# Patient Record
Sex: FEMALE | Race: WHITE | NOT HISPANIC OR LATINO | Employment: FULL TIME | URBAN - METROPOLITAN AREA
[De-identification: names, ages, dates, MRNs, and addresses within clinical notes are randomized per-mention and may not be internally consistent; named-entity substitution may affect disease eponyms.]

---

## 2017-03-14 ENCOUNTER — TRANSFERRED RECORDS (OUTPATIENT)
Dept: HEALTH INFORMATION MANAGEMENT | Facility: CLINIC | Age: 32
End: 2017-03-14

## 2017-08-11 ENCOUNTER — TRANSFERRED RECORDS (OUTPATIENT)
Dept: HEALTH INFORMATION MANAGEMENT | Facility: CLINIC | Age: 32
End: 2017-08-11

## 2018-05-09 ENCOUNTER — TRANSFERRED RECORDS (OUTPATIENT)
Dept: HEALTH INFORMATION MANAGEMENT | Facility: CLINIC | Age: 33
End: 2018-05-09

## 2018-05-09 LAB
ALT SERPL-CCNC: 13 IU/L (ref 0–32)
AST SERPL-CCNC: 15 IU/L (ref 0–40)
CREAT SERPL-MCNC: 0.57 MG/DL (ref 0.57–1)
GFR SERPL CREATININE-BSD FRML MDRD: 122 ML/MIN/1.73
GLUCOSE SERPL-MCNC: 82 MG/DL (ref 65–99)
POTASSIUM SERPL-SCNC: 3.9 MMOL/L (ref 3.5–5.2)
TSH SERPL-ACNC: 1.48 ULU/ML (ref 0.45–4.5)

## 2019-07-01 ENCOUNTER — TRANSFERRED RECORDS (OUTPATIENT)
Dept: HEALTH INFORMATION MANAGEMENT | Facility: CLINIC | Age: 34
End: 2019-07-01

## 2019-07-01 LAB — PAP SMEAR - HIM PATIENT REPORTED: NEGATIVE

## 2019-07-05 ENCOUNTER — THERAPY VISIT (OUTPATIENT)
Dept: PHYSICAL THERAPY | Facility: CLINIC | Age: 34
End: 2019-07-05
Payer: COMMERCIAL

## 2019-07-05 DIAGNOSIS — N81.89 PELVIC FLOOR WEAKNESS IN FEMALE: ICD-10-CM

## 2019-07-05 DIAGNOSIS — M62.89 PELVIC FLOOR DYSFUNCTION IN FEMALE: ICD-10-CM

## 2019-07-05 PROCEDURE — 97140 MANUAL THERAPY 1/> REGIONS: CPT | Mod: GP | Performed by: PHYSICAL THERAPIST

## 2019-07-05 PROCEDURE — 97161 PT EVAL LOW COMPLEX 20 MIN: CPT | Mod: GP | Performed by: PHYSICAL THERAPIST

## 2019-07-05 PROCEDURE — 97112 NEUROMUSCULAR REEDUCATION: CPT | Mod: GP | Performed by: PHYSICAL THERAPIST

## 2019-07-05 PROCEDURE — 97110 THERAPEUTIC EXERCISES: CPT | Mod: GP | Performed by: PHYSICAL THERAPIST

## 2019-07-05 PROCEDURE — 97535 SELF CARE MNGMENT TRAINING: CPT | Mod: GP | Performed by: PHYSICAL THERAPIST

## 2019-07-05 NOTE — LETTER
[facility]  [address] [address] [place] [id number]  [phone]    2019    Re: Griselda Bell   :   1985  MRN:  [id number]   REFERRING PHYSICIAN:   Homa Gonzales    [facility]    Date of Initial Evaluation:  2019  Visits:  Rxs Used: 1  Reason for Referral:     Pelvic floor dysfunction in female  Pelvic floor weakness in female  Postpartum care following vaginal delivery  Fourth degree perineal tear during delivery with  problem    EVALUATION SUMMARY    [facility] Initial Evaluation  Subjective:    Griselda Bell being seen for pelvic floor weakness.   Date of Onset: 19. Problem occurred: vaginal delivery with 4 th degree tear  and reported as 2/10 (groin) on pain scale. General health as reported by patient is excellent. Pertinent medical history includes:  None.   Other medical allergies details: shellfish.  Surgeries include:  Orthopedic surgery. Other surgery history details: left shoulder.  Current medications:  Anti-inflammatory.   Primary job tasks include:  Prolonged sitting and computer work.  Pain is described as aching  Pain is worse during the day. Since onset symptoms are unchanged.  Patient is . Restrictions include:  Working in normal job without restrictions.  Barriers include:  None as reported by patient.Red flags:  None as reported by patient.    Type of problem:  Pelvic dysfunction. Condition occurred with:  After pregnancy.    Problem details: MD order 19. - vaginal delivery with 4 th degree tear. Marisabel is getting back to normal routine-walking and she is nervous about running. She is terrified to have sex again, she feels some groin pain while sitting on toilet seat. She also spins on bike and not sure if it safe to bike again. She does not have UI or bowel issues currently. Just got her period back. She concern is to strengthen her pelvic floor and do her  recreational activities and make sure no pain with intercourse. She met with her OBGYN and was sent to PT for further management. .   Patient reports pain:  Groin. Symptoms are exacerbated by walking and relieved by NSAID's.    Objective:    Pelvic Dysfunction Evaluation:    Abdominal Wall:  normal  Pelvic Clock Exam:    Transverse Perineal:  +/-  Levator ANI:  +  Perineal Body:  ++  External Assessment:    Skin Condition:  Normal  Scars:  Tender and tear  Bearing Down/Coughing:  Normal  Tissue Symmetry:  Normal  Introitus:  Normal  Muscle Contraction/Perineal Mobility:  Elevation and urogential triangle descent and substitution  Internal Assessment:  Internal assessment pelvic: Laycock 2+/5/10/7. Tender to palaption around the scar tissue mostly to the left side.   Sensory Exam:  Normal  Contraction/Grade:  Weak squeeze, 2 second hold (2)  Accessory Muscle use-Abdominals:  After 5 sec hold with PFM contraction  Accessory Muscle use-Gluteals:  After 5 sec hold with PFM contraction  Accessory Muscle use-Adductors:  Minimal    Additional History:  Delivery History:  Vaginal delivery and tearing  Number of Pregnancies: 1  Number of Live Births: 1  Caffeine Consumption:  1 cup/day         Assessment/Plan:    Patient is a 34 year old female with pelvic complaints.    Patient has the following significant findings with corresponding treatment plan.                Diagnosis 1:  Pelvic floor dysfunction- PFM weakness  Pain -  hot/cold therapy, manual therapy, STS, self management, education and home program  Decreased strength - therapeutic exercise, therapeutic activities and home program  Decreased function - therapeutic activities and home program    Therapy Evaluation Codes:   1) History comprised of:   Personal factors that impact the plan of care:      Time since onset of symptoms.    Comorbidity factors that impact the plan of care are:      Cancer and Menopausal.     Medications impacting care: None.  2) Examination  of Body Systems comprised of:   Body structures and functions that impact the plan of care:      Pelvis.   Activity limitations that impact the plan of care are:      Lifting, Running and [place].  3) Clinical presentation characteristics are:   Stable/Uncomplicated.  4) Decision-Making    Low complexity using standardized patient assessment instrument and/or measureable assessment of functional outcome.  Cumulative Therapy Evaluation is: Low complexity.    Previous and current functional limitations:  (See Goal Flow Sheet for this information)    Short term and Long term goals: (See Goal Flow Sheet for this information)     Communication ability:  Patient appears to be able to clearly communicate and understand verbal and written communication and follow directions correctly.  Treatment Explanation - The following has been discussed with the patient:   RX ordered/plan of care  Anticipated outcomes  Possible risks and side effects  This patient would benefit from PT intervention to resume normal activities.   Rehab potential is good.    Frequency:  1 X week, once daily  Duration:  for 1 weeks for 3 weeks tapering to 2 X a month over 2 months  Discharge Plan:  Achieve all LTG.  Independent in home treatment program.  Reach maximal therapeutic benefit.    Thank you for your referral.    INQUIRIES  Therapist: Blaire Starr, PT  [address] [address]  Phone: [phone]  Fax: [phone]

## 2019-07-05 NOTE — PROGRESS NOTES
[facility] Initial Evaluation  Subjective:    Griselda Bell being seen for pelvic floor weakness.   Date of Onset: 19. Problem occurred: vaginal delivery with 4 th degree tear  and reported as 2/10 (groin) on pain scale. General health as reported by patient is excellent. Pertinent medical history includes:  None.   Other medical allergies details: shellfish.  Surgeries include:  Orthopedic surgery. Other surgery history details: left shoulder.  Current medications:  Anti-inflammatory.   Primary job tasks include:  Prolonged sitting and computer work.  Pain is described as aching  Pain is worse during the day. Since onset symptoms are unchanged.      Patient is . Restrictions include:  Working in normal job without restrictions.    Barriers include:  None as reported by patient.  Red flags:  None as reported by patient.  Type of problem:  Pelvic dysfunction   Condition occurred with:  After pregnancy.    Problem details: MD order 19. - vaginal delivery with 4 th degree tear. Marisabel is getting back to normal routine-walking and she is nervous about running. She is terrified to have sex again, she feels some groin pain while sitting on toilet seat. She also spins on bike and not sure if it safe to bike again. She does not have UI or bowel issues currently. Just got her period back. She concern is to strengthen her pelvic floor and do her recreational activities and make sure no pain with intercourse. She met with her OBGYN and was sent to PT for further management. .   Patient reports pain:  Groin.   Symptoms are exacerbated by walking and relieved by NSAID's.                      Objective:  System                                 Pelvic Dysfunction Evaluation:          Abdominal Wall:  normal        Pelvic Clock Exam:        Transverse Perineal:  +/-  Levator ANI:  +  Perineal Body:  ++      External Assessment:    Skin Condition:  Normal  Scars:  Tender and tear  Bearing  Down/Coughing:  Normal  Tissue Symmetry:  Normal  Introitus:  Normal  Muscle Contraction/Perineal Mobility:  Elevation and urogential triangle descent and substitution  Internal Assessment:  Internal assessment pelvic: Laycock 2+/5/10/7. Tender to palaption around the scar tissue mostly to the left side.   Sensory Exam:  Normal  Contraction/Grade:  Weak squeeze, 2 second hold (2)  Accessory Muscle use-Abdominals:  After 5 sec hold with PFM contraction  Accessory Muscle use-Gluteals:  After 5 sec hold with PFM contraction  Accessory Muscle use-Adductors:  Minimal    Additional History:  Delivery History:  Vaginal delivery and tearing  Number of Pregnancies: 1  Number of Live Births: 1  Caffeine Consumption:  1 cup/day                     General     ROS    Assessment/Plan:    Patient is a 34 year old female with pelvic complaints.    Patient has the following significant findings with corresponding treatment plan.                Diagnosis 1:  Pelvic floor dysfunction- PFM weakness  Pain -  hot/cold therapy, manual therapy, STS, self management, education and home program  Decreased strength - therapeutic exercise, therapeutic activities and home program  Decreased function - therapeutic activities and home program    Therapy Evaluation Codes:   1) History comprised of:   Personal factors that impact the plan of care:      Time since onset of symptoms.    Comorbidity factors that impact the plan of care are:      Cancer and Menopausal.     Medications impacting care: None.  2) Examination of Body Systems comprised of:   Body structures and functions that impact the plan of care:      Pelvis.   Activity limitations that impact the plan of care are:      Lifting, Running and [place].  3) Clinical presentation characteristics are:   Stable/Uncomplicated.  4) Decision-Making    Low complexity using standardized patient assessment instrument and/or measureable assessment of functional outcome.  Cumulative Therapy  Evaluation is: Low complexity.    Previous and current functional limitations:  (See Goal Flow Sheet for this information)    Short term and Long term goals: (See Goal Flow Sheet for this information)     Communication ability:  Patient appears to be able to clearly communicate and understand verbal and written communication and follow directions correctly.  Treatment Explanation - The following has been discussed with the patient:   RX ordered/plan of care  Anticipated outcomes  Possible risks and side effects  This patient would benefit from PT intervention to resume normal activities.   Rehab potential is good.    Frequency:  1 X week, once daily  Duration:  for 1 weeks for 3 weeks tapering to 2 X a month over 2 months  Discharge Plan:  Achieve all LTG.  Independent in home treatment program.  Reach maximal therapeutic benefit.    Please refer to the daily flowsheet for treatment today, total treatment time and time spent performing 1:1 timed codes.

## 2019-07-09 ENCOUNTER — THERAPY VISIT (OUTPATIENT)
Dept: PHYSICAL THERAPY | Facility: CLINIC | Age: 34
End: 2019-07-09
Payer: COMMERCIAL

## 2019-07-09 DIAGNOSIS — N81.89 PELVIC FLOOR WEAKNESS IN FEMALE: ICD-10-CM

## 2019-07-09 DIAGNOSIS — M62.89 PELVIC FLOOR DYSFUNCTION IN FEMALE: ICD-10-CM

## 2019-07-09 PROCEDURE — 97110 THERAPEUTIC EXERCISES: CPT | Mod: GP | Performed by: PHYSICAL THERAPIST

## 2019-07-09 PROCEDURE — 97140 MANUAL THERAPY 1/> REGIONS: CPT | Mod: GP | Performed by: PHYSICAL THERAPIST

## 2019-07-09 PROCEDURE — 97112 NEUROMUSCULAR REEDUCATION: CPT | Mod: GP | Performed by: PHYSICAL THERAPIST

## 2019-07-17 ENCOUNTER — THERAPY VISIT (OUTPATIENT)
Dept: PHYSICAL THERAPY | Facility: CLINIC | Age: 34
End: 2019-07-17
Payer: COMMERCIAL

## 2019-07-17 DIAGNOSIS — M62.89 PELVIC FLOOR DYSFUNCTION IN FEMALE: ICD-10-CM

## 2019-07-17 DIAGNOSIS — N81.89 PELVIC FLOOR WEAKNESS IN FEMALE: ICD-10-CM

## 2019-07-17 PROCEDURE — 97140 MANUAL THERAPY 1/> REGIONS: CPT | Mod: GP | Performed by: PHYSICAL THERAPIST

## 2019-07-17 PROCEDURE — 97112 NEUROMUSCULAR REEDUCATION: CPT | Mod: GP | Performed by: PHYSICAL THERAPIST

## 2019-08-02 ENCOUNTER — THERAPY VISIT (OUTPATIENT)
Dept: PHYSICAL THERAPY | Facility: CLINIC | Age: 34
End: 2019-08-02
Payer: COMMERCIAL

## 2019-08-02 DIAGNOSIS — M62.89 PELVIC FLOOR DYSFUNCTION IN FEMALE: ICD-10-CM

## 2019-08-02 DIAGNOSIS — N81.89 PELVIC FLOOR WEAKNESS IN FEMALE: ICD-10-CM

## 2019-08-02 PROCEDURE — 97140 MANUAL THERAPY 1/> REGIONS: CPT | Mod: GP | Performed by: PHYSICAL THERAPIST

## 2019-08-02 PROCEDURE — 97112 NEUROMUSCULAR REEDUCATION: CPT | Mod: GP | Performed by: PHYSICAL THERAPIST

## 2019-08-02 PROCEDURE — 97110 THERAPEUTIC EXERCISES: CPT | Mod: GP | Performed by: PHYSICAL THERAPIST

## 2019-09-13 ENCOUNTER — THERAPY VISIT (OUTPATIENT)
Dept: PHYSICAL THERAPY | Facility: CLINIC | Age: 34
End: 2019-09-13
Payer: COMMERCIAL

## 2019-09-13 DIAGNOSIS — N81.89 PELVIC FLOOR WEAKNESS IN FEMALE: ICD-10-CM

## 2019-09-13 DIAGNOSIS — M62.89 PELVIC FLOOR DYSFUNCTION IN FEMALE: ICD-10-CM

## 2019-09-13 PROCEDURE — 97110 THERAPEUTIC EXERCISES: CPT | Mod: GP | Performed by: PHYSICAL THERAPIST

## 2019-09-13 NOTE — PROGRESS NOTES
Subjective:  HPI                    Objective:  System    Physical Exam    General     ROS    Assessment/Plan:    PROGRESS  REPORT    Progress reporting period is from 7/5/19 to 9/13/19.       SUBJECTIVE  Subjective changes noted by patient:   Subjective: Marisabel was concerned about seeing     Current pain level is   .     Previous pain level was    .   Changes in function:  Yes (See Goal flowsheet attached for changes in current functional level)  Adverse reaction to treatment or activity: None    OBJECTIVE  Changes noted in objective findings:  Yes,   Objective: Laycock 3+/10/10/10. Improved isolation and awareness of PFM demonstrated.      ASSESSMENT/PLAN  Updated problem list and treatment plan: Diagnosis 1:  PFD    STG/LTGs have been met or progress has been made towards goals:  Yes (See Goal flow sheet completed today.)  Assessment of Progress: The patient's condition is improving.  The patient's condition has potential to improve.  The patient has met all of their long term goals.  Self Management Plans:  Patient is independent in a home treatment program.  Patient is independent in self management of symptoms.  I have re-evaluated this patient and find that the nature, scope, duration and intensity of the therapy is appropriate for the medical condition of the patient.  Griselda continues to require the following intervention to meet STG and LTG's:  PT intervention is no longer required to meet STG/LTG.    Recommendations:  This patient is ready to be discharged from therapy and continue their home treatment program.    Please refer to the daily flowsheet for treatment today, total treatment time and time spent performing 1:1 timed codes.

## 2019-11-06 ENCOUNTER — TELEPHONE (OUTPATIENT)
Dept: FAMILY MEDICINE | Facility: CLINIC | Age: 34
End: 2019-11-06

## 2019-11-06 NOTE — TELEPHONE ENCOUNTER
Reason for Call:  Other returning call    Detailed comments: Pt did return call and stated she wanted to have the appt as a Pre op. Appt notes were switched over to pre op. Thank you.    Phone Number Patient can be reached at: Home number on file [phone] (home)    Best Time: Any    Can we leave a detailed message on this number? NO    Call taken on 11/6/2019 at 1:30 PM by Nan Schroeder

## 2019-11-06 NOTE — TELEPHONE ENCOUNTER
This writer attempted to contact pt on 11/06/19      Reason for call relay message and left message.      If patient calls back:   On 11/7/19 pt is scheduled for a preop and a physical at 1120 with Dr. Garcia,  We can not do both visits at one time has to be either for a AFE or a preop.        Winter Muller MA

## 2019-11-07 ENCOUNTER — OFFICE VISIT (OUTPATIENT)
Dept: FAMILY MEDICINE | Facility: CLINIC | Age: 34
End: 2019-11-07
Payer: COMMERCIAL

## 2019-11-07 VITALS
BODY MASS INDEX: 29.98 KG/M2 | WEIGHT: 191 LBS | RESPIRATION RATE: 16 BRPM | HEIGHT: 67 IN | OXYGEN SATURATION: 98 % | TEMPERATURE: 97.9 F | HEART RATE: 75 BPM | DIASTOLIC BLOOD PRESSURE: 81 MMHG | SYSTOLIC BLOOD PRESSURE: 117 MMHG

## 2019-11-07 DIAGNOSIS — Z91.013 SHELLFISH ALLERGY: ICD-10-CM

## 2019-11-07 DIAGNOSIS — Z01.818 PRE-OPERATIVE EXAMINATION: Primary | ICD-10-CM

## 2019-11-07 LAB — HCG UR QL: NEGATIVE

## 2019-11-07 PROCEDURE — 99204 OFFICE O/P NEW MOD 45 MIN: CPT | Performed by: FAMILY MEDICINE

## 2019-11-07 PROCEDURE — 81025 URINE PREGNANCY TEST: CPT | Performed by: FAMILY MEDICINE

## 2019-11-07 RX ORDER — ESCITALOPRAM OXALATE 10 MG/1
TABLET ORAL
COMMUNITY
Start: 2019-10-31 | End: 2021-04-19

## 2019-11-07 SDOH — HEALTH STABILITY: MENTAL HEALTH: HOW OFTEN DO YOU HAVE A DRINK CONTAINING ALCOHOL?: 2-4 TIMES A MONTH

## 2019-11-07 ASSESSMENT — MIFFLIN-ST. JEOR: SCORE: 1591.06

## 2019-11-07 NOTE — PROGRESS NOTES
[address] [address]  [phone]  Dept: [phone]    PRE-OP EVALUATION:  Today's date: 2019  New Patient/Transfer of Care from [facility]     Griselda Bell (: 1985) presents for pre-operative evaluation assessment as requested by Diana Coy.  She requires evaluation and anesthesia risk assessment prior to undergoing surgery/procedure for treatment of  Vaginal gas/?stool, ? fistula .    Proposed Surgery/ Procedure: EXAM UNDER ANESTHESIA, RECTAL VAGINAL FISTULA  Date of Surgery/ Procedure: 19  Time of Surgery/ Procedure: 10:10AM  Hospital/Surgical [facility]: [facility]   Fax number for surgical facility: not needed   Primary Physician: Za Buckley  Type of Anesthesia Anticipated: Monitor Anesthesia Care    Patient has a Health Care Directive or Living Will:  NO    1. NO - Do you have a history of heart attack, stroke, stent, bypass or surgery on an artery in the head, neck, heart or legs?  2. NO - Do you ever have any pain or discomfort in your chest?  3. NO - Do you have a history of  Heart Failure?  4. NO - Are you troubled by shortness of breath when: walking on the level, up a slight hill or at night?  5. NO - Do you currently have a cold, bronchitis or other respiratory infection?  6. NO - Do you have a cough, shortness of breath or wheezing?  7. NO - Do you sometimes get pains in the calves of your legs when you walk?  8. NO - Do you or anyone in your family have previous history of blood clots?  9. NO - Do you or does anyone in your family have a serious bleeding problem such as prolonged bleeding following surgeries or cuts?  10. YES - HAVE YOU EVER HAD PROBLEMS WITH ANEMIA OR BEEN TOLD TO TAKE IRON PILLS? 10 years ago had to take iron pills, no anemia since. Pt doesn't remember the reason why she was anemic.   11. NO - Have you had any abnormal blood loss such as black, tarry or bloody  stools, or abnormal vaginal bleeding?  12. NO - Have you ever had a blood transfusion?  13. NO - Have you or any of your relatives ever had problems with anesthesia?  14. NO - Do you have sleep apnea, excessive snoring or daytime drowsiness?  15. NO - Do you have any prosthetic heart valves?  16. NO - Do you have prosthetic joints?  17. NO - Is there any chance that you may be pregnant?      HPI:     HPI related to upcoming procedure: In April 2019 pt gave birth and experienced a fourth degree tear. She is not having pain or bowel incontinence, but questions if there has been times stool/gas has come our of her vagina. Has exploratory surgery scheduled to investigate the possibility of a rectal vaginal fistula.     See problem list for active medical problems.  Problems all longstanding and stable, except as noted/documented.  See ROS for pertinent symptoms related to these conditions.      MEDICAL HISTORY:     Patient Active Problem List    Diagnosis Date Noted     Post partum depression 11/07/2019     Priority: Medium      History reviewed. No pertinent past medical history.  Past Surgical History:   Procedure Laterality Date     ARTHROSCOPY SHOULDER RT/LT  2006    labral repair     LEEP TX, CERVICAL  2015?     Current Outpatient Medications   Medication Sig Dispense Refill     escitalopram (LEXAPRO) 10 MG tablet        Prenatal Multivit-Min-Fe-FA (PRENATAL VITAMINS PO)        OTC products: None, except as noted above    No Known Allergies   Latex Allergy: NO    Social History     Tobacco Use     Smoking status: Never Smoker     Smokeless tobacco: Never Used   Substance Use Topics     Alcohol use: Yes     Frequency: 2-4 times a month     Comment: 1 drink when does have etoh     History   Drug Use Unknown       REVIEW OF SYSTEMS:   CONSTITUTIONAL: NEGATIVE for fever, chills, change in weight. About 1 week ago pt had a URI. She is feeling better now, all symptoms resolved.   INTEGUMENTARY/SKIN: NEGATIVE for  "worrisome rashes, moles or lesions. Has had food allergy testing in the past that showed an allergen to shellfish. She has noticed a swelling in her upper lip when she has eaten scallops in the past. No recent exposure or sx's.   EYES: NEGATIVE for vision changes or irritation  ENT/MOUTH: deviated septum. NEGATIVE for ear, mouth and throat problems  RESP: NEGATIVE for significant cough or SOB  BREAST: NEGATIVE for masses, tenderness or discharge. Pt is currently breast feeding.   CV: NEGATIVE for chest pain, palpitations or peripheral edema  GI: See HPI. NEGATIVE for nausea, abdominal pain, heartburn, or change in bowel habits  : NEGATIVE for frequency, dysuria, or hematuria. Pt consistently uses condoms, is not on an OCP. Has been getting irregular periods since birth.   MUSCULOSKELETAL: NEGATIVE for significant arthralgias or myalgia  NEURO: NEGATIVE for weakness, dizziness or paresthesias  ENDOCRINE: NEGATIVE for temperature intolerance, skin/hair changes  HEME: NEGATIVE for bleeding problems   PSYCHIATRIC: NEGATIVE for changes in mood or affect. Has been taking Lexapro for post partum depression, pt thinks this has a large part to do with the tear she experienced. It is working well for her. Plans to continue taking it until her baby is 1 year old and then will re-assess her mood with her obgyn.     This document serves as a record of the services and decisions personally performed by FRANKLIN GONZALEZ. It was created on his/her behalf by Yaquelin Rodriguez, a trained medical scribe. The creation of this document is based on the provider's statements to the medical scribe. Yaquelin Rodriguez, November 7, 2019 12:17 PM     EXAM:   /81 (BP Location: Right arm, Patient Position: Sitting, Cuff Size: Adult Large)   Pulse 75   Temp 97.9  F (36.6  C) (Oral)   Resp 16   Ht 1.689 m (5' 6.5\")   Wt 86.6 kg (191 lb)   SpO2 98%   BMI 30.37 kg/m      GENERAL APPEARANCE: healthy, alert and no distress     EYES: " EOMI, PERRL     HENT: air fluid level seen behind right TM, otherwise ear canals and TM's normal and nose and mouth without ulcers or lesions     NECK: no adenopathy, no asymmetry, masses, or scars and thyroid normal to palpation     RESP: lungs clear to auscultation - no rales, rhonchi or wheezes     CV: regular rates and rhythm, normal S1 S2, no S3 or S4 and no murmur, click or rub     ABDOMEN:  soft, nontender, no HSM or masses and bowel sounds normal     MS: extremities normal- no gross deformities noted, no evidence of inflammation in joints, FROM in all extremities.     SKIN: no suspicious lesions or rashes     NEURO: Normal strength and tone, sensory exam grossly normal, mentation intact and speech normal     PSYCH: mentation appears normal. and affect normal/bright     LYMPHATICS: No cervical adenopathy    DIAGNOSTICS:   No labs or EKG required for low risk surgery (cataract, skin procedure, breast biopsy, etc)    Results for orders placed or performed in visit on 11/07/19   HCG Qual, Urine ([id number])     Status: None   Result Value Ref Range    HCG Qual Urine Negative NEG^Negative     IMPRESSION:   Reason for surgery/procedure: ? Of rectovaginal fistula    The proposed surgical procedure is considered LOW risk.    REVISED CARDIAC RISK INDEX  The patient has the following serious cardiovascular risks for perioperative complications such as (MI, PE, VFib and 3  AV Block):  No serious cardiac risks  INTERPRETATION: 0 risks: Class I (very low risk - 0.4% complication rate)    The patient has the following additional risks for perioperative complications:  No identified additional risks      ICD-10-CM    1. Obstetric vaginal laceration with fourth degree perineal laceration O70.3 HCG Qual, Urine ([id number])   2. Post partum depression O99.345     F53.0    3. Shellfish allergy Z91.013 ALLERGY/ASTHMA ADULT REFERRAL   4. Breast feeding status of mother Z39.1    5. Pre-operative examination Z01.818 HCG Qual, Urine  ([id number])       RECOMMENDATIONS:     --Patient is to take all scheduled medications on the day of surgery EXCEPT for modifications listed below. Patient takes her lexapro at  and will do so night prior to surgery    APPROVAL GIVEN to proceed with proposed procedure, without further diagnostic evaluation     Patient Instructions     Check with your surgery team about recommendations for breast feeding/pumping after your surgery.    Do not take NSAIDs (ibuprofen, Aleve, naproxen, Advil, aspirin) the week before surgery. Tylenol is fine for pain.     On the morning of the procedure do not take any vitamins or medications.     At [facility], we strive to deliver an exceptional experience to you, every time we see you. If you receive a survey, please complete it as we do value your feedback.  If you have MyChart, you can expect to receive results automatically within 24 hours of their completion.  Your provider will send a note interpreting your results as well.   If you do not have MyChart, you should receive your results in about a week by mail.    Your care team:     Family Medicine   KEM Brewer MD Emily Bunt, APRN [facility]   S. MD Leonila Brantley MD Angela Werm[place]kirc[place], MD    Internal Medicine  Carlos Fuentes MD coming 2020     Clinic hours: Monday - Wednesday 7 am-7 pm   Thursdays and Fridays 7 am-5 pm.     [facility]: Monday - Friday 11 am-9 pm,   Saturday and Sunday 9 am-5 pm.    [facility]: Monday -Thursday 8 am-8 pm; Friday 8 am-6 pm; Saturday and Sunday 9 am-5 pm.     [facility]: Monday - Thursday 8 am - 7 pm; Friday 8 am - 6 pm    Clinic: [phone]   [facility]: [phone]   [facility]: [phone]                The information in this document, created by the medical scribe for me, accurately reflects the services I  personally performed and the decisions made by me. I have reviewed and approved this document for accuracy.   Signed Electronically by: Za Buckley MD    Copy of this evaluation report is provided to requesting physician.    [place] Preop Guidelines    Revised Cardiac Risk Index

## 2019-11-07 NOTE — PATIENT INSTRUCTIONS
Check with your surgery team about recommendations for breast feeding/pumping after your surgery.    Do not take NSAIDs (ibuprofen, Aleve, naproxen, Advil, aspirin) the week before surgery. Tylenol is fine for pain.     On the morning of the procedure do not take any vitamins or medications.     At [facility], we strive to deliver an exceptional experience to you, every time we see you. If you receive a survey, please complete it as we do value your feedback.  If you have MyChart, you can expect to receive results automatically within 24 hours of their completion.  Your provider will send a note interpreting your results as well.   If you do not have MyChart, you should receive your results in about a week by mail.    Your care team:     Family Medicine   KEM Brewer MD Emily Bunt, APRTANNER [facility]   S. MD Leonila Brantley MD Angela Werm[place]hen, MD    Internal Medicine  Carlos Fuentes MD [place] [id number]     Clinic hours: Monday - Wednesday 7 am-7 pm   Thursdays and Fridays 7 am-5 pm.     [facility]: Monday - Friday 11 am-9 pm,   Saturday and Sunday 9 am-5 pm.    [facility]: Monday -Thursday 8 am-8 pm; Friday 8 am-6 pm; Saturday and Sunday 9 am-5 pm.     [facility]: Monday - Thursday 8 am - 7 pm; Friday 8 am - 6 pm    Clinic: [phone]   [facility]: [phone] [facility]: [phone]

## 2019-11-13 ENCOUNTER — ANESTHESIA EVENT (OUTPATIENT)
Dept: SURGERY | Facility: CLINIC | Age: 34
End: 2019-11-13
Payer: COMMERCIAL

## 2019-11-14 ENCOUNTER — HOSPITAL ENCOUNTER (OUTPATIENT)
Facility: CLINIC | Age: 34
Discharge: HOME | End: 2019-11-14
Attending: COLON & RECTAL SURGERY | Admitting: COLON & RECTAL SURGERY
Payer: COMMERCIAL

## 2019-11-14 ENCOUNTER — ANESTHESIA (OUTPATIENT)
Dept: SURGERY | Facility: CLINIC | Age: 34
End: 2019-11-14
Payer: COMMERCIAL

## 2019-11-14 VITALS
HEART RATE: 71 BPM | DIASTOLIC BLOOD PRESSURE: 73 MMHG | HEIGHT: 66 IN | SYSTOLIC BLOOD PRESSURE: 116 MMHG | RESPIRATION RATE: 16 BRPM | TEMPERATURE: 97.2 F | BODY MASS INDEX: 30.7 KG/M2 | WEIGHT: 191 LBS | OXYGEN SATURATION: 100 %

## 2019-11-14 LAB — HCG UR QL: NEGATIVE

## 2019-11-14 PROCEDURE — 36000046 ZZH SURGERY LEVEL 1 EA 15 ADDTL MIN: Performed by: COLON & RECTAL SURGERY

## 2019-11-14 PROCEDURE — 25000125 ZZHC RX 250: Performed by: COLON & RECTAL SURGERY

## 2019-11-14 PROCEDURE — 25000128 H RX IP 250 OP 636: Performed by: NURSE ANESTHETIST, CERTIFIED REGISTERED

## 2019-11-14 PROCEDURE — 71000012 ZZH RECOVERY PHASE 1 LEVEL 1 FIRST HR: Performed by: COLON & RECTAL SURGERY

## 2019-11-14 PROCEDURE — 25000132 ZZH RX MED GY IP 250 OP 250 PS 637: Performed by: COLON & RECTAL SURGERY

## 2019-11-14 PROCEDURE — 81025 URINE PREGNANCY TEST: CPT | Performed by: COLON & RECTAL SURGERY

## 2019-11-14 PROCEDURE — 71000027 ZZH RECOVERY PHASE 2 EACH 15 MINS: Performed by: COLON & RECTAL SURGERY

## 2019-11-14 PROCEDURE — 25800030 ZZH RX IP 258 OP 636: Performed by: NURSE ANESTHETIST, CERTIFIED REGISTERED

## 2019-11-14 PROCEDURE — 36000044 ZZH SURGERY LEVEL 1 1ST 30 MIN: Performed by: COLON & RECTAL SURGERY

## 2019-11-14 PROCEDURE — 25000125 ZZHC RX 250: Performed by: NURSE ANESTHETIST, CERTIFIED REGISTERED

## 2019-11-14 PROCEDURE — 37000009 ZZH ANESTHESIA TECHNICAL FEE, EACH ADDTL 15 MIN: Performed by: COLON & RECTAL SURGERY

## 2019-11-14 PROCEDURE — 27210794 ZZH OR GENERAL SUPPLY STERILE: Performed by: COLON & RECTAL SURGERY

## 2019-11-14 PROCEDURE — 37000008 ZZH ANESTHESIA TECHNICAL FEE, 1ST 30 MIN: Performed by: COLON & RECTAL SURGERY

## 2019-11-14 PROCEDURE — 40000170 ZZH STATISTIC PRE-PROCEDURE ASSESSMENT II: Performed by: COLON & RECTAL SURGERY

## 2019-11-14 RX ORDER — FENTANYL CITRATE 50 UG/ML
25-50 INJECTION, SOLUTION INTRAMUSCULAR; INTRAVENOUS
Status: DISCONTINUED | OUTPATIENT
Start: 2019-11-14 | End: 2019-11-14 | Stop reason: HOSPADM

## 2019-11-14 RX ORDER — ONDANSETRON 2 MG/ML
INJECTION INTRAMUSCULAR; INTRAVENOUS PRN
Status: DISCONTINUED | OUTPATIENT
Start: 2019-11-14 | End: 2019-11-14

## 2019-11-14 RX ORDER — DEXAMETHASONE SODIUM PHOSPHATE 4 MG/ML
4 INJECTION, SOLUTION INTRA-ARTICULAR; INTRALESIONAL; INTRAMUSCULAR; INTRAVENOUS; SOFT TISSUE EVERY 10 MIN PRN
Status: DISCONTINUED | OUTPATIENT
Start: 2019-11-14 | End: 2019-11-14 | Stop reason: HOSPADM

## 2019-11-14 RX ORDER — LABETALOL HYDROCHLORIDE 5 MG/ML
10 INJECTION, SOLUTION INTRAVENOUS
Status: DISCONTINUED | OUTPATIENT
Start: 2019-11-14 | End: 2019-11-14 | Stop reason: HOSPADM

## 2019-11-14 RX ORDER — MEPERIDINE HYDROCHLORIDE 25 MG/ML
12.5 INJECTION INTRAMUSCULAR; INTRAVENOUS; SUBCUTANEOUS
Status: DISCONTINUED | OUTPATIENT
Start: 2019-11-14 | End: 2019-11-14 | Stop reason: HOSPADM

## 2019-11-14 RX ORDER — PROPOFOL 10 MG/ML
INJECTION, EMULSION INTRAVENOUS PRN
Status: DISCONTINUED | OUTPATIENT
Start: 2019-11-14 | End: 2019-11-14

## 2019-11-14 RX ORDER — SODIUM CHLORIDE, SODIUM LACTATE, POTASSIUM CHLORIDE, CALCIUM CHLORIDE 600; 310; 30; 20 MG/100ML; MG/100ML; MG/100ML; MG/100ML
INJECTION, SOLUTION INTRAVENOUS CONTINUOUS
Status: DISCONTINUED | OUTPATIENT
Start: 2019-11-14 | End: 2019-11-14 | Stop reason: HOSPADM

## 2019-11-14 RX ORDER — HYDRALAZINE HYDROCHLORIDE 20 MG/ML
2.5-5 INJECTION INTRAMUSCULAR; INTRAVENOUS EVERY 10 MIN PRN
Status: DISCONTINUED | OUTPATIENT
Start: 2019-11-14 | End: 2019-11-14 | Stop reason: HOSPADM

## 2019-11-14 RX ORDER — ACETAMINOPHEN 325 MG/1
975 TABLET ORAL ONCE
Status: COMPLETED | OUTPATIENT
Start: 2019-11-14 | End: 2019-11-14

## 2019-11-14 RX ORDER — KETOROLAC TROMETHAMINE 30 MG/ML
30 INJECTION, SOLUTION INTRAMUSCULAR; INTRAVENOUS EVERY 6 HOURS PRN
Status: DISCONTINUED | OUTPATIENT
Start: 2019-11-14 | End: 2019-11-14 | Stop reason: HOSPADM

## 2019-11-14 RX ORDER — MAGNESIUM HYDROXIDE 1200 MG/15ML
LIQUID ORAL PRN
Status: DISCONTINUED | OUTPATIENT
Start: 2019-11-14 | End: 2019-11-14 | Stop reason: HOSPADM

## 2019-11-14 RX ORDER — DEXAMETHASONE SODIUM PHOSPHATE 4 MG/ML
INJECTION, SOLUTION INTRA-ARTICULAR; INTRALESIONAL; INTRAMUSCULAR; INTRAVENOUS; SOFT TISSUE PRN
Status: DISCONTINUED | OUTPATIENT
Start: 2019-11-14 | End: 2019-11-14

## 2019-11-14 RX ORDER — LIDOCAINE HYDROCHLORIDE 20 MG/ML
INJECTION, SOLUTION INFILTRATION; PERINEURAL PRN
Status: DISCONTINUED | OUTPATIENT
Start: 2019-11-14 | End: 2019-11-14

## 2019-11-14 RX ORDER — ONDANSETRON 4 MG/1
4 TABLET, ORALLY DISINTEGRATING ORAL EVERY 30 MIN PRN
Status: DISCONTINUED | OUTPATIENT
Start: 2019-11-14 | End: 2019-11-14 | Stop reason: HOSPADM

## 2019-11-14 RX ORDER — PROPOFOL 10 MG/ML
INJECTION, EMULSION INTRAVENOUS CONTINUOUS PRN
Status: DISCONTINUED | OUTPATIENT
Start: 2019-11-14 | End: 2019-11-14

## 2019-11-14 RX ORDER — SODIUM CHLORIDE, SODIUM LACTATE, POTASSIUM CHLORIDE, CALCIUM CHLORIDE 600; 310; 30; 20 MG/100ML; MG/100ML; MG/100ML; MG/100ML
INJECTION, SOLUTION INTRAVENOUS CONTINUOUS PRN
Status: DISCONTINUED | OUTPATIENT
Start: 2019-11-14 | End: 2019-11-14

## 2019-11-14 RX ORDER — HYDROMORPHONE HYDROCHLORIDE 1 MG/ML
.3-.5 INJECTION, SOLUTION INTRAMUSCULAR; INTRAVENOUS; SUBCUTANEOUS EVERY 10 MIN PRN
Status: DISCONTINUED | OUTPATIENT
Start: 2019-11-14 | End: 2019-11-14 | Stop reason: HOSPADM

## 2019-11-14 RX ORDER — FENTANYL CITRATE 50 UG/ML
INJECTION, SOLUTION INTRAMUSCULAR; INTRAVENOUS PRN
Status: DISCONTINUED | OUTPATIENT
Start: 2019-11-14 | End: 2019-11-14

## 2019-11-14 RX ORDER — NALOXONE HYDROCHLORIDE 0.4 MG/ML
.1-.4 INJECTION, SOLUTION INTRAMUSCULAR; INTRAVENOUS; SUBCUTANEOUS
Status: DISCONTINUED | OUTPATIENT
Start: 2019-11-14 | End: 2019-11-14 | Stop reason: HOSPADM

## 2019-11-14 RX ORDER — ONDANSETRON 2 MG/ML
4 INJECTION INTRAMUSCULAR; INTRAVENOUS EVERY 30 MIN PRN
Status: DISCONTINUED | OUTPATIENT
Start: 2019-11-14 | End: 2019-11-14 | Stop reason: HOSPADM

## 2019-11-14 RX ADMIN — DEXAMETHASONE SODIUM PHOSPHATE 4 MG: 4 INJECTION, SOLUTION INTRA-ARTICULAR; INTRALESIONAL; INTRAMUSCULAR; INTRAVENOUS; SOFT TISSUE at 10:32

## 2019-11-14 RX ADMIN — FENTANYL CITRATE 50 MCG: 50 INJECTION, SOLUTION INTRAMUSCULAR; INTRAVENOUS at 10:30

## 2019-11-14 RX ADMIN — PROPOFOL 150 MCG/KG/MIN: 10 INJECTION, EMULSION INTRAVENOUS at 10:28

## 2019-11-14 RX ADMIN — LIDOCAINE HYDROCHLORIDE 40 MG: 20 INJECTION, SOLUTION INFILTRATION; PERINEURAL at 10:28

## 2019-11-14 RX ADMIN — SODIUM CHLORIDE, POTASSIUM CHLORIDE, SODIUM LACTATE AND CALCIUM CHLORIDE: 600; 310; 30; 20 INJECTION, SOLUTION INTRAVENOUS at 10:27

## 2019-11-14 RX ADMIN — ONDANSETRON 4 MG: 2 INJECTION INTRAMUSCULAR; INTRAVENOUS at 10:36

## 2019-11-14 RX ADMIN — ACETAMINOPHEN 975 MG: 325 TABLET, FILM COATED ORAL at 09:06

## 2019-11-14 RX ADMIN — PROPOFOL 30 MG: 10 INJECTION, EMULSION INTRAVENOUS at 10:44

## 2019-11-14 RX ADMIN — MIDAZOLAM 2 MG: 1 INJECTION INTRAMUSCULAR; INTRAVENOUS at 10:25

## 2019-11-14 ASSESSMENT — MIFFLIN-ST. JEOR: SCORE: 1583.12

## 2019-11-14 ASSESSMENT — LIFESTYLE VARIABLES: TOBACCO_USE: 0

## 2019-11-14 NOTE — BRIEF OP NOTE
[facility]    Brief Operative Note    Pre-operative diagnosis: Rectovaginal fistula [N82.3]  Post-operative diagnosis rectovaginal fistula    Procedure: Procedure(s):  EXAM UNDER ANESTHESIA  Surgeon: Surgeon(s) and Role:     * Diana Steward MD - Primary     * Gaby Cabrera MD - Fellow - Assisting  Anesthesia: Monitor Anesthesia Care   Estimated blood loss: Minimal  Drains: None  Specimens: * No specimens in log *  Findings:   Midline anterior rectovaginal fistula at the dentate line  Complications: None.  Implants: * No implants in log *

## 2019-11-14 NOTE — DISCHARGE INSTRUCTIONS
Discharge Instructions Following Anorectal Surgery  [facility]  [phone]  Medication:     You may be prescribed a narcotic pain relievers such as: Vicodin, Percocet, and Tylenol # 3.  You should reduce your use of these medications as your pain improves.  You may be prescribed an anal ointment (such as Americain, Tronothane, or Xylocaine). Apply the ointment to the anal area with your finger, then cover the area with cotton or gauze.  Stool softeners (Miralax) are to be taken in a glass of water once in the morning with increased water intake throughout the day.   Bowel function:   It is important to have soft bowel movements at least every other day and to keep your stool soft. Constipation and/or diarrhea can make the pain worse and must be avoided.   Constipation:  You should have a bowel movement at least every other day.  If two days pass without one, take one tablespoon of milk of magnesia; if there is no result, repeat this dose in six hours.   Diarrhea:  Diarrhea, usually caused by the overuse of laxatives, is also a concern. If you have more than three watery bowel movements during a 24 hour period, stop taking milk of magnesia or laxatives.  If diarrhea persists, call your doctor.   Bathing:  After bowel movements, use a wet washcloth, toilet paper or cotton to clean yourself.  If possible take a sitz bath or tub bath immediately. Baths should last between 10-15 minutes with the water as warm as you can comfortably tolerate. Try to take at least three sitz baths (or showers with hand-held sprayer) every day.   Discharge/Infection:  Bloody discharge after bowel movements is normal and may last 2 to 4 weeks after your surgery. However, if you have bleeding between bowel movements that doesn't stop, call the office.  Urination:  If you have trouble urinating, do so while sitting in a tub of water, or run the water faucet while sitting on the toilet. If you are unable to urinate  6-8 hours after surgery, call the office.  Diet:  A high fiber diet, including at least four servings of fruits or vegetables daily, will help to keep your bowel movements regular and soft. It is important to drink six to eight glasses of water or juice everyday when using fiber products.   Activity:    Activity as tolerated. After some surgeries, you may be told not to perform any lifting (more than 10 pounds) for several weeks after surgery.    Call the office if you have:  Fever greater than 101   Chills   Foul-smelling drainage   Nausea and vomiting   Diarrhea - greater than 3 water stools in 24 hours   Constipation - no bowel movement for 3 days   Severe bleeding that does not stop soon after a bowel movement   Problems with the incision, including increased pain, swelling, redness, or drainage.  Difficulty urinating        Same Day Surgery Discharge Instructions for  Sedation and General Anesthesia       It's not unusual to feel dizzy, light-headed or faint for up to 24 hours after surgery or while taking pain medication.  If you have these symptoms: sit for a few minutes before standing and have someone assist you when you get up to walk or use the bathroom.      You should rest and relax for the next 24 hours. We recommend you make arrangements to have an adult stay with you for at least 24 hours after your discharge.  Avoid hazardous and strenuous activity.      DO NOT DRIVE any vehicle or operate mechanical equipment for 24 hours following the end of your surgery.  Even though you may feel normal, your reactions may be affected by the medication you have received.      Do not drink alcoholic beverages for 24 hours following surgery.       Slowly progress to your regular diet as you feel able. It's not unusual to feel nauseated and/or vomit after receiving anesthesia.  If you develop these symptoms, drink clear liquids (apple juice, ginger ale, broth, 7-up, etc. ) until you feel better.  If your nausea and  vomiting persists for 24 hours, please notify your surgeon.        All narcotic pain medications, along with inactivity and anesthesia, can cause constipation. Drinking plenty of liquids and increasing fiber intake will help.      For any questions of a medical nature, call your surgeon.      Do not make important decisions for 24 hours.      If you feel your pain is not well managed with the pain medications prescribed by your surgeon, please contact your surgeon's office to let them know so they can address your concerns.       Today you were given 975 mg of Tylenol at 0910. The recommended daily maximum dose is 4000 mg.     **If you have questions or concerns about your procedure,  call Dr. Steward at [phone]**

## 2019-11-14 NOTE — ANESTHESIA PREPROCEDURE EVALUATION
"Anesthesia Pre-Procedure Evaluation    Patient: Griselda Bell   MRN: [id number] : 1985          Preoperative Diagnosis: Rectovaginal fistula [N82.3]    Procedure(s):  EXAM UNDER ANESTHESIA, RECTAL VAGINAL FISTULA    Past Medical History:   Diagnosis Date     Obese      Past Surgical History:   Procedure Laterality Date     ARTHROSCOPY SHOULDER RT/LT  2006    labral repair     LEEP [place], CERVICAL  2015?       Anesthesia Evaluation     . Pt has had prior anesthetic.     No history of anesthetic complications          ROS/MED HX    ENT/Pulmonary:      (-) tobacco use, asthma and sleep apnea   Neurologic:  - neg neurologic ROS     Cardiovascular:  - neg cardiovascular ROS      (-) hypertension   METS/Exercise Tolerance:     Hematologic:         Musculoskeletal:         GI/Hepatic:  - neg GI/hepatic ROS      (-) GERD   Renal/Genitourinary:  - ROS Renal section negative   (+) Other Renal/ Genitourinary, rectovaginal fistula      Endo:     (+) Obesity, .      Psychiatric:         Infectious Disease:         Malignancy:         Other:                          Physical Exam  Normal systems: cardiovascular, pulmonary and dental    Airway   Mallampati: II  TM distance: >3 FB  Neck ROM: full    Dental     Cardiovascular       Pulmonary             Lab Results   Component Value Date    HCG Negative 2019       Preop Vitals  BP Readings from Last 3 Encounters:   19 117/84   19 117/81    Pulse Readings from Last 3 Encounters:   19 75      Resp Readings from Last 3 Encounters:   19 20   19 16    SpO2 Readings from Last 3 Encounters:   19 97%   19 98%      Temp Readings from Last 1 Encounters:   19 36.6  C (97.8  F) (Oral)    Ht Readings from Last 1 Encounters:   19 1.676 m (5' 6\")      Wt Readings from Last 1 Encounters:   19 86.6 kg (191 lb)    Estimated body mass index is 30.83 kg/m  as calculated from the following:    Height as of this encounter: " "1.676 m (5' 6\").    Weight as of this encounter: 86.6 kg (191 lb).       Anesthesia Plan      History & Physical Review  History and physical reviewed and following examination; no interval change.    ASA Status:  2 .    NPO Status:  > 8 hours    Plan for MAC Reason for MAC:  Deep or markedly invasive procedure (G8)  PONV prophylaxis:  Ondansetron (or other 5HT-3) and Dexamethasone or Solumedrol       Postoperative Care  Postoperative pain management:  Multi-modal analgesia.      Consents  Anesthetic plan, risks, benefits and alternatives discussed with:  Patient..                 Gabriela Zapata MD  "

## 2019-11-14 NOTE — ANESTHESIA POSTPROCEDURE EVALUATION
Patient: Griselda Bell    Procedure(s):  EXAM UNDER ANESTHESIA    Diagnosis:Rectovaginal fistula [N82.3]  Diagnosis Additional Information: No value filed.    Anesthesia Type:  MAC    Note:  Anesthesia Post Evaluation    Patient location during evaluation: PACU  Patient participation: Able to fully participate in evaluation  Level of consciousness: awake and alert  Pain management: adequate  Airway patency: patent  Cardiovascular status: acceptable  Respiratory status: acceptable and unassisted  Hydration status: acceptable  PONV: none             Last vitals:  Vitals:    11/14/19 0818   BP: 117/84   Resp: 20   Temp: 36.6  C (97.8  F)   SpO2: 97%         Electronically Signed By: Gabriela Zapata MD  November 14, 2019  11:19 AM

## 2019-11-14 NOTE — BRIEF OP NOTE
[facility]    Brief Operative Note    Pre-operative diagnosis: Rectovaginal fistula [N82.3]  Post-operative diagnosis rectovaginal fistula    Procedure: Procedure(s):  EXAM UNDER ANESTHESIA  Surgeon: Surgeon(s) and Role:     * Diana Steward MD - Primary     * Gaby Cabrera MD - Fellow - Assisting  Anesthesia: Monitor Anesthesia Care   Estimated blood loss: None  Drains: None  Specimens: * No specimens in log *  Findings:  Narrow fistula at top of anal canal.   Complications: None.  Implants: * No implants in log *

## 2019-11-14 NOTE — ANESTHESIA CARE TRANSFER NOTE
Patient: Griselda Bell    Procedure(s):  EXAM UNDER ANESTHESIA    Diagnosis: Rectovaginal fistula [N82.3]  Diagnosis Additional Information: No value filed.    Anesthesia Type:   MAC     Note:  Airway :Face Mask  Patient transferred to:PACU  Comments: At end of procedure, spontaneous respirations, patient alert to voice, able to follow commands. Oxygen via facemask at 8 liters per minute to PACU. Oxygen tubing connected to wall O2 in PACU, SpO2, NiBP, and EKG monitors and alarms on and functioning, Yashira Hugger warmer connected to patient gown, report on patient's clinical status given to PACU RN, RN questions answered.Handoff Report: Identifed the Patient, Identified the Reponsible Provider, Reviewed the pertinent medical history, Discussed the surgical course, Reviewed Intra-OP anesthesia mangement and issues during anesthesia, Set expectations for post-procedure period and Allowed opportunity for questions and acknowledgement of understanding      Vitals: (Last set prior to Anesthesia Care Transfer)    CRNA VITALS  11/14/2019 1026 - 11/14/2019 1102      11/14/2019             Resp Rate (set):  10            100/69  HR 70  SPO2 99    Electronically Signed By: JASWINDER Ag CRNA  November 14, 2019  11:02 AM

## 2019-11-18 NOTE — OP NOTE
Procedure Date: 11/14/2019      PREOPERATIVE DIAGNOSIS:  Possible rectovaginal fistula.      POSTOPERATIVE DIAGNOSIS:  Rectovaginal fistula.      SURGEON:  Diana Steward MD      ASSISTANT:  Gaby Cabrera MD      ANESTHESIA:  MAC.      INDICATIONS:  The patient is a 34-year-old woman who in her postpartum period has developed some symptoms of passage of air and occasional abnormal discharge from her vagina.  On evaluation in the office, there was some indication of a rectovaginal fistula, but it could not be definitively demonstrated.  We discussed the possibility of monitoring her symptoms for another several months or doing an examination under anesthesia to definitively demonstrate the presence or absence of a fistula.  She preferred to proceed with the examination under anesthesia.  The indications, procedure and risks were reviewed with the patient who understands and wishes to proceed.      OPERATIVE PROCEDURE:  After an outpatient prep, the patient was brought to the operating room.  Her legs were placed in pneumatic compression stockings.  She was then placed in the prone jackknife position.  After receiving sedation from the anesthetist, her buttocks were taped apart for exposure, the area was prepped and draped in the usual sterile manner.  A timeout was performed and everyone present in the operating room agreed to the planned procedure.  Perianal nerve block using a mixture of Marcaine, lidocaine and sodium bicarb was then induced.      On examination, her perineal body appears normal.  Digital examination reveals probable intact distal external sphincter, but the possibility of a sphincter defect in the mid and upper portion of the anal sphincter anteriorly.  A Monae bivalve anoscope was introduced, and a possible internal opening was noted at the dentate line.  This opening is 1-2 mm in size.  However, with introduction of the probe, I could pass it easily into the vagina.  Hemostasis was checked  for and felt to be adequate.  A pad was placed over the anal canal.  All sponge, blade and needle counts were reported as correct x2.        If the fistula does not close spontaneously,then surgical repair would be considered.  INDIRA QUACH MD             D: 2019   T: 2019   MT: TAMEKA      Name:     DOMITILA GALVIN   MRN:      [id number]        Account:        [id number]   :      1985           Procedure Date: 2019      Document: [id number]       cc: Za Quach MD

## 2019-11-25 ENCOUNTER — OFFICE VISIT (OUTPATIENT)
Dept: FAMILY MEDICINE | Facility: CLINIC | Age: 34
End: 2019-11-25
Payer: COMMERCIAL

## 2019-11-25 VITALS
WEIGHT: 193.2 LBS | HEART RATE: 85 BPM | DIASTOLIC BLOOD PRESSURE: 79 MMHG | BODY MASS INDEX: 31.05 KG/M2 | TEMPERATURE: 98.6 F | SYSTOLIC BLOOD PRESSURE: 118 MMHG | OXYGEN SATURATION: 98 % | HEIGHT: 66 IN | RESPIRATION RATE: 18 BRPM

## 2019-11-25 DIAGNOSIS — R21 RASH AND NONSPECIFIC SKIN ERUPTION: Primary | ICD-10-CM

## 2019-11-25 PROCEDURE — 99213 OFFICE O/P EST LOW 20 MIN: CPT | Performed by: NURSE PRACTITIONER

## 2019-11-25 RX ORDER — MUPIROCIN 20 MG/G
OINTMENT TOPICAL 2 TIMES DAILY
Qty: 22 G | Refills: 0 | Status: SHIPPED | OUTPATIENT
Start: 2019-11-25 | End: 2019-11-30

## 2019-11-25 ASSESSMENT — MIFFLIN-ST. JEOR: SCORE: 1593.1

## 2019-11-25 ASSESSMENT — PAIN SCALES - GENERAL: PAINLEVEL: NO PAIN (0)

## 2019-11-25 NOTE — PROGRESS NOTES
"Subjective     Griselda Bell is a 34 year old female who presents to clinic today for the following health issues:    HPI   Rash  Onset:last  night    Description:   Location: Inner thigh close to the pelvic area  Character: raised, draining, red  Itching (Pruritis): no     Progression of Symptoms:  worsening    Accompanying Signs & Symptoms:  Fever: no   Body aches or joint pain: no   Sore throat symptoms: no   Recent cold symptoms: yes    History:   Previous similar rash: no     Precipitating factors:   Exposure to similar rash: no   New exposures: None   Recent travel: no     Alleviating factors:  nothing    Therapies Tried and outcome: nothing    Patient is breastfeeding. Redness got bigger this morning. No new lotions, detergents, foods. Minimal pain at site.        Patient Active Problem List   Diagnosis     Post partum depression     Patient is a currently breast-feeding mother     Past Surgical History:   Procedure Laterality Date     ARTHROSCOPY SHOULDER RT/LT  2006    labral repair     EXAM UNDER ANESTHESIA RECTUM N/A 11/14/2019    Procedure: EXAM UNDER ANESTHESIA;  Surgeon: Diana Steward MD;  Location:  OR     [facility] [place], CERVICAL  2015?       Social History     Tobacco Use     Smoking status: Never Smoker     Smokeless tobacco: Never Used   Substance Use Topics     Alcohol use: Yes     Frequency: 2-4 times a month     Comment: 1 drink when does have etoh     Family History   Problem Relation Age of Onset     Diabetes Father      Arrhythmia Maternal Grandfather         pacemaker             Reviewed and updated as needed this visit by Provider  Tobacco  Allergies  Meds  Problems  Med Hx  Surg Hx  Fam Hx         Review of Systems   ROS COMP: skin as above      Objective    /79 (BP Location: Right arm, Patient Position: Sitting, Cuff Size: Adult Regular)   Pulse 85   Temp 98.6  F (37  C) (Oral)   Resp 18   Ht 1.676 m (5' 6\")   Wt 87.6 kg (193 lb 3.2 oz)   LMP 11/21/2019   " "SpO2 98%   BMI 31.18 kg/m    Body mass index is 31.18 kg/m .  Physical Exam   GENERAL: healthy, alert and no distress  SKIN: right groin/upper thigh small red papule, slightly tender, slight erythema about 2 inches in circular diameter around papule, no pus, not itchy    Diagnostic Test Results:  Labs reviewed in Epic        Assessment & Plan     1. Rash and nonspecific skin eruption  Call Wednesday morning if not improving, can adjust treatment as needed before the holiday  - mupirocin (BACTROBAN) 2 % external ointment; Apply topically 2 times daily for 5 days To right groin rash  Dispense: 22 g; Refill: 0    2. Patient is a currently breast-feeding mother  Had to adjust ointment due to this. Clindamycin ointment was a reaction with lacation       BMI:   Estimated body mass index is 31.18 kg/m  as calculated from the following:    Height as of this encounter: 1.676 m (5' 6\").    Weight as of this encounter: 87.6 kg (193 lb 3.2 oz).       Return in about 1 week (around 12/2/2019), or if symptoms worsen or fail to improve.    JASWINDER Younger, NP-C  [facility]    "

## 2019-11-25 NOTE — Clinical Note
Please abstract the following data from this visit with this patient into the appropriate field in Epic:Tests that can be patient reported without a hard copy:Pap smear done on this date: 07/2019 (approximately), by this group: [facility], results were Normal. Other Tests found in the patient's chart through Chart Review/Care Everywhere:{Abstract Quality List (Optional):988804}Note to Abstraction: If this section is blank, no results were found via Chart Review/Care Everywhere.

## 2020-01-21 PROBLEM — Z98.890 S/P LEEP: Status: ACTIVE | Noted: 2020-01-21

## 2020-01-30 ENCOUNTER — OFFICE VISIT (OUTPATIENT)
Dept: FAMILY MEDICINE | Facility: CLINIC | Age: 35
End: 2020-01-30
Payer: COMMERCIAL

## 2020-01-30 VITALS
OXYGEN SATURATION: 98 % | HEART RATE: 72 BPM | RESPIRATION RATE: 18 BRPM | BODY MASS INDEX: 31.34 KG/M2 | HEIGHT: 66 IN | WEIGHT: 195 LBS | SYSTOLIC BLOOD PRESSURE: 118 MMHG | DIASTOLIC BLOOD PRESSURE: 84 MMHG | TEMPERATURE: 98.3 F

## 2020-01-30 DIAGNOSIS — J01.00 ACUTE MAXILLARY SINUSITIS, RECURRENCE NOT SPECIFIED: Primary | ICD-10-CM

## 2020-01-30 PROCEDURE — 99213 OFFICE O/P EST LOW 20 MIN: CPT | Performed by: PHYSICIAN ASSISTANT

## 2020-01-30 ASSESSMENT — MIFFLIN-ST. JEOR: SCORE: 1601.26

## 2020-01-30 NOTE — PATIENT INSTRUCTIONS
Fill prescription for augmentin twice a day for 10 days if no improvement in symptoms over the weekend   Return urgently if any change in symptoms like fever, increasing cough, shortness of breath or other change in symptoms.     Patient Education     At [facility], we strive to deliver an exceptional experience to you, every time we see you. If you receive a survey, please complete it as we do value your feedback.  If you have MyChart, you can expect to receive results automatically within 24 hours of their completion.  Your provider will send a note interpreting your results as well.   If you do not have MyChart, you should receive your results in about a week by mail.    Your care team:     Family Medicine   KEM Brewer MD Emily Bunt, APRN CNP S. Matthew Hockett, MD Pamela Kolacz, MD Angela Wermerskirchen, MD    Internal Medicine  Carlos Fuentes MD coming 2020     Clinic hours: Monday - Wednesday 7 am-7 pm   Thursdays and Fridays 7 am-5 pm.     [facility]: Monday - Friday 11 am-9 pm,   Saturday and Sunday 9 am-5 pm.    [facility]: Monday -Thursday 8 am-8 pm; Friday 8 am-6 pm; Saturday and Sunday 9 am-5 pm.     [facility]: Monday - Thursday 8 am - 7 pm; Friday 8 am - 6 pm    Clinic: [phone]   [facility]: [phone] m [facility]: [phone]

## 2020-01-30 NOTE — PROGRESS NOTES
"Subjective     Griselda Bell is a 34 year old female who presents to clinic today for the following health issues:    HPI   Acute Illness   Acute illness concerns: Cough   Onset: 4 days - had stomach flu 1/25-/1/26 and was not treated; resolved     Fever: no     Chills/Sweats: YES    Headache (location?): YES    Sinus Pressure:YES    Conjunctivitis:  no    Ear Pain: YES: bilateral    Rhinorrhea: YES    Congestion: YES    Sore Throat: YES, not strep like      Cough: YES-productive of yellow sputum    Wheeze: no     Decreased Appetite: YES    Nausea: no     Vomiting: YES, due to stomach flu     Diarrhea:  YES, \"did\"    Dysuria/Freq.: no     Fatigue/Achiness: YES    Sick/Strep Exposure: YES- work with       Therapies Tried and outcome: Sudafed, Mucinex, Nasal spray - not resolved    2 days prior to cough had stomach bug  In ears and head and nothing working  and cough is hurting and feels wiped.   No fever.   Cough for 4 days  No history of pneumonia   Just stopped breastfeeding- has some breast pain bilateral lateral breasts  Doesn't feel like mastitis pain          Patient Active Problem List   Diagnosis     Post partum depression     Patient is a currently breast-feeding mother     S/P LEEP     Past Surgical History:   Procedure Laterality Date     ARTHROSCOPY SHOULDER RT/LT  2006    labral repair     EXAM UNDER ANESTHESIA RECTUM N/A 11/14/2019    Procedure: EXAM UNDER ANESTHESIA;  Surgeon: Diana Steward MD;  Location: [facility] [place], CERVICAL  2015?       Social History     Tobacco Use     Smoking status: Never Smoker     Smokeless tobacco: Never Used   Substance Use Topics     Alcohol use: Yes     Frequency: 2-4 times a month     Comment: 1 drink when does have etoh     Family History   Problem Relation Age of Onset     Diabetes Father      Arrhythmia Maternal Grandfather         pacemaker     Colon Polyps Brother         age 35     Colon Cancer No family hx of      Breast Cancer No " "family hx of          Current Outpatient Medications   Medication Sig Dispense Refill     amoxicillin-clavulanate (AUGMENTIN) 875-125 MG tablet Take 1 tablet by mouth 2 times daily for 10 days 20 tablet 0     escitalopram (LEXAPRO) 10 MG tablet        Prenatal Multivit-Min-Fe-FA (PRENATAL VITAMINS PO)        BP Readings from Last 3 Encounters:   01/30/20 118/84   11/25/19 118/79   11/14/19 116/73    Wt Readings from Last 3 Encounters:   01/30/20 88.5 kg (195 lb)   11/25/19 87.6 kg (193 lb 3.2 oz)   11/14/19 86.6 kg (191 lb)                      Reviewed and updated as needed this visit by Provider  Tobacco  Allergies  Meds  Problems  Med Hx  Surg Hx  Fam Hx  Soc Hx          Review of Systems   ROS COMP: Constitutional, HEENT, cardiovascular, pulmonary, gi and gu systems are negative, except as otherwise noted.      Objective    /84 (BP Location: Right arm, Patient Position: Sitting, Cuff Size: Adult Large)   Pulse 72   Temp 98.3  F (36.8  C) (Oral)   Resp 18   Ht 1.676 m (5' 6\")   Wt 88.5 kg (195 lb)   SpO2 98%   BMI 31.47 kg/m    Body mass index is 31.47 kg/m .  Physical Exam   GENERAL: healthy, alert and no distress  EYES: Eyes grossly normal to inspection, PERRL and conjunctivae and sclerae normal  HENT: normal cephalic/atraumatic, ear canals and TM's normal, nose and mouth without ulcers or lesions, oropharynx clear, oral mucous membranes moist and sinuses: maxillary tenderness on bilaterally  NECK: no adenopathy, no asymmetry, masses, or scars and thyroid normal to palpation  RESP: lungs clear to auscultation - no rales, rhonchi or wheezes  CV: regular rate and rhythm, normal S1 S2, no S3 or S4, no murmur, click or rub, no peripheral edema and peripheral pulses strong  MS: no gross musculoskeletal defects noted, no edema    Diagnostic Test Results:  none         Assessment & Plan     1. Acute maxillary sinusitis, recurrence not specified  May be viral given only four days of symptoms - given " "prescription to fill if symptoms not improving over the next 5 days.  Discussed concerns for overuse of antibiotics and antibiotic resistance etc .    - amoxicillin-clavulanate (AUGMENTIN) 875-125 MG tablet; Take 1 tablet by mouth 2 times daily for 10 days  Dispense: 20 tablet; Refill: 0     BMI:   Estimated body mass index is 31.47 kg/m  as calculated from the following:    Height as of this encounter: 1.676 m (5' 6\").    Weight as of this encounter: 88.5 kg (195 lb).   Weight management plan: Discussed healthy diet and exercise guidelines        Patient Instructions     Fill prescription for augmentin twice a day for 10 days if no improvement in symptoms over the weekend   Return urgently if any change in symptoms like fever, increasing cough, shortness of breath or other change in symptoms.     Patient Education     At [facility], we strive to deliver an exceptional experience to you, every time we see you. If you receive a survey, please complete it as we do value your feedback.  If you have MyChart, you can expect to receive results automatically within 24 hours of their completion.  Your provider will send a note interpreting your results as well.   If you do not have MyChart, you should receive your results in about a week by mail.    Your care team:     Family Medicine   KEM Brewer MD Emily Bunt, APRN CNP S. MD Leonila Brantley MD Angela Wermerskirchen, MD    Internal Medicine  Carlos Fuentes MD coming 2020     Clinic hours: Monday - Wednesday 7 am-7 pm   Thursdays and Fridays 7 am-5 pm.     [facility]: Monday - Friday 11 am-9 pm,   Saturday and Sunday 9 am-5 pm.    [facility]: Monday -Thursday 8 am-8 pm; Friday 8 am-6 pm; Saturday and Sunday 9 am-5 pm.     [facility]: Monday - Thursday 8 am - 7 pm; Friday 8 am - 6 pm    Clinic: [phone]   [facility]: " [phone] m [facility]: [phone]                Return in about 1 week (around 2/6/2020), or if symptoms worsen or fail to improve.    Carolina Haji PA-C  [facility]

## 2020-03-11 ENCOUNTER — HEALTH MAINTENANCE LETTER (OUTPATIENT)
Age: 35
End: 2020-03-11

## 2020-06-30 ENCOUNTER — PRE VISIT (OUTPATIENT)
Dept: MATERNAL FETAL MEDICINE | Facility: CLINIC | Age: 35
End: 2020-06-30

## 2020-06-30 DIAGNOSIS — O35.9XX0 SUSPECTED FETAL ANOMALY, ANTEPARTUM, SINGLE OR UNSPECIFIED FETUS: Primary | ICD-10-CM

## 2020-07-03 ENCOUNTER — OFFICE VISIT (OUTPATIENT)
Dept: MATERNAL FETAL MEDICINE | Facility: CLINIC | Age: 35
End: 2020-07-03
Attending: OBSTETRICS & GYNECOLOGY
Payer: COMMERCIAL

## 2020-07-03 ENCOUNTER — HOSPITAL ENCOUNTER (OUTPATIENT)
Dept: ULTRASOUND IMAGING | Facility: CLINIC | Age: 35
End: 2020-07-03
Attending: OBSTETRICS & GYNECOLOGY
Payer: COMMERCIAL

## 2020-07-03 DIAGNOSIS — O09.521 SUPERVISION OF ELDERLY MULTIGRAVIDA IN FIRST TRIMESTER: ICD-10-CM

## 2020-07-03 DIAGNOSIS — O09.521 SUPERVISION OF ELDERLY MULTIGRAVIDA IN FIRST TRIMESTER: Primary | ICD-10-CM

## 2020-07-03 DIAGNOSIS — O35.9XX0 SUSPECTED FETAL ANOMALY, ANTEPARTUM, SINGLE OR UNSPECIFIED FETUS: Primary | ICD-10-CM

## 2020-07-03 DIAGNOSIS — O35.9XX0 SUSPECTED FETAL ANOMALY, ANTEPARTUM, SINGLE OR UNSPECIFIED FETUS: ICD-10-CM

## 2020-07-03 LAB — MISCELLANEOUS TEST: NORMAL

## 2020-07-03 PROCEDURE — 88267 CHROMOSOME ANALYS PLACENTA: CPT | Performed by: OBSTETRICS & GYNECOLOGY

## 2020-07-03 PROCEDURE — 36415 COLL VENOUS BLD VENIPUNCTURE: CPT | Mod: ZF

## 2020-07-03 PROCEDURE — 96040 ZZH GENETIC COUNSELING, EACH 30 MINUTES: CPT | Mod: ZF | Performed by: GENETIC COUNSELOR, MS

## 2020-07-03 PROCEDURE — 88275 CYTOGENETICS 100-300: CPT | Performed by: OBSTETRICS & GYNECOLOGY

## 2020-07-03 PROCEDURE — 88271 CYTOGENETICS DNA PROBE: CPT | Performed by: OBSTETRICS & GYNECOLOGY

## 2020-07-03 PROCEDURE — 76945 ECHO GUIDE VILLUS SAMPLING: CPT | Performed by: OBSTETRICS & GYNECOLOGY

## 2020-07-03 PROCEDURE — 25000128 H RX IP 250 OP 636: Mod: ZF | Performed by: OBSTETRICS & GYNECOLOGY

## 2020-07-03 PROCEDURE — 40001082 ZZHCL STATISTIC MATERNAL CELL CONTAM MOLEC: Performed by: OBSTETRICS & GYNECOLOGY

## 2020-07-03 PROCEDURE — 76801 OB US < 14 WKS SINGLE FETUS: CPT

## 2020-07-03 PROCEDURE — 88235 TISSUE CULTURE PLACENTA: CPT | Performed by: OBSTETRICS & GYNECOLOGY

## 2020-07-03 RX ORDER — HEPARIN SODIUM (PORCINE) LOCK FLUSH IV SOLN 100 UNIT/ML 100 UNIT/ML
1 SOLUTION INTRAVENOUS ONCE
Status: COMPLETED | OUTPATIENT
Start: 2020-07-03 | End: 2020-07-03

## 2020-07-03 RX ADMIN — HEPARIN 1 ML: 100 SYRINGE at 14:10

## 2020-07-03 NOTE — PROGRESS NOTES
Please refer to ultrasound report under 'Imaging' Studies of 'Chart Review' tabs.    Travis Hernandez M.D.

## 2020-07-03 NOTE — PROGRESS NOTES
Pt here for chorionic villus sampling d/t possible trisomy 13, abnormal NIPT. Saw [facility], see their dictation.  After consent signed and TimeOut completed, patient prepped with chlorahexadine 4% solution due to a shellfish allergy,  Dr. Hernandez withdrew adequate fluid x1 transabdominal pass.    Patient reports a little pain.  Pt is RH postitive.  MD reviewed blood typeand rhogam is not indicated.  Discharge teaching completed and questions answered.  Pt discharged ambulatory and stable.   Lab alerted by calling phone number on amnio work-aid for [facility].  Cytogenetics notified of specimen.  Specimen transported to main lab, warm hand-off [facility] lab charge instructed this RN to hand of to tech when in lab.

## 2020-07-03 NOTE — PROGRESS NOTES
[facility] [place]  Genetic Counseling Consult    Patient: Griselda Bell YOB: 1985   Date of Service: 20      Griselda Bell was seen at [facility] [place] for genetic consultation to discuss the options for screening and testing for fetal chromosome abnormalities.  The indication for genetic counseling is abnormal NIPT, increased risk for trisomy 13.        Impression/Plan:   1.  Griselda had an ultrasound and CVS procedure.  The following testing was ordered on the prenatal sample: Chromosome analysis and FISH preliminary analysis.  Results are expected within 2-4 days for FISH and 7-10 days for chromosome analysis, and will be available in EPIC.  We will contact her to discuss the results, and a copy will be forwarded to the office of the referring OB provider.  Griselda will be contacted at the phone number she provided, [phone], and requests that results NOT be left in her voicemail if she cannot be reached.    2.  Griselda requests that fetal sex indicated by testing not be disclosed unless requested.      Pregnancy History:   /Parity:    Age at Delivery: 35 year old  KARAN: 2021, by Last Menstrual Period  Gestational Age: 11w0d    No significant complications or exposures were reported in the current pregnancy.    Griselda vallejo pregnancy history is significant for 1 prior full term pregnancy with no reported complications.    Medical History:   Griselda vallejo reported medical history is not expected to impact pregnancy management or risks to fetal development.          Risk Assessment for Chromosome Conditions:   We explained that the risk for fetal chromosome abnormalities increases with maternal age. We discussed specific features of common chromosome abnormalities, including Down syndrome, trisomy 13, trisomy 18, and sex chromosome trisomies.      - At age 35 at midtrimester, the risk to  "have a baby with Down syndrome is 1 in 274.     - At age 35 at midtrimester, the risk to have a baby with any chromosome abnormality is 1 in 135.         The patient had a Non-Invasive Prenatal Test (NIPT) ([id number]) earlier in pregnancy.  The results are \"ANEUPLOIDY DETECTED\" for trisomy 13. The results are normal for chromosomes 21, 18, and sex chromosomes, as well as select microdeletions (no aneuploidy detected).  We reviewed that NIPT is a screening test, not a diagnostic test, meaning that it does not confirm or rule out any condition.  We discussed that the purpose of NIPT is to identify pregnancies that are at greater risk for having the chromosome conditions assessed.  For a positive NIPT result, there is a statistical calculation that can be used to provide an estimate for the risks for the pregnancy to be affected called a positive predictive value (PPV).  PPV is calculated using the accuracy metrics for the test for that condition (sensitivity and specificity) as well the baseline risks for an affected pregnancy.  For Griselda's NIPT result, the positive predictive value is 17%.  This means that there is a 83% chance that the pregnancy is not affected with trisomy 13, and that her result is a false positive.  We discussed that diagnostic testing via amniocentesis or chorionic villus sampling can provide a definitive answer regarding the status of the pregnancy.      We reviewed the benefits and limitations of CVS, including the possibility of mosaic results and the possibility of recommendation for amniocentesis to clarify those results.  After thorough discussion Griselda consented for CVS for diagnostic testing.  Griselda opted for FISH analysis and chromosome analysis.  Chromosome microarray was discussed as an option in the event of a normal FISH, but was declined due to the possibility of VUS deletions/duplications.      At the couple's request we briefly reviewed the clinical picture for " trisomy 13.  We reviewed that pregnancies with this condition are at significantly increased risk for loss, and that children with the condition typically experience significant health concerns.  A majority of children born with trisomy 13 do not survive to their first birthday, with many passing away in the immediate  period.  Griselda reports that they would likely consider termination of pregnancy if a diagnosis of trisomy 13 is made, and declined and in depth discussion of this care until after CVS results are back.      All of the couple's questions were answered to their satisfaction and they were provided direct contact information for genetic counseling if any questions or concerns arise moving forward.      It was a pleasure to be involved with Griselda s care. Face-to-face time of the meeting was 25 minutes.      German Barry MS, [facility]  Licensed Genetic Counselor  Phone: [phone]  Pager: [phone]

## 2020-07-06 ENCOUNTER — TELEPHONE (OUTPATIENT)
Dept: MATERNAL FETAL MEDICINE | Facility: CLINIC | Age: 35
End: 2020-07-06

## 2020-07-06 LAB
CHROM ANALY INTERPHASE CVS FISH-IMP: NORMAL
PATHOLOGY STUDY: NORMAL

## 2020-07-06 NOTE — TELEPHONE ENCOUNTER
7/6/2020       Called Griselda to discuss FISH results from her recent CVS  Results came back with a normal pattern, indicating two copies each of chromosomes 21, 18, & 13, as well as the sex chromosomes.  These test results do not definitively rule out the possibility of one of these conditions, but they do greatly reduce the likelihood. We reviewed that FISH results are not considered completely diagnostic, but that this result is highly reassuring and that final karyotype and maternal cell contamination studies are expected in 7-10 days. Griselda had no questions at this time and was encouraged to reach out if she has any questions or concerns in the future.       German Barry MS, [facility]  Licensed Genetic Counselor  Phone: [phone]  Pager: [phone]

## 2020-07-16 ENCOUNTER — TELEPHONE (OUTPATIENT)
Dept: MATERNAL FETAL MEDICINE | Facility: CLINIC | Age: 35
End: 2020-07-16

## 2020-07-16 NOTE — TELEPHONE ENCOUNTER
Marisabel called the [facility] clinic today asking if her CVS karyotype results have returned yet. I called [facility] and spoke to Emy, MS [facility], to inquire about the estimated return of results. Emy stated that the results should be back by tomorrow (Friday 7/17), but may be back on Saturday.    Understandably, Marisabel expressed significant frustration for the delayed results and lack of concrete updates. I normalized her feelings and apologized for the wait time for the results to return.     We made a plan that I would call [facility] tomorrow if results did not come back by 2pm. I will call Marisabel by the end of the day with an update.    Eloina Flynn MS, [facility]  Genetic Counselor  Maternal Fetal Medicine  [facility]   Phone: [phone]  Pager: [phone]  Email: [email or web address]

## 2020-07-17 LAB
CHROMOSOME ENHANCED REPORT CVS: NORMAL
KARYOTYP CVS: NORMAL

## 2020-07-17 NOTE — TELEPHONE ENCOUNTER
Marisabel completed a CVS procedure on 7/3/20 due to the high risk for Trisomy 13 on NIPT. She was informed of the normal FISH result on 7/6/20 by German Barry MS [facility].  Chromosome analysis is complete and no clinically significant abnormalities were identified. This result is consistent with a 46,XX or chromosomally normal genetic female.     Marisabel understands that these results are reassuring, however, this analysis is not capable of ruling out all potential underlying genetic conditions. All of Marisabel's questions were answered to her apparent satisfaction. I encouraged her to contact me with any questions or concerns.      Eloina Flynn MS, [facility]  Genetic Counselor  Maternal Fetal Medicine  [facility]   Phone: [phone]  Pager: [phone]  Email: [email or web address]

## 2020-07-17 NOTE — TELEPHONE ENCOUNTER
I called Marisabel to inform her that [facility] still does not have the CVS chromosome results back at this point. When I spoke with [facility] today, I requested that they call me when results are back even if after hours. I offered to call Marisabel when results come back either tonight or tomorrow if she doesn't mind receiving them on the weekend. Marisabel stated she would like me to call with results when they return either today or tomorrw, and if she does not answer, she requested the results be left in her voicemail.    Marisabel expressed appreciation for the call and update.    Eloina Flynn MS, [facility]  Genetic Counselor  Maternal Fetal Medicine  [facility]   Phone: [phone]  Pager: [phone]  Email: [email or web address]

## 2020-07-23 ENCOUNTER — TELEPHONE (OUTPATIENT)
Dept: MATERNAL FETAL MEDICINE | Facility: CLINIC | Age: 35
End: 2020-07-23

## 2020-07-23 LAB — LAB SCANNED RESULT: NORMAL

## 2020-07-23 NOTE — TELEPHONE ENCOUNTER
7/23/2020      Called Griselda to review maternal cell contamination (longterm) results from her CVS.  longterm results indicated 100% contamination.  This means that for the sample tested, no cell line other than Griselda's was detected.  This result indicates that the negative FISH and chromosome results reported previously may not be representative of the pregnancy, and there is still a risk for the pregnancy to be affected with trisomy 13.  We discussed that this could be clarified via amniocentesis.      Griselda expressed significant anger at this information and with the testing and results disclosure process thus far.  Our call was ended with Griselda requesting to be contacted later this afternoon to discuss further.     German Barry MS, [facility]  Licensed Genetic Counselor  Phone: [phone]  Pager: [phone]

## 2020-07-23 NOTE — TELEPHONE ENCOUNTER
7/23/2020    Griselda's  Zurdo called and with verbal permission from Griselda we discussed the maternal cell contamination result in more detail.  We reviewed that the FISH and chromosome results indicated negative, normal female results.  However, the lab performs a secondary test that compares the test sample to a genetic sample from the mother, Marisabel, in order to ensure that the CVS sample is testing the pregnancy.  This testing, called maternal cell contamination, indicated that the blood sample from Marisabel and the CVS sample for the chromosome analysis are genetically identical, indicating the test sample for chromosome analysis is contaminated with maternal DNA.  This means that the normal result discussed previously is not expected to be reflective of the pregnancy, but rather is Griselda's DNA.      The couple expressed significant anger at this result, in particular that this result is the final information they received, so far after their chromosome analysis results.  We discussed amniocentesis, as well as the timeline for this testing as an option, however, Griselda reports that she is unsure if she would be willing to have an amniocentesis with our clinic, and we discussed that there are other providers in the area that can facilitate this care as well.        Griselda reports that she is going to discuss her situation with her primary OB provider directly, and will contact [facility] as needed moving forward.      German Barry MS, [facility]  Licensed Genetic Counselor  Phone: [phone]  Pager: [phone]

## 2020-07-24 ENCOUNTER — TELEPHONE (OUTPATIENT)
Dept: MATERNAL FETAL MEDICINE | Facility: CLINIC | Age: 35
End: 2020-07-24

## 2020-07-24 DIAGNOSIS — O35.9XX0 SUSPECTED FETAL ANOMALY, ANTEPARTUM, SINGLE OR UNSPECIFIED FETUS: Primary | ICD-10-CM

## 2020-07-24 DIAGNOSIS — O09.521 SUPERVISION OF ELDERLY MULTIGRAVIDA IN FIRST TRIMESTER: ICD-10-CM

## 2020-07-24 NOTE — TELEPHONE ENCOUNTER
Called patient to follow-up on her CVS results. Patient is upset that we received residential results today indicating that the CVS sample had maternal cell contamination and the previous results may not be representative of fetal results. Will review with Risk management and offered the Patient Relations number which she said she already had.    Val Dodd MS, [facility]  Maternal Fetal Medicine  [facility]  Phone:[phone]  Email: [email or web address]

## 2020-07-24 NOTE — TELEPHONE ENCOUNTER
7/24/2020    Griselda called and left a voicemail asking to be contacted by genetic counseling to discuss coordination of an amniocentesis. Griselda has been in contact with another genetic counselor with our clinic, and is has been scheduled for an amniocentesis next week, 7/31, with maternal fetal medicine.  A return call was made and a generic voicemail was left asking Griselda to contact genetic counseling at her convenience if there is anything further she would like to discuss.     German Barry MS, [facility]  Licensed Genetic Counselor  Phone: [phone]  Pager: [phone]

## 2020-07-24 NOTE — TELEPHONE ENCOUNTER
Returned patient's call regarding scheduling amniocentesis. Patient would like to schedule 2/3 complete, early anatomy US with amniocentesis at 15w0d. Discussed chorion amnion separation may mean the amnio at 15w0d is not possible and patient would need to return for another attempt. Patient verbalized understanding. Patient will arrive at 10am for GC consent with Radha Lombardi and 2/3 complete is scheduled at 10:15am.    Val Dodd MS, [facility]  Maternal Fetal Medicine

## 2020-07-25 NOTE — TELEPHONE ENCOUNTER
Called patient to inform her that Dr. Hernandez would be performing her amnio instead of Dr. Maki. Patient agreeable

## 2020-07-31 ENCOUNTER — HOSPITAL ENCOUNTER (OUTPATIENT)
Dept: ULTRASOUND IMAGING | Facility: CLINIC | Age: 35
End: 2020-07-31
Attending: OBSTETRICS & GYNECOLOGY
Payer: COMMERCIAL

## 2020-07-31 ENCOUNTER — OFFICE VISIT (OUTPATIENT)
Dept: MATERNAL FETAL MEDICINE | Facility: CLINIC | Age: 35
End: 2020-07-31
Attending: OBSTETRICS & GYNECOLOGY
Payer: COMMERCIAL

## 2020-07-31 DIAGNOSIS — O35.9XX0 SUSPECTED FETAL ANOMALY, ANTEPARTUM, SINGLE OR UNSPECIFIED FETUS: ICD-10-CM

## 2020-07-31 DIAGNOSIS — O09.521 SUPERVISION OF ELDERLY MULTIGRAVIDA IN FIRST TRIMESTER: ICD-10-CM

## 2020-07-31 DIAGNOSIS — O28.0 ABNORMAL MATERNAL SERUM SCREENING TEST: Primary | ICD-10-CM

## 2020-07-31 DIAGNOSIS — O09.522 SUPERVISION OF ELDERLY MULTIGRAVIDA IN SECOND TRIMESTER: ICD-10-CM

## 2020-07-31 DIAGNOSIS — O28.0 ABNORMAL MATERNAL SERUM SCREENING TEST: ICD-10-CM

## 2020-07-31 LAB
LOCATION PERFORMED: NORMAL
LOCATION PERFORMED: NORMAL
MISCELLANEOUS TEST: NORMAL
MISCELLANEOUS TEST: NORMAL
RESULT: NORMAL
RESULT: NORMAL
SEND OUTS MISC TEST CODE: NORMAL
SEND OUTS MISC TEST CODE: NORMAL
SEND OUTS MISC TEST SPECIMEN: NORMAL
SEND OUTS MISC TEST SPECIMEN: NORMAL
TEST NAME: NORMAL
TEST NAME: NORMAL

## 2020-07-31 PROCEDURE — 76805 OB US >/= 14 WKS SNGL FETUS: CPT

## 2020-07-31 PROCEDURE — 36415 COLL VENOUS BLD VENIPUNCTURE: CPT | Mod: ZF

## 2020-07-31 PROCEDURE — 88235 TISSUE CULTURE PLACENTA: CPT | Performed by: OBSTETRICS & GYNECOLOGY

## 2020-07-31 PROCEDURE — 88275 CYTOGENETICS 100-300: CPT | Performed by: OBSTETRICS & GYNECOLOGY

## 2020-07-31 PROCEDURE — 76946 ECHO GUIDE FOR AMNIOCENTESIS: CPT | Performed by: OBSTETRICS & GYNECOLOGY

## 2020-07-31 PROCEDURE — 40000803 ZZHCL STATISTIC DNA ISOL HIGH PURITY: Performed by: OBSTETRICS & GYNECOLOGY

## 2020-07-31 PROCEDURE — 81265 STR MARKERS SPECIMEN ANAL: CPT | Performed by: GENERAL ACUTE CARE HOSPITAL

## 2020-07-31 PROCEDURE — 88269 CHROMOSOME ANALYS AMNIOTIC: CPT | Performed by: OBSTETRICS & GYNECOLOGY

## 2020-07-31 PROCEDURE — 88280 CHROMOSOME KARYOTYPE STUDY: CPT | Performed by: OBSTETRICS & GYNECOLOGY

## 2020-07-31 PROCEDURE — 82106 ALPHA-FETOPROTEIN AMNIOTIC: CPT | Performed by: OBSTETRICS & GYNECOLOGY

## 2020-07-31 PROCEDURE — 88271 CYTOGENETICS DNA PROBE: CPT | Performed by: OBSTETRICS & GYNECOLOGY

## 2020-07-31 PROCEDURE — 88285 CHROMOSOME COUNT ADDITIONAL: CPT | Performed by: OBSTETRICS & GYNECOLOGY

## 2020-07-31 PROCEDURE — 40000072 ZZH STATISTIC GENETIC COUNSELING, < 16 MIN: Mod: ZF | Performed by: GENETIC COUNSELOR, MS

## 2020-07-31 NOTE — PROGRESS NOTES
[facility]  Genetic Counseling Consult    Patient:  Griselda Bell YOB: 1985   Date of Service:  7/31/20      Griselda Bell was seen at the [facility] for genetic consultation as part of her appointment for comprehensive ultrasound.  The indication for genetic counseling is advanced maternal age.       Impression/Plan:   1. Marisabel completed an early anatomy ultrasound today. Please see the ultrasound report for further details.    2. Marisabel has had detailed discussions with Val Dodd, genetic counselor via telephone regarding the option of genetic amniocentesis. She did not wish to review the procedure in detail with me today, however, we did review the importance of chorion/amnion fusion.     Marisabel has previously completed the genetic testing consent form as well as the procedure consent for CVS testing. These consents were again reviewed today. Marisabel did not wish for these consents to be reviewed in complete detail today, however, the risks, benefits, and limitations of this procedure and testing were again reviewed with Marisabel and Zurdo.      Genetic Amniocentesis  - This is an invasive procedure typically performed at 15 weeks or later, through which amniotic fluid is obtained for the purpose of chromosome analysis and/or other prenatal genetic analysis.  - Amniocentesis is considered a diagnostic test for chromosome problems during pregnancy.  - The risk of pregnancy loss associated with amniocentesis is generally estimated to be 1/500 or less.  - Amniotic fluid AFP measurement is also done to screen for the possibility of open neural tube or ventral defects.    The following testing was ordered on the prenatal sample: FISH preliminary analysis, Chromosome analysis, AFAFP, and maternal cell contamination. The availability of microarray analysis was again reviewed with Marisabel today and they continue  to decline this testing option. Results of the FISH analysis are expected tomorrow and Val Dodd will inform her of these results when available. AF-AFP results are anticipated in 3-4 days. Chromosome analysis results are expected in 7-10 days. We will keep Marisabel informed of any potential delays with this result. Maternal Cell Contamination studies will likely take approximately 2 weeks depending on size of sample obtained today. half-way studies will be completed at [facility]. These results will be available in EPIC.  Marisabel requests a detailed voice message with results when available ([phone]). They are already aware of expected female sex.     Each test ordered was reviewed in detail with Marisabel and Zurdo today as well as the expected timeline for each result. We will keep them informed of any unexpected changes to these expectations. All of Marisabel and Zurdo's questions were answered to their apparent satisfaction.     Pregnancy/Medical History:   Discussed at previous genetic counseling visits.       Risk Assessment for Chromosome Conditions:   We explained that the risk for fetal chromosome abnormalities increases with maternal age. We discussed specific features of common chromosome abnormalities, including Down syndrome, trisomy 13, trisomy 18, and sex chromosome trisomies.      - At age 35 at midtrimester, the risk to have a baby with Down syndrome is 1 in 274.     - At age 35 at midtrimester, the risk to have a baby with any chromosome abnormality is 1 in 135.       Griselda had maternal serum screening earlier in pregnancy.     Non-invasive Prenatal Testing (NIPT)    Maternal plasma cell-free DNA testing    Screens for fetal trisomy 21, trisomy 13, trisomy 18, and sex chromosome aneuploidy    This screen was ABNORMAL, consistent with an increased risk for fetal trisomy 13. This result has been discussed in detail during prior genetic counseling appointments. See consult note  dated 7/3/2020 by German Barry for complete discussion.      Chorionic Villus Sampling (CVS)    Griselda completed a CVS procedure on 7/3/2020. See documentation for consenting process as well as results disclosure.     Maternal Cell Contamination studies confirmed contamination of sample, confirming that a single maternal genotype was present with no fetal cells present.     Testing Options:   We discussed the following options:   Genetic Amniocentesis    Invasive procedure typically performed in the second trimester by which amniotic fluid is obtained for the purpose of chromosome analysis and/or other prenatal genetic analysis    Diagnostic results; >99% sensitivity for fetal chromosome abnormalities    AFAFP measurement tests for open neural tube defects      We reviewed the benefits and limitations of this testing.  Screening tests provide a risk assessment specific to the pregnancy for certain fetal chromosome abnormalities, but cannot definitively diagnose or exclude a fetal chromosome abnormality.  Follow-up genetic counseling and consideration of diagnostic testing is recommended with any abnormal screening result.     Diagnostic tests carry inherent risks- including risk of miscarriage- that require careful consideration.  These tests can detect fetal chromosome abnormalities with greater than 99% certainty.  Results can be compromised by maternal cell contamination or mosaicism, and are limited by the resolution of cytogenetic G-banding technology.  There is no screening nor diagnostic test that can detect all forms of birth defects or mental disability.    It was a pleasure to be involved with Griselda s care. Face-to-face time of the meeting was 15 minutes.      Radha Lombardi MS, [facility]  Maternal Fetal Medicine  [facility]  Ph: [phone]  [email or web address]

## 2020-07-31 NOTE — NURSING NOTE
Pt here for amniocentesis d/t +T13 on NIPT. Saw [facility], see their dictation.  After consent signed and TimeOut completed, Dr. Hernandez withdrew adequate fluid x1 transabdominal pass.    Patient reports no pain.  Pt is RH positive.  MD reviewed blood type and screen and Rhogam NOT indicated. Discharge teaching completed and questions answered.  Pt discharged ambulatory and stable.   Lab alerted by calling phone number on amnio work-aid for [facility] site.  Cytogenetics notified of specimen.  Specimen transported to main lab, warm hand-off completed.  Yanique Le RN

## 2020-08-01 ENCOUNTER — TELEPHONE (OUTPATIENT)
Dept: OBGYN | Facility: CLINIC | Age: 35
End: 2020-08-01

## 2020-08-01 ENCOUNTER — TELEPHONE (OUTPATIENT)
Dept: MATERNAL FETAL MEDICINE | Facility: CLINIC | Age: 35
End: 2020-08-01

## 2020-08-01 LAB — COPATH REPORT: NORMAL

## 2020-08-01 NOTE — TELEPHONE ENCOUNTER
I have contacted Ms. Bell. Regarding her recent test results as well as her concern for cramping. She reports no loss of fluid or vaginal bleeding.     I have recommended using acetaminophen or ibuprofen on schedule for cramping, which should gradually resolve and should not be associated with any bleeding.     We discussed the backgound risk for miscarriage is also increased for pregnancies complicated by trisomy 13 and have recommended she contact me or her primary care provider should increased cramping with or without bleeding develop.    Travis Hernandez M.D.  Maternal Fetal Medicine

## 2020-08-01 NOTE — TELEPHONE ENCOUNTER
Called Marisabel and reported abnormal FISH results from her amniocentesis. Preliminary results are consistent with a diagnosis of trisomy 13. Patient verbalized understanding. They are considering pregnancy termination by D&E. Discussed recommendation for receiving final amniocentesis results (karyotype) before pursing termination but that some patients opt that some patients decide to schedule D&E based on abnormal FISH results, ultrasound abnormalities, and an abnormal NIPT.    I will communicate results to patient's primary OB, Dr. Gonzales, on Monday. Patient reports that she has occasional mild cramping. Reviewed symptoms and FISH results with Dr. Hernandez - communicated to patient that mild cramping is not uncommon following procedure. I reviewed with patient and encouraged her to call the on call provider if symptoms get worse. Patient verbalized understanding.    Val Dodd MS, [facility]  Maternal Fetal Medicine  Phone:[phone]

## 2020-08-02 LAB
A-FETO PROTEIN  AMNGEST AGE ULTRA: NORMAL
AFP AMN-MCNC: NORMAL NG/ML
AFP INTERP AMN-IMP: NEGATIVE
AFP MOM AMN: 1.51
GA: NORMAL WK
LMP START DATE: NORMAL

## 2020-08-03 ENCOUNTER — TELEPHONE (OUTPATIENT)
Dept: MATERNAL FETAL MEDICINE | Facility: CLINIC | Age: 35
End: 2020-08-03

## 2020-08-03 PROBLEM — O35.11X0: Status: ACTIVE | Noted: 2020-08-03

## 2020-08-03 PROBLEM — O35.9XX0 SUSPECTED FETAL ANOMALY, ANTEPARTUM, SINGLE OR UNSPECIFIED FETUS: Status: ACTIVE | Noted: 2020-08-03

## 2020-08-03 NOTE — TELEPHONE ENCOUNTER
Writer called and spoke with Marisabel.  Marisabel has apt for cervical dilators tomorrow at [facility] with Dr. Hernandez at 1:30 pm. Pt to have Covid testing today at 4:00 pm. Pt is scheduled for D&E 8/5 at 10:30 am. Pt verbalized understanding was given directions for apt tomorrow and has no further questions and/or concerns at this time. Stefanie Headley RN

## 2020-08-03 NOTE — TELEPHONE ENCOUNTER
Called patient's primary OB clinic, spoke with triage RN Phylicia, and reported abnormal FISH results from amnio. Informed RN that patient is considering D&E and asked their provider's preference in helping to facilitate procedure - we can start our process of scheduling a D&E if OGI would prefer. Provided my contact information. Per RN, Dr. Gonzales is out of the office today but she will provide information to their on-call physician.    Val Dodd MS, [facility]  Maternal Fetal Medicine

## 2020-08-03 NOTE — TELEPHONE ENCOUNTER
"Contacted [employer] financial counselors regarding benefit information for D&E. :\"spoke with Donna DODGE with [employer] at [phone] and it appears [place] [address] is a covered benefit based on medical necessity; however, there is no definition of medical necessity. The patient does not have benefit for an elective termination. There is no policy exclusion that the mother s life is at risk, rape or incest. The call ref #[id number], Ded-$1000 ($ 0 met), Cpoins-80%,OOP-$4000 ($100.17 met). If the patient wants to be more confident she can check with her benefit manual, benefit rep at her employer or she can call customer service.\"      Dr. Hernandez completed the 24 hr WRTK consent and discussed the above information with the patient. Per , the patient was calling her insurance to get additional information but wanted to scheduled the D&E on Wednesday morning 10:30am. At Dr. Hernandez's direction, the patient will be scheduled on Wednesday 8/5/20. Called patient regarding the above information. Patient stated that her insurance will cover the procedure, that she has not yet met her deductible and that after she meets her deductible her insurance company covers at 80%. I communicated plan for D&E on 8/5/20 and that PCC will contact patient regarding COVID19 testing and laminaria placement. Patient verbalized understanding.    Val Dodd MS, [facility]  Maternal Fetal Medicine        "

## 2020-08-04 ENCOUNTER — ANCILLARY PROCEDURE (OUTPATIENT)
Dept: MATERNAL FETAL MEDICINE | Facility: CLINIC | Age: 35
End: 2020-08-04
Attending: OBSTETRICS & GYNECOLOGY
Payer: COMMERCIAL

## 2020-08-04 DIAGNOSIS — O35.11X0 FETUS WITH TRISOMY 13, SINGLE GESTATION: ICD-10-CM

## 2020-08-04 RX ORDER — MISOPROSTOL 200 UG/1
800 TABLET ORAL ONCE
Qty: 4 TABLET | Refills: 0 | Status: ON HOLD | OUTPATIENT
Start: 2020-08-05 | End: 2020-08-05

## 2020-08-04 NOTE — PROGRESS NOTES
Marisabel here for cervical dilator placement today due to fetus with Trisomy 13 and D&E tomorrow for termination of pregnancy. Dr. Hernandez signed 24 hr Women's Right to know consent via telephone yesterday at noon and pt signed the form in person today prior to starting procedure. Pt signed consent today for dilator placement. Pt is allergic to shellfish so cholorprep was used. Dr. Hernandez in to explain procedure. Time out was done. Dr. Hernandez used 1% Lidocaine to cervical area. Pt tolerated sterile procedure well. Pt had 4 Dilapan -S placed and 1 4x4 sponge placed. Pt had scant amount of bleeding. Pt was given a kenna pad. Pt is aware of where to go tomorrow for her procedure 8/5 at 10:30 am for D&E. Pt was given grief and loss support information via forms.  Pt was given instructions about taking Ibuprofen and TYlenol for pain. Pt left amb and stable. Pt given phone number for L&D to discuss any s/s of contractions and /or bleeding. Pt left amb and stable. Stefanie Headley RN

## 2020-08-04 NOTE — H&P
Patient is 35 year old  at 15w4d bu LMP and first trimester US with a pregnancy diagnosis of trisomy 13 based on NIPT, abnormal ultrasound and amniocentesis based FISH. Final karyotype pending.     Ms. Bell has elected to proceed with pregnancy termination by D and E tomorrow. She is here today for laminaria placement. She has no complaints at this time: bleeding or cramping. COVID 19 testing done yesterday was negative.    After discussion of procedure, risks and benefits we proceeded to placement of laminaria.    Review Of Systems  Skin: negative  Eyes: negative  Ears/Nose/Throat: negative  Respiratory: No shortness of breath, dyspnea on exertion, cough, or hemoptysis  Cardiovascular: negative  Gastrointestinal: negative  Genitourinary: negative  Musculoskeletal: negative  Neurologic: negative  Psychiatric: negative  Hematologic/Lymphatic/Immunologic: negative  Endocrine: negative    Patient Active Problem List   Diagnosis Code     Post partum depression O99.345, F53.0     Patient is a currently breast-feeding mother Z39.1     S/P [facility] Z98.890     Suspected fetal anomaly, antepartum, single or unspecified fetus O35.9XX0     Fetus with trisomy 13, single gestation O35.1XX0       Past Medical History:   Diagnosis Date     Migraine      Obese      Past Surgical History:   Procedure Laterality Date     ARTHROSCOPY SHOULDER RT/LT  2006    labral repair     EXAM UNDER ANESTHESIA RECTUM N/A 2019    Procedure: EXAM UNDER ANESTHESIA;  Surgeon: Diana Steward MD;  Location:  OR     [facility] [place], CERVICAL  ?       I have reviewed this patient's family history and updated it with pertinent information if needed.  Family History   Problem Relation Age of Onset     Diabetes Father      Arrhythmia Maternal Grandfather         pacemaker     Colon Polyps Brother         age 35     Colon Cancer No family hx of      Breast Cancer No family hx of         Allergies   Allergen Reactions     Shellfish-Derived  Products Swelling   Iodine    PHYSICAL EXAM:  The cervix was cleansed with chlorhexidine solution, grasped at 12:00 position and paracervical block was placed. We then inserted the Dilapan dilators without difficulty. When attempting to insert the fifth dilator, patient complained of discomfort and procedure was suspended.    Assessment and plan:  IUP with trisomy 13 status post hydrophilic dilator placement.    P:  1. D and E at 10:30 am  2. Misoprostol 800 mcg at 6 am  3. NPO after midnight.  4. Instructions given to report increasing cramping or bleeding prior to admission.  5. Pain management with ibuprofen and acetaminophen.    Travis Hernandez M.D.  Maternal Fetal Medicine

## 2020-08-04 NOTE — PROGRESS NOTES
Patient is 35 year old  at 15w4d bu LMP and first trimester US with a pregnancy diagnosis of trisomy 13 based on NIPT, abnormal ultrasound and amniocentesis based FISH. Final karyotype pending.     Ms. Bell has elected to proceed with pregnancy termination by WALI and E tomorrow. She is here today for laminaria placement. She has no complaints at this time: bleeding or cramping. COVID 19 testing done yesterday was negative.    After discussion of procedure, risks and benefits we proceeded to placement of laminaria: Dilapan x 4.     The cervix was cleansed with chlorhexidine solution, grasped at 12:00 position and paracervical block was placed. We then inserted the Dilapan dilators without difficulty. When attempting to insert the fifth dilator, patient complained of discomfort and procedure was suspended.    A: IUP with trisomy 13 status post hydrophilic dilator placement.    P:  1. D and E tomorrow at 10:30  2. Misoprostol 800 mcg tomorrow at 6 am  3. NPO after midnight.    Travis Hernandez M.D.  Maternal Fetal Medicine

## 2020-08-05 ENCOUNTER — ANESTHESIA EVENT (OUTPATIENT)
Dept: SURGERY | Facility: CLINIC | Age: 35
End: 2020-08-05
Payer: COMMERCIAL

## 2020-08-05 ENCOUNTER — ANESTHESIA (OUTPATIENT)
Dept: SURGERY | Facility: CLINIC | Age: 35
End: 2020-08-05
Payer: COMMERCIAL

## 2020-08-05 ENCOUNTER — HOSPITAL ENCOUNTER (OUTPATIENT)
Facility: CLINIC | Age: 35
Discharge: HOME | End: 2020-08-05
Attending: OBSTETRICS & GYNECOLOGY | Admitting: OBSTETRICS & GYNECOLOGY
Payer: COMMERCIAL

## 2020-08-05 VITALS
SYSTOLIC BLOOD PRESSURE: 114 MMHG | DIASTOLIC BLOOD PRESSURE: 74 MMHG | WEIGHT: 192.46 LBS | BODY MASS INDEX: 30.21 KG/M2 | HEART RATE: 82 BPM | RESPIRATION RATE: 14 BRPM | TEMPERATURE: 98.1 F | OXYGEN SATURATION: 98 % | HEIGHT: 67 IN

## 2020-08-05 DIAGNOSIS — O35.9XX0 SUSPECTED FETAL ANOMALY, ANTEPARTUM, SINGLE OR UNSPECIFIED FETUS: ICD-10-CM

## 2020-08-05 DIAGNOSIS — O35.11X0 FETUS WITH TRISOMY 13, SINGLE GESTATION: ICD-10-CM

## 2020-08-05 LAB
ABO + RH BLD: NORMAL
ABO + RH BLD: NORMAL
BLD GP AB SCN SERPL QL: NORMAL
BLOOD BANK CMNT PATIENT-IMP: NORMAL
GLUCOSE BLDC GLUCOMTR-MCNC: 80 MG/DL (ref 70–99)
GLUCOSE SERPL-MCNC: 78 MG/DL (ref 70–99)
HCG SERPL QL: POSITIVE
HGB BLD-MCNC: 13.4 G/DL (ref 11.7–15.7)
SPECIMEN EXP DATE BLD: NORMAL

## 2020-08-05 PROCEDURE — 25000125 ZZHC RX 250: Performed by: OBSTETRICS & GYNECOLOGY

## 2020-08-05 PROCEDURE — 36415 COLL VENOUS BLD VENIPUNCTURE: CPT | Performed by: ANESTHESIOLOGY

## 2020-08-05 PROCEDURE — 25000125 ZZHC RX 250: Performed by: NURSE ANESTHETIST, CERTIFIED REGISTERED

## 2020-08-05 PROCEDURE — 86850 RBC ANTIBODY SCREEN: CPT | Performed by: ANESTHESIOLOGY

## 2020-08-05 PROCEDURE — 36000053 ZZH SURGERY LEVEL 2 EA 15 ADDTL MIN - UMMC: Performed by: OBSTETRICS & GYNECOLOGY

## 2020-08-05 PROCEDURE — 82947 ASSAY GLUCOSE BLOOD QUANT: CPT | Performed by: ANESTHESIOLOGY

## 2020-08-05 PROCEDURE — 82962 GLUCOSE BLOOD TEST: CPT

## 2020-08-05 PROCEDURE — 25800030 ZZH RX IP 258 OP 636: Performed by: ANESTHESIOLOGY

## 2020-08-05 PROCEDURE — 25000128 H RX IP 250 OP 636: Performed by: NURSE ANESTHETIST, CERTIFIED REGISTERED

## 2020-08-05 PROCEDURE — 00000159 ZZHCL STATISTIC H-SEND OUTS PREP: Performed by: OBSTETRICS & GYNECOLOGY

## 2020-08-05 PROCEDURE — 86901 BLOOD TYPING SEROLOGIC RH(D): CPT | Performed by: ANESTHESIOLOGY

## 2020-08-05 PROCEDURE — 86900 BLOOD TYPING SEROLOGIC ABO: CPT | Performed by: ANESTHESIOLOGY

## 2020-08-05 PROCEDURE — 37000008 ZZH ANESTHESIA TECHNICAL FEE, 1ST 30 MIN: Performed by: OBSTETRICS & GYNECOLOGY

## 2020-08-05 PROCEDURE — 71000027 ZZH RECOVERY PHASE 2 EACH 15 MINS: Performed by: OBSTETRICS & GYNECOLOGY

## 2020-08-05 PROCEDURE — 85018 HEMOGLOBIN: CPT | Performed by: ANESTHESIOLOGY

## 2020-08-05 PROCEDURE — 37000009 ZZH ANESTHESIA TECHNICAL FEE, EACH ADDTL 15 MIN: Performed by: OBSTETRICS & GYNECOLOGY

## 2020-08-05 PROCEDURE — 40000170 ZZH STATISTIC PRE-PROCEDURE ASSESSMENT II: Performed by: OBSTETRICS & GYNECOLOGY

## 2020-08-05 PROCEDURE — 84703 CHORIONIC GONADOTROPIN ASSAY: CPT | Performed by: ANESTHESIOLOGY

## 2020-08-05 PROCEDURE — 25800030 ZZH RX IP 258 OP 636: Performed by: NURSE ANESTHETIST, CERTIFIED REGISTERED

## 2020-08-05 PROCEDURE — 88309 TISSUE EXAM BY PATHOLOGIST: CPT | Mod: 26 | Performed by: OBSTETRICS & GYNECOLOGY

## 2020-08-05 PROCEDURE — 25000128 H RX IP 250 OP 636: Performed by: ANESTHESIOLOGY

## 2020-08-05 PROCEDURE — 27210794 ZZH OR GENERAL SUPPLY STERILE: Performed by: OBSTETRICS & GYNECOLOGY

## 2020-08-05 PROCEDURE — 36000051 ZZH SURGERY LEVEL 2 1ST 30 MIN - UMMC: Performed by: OBSTETRICS & GYNECOLOGY

## 2020-08-05 PROCEDURE — 88309 TISSUE EXAM BY PATHOLOGIST: CPT | Performed by: OBSTETRICS & GYNECOLOGY

## 2020-08-05 RX ORDER — LIDOCAINE 40 MG/G
CREAM TOPICAL
Status: DISCONTINUED | OUTPATIENT
Start: 2020-08-05 | End: 2020-08-05 | Stop reason: HOSPADM

## 2020-08-05 RX ORDER — FENTANYL CITRATE 50 UG/ML
INJECTION, SOLUTION INTRAMUSCULAR; INTRAVENOUS PRN
Status: DISCONTINUED | OUTPATIENT
Start: 2020-08-05 | End: 2020-08-05

## 2020-08-05 RX ORDER — PROPOFOL 10 MG/ML
INJECTION, EMULSION INTRAVENOUS PRN
Status: DISCONTINUED | OUTPATIENT
Start: 2020-08-05 | End: 2020-08-05

## 2020-08-05 RX ORDER — ACETAMINOPHEN 500 MG
500-1000 TABLET ORAL EVERY 6 HOURS PRN
COMMUNITY
End: 2021-04-19

## 2020-08-05 RX ORDER — PROPOFOL 10 MG/ML
INJECTION, EMULSION INTRAVENOUS CONTINUOUS PRN
Status: DISCONTINUED | OUTPATIENT
Start: 2020-08-05 | End: 2020-08-05

## 2020-08-05 RX ORDER — SODIUM CHLORIDE, SODIUM LACTATE, POTASSIUM CHLORIDE, CALCIUM CHLORIDE 600; 310; 30; 20 MG/100ML; MG/100ML; MG/100ML; MG/100ML
INJECTION, SOLUTION INTRAVENOUS CONTINUOUS
Status: DISCONTINUED | OUTPATIENT
Start: 2020-08-05 | End: 2020-08-05 | Stop reason: HOSPADM

## 2020-08-05 RX ORDER — SODIUM CHLORIDE, SODIUM LACTATE, POTASSIUM CHLORIDE, CALCIUM CHLORIDE 600; 310; 30; 20 MG/100ML; MG/100ML; MG/100ML; MG/100ML
INJECTION, SOLUTION INTRAVENOUS CONTINUOUS PRN
Status: DISCONTINUED | OUTPATIENT
Start: 2020-08-05 | End: 2020-08-05

## 2020-08-05 RX ORDER — DOXYCYCLINE 100 MG/10ML
100 INJECTION, POWDER, LYOPHILIZED, FOR SOLUTION INTRAVENOUS
Status: COMPLETED | OUTPATIENT
Start: 2020-08-05 | End: 2020-08-05

## 2020-08-05 RX ORDER — IBUPROFEN 600 MG/1
600 TABLET, FILM COATED ORAL EVERY 6 HOURS PRN
Qty: 30 TABLET | Refills: 0 | Status: SHIPPED | OUTPATIENT
Start: 2020-08-05 | End: 2021-04-19

## 2020-08-05 RX ORDER — DOXYCYCLINE HYCLATE 100 MG
100 TABLET ORAL 2 TIMES DAILY
Qty: 10 TABLET | Refills: 0 | Status: SHIPPED | OUTPATIENT
Start: 2020-08-05 | End: 2020-08-10

## 2020-08-05 RX ORDER — LIDOCAINE HYDROCHLORIDE 20 MG/ML
INJECTION, SOLUTION INFILTRATION; PERINEURAL PRN
Status: DISCONTINUED | OUTPATIENT
Start: 2020-08-05 | End: 2020-08-05

## 2020-08-05 RX ORDER — KETOROLAC TROMETHAMINE 30 MG/ML
INJECTION, SOLUTION INTRAMUSCULAR; INTRAVENOUS PRN
Status: DISCONTINUED | OUTPATIENT
Start: 2020-08-05 | End: 2020-08-05

## 2020-08-05 RX ORDER — IBUPROFEN 200 MG
600 TABLET ORAL EVERY 4 HOURS PRN
Status: ON HOLD | COMMUNITY
End: 2020-08-05

## 2020-08-05 RX ORDER — ONDANSETRON 2 MG/ML
4 INJECTION INTRAMUSCULAR; INTRAVENOUS ONCE
Status: COMPLETED | OUTPATIENT
Start: 2020-08-05 | End: 2020-08-05

## 2020-08-05 RX ORDER — LIDOCAINE HYDROCHLORIDE 10 MG/ML
INJECTION, SOLUTION EPIDURAL; INFILTRATION; INTRACAUDAL; PERINEURAL PRN
Status: DISCONTINUED | OUTPATIENT
Start: 2020-08-05 | End: 2020-08-05 | Stop reason: HOSPADM

## 2020-08-05 RX ADMIN — PROPOFOL 30 MG: 10 INJECTION, EMULSION INTRAVENOUS at 10:28

## 2020-08-05 RX ADMIN — LIDOCAINE HYDROCHLORIDE 80 MG: 20 INJECTION, SOLUTION INFILTRATION; PERINEURAL at 10:27

## 2020-08-05 RX ADMIN — KETOROLAC TROMETHAMINE 30 MG: 30 INJECTION, SOLUTION INTRAMUSCULAR at 10:55

## 2020-08-05 RX ADMIN — HYDROMORPHONE HYDROCHLORIDE 0.2 MG: 1 INJECTION, SOLUTION INTRAMUSCULAR; INTRAVENOUS; SUBCUTANEOUS at 10:35

## 2020-08-05 RX ADMIN — SODIUM CHLORIDE, POTASSIUM CHLORIDE, SODIUM LACTATE AND CALCIUM CHLORIDE 500 ML: 600; 310; 30; 20 INJECTION, SOLUTION INTRAVENOUS at 09:49

## 2020-08-05 RX ADMIN — PROPOFOL 100 MCG/KG/MIN: 10 INJECTION, EMULSION INTRAVENOUS at 10:27

## 2020-08-05 RX ADMIN — ONDANSETRON 4 MG: 2 INJECTION INTRAMUSCULAR; INTRAVENOUS at 10:02

## 2020-08-05 RX ADMIN — SODIUM CHLORIDE, POTASSIUM CHLORIDE, SODIUM LACTATE AND CALCIUM CHLORIDE: 600; 310; 30; 20 INJECTION, SOLUTION INTRAVENOUS at 10:24

## 2020-08-05 RX ADMIN — PROPOFOL 30 MG: 10 INJECTION, EMULSION INTRAVENOUS at 10:40

## 2020-08-05 RX ADMIN — MIDAZOLAM 2 MG: 1 INJECTION INTRAMUSCULAR; INTRAVENOUS at 10:24

## 2020-08-05 RX ADMIN — FENTANYL CITRATE 50 MCG: 50 INJECTION, SOLUTION INTRAMUSCULAR; INTRAVENOUS at 10:28

## 2020-08-05 RX ADMIN — SODIUM CHLORIDE, POTASSIUM CHLORIDE, SODIUM LACTATE AND CALCIUM CHLORIDE: 600; 310; 30; 20 INJECTION, SOLUTION INTRAVENOUS at 11:04

## 2020-08-05 RX ADMIN — PROPOFOL 20 MG: 10 INJECTION, EMULSION INTRAVENOUS at 10:36

## 2020-08-05 RX ADMIN — PROPOFOL 20 MG: 10 INJECTION, EMULSION INTRAVENOUS at 10:32

## 2020-08-05 RX ADMIN — HYDROMORPHONE HYDROCHLORIDE 0.3 MG: 1 INJECTION, SOLUTION INTRAMUSCULAR; INTRAVENOUS; SUBCUTANEOUS at 10:30

## 2020-08-05 RX ADMIN — DOXYCYCLINE 100 MG: 100 INJECTION, POWDER, LYOPHILIZED, FOR SOLUTION INTRAVENOUS at 10:26

## 2020-08-05 ASSESSMENT — MIFFLIN-ST. JEOR: SCORE: 1600.63

## 2020-08-05 NOTE — ANESTHESIA POSTPROCEDURE EVALUATION
Anesthesia POST Procedure Evaluation    Patient: Griselda Bell   MRN:     [id number] Gender:   female   Age:    35 year old :      1985        Preoperative Diagnosis: Suspected fetal anomaly, antepartum, single or unspecified fetus [O35.9XX0]  Fetus with trisomy 13, single gestation [O35.1XX0]   Procedure(s):  DILATION AND EVACUATION, UTERUS-   Postop Comments: No value filed.     Anesthesia Type: MAC          Postop Pain Control: Uneventful            Sign Out: Well controlled pain   PONV: No   Neuro/Psych: Uneventful            Sign Out: Acceptable/Baseline neuro status   Airway/Respiratory: Uneventful            Sign Out: AIRWAY IN SITU/Resp. Support   CV/Hemodynamics: Uneventful            Sign Out: Acceptable CV status   Other NRE: NONE   DID A NON-ROUTINE EVENT OCCUR? No         Last Anesthesia Record Vitals:  CRNA VITALS  2020 1037 - 2020 1137      2020             NIBP:  121/74    Ht Rate:  84    Temp:  36.8  C (98.2  F)    SpO2:  100 %    EKG:  Sinus rhythm     regular          Last PACU Vitals:  Vitals Value Taken Time   BP     Temp     Pulse     Resp     SpO2     Temp src     NIBP 102/68 2020 11:01 AM   Pulse 90 2020 11:06 AM   SpO2 98 % 2020 11:06 AM   Resp     Temp     Ht Rate 80 2020 11:03 AM   Temp 2           Electronically Signed By: Jon Murphy MD, 2020, 12:29 PM

## 2020-08-05 NOTE — ANESTHESIA CARE TRANSFER NOTE
Patient: Griselda Bell    Procedure(s):  DILATION AND EVACUATION, UTERUS-    Diagnosis: Suspected fetal anomaly, antepartum, single or unspecified fetus [O35.9XX0]  Fetus with trisomy 13, single gestation [O35.1XX0]  Diagnosis Additional Information: No value filed.    Anesthesia Type:   MAC     Note:  Airway :Face Mask  Patient transferred to:Phase II  Comments: Report to SYLVIE Baker Pt awake, emotional, otherwise comfortable with VSS (see intraop documentation)  Handoff Report: Identifed the Patient, Identified the Reponsible Provider, Reviewed the pertinent medical history, Discussed the surgical course, Reviewed Intra-OP anesthesia mangement and issues during anesthesia, Set expectations for post-procedure period and Allowed opportunity for questions and acknowledgement of understanding      Vitals: (Last set prior to Anesthesia Care Transfer)    CRNA VITALS  8/5/2020 1037 - 8/5/2020 1121      8/5/2020             NIBP:  121/74    Ht Rate:  84    Temp:  36.8  C (98.2  F)    SpO2:  100 %    EKG:  Sinus rhythm     regular                Electronically Signed By: AJSWINDER Mitchell CRNA  August 5, 2020  11:21 AM

## 2020-08-05 NOTE — ANESTHESIA PREPROCEDURE EVALUATION
"Anesthesia Pre-Procedure Evaluation    Patient: Griselda Bell   MRN:     [id number] Gender:   female   Age:    35 year old :      1985        Preoperative Diagnosis: Suspected fetal anomaly, antepartum, single or unspecified fetus [O35.9XX0]  Fetus with trisomy 13, single gestation [O35.1XX0]   Procedure(s):  DILATION AND EVACUATION, UTERUS-     LABS:  CBC:   Lab Results   Component Value Date    HGB 13.4 2020     BMP:   Lab Results   Component Value Date    POTASSIUM 3.9 2018    POTASSIUM 3.8 2016    CR 0.57 2018    CR 0.60 2016    GLC 78 2020    GLC 82 2018     COAGS: No results found for: PTT, INR, FIBR  POC:   Lab Results   Component Value Date    BGM 80 2020    HCG Negative 2019    HCGS Positive (A) 2020     OTHER:   Lab Results   Component Value Date    ALT 13 2018    AST 15 2018    TSH 1.480 2018        Preop Vitals    BP Readings from Last 3 Encounters:   20 (!) 131/90   20 118/84   19 118/79    Pulse Readings from Last 3 Encounters:   20 91   20 72   19 85      Resp Readings from Last 3 Encounters:   20 16   20 18   19 18    SpO2 Readings from Last 3 Encounters:   20 99%   20 98%   19 98%      Temp Readings from Last 1 Encounters:   20 36.9  C (98.4  F) (Oral)    Ht Readings from Last 1 Encounters:   20 1.702 m (5' 7\")      Wt Readings from Last 1 Encounters:   20 87.3 kg (192 lb 7.4 oz)    Estimated body mass index is 30.14 kg/m  as calculated from the following:    Height as of this encounter: 1.702 m (5' 7\").    Weight as of this encounter: 87.3 kg (192 lb 7.4 oz).     LDA:  Peripheral IV 20 Right Hand (Active)   Number of days: 0        Past Medical History:   Diagnosis Date     Migraine      Obese       Past Surgical History:   Procedure Laterality Date     ARTHROSCOPY SHOULDER RT/LT  2006    labral repair     EXAM " UNDER ANESTHESIA RECTUM N/A 11/14/2019    Procedure: EXAM UNDER ANESTHESIA;  Surgeon: Diana Steward MD;  Location:  OR     LEEP [place], CERVICAL  2015?      Allergies   Allergen Reactions     Shellfish-Derived Products Swelling        Anesthesia Evaluation     . Pt has had prior anesthetic.     No history of anesthetic complications          ROS/MED HX    ENT/Pulmonary:       Neurologic:       Cardiovascular:         METS/Exercise Tolerance:     Hematologic:         Musculoskeletal:         GI/Hepatic:         Renal/Genitourinary:         Endo:         Psychiatric: Comment: H/O post partum depresion        Infectious Disease:         Malignancy:         Other:                         PHYSICAL EXAM:   Mental Status/Neuro: A/A/O   Airway: Facies: Feasible  Mallampati: I  Mouth/Opening: Full  TM distance: > 6 cm  Neck ROM: Full   Respiratory: Auscultation: CTAB     Resp. Rate: Normal     Resp. Effort: Normal      CV: Rhythm: Regular  Rate: Age appropriate  Heart: Normal Sounds  Edema: None   Comments:      Dental: Normal Dentition Habitus: Overweight               Assessment:   ASA SCORE: 2            Plan:   Anes. Type:  MAC   Pre-Medication: None   Induction:  N/a   Airway: Native Airway   Access/Monitoring: PIV   Maintenance: N/a     Postop Plan:   Postop Pain: None  Postop Sedation/Airway: Not planned     PONV Management: Adult Risk Factors: Female   Prevention: Ondansetron     CONSENT: Direct conversation   Plan and risks discussed with: Patient   Blood Products: Consented (ALL Blood Products)                   Jon Murphy MD

## 2020-08-05 NOTE — OP NOTE
Obstetrics Dilation and Evacuation Operative Note:         Pre-Op Diagnosis:   1) single intrauterine pregnancy at 15w5d by LMP and first trimester US  2) fetal anomalies: trisomy 13         Post-Op Diagnosis:   1) same  2) s/p D and E         Procedure:   1) dilation and evacuation under direct continuous ultrasound guidance         Surgeons:   Attending: Travis Hernandez MD, Maternal-Fetal Medicine & Clinical Genetics  Fellow: Michelle Cunningham           Anesthesia:   Conscious sedation with intracervical block          Estimated Blood Loss:   100 cc         Findings:   1) fetal parts consistent with 15 weeks  2) fetal malformations: bilateral cleft lip and palate          Specimens:   1) products of conception for complete autopsy           Complications:   None apparent          History:   Brianne Bell is a 35y.o.  at 15w5d by LMP and US.  Pregnancy was found to be affected by trisomy 13  After counseling regarding these findings, the patient has decided to terminate the pregnancy.         Details of Procedure:   After administration of MAC anesthesia the patient was placed in the dorsal lithotomy position and prepped and draped in the usual fashion.  An open-sided speculum was placed into the vagina and the 4x4 sponge and 4 Dilapan dilators which had been placed in the clinic on the prior day were removed from the vagina/cervix.  The vagina was copiously cleansed with solution.  The anterior lip of the cervix was injected with 2cc of 1% lidocaine without vasopressin and was then grasped with a ringed forceps.  An intracervical block was then placed using a total of an additional 8cc of 1% lidocaine without vasopressin.  The cervix was confirmed to be dilated with to Rangel Monae dilator #59.  A 16-curved suction cannula was inserted, rupturing membranes.  When the amniotic fluid was completely evacuated, the suction cannula was removed.  Using Tam forceps, the placenta and fetal parts were extracted under  ultrasound guidance.  When all of the pregnancy tissue had been evacuated, a sharp curette was inserted and a gentle curetage was performed until a uterine cri was noted.  The 16-curved suction cannula was then re-inserted to clear the uterine cavity of remaining debris.   The tenaculum was removed from the cervix.  The cervix and vaginal vault were inspected and noted to be free of injury.  Only a small amount of blood was evident at the cervical os. The instruments were removed from the vagina, the patient transferred to the recovery room in satisfactory condition.    Sponge counts were correct at the close of the case x 2.    Fetal remains will be sent to for general cremation at the patient's request.         Follow-up and Discharge Instructions:   We discussed activity restrictions, pelvic rest, breast care & post-partum depression.    She will follow-up with her primary Obstetric Provider Dr. Gonzales in 2 weeks for a physical and emotional check.  She will discuss a contraceptive plan with her primary provider.    Discharge medications prescriptions including ibuprofen (#40), doxycycline were provided in the clinic at the time of dilator placement.    Travis Hernandez M.D.  Maternal Fetal Medicine

## 2020-08-05 NOTE — DISCHARGE INSTRUCTIONS
Same-Day Surgery   Adult Discharge Orders & Instructions     For 24 hours after surgery:  1. Get plenty of rest.  A responsible adult must stay with you for at least 24 hours after you leave the hospital.   2. Pain medication can slow your reflexes. Do not drive or use heavy equipment.  If you have weakness or tingling, don't drive or use heavy equipment until this feeling goes away.  3. Mixing alcohol and pain medication can cause dizziness and slow your breathing. It can even be fatal. Do not drink alcohol while taking pain medication.  4. Avoid strenuous or risky activities.  Ask for help when climbing stairs.   5. You may feel lightheaded.  If so, sit for a few minutes before standing.  Have someone help you get up.   6. If you have nausea (feel sick to your stomach), drink only clear liquids such as apple juice, ginger ale, broth or 7-Up.  Rest may also help.  Be sure to drink enough fluids.  Move to a regular diet as you feel able. Take pain medications with a small amount of solid food, such as toast or crackers, to avoid nausea.   7. A slight fever is normal. Call the doctor if your fever is over 100 F (37.7 C) (taken under the tongue) or lasts longer than 24 hours.  8. You may have a dry mouth, muscle aches, trouble sleeping or a sore throat.  These symptoms should go away after 24 hours.  9. Do not make important or legal decisions.   Pain Management:      1. Take pain medication (if prescribed) for pain as directed by your physician.        2. WARNING: If the pain medication you have been prescribed contains Tylenol  (acetaminophen), DO NOT take additional doses of Tylenol (acetaminophen).     Call your doctor for any of the followin.  Signs of infection (fever, growing tenderness at the surgery site, severe pain, a large amount of drainage or bleeding, foul-smelling drainage, redness, swelling).    2.  It has been over 8 to 10 hours since surgery and you are still not able to urinate (pee).    3.   Headache for over 24 hours.    4.  Numbness, tingling or weakness the day after surgery (if you had spinal anesthesia).  To contact a doctor, call _____________________________________ or:      [phone] and ask for the Resident On Call for:          __________________________________________ (answered 24 hours a day)      Emergency Department:  [place] [facility]: [phone]  [place] [facility]: [phone]               Rev. 10/2014   Discharge Instructions: Following a Dilation   and Curettage/Dilation and Evacuation    What to expect:    Expect small to moderate amount of vaginal bleeding which should taper off in 4-5 days. It should not be heavier than your regular menstrual flow.    Do not douche, and use a pad rather than tampons.     No intercourse until bleeding has ceased.    Activity:    Rest the day of surgery. You may resume normal activity the next day.    You may bathe or shower.    Avoid heavy lifting (10-15 lbs) for one week.    Comfort:    The amount of discomfort you can expect is very unpredictable. If you have pain that cannot be controlled with non-aspirin pain relievers or with the prescription you may have received, you should notify your doctor.    Abdominal cramping (like menstrual cramps) or low back ache are common and should not be a cause for concern. You will be drowsy and weak the day of surgery and possibly the following day.    Diet:    You have no restrictions on your diet. Following surgery, drink plenty of fluids and eat a light meal.    Nausea:    The anesthesia medications you received during your surgical procedure may produce some nausea.    If you feel nauseated, stay in bed, keep your head down and try drinking fluids such as Seven-Up, tea or soup.    Notify Physician at once if you experience:    A fever over 100.4 degrees (a low grade fever under 100 degrees is usual after surgery).    Heavy flow and/or passing large clots. Saturating more  than 1 pad per hour for 2 or more hours.     Severe pain or cramps.    Important numbers  [facility] ([address]) - [place]: [phone]   [facility] ([address]) : [phone]  Rev. 5/12

## 2020-08-07 LAB — COPATH REPORT: NORMAL

## 2020-08-10 ENCOUNTER — TELEPHONE (OUTPATIENT)
Dept: MATERNAL FETAL MEDICINE | Facility: CLINIC | Age: 35
End: 2020-08-10

## 2020-08-10 LAB — COPATH REPORT: NORMAL

## 2020-08-10 NOTE — TELEPHONE ENCOUNTER
I left a detailed voice message for Marisabel today regarding final chromosome analysis results. Per Dr. Nanette Cagle, director with Cyt[employer], final results are consistent with a fetal diagnosis of trisomy 13 which is consistent with the NIPT and amniocentesis FISH results.     Per Dr. Cagle, the analysis showed trisomy 13 due to nondisjunction with no other structural or numerical changes noted. Final results are not yet signed out by the laboratory.     Maternal cell contamination studies are currently pending with the [facility] Molecular laboratory. Marisabel will be informed of these results when they become available.    Marisabel was encouraged to contact me with any questions or concerns.    Radha Lombardi MS, [facility]  Maternal Fetal Medicine  [facility]  Ph: [phone]  [email or web address]

## 2020-08-10 NOTE — TELEPHONE ENCOUNTER
Per Rose in the Cytogenetics laboratory, cells are growing well for chromosome analysis and the flask is almost ready to be sent to the Molecular laboratory to complete maternal cell contamination studies. Marisabel was encouraged to call with any questions or concerns while we await further results.    Radha Lombardi MS, [facility]  Maternal Fetal Medicine  [facility]  Ph: [phone]  [email or web address]

## 2020-08-14 LAB — COPATH REPORT: NORMAL

## 2020-08-17 ENCOUNTER — TELEPHONE (OUTPATIENT)
Dept: MATERNAL FETAL MEDICINE | Facility: CLINIC | Age: 35
End: 2020-08-17

## 2020-08-17 NOTE — TELEPHONE ENCOUNTER
LM for patient that amniocentesis retirement results did not identify maternal cell contamination in the amniotic fluid specimen and therefore the karyotype results previously called out on amniotic fluid specimen were representative of the fetus having a diagnosis of trisomy 13.    Patient was provided my call back number.    Val Dodd MS, [facility]  Maternal Fetal Medicine  Phone:[phone]

## 2020-08-20 LAB — COPATH REPORT: NORMAL

## 2020-10-09 LAB — COPATH REPORT: NORMAL

## 2020-11-11 ENCOUNTER — AMBULATORY - HEALTHEAST (OUTPATIENT)
Dept: FAMILY MEDICINE | Facility: CLINIC | Age: 35
End: 2020-11-11

## 2020-11-11 ENCOUNTER — VIRTUAL VISIT (OUTPATIENT)
Dept: FAMILY MEDICINE | Facility: OTHER | Age: 35
End: 2020-11-11
Payer: COMMERCIAL

## 2020-11-11 DIAGNOSIS — Z20.822 SUSPECTED COVID-19 VIRUS INFECTION: ICD-10-CM

## 2020-11-11 PROCEDURE — 99421 OL DIG E/M SVC 5-10 MIN: CPT | Performed by: PHYSICIAN ASSISTANT

## 2020-11-11 NOTE — PROGRESS NOTES
"Date: 2020 15:10:13  Clinician: Kiki Barksdale  Clinician NPI: [id number]  Patient: Griselda Bell  Patient : 1985  Patient Address: [facility] [place] [address]  Patient Phone: [phone]  Visit Protocol: URI  Patient Summary:  Griselda is a 35 year old ( : 1985 ) female who initiated a [facility] Visit for COVID-19 (Coronavirus) evaluation and screening. When asked the question \"Please sign me up to receive news, health information and promotions from [facility].\", Griselda responded \"No\".    Griselda states her symptoms started 1-2 days ago.   Her symptoms consist of malaise, a sore throat, a headache, and nasal congestion.   Symptom details     Nasal secretions: The color of her mucus is clear.    Sore throat: Griselda reports having mild throat pain (1-3 on a 10 point pain scale), does not have exudate on her tonsils, and can swallow liquids. The lymph nodes in her neck are not enlarged. A rash has not appeared on the skin since the sore throat started.     Headache: She states the headache is moderate (4-6 on a 10 point pain scale).      Griselda denies having vomiting, rhinitis, facial pain or pressure, myalgias, chills, teeth pain, ageusia, diarrhea, ear pain, wheezing, fever, enlarged lymph nodes, cough, nausea, and anosmia. She also denies taking antibiotic medication in the past month and having recent facial or sinus surgery in the past 60 days. She is not experiencing dyspnea.   Precipitating events  Within the past week, Griselda has not been exposed to someone with strep throat.   Pertinent COVID-19 (Coronavirus) information  Griselda does not work or volunteer as healthcare worker or a . In the past 14 days, Griselda has not worked or volunteered at a healthcare facility or group living setting.   In the past 14 days, she also has not lived in a congregate living setting.   Griselda has had a close contact with a laboratory-confirmed " COVID-19 patient within 14 days of symptom onset. She was not exposed at her work. Date Griselda was exposed to the laboratory-confirmed COVID-19 patient: 11/06/2020   Additional information about contact with COVID-19 (Coronavirus) patient as reported by the patient (free text):  at home    Since December 2019, Griselda has been tested for COVID-19 and has had upper respiratory infection (URI) or influenza-like illness.      Result of COVID-19 test: Negative     Date of her COVID-19 test: 10/06/2020     Date(s) of previous URI or influenza-like illness (free-text): 10/6/2020     Symptoms Griselda experienced during previous URI or influenza-like illness as reported by the patient (free-text): Sore throat, cough, cold, stuffy nose, runny nose, sinus pressure        Pertinent medical history  Griselda does not get yeast infections when she takes antibiotics.   Griselda does not need a return to work/school note.   Weight: 195 lbs   Griselda does not smoke or use smokeless tobacco.   She denies pregnancy and denies breastfeeding. She has menstruated in the past month.   Weight: 195 lbs    MEDICATIONS: Prozac oral, ALLERGIES: NKDA  Clinician Response:  Dear Griselda,   Your symptoms show that you may have coronavirus (COVID-19). This illness can cause fever, cough and trouble breathing. Many people get a mild case and get better on their own. Some people can get very sick.  What should I do?  We would like to test you for this virus.   1. Please call [phone] to schedule your visit. Explain that you were referred by [facility] to have a COVID-19 test. Be ready to share your [facility] visit ID number.  * If you need to schedule in [facility] please call [phone] or for [facility] employees please call [phone].  * If you need to schedule in the [place] area please call [phone]. [employer] employees call [phone].  The following will serve as your written order for this COVID Test, ordered by me,  "for the indication of suspected COVID [Z20.828]: The test will be ordered in [employer], our electronic health record, after you are scheduled. It will show as ordered and authorized by Sathya Smith MD.  Order: COVID-19 (Coronavirus) PCR [facility]TOMATIC [facility].   2. When it's time for your COVID test:  Stay at least 6 feet away from others. (If someone will drive you to your test, stay in the backseat, as far away from the  as you can.)   Cover your mouth and nose with a mask, tissue or washcloth.  Go straight to the testing site. Don't make any stops on the way there or back.      3.Starting now: Stay home and away from others (self-isolate) until:   You've had no fever---and no medicine that reduces fever---for one full day (24 hours). And...   Your other symptoms have gotten better. For example, your cough or breathing has improved. And...   At least 10 days have passed since your symptoms started.       During this time, don't leave the house except for testing or medical care.   Stay in your own room, even for meals. Use your own bathroom if you can.   Stay away from others in your home. No hugging, kissing or shaking hands. No visitors.  Don't go to work, school or anywhere else.    Clean \"high touch\" surfaces often (doorknobs, counters, handles, etc.). Use a household cleaning spray or wipes. You'll find a full list of  on the EPA website: www.epa.gov/pesticide-registration/list-n-disinfectants-use-against-sars-cov-2.   Cover your mouth and nose with a mask, tissue or washcloth to avoid spreading germs.  Wash your hands and face often. Use soap and water.  Caregivers in these groups are at risk for severe illness due to COVID-19:  o People 65 years and older  o People who live in a nursing home or long-term care facility  o People with chronic disease (lung, heart, cancer, diabetes, kidney, liver, immunologic)  o People who have a weakened immune system, including those who:   Are in " cancer treatment  Take medicine that weakens the immune system, such as corticosteroids  Had a bone marrow or organ transplant  Have an immune deficiency  Have poorly controlled HIV or AIDS  Are obese (body mass index of 40 or higher)  Smoke regularly   o Caregivers should wear gloves while washing dishes, handling laundry and cleaning bedrooms and bathrooms.  o Use caution when washing and drying laundry: Don't shake dirty laundry, and use the warmest water setting that you can.  o For more tips, go to www.cdc.gov/coronavirus/2019-ncov/downloads/10Things.pdf.    4.Sign up for [employer]. We know it's scary to hear that you might have COVID-19. We want to track your symptoms to make sure you're okay over the next 2 weeks. Please look for an email from [employer]---this is a free, online program that we'll use to keep in touch. To sign up, follow the link in the email. Learn more at [email or web address]  How can I take care of myself?   Get lots of rest. Drink extra fluids (unless a doctor has told you not to).   Take Tylenol (acetaminophen) for fever or pain. If you have liver or kidney problems, ask your family doctor if it's okay to take Tylenol.   Adults can take either:    650 mg (two 325 mg pills) every 4 to 6 hours, or...   1,000 mg (two 500 mg pills) every 8 hours as needed.    Note: Don't take more than 3,000 mg in one day. Acetaminophen is found in many medicines (both prescribed and over-the-counter medicines). Read all labels to be sure you don't take too much.   For children, check the Tylenol bottle for the right dose. The dose is based on the child's age or weight.    If you have other health problems (like cancer, heart failure, an organ transplant or severe kidney disease): Call your specialty clinic if you don't feel better in the next 2 days.       Know when to call 911. Emergency warning signs include:    Trouble breathing or shortness of breath Pain or pressure in the chest that  doesn't go away Feeling confused like you haven't felt before, or not being able to wake up Bluish-colored lips or face.  Where can I get more information?   [facility] -- About COVID-19: [email or web address]   CDC -- What to Do If You're Sick: www.cdc.gov/coronavirus/2019-ncov/about/steps-when-sick.html   CDC -- Ending Home Isolation: www.cdc.gov/coronavirus/2019-ncov/hcp/disposition-in-home-patients.html   [facility] -- Caring for Someone: www.cdc.gov/coronavirus/2019-ncov/if-you-are-sick/care-for-someone.html   [facility] -- Interim Guidance for Hospital Discharge to Home: www.[facility].[place].us/diseases/coronavirus/hcp/hospdischarge.pdf   [facility] clinical trials (COVID-19 research studies): [email or web address].[facility]-clinical-trials    Below are the COVID-19 hotlines at the [facility] ([facility]). Interpreters are available.    For health questions: Call 182-501-9064 or 1-520.927.3470 (7 a.m. to 7 p.m.) For questions about schools and childcare: Call 124-295-2561 or 1-408.126.5111 (7 a.m. to 7 p.m.)    Diagnosis: Contact with and (suspected) exposure to other communicable diseases  Diagnosis ICD: Z20.89

## 2020-11-12 ENCOUNTER — AMBULATORY - HEALTHEAST (OUTPATIENT)
Dept: FAMILY MEDICINE | Facility: CLINIC | Age: 35
End: 2020-11-12

## 2020-11-12 DIAGNOSIS — Z20.822 SUSPECTED COVID-19 VIRUS INFECTION: ICD-10-CM

## 2020-11-14 ENCOUNTER — COMMUNICATION - HEALTHEAST (OUTPATIENT)
Dept: SCHEDULING | Facility: CLINIC | Age: 35
End: 2020-11-14

## 2021-01-03 ENCOUNTER — HEALTH MAINTENANCE LETTER (OUTPATIENT)
Age: 36
End: 2021-01-03

## 2021-03-18 ENCOUNTER — AMBULATORY - HEALTHEAST (OUTPATIENT)
Dept: NURSING | Facility: CLINIC | Age: 36
End: 2021-03-18

## 2021-04-08 ENCOUNTER — AMBULATORY - HEALTHEAST (OUTPATIENT)
Dept: NURSING | Facility: CLINIC | Age: 36
End: 2021-04-08

## 2021-04-19 ENCOUNTER — SURGERY (OUTPATIENT)
Age: 36
End: 2021-04-19
Payer: COMMERCIAL

## 2021-04-19 ENCOUNTER — ANESTHESIA (OUTPATIENT)
Dept: SURGERY | Facility: CLINIC | Age: 36
End: 2021-04-19
Payer: COMMERCIAL

## 2021-04-19 ENCOUNTER — APPOINTMENT (OUTPATIENT)
Dept: CT IMAGING | Facility: CLINIC | Age: 36
End: 2021-04-19
Attending: EMERGENCY MEDICINE
Payer: COMMERCIAL

## 2021-04-19 ENCOUNTER — ANESTHESIA EVENT (OUTPATIENT)
Dept: SURGERY | Facility: CLINIC | Age: 36
End: 2021-04-19
Payer: COMMERCIAL

## 2021-04-19 ENCOUNTER — HOSPITAL ENCOUNTER (OUTPATIENT)
Facility: CLINIC | Age: 36
Setting detail: OBSERVATION
Discharge: HOME | End: 2021-04-19
Attending: EMERGENCY MEDICINE | Admitting: SURGERY
Payer: COMMERCIAL

## 2021-04-19 ENCOUNTER — OFFICE VISIT (OUTPATIENT)
Dept: FAMILY MEDICINE | Facility: CLINIC | Age: 36
End: 2021-04-19
Payer: COMMERCIAL

## 2021-04-19 VITALS
BODY MASS INDEX: 29.44 KG/M2 | SYSTOLIC BLOOD PRESSURE: 121 MMHG | HEIGHT: 67 IN | TEMPERATURE: 98.8 F | DIASTOLIC BLOOD PRESSURE: 83 MMHG | RESPIRATION RATE: 18 BRPM | OXYGEN SATURATION: 100 % | WEIGHT: 187.6 LBS | HEART RATE: 89 BPM

## 2021-04-19 VITALS
DIASTOLIC BLOOD PRESSURE: 74 MMHG | RESPIRATION RATE: 10 BRPM | OXYGEN SATURATION: 95 % | HEART RATE: 79 BPM | BODY MASS INDEX: 29.38 KG/M2 | SYSTOLIC BLOOD PRESSURE: 121 MMHG | HEIGHT: 67 IN | TEMPERATURE: 98.4 F

## 2021-04-19 DIAGNOSIS — K35.30 ACUTE APPENDICITIS WITH LOCALIZED PERITONITIS, WITHOUT PERFORATION, ABSCESS, OR GANGRENE: ICD-10-CM

## 2021-04-19 DIAGNOSIS — G89.18 POSTOPERATIVE PAIN: Primary | ICD-10-CM

## 2021-04-19 DIAGNOSIS — R10.31 ABDOMINAL PAIN, RIGHT LOWER QUADRANT: Primary | ICD-10-CM

## 2021-04-19 LAB
ALBUMIN SERPL-MCNC: 3.8 G/DL (ref 3.4–5)
ALBUMIN UR-MCNC: NEGATIVE MG/DL
ALP SERPL-CCNC: 54 U/L (ref 40–150)
ALT SERPL W P-5'-P-CCNC: 21 U/L (ref 0–50)
ANION GAP SERPL CALCULATED.3IONS-SCNC: 5 MMOL/L (ref 3–14)
APPEARANCE UR: CLEAR
AST SERPL W P-5'-P-CCNC: 12 U/L (ref 0–45)
BASOPHILS # BLD AUTO: 0 10E9/L (ref 0–0.2)
BASOPHILS NFR BLD AUTO: 0.5 %
BILIRUB SERPL-MCNC: 1.2 MG/DL (ref 0.2–1.3)
BILIRUB UR QL STRIP: NEGATIVE
BUN SERPL-MCNC: 11 MG/DL (ref 7–30)
CALCIUM SERPL-MCNC: 9 MG/DL (ref 8.5–10.1)
CHLORIDE SERPL-SCNC: 106 MMOL/L (ref 94–109)
CO2 SERPL-SCNC: 25 MMOL/L (ref 20–32)
COLOR UR AUTO: NORMAL
CREAT SERPL-MCNC: 0.6 MG/DL (ref 0.52–1.04)
DIFFERENTIAL METHOD BLD: NORMAL
EOSINOPHIL # BLD AUTO: 0.1 10E9/L (ref 0–0.7)
EOSINOPHIL NFR BLD AUTO: 1.7 %
ERYTHROCYTE [DISTWIDTH] IN BLOOD BY AUTOMATED COUNT: 11.4 % (ref 10–15)
GFR SERPL CREATININE-BSD FRML MDRD: >90 ML/MIN/{1.73_M2}
GLUCOSE SERPL-MCNC: 91 MG/DL (ref 70–99)
GLUCOSE UR STRIP-MCNC: NEGATIVE MG/DL
HCG UR QL: NEGATIVE
HCT VFR BLD AUTO: 38.8 % (ref 35–47)
HGB BLD-MCNC: 13.4 G/DL (ref 11.7–15.7)
HGB UR QL STRIP: NEGATIVE
IMM GRANULOCYTES # BLD: 0 10E9/L (ref 0–0.4)
IMM GRANULOCYTES NFR BLD: 0.2 %
INR PPP: 1.01 (ref 0.86–1.14)
KETONES UR STRIP-MCNC: NEGATIVE MG/DL
LABORATORY COMMENT REPORT: NORMAL
LEUKOCYTE ESTERASE UR QL STRIP: NEGATIVE
LYMPHOCYTES # BLD AUTO: 1.4 10E9/L (ref 0.8–5.3)
LYMPHOCYTES NFR BLD AUTO: 16.9 %
MCH RBC QN AUTO: 31.6 PG (ref 26.5–33)
MCHC RBC AUTO-ENTMCNC: 34.5 G/DL (ref 31.5–36.5)
MCV RBC AUTO: 92 FL (ref 78–100)
MONOCYTES # BLD AUTO: 0.6 10E9/L (ref 0–1.3)
MONOCYTES NFR BLD AUTO: 7.7 %
NEUTROPHILS # BLD AUTO: 6.1 10E9/L (ref 1.6–8.3)
NEUTROPHILS NFR BLD AUTO: 73 %
NITRATE UR QL: NEGATIVE
NRBC # BLD AUTO: 0 10*3/UL
NRBC BLD AUTO-RTO: 0 /100
PH UR STRIP: 6 PH (ref 5–7)
PLATELET # BLD AUTO: 224 10E9/L (ref 150–450)
POTASSIUM SERPL-SCNC: 3.6 MMOL/L (ref 3.4–5.3)
PROT SERPL-MCNC: 7.4 G/DL (ref 6.8–8.8)
RBC # BLD AUTO: 4.24 10E12/L (ref 3.8–5.2)
SARS-COV-2 RNA RESP QL NAA+PROBE: NEGATIVE
SODIUM SERPL-SCNC: 136 MMOL/L (ref 133–144)
SOURCE: NORMAL
SP GR UR STRIP: 1.01 (ref 1–1.03)
SPECIMEN SOURCE: NORMAL
UROBILINOGEN UR STRIP-MCNC: 0 MG/DL (ref 0–2)
WBC # BLD AUTO: 8.3 10E9/L (ref 4–11)

## 2021-04-19 PROCEDURE — 99285 EMERGENCY DEPT VISIT HI MDM: CPT | Mod: 25

## 2021-04-19 PROCEDURE — 258N000003 HC RX IP 258 OP 636: Performed by: EMERGENCY MEDICINE

## 2021-04-19 PROCEDURE — 87635 SARS-COV-2 COVID-19 AMP PRB: CPT | Performed by: EMERGENCY MEDICINE

## 2021-04-19 PROCEDURE — 88304 TISSUE EXAM BY PATHOLOGIST: CPT | Mod: TC | Performed by: SURGERY

## 2021-04-19 PROCEDURE — 96361 HYDRATE IV INFUSION ADD-ON: CPT

## 2021-04-19 PROCEDURE — 250N000011 HC RX IP 250 OP 636: Performed by: ANESTHESIOLOGY

## 2021-04-19 PROCEDURE — 272N000001 HC OR GENERAL SUPPLY STERILE: Performed by: SURGERY

## 2021-04-19 PROCEDURE — 96365 THER/PROPH/DIAG IV INF INIT: CPT | Mod: 59

## 2021-04-19 PROCEDURE — 258N000003 HC RX IP 258 OP 636: Performed by: NURSE ANESTHETIST, CERTIFIED REGISTERED

## 2021-04-19 PROCEDURE — 80053 COMPREHEN METABOLIC PANEL: CPT | Performed by: EMERGENCY MEDICINE

## 2021-04-19 PROCEDURE — 88304 TISSUE EXAM BY PATHOLOGIST: CPT | Mod: 26 | Performed by: PATHOLOGY

## 2021-04-19 PROCEDURE — 85610 PROTHROMBIN TIME: CPT | Performed by: EMERGENCY MEDICINE

## 2021-04-19 PROCEDURE — 710N000009 HC RECOVERY PHASE 1, LEVEL 1, PER MIN: Performed by: SURGERY

## 2021-04-19 PROCEDURE — 250N000009 HC RX 250: Performed by: NURSE ANESTHETIST, CERTIFIED REGISTERED

## 2021-04-19 PROCEDURE — 99207 PR INPT ADMISSION FROM CLINIC: CPT | Performed by: INTERNAL MEDICINE

## 2021-04-19 PROCEDURE — 250N000011 HC RX IP 250 OP 636: Performed by: NURSE ANESTHETIST, CERTIFIED REGISTERED

## 2021-04-19 PROCEDURE — 370N000017 HC ANESTHESIA TECHNICAL FEE, PER MIN: Performed by: SURGERY

## 2021-04-19 PROCEDURE — G0378 HOSPITAL OBSERVATION PER HR: HCPCS

## 2021-04-19 PROCEDURE — 74177 CT ABD & PELVIS W/CONTRAST: CPT

## 2021-04-19 PROCEDURE — 44970 LAPAROSCOPY APPENDECTOMY: CPT | Mod: AS | Performed by: PHYSICIAN ASSISTANT

## 2021-04-19 PROCEDURE — 81003 URINALYSIS AUTO W/O SCOPE: CPT | Performed by: EMERGENCY MEDICINE

## 2021-04-19 PROCEDURE — 710N000012 HC RECOVERY PHASE 2, PER MINUTE: Performed by: SURGERY

## 2021-04-19 PROCEDURE — 250N000025 HC SEVOFLURANE, PER MIN: Performed by: SURGERY

## 2021-04-19 PROCEDURE — 360N000076 HC SURGERY LEVEL 3, PER MIN: Performed by: SURGERY

## 2021-04-19 PROCEDURE — 250N000009 HC RX 250: Performed by: SURGERY

## 2021-04-19 PROCEDURE — 250N000009 HC RX 250: Performed by: EMERGENCY MEDICINE

## 2021-04-19 PROCEDURE — 44970 LAPAROSCOPY APPENDECTOMY: CPT | Performed by: SURGERY

## 2021-04-19 PROCEDURE — 85025 COMPLETE CBC W/AUTO DIFF WBC: CPT | Performed by: EMERGENCY MEDICINE

## 2021-04-19 PROCEDURE — 250N000013 HC RX MED GY IP 250 OP 250 PS 637: Performed by: PHYSICIAN ASSISTANT

## 2021-04-19 PROCEDURE — 250N000011 HC RX IP 250 OP 636: Performed by: EMERGENCY MEDICINE

## 2021-04-19 PROCEDURE — 81025 URINE PREGNANCY TEST: CPT | Performed by: EMERGENCY MEDICINE

## 2021-04-19 PROCEDURE — 999N000141 HC STATISTIC PRE-PROCEDURE NURSING ASSESSMENT: Performed by: SURGERY

## 2021-04-19 PROCEDURE — 99204 OFFICE O/P NEW MOD 45 MIN: CPT | Mod: 57 | Performed by: SURGERY

## 2021-04-19 PROCEDURE — 96375 TX/PRO/DX INJ NEW DRUG ADDON: CPT | Mod: 59

## 2021-04-19 PROCEDURE — C9803 HOPD COVID-19 SPEC COLLECT: HCPCS

## 2021-04-19 RX ORDER — NALOXONE HYDROCHLORIDE 0.4 MG/ML
0.4 INJECTION, SOLUTION INTRAMUSCULAR; INTRAVENOUS; SUBCUTANEOUS
Status: DISCONTINUED | OUTPATIENT
Start: 2021-04-19 | End: 2021-04-19

## 2021-04-19 RX ORDER — NALOXONE HYDROCHLORIDE 0.4 MG/ML
0.2 INJECTION, SOLUTION INTRAMUSCULAR; INTRAVENOUS; SUBCUTANEOUS
Status: DISCONTINUED | OUTPATIENT
Start: 2021-04-19 | End: 2021-04-19

## 2021-04-19 RX ORDER — ONDANSETRON 2 MG/ML
4 INJECTION INTRAMUSCULAR; INTRAVENOUS EVERY 30 MIN PRN
Status: DISCONTINUED | OUTPATIENT
Start: 2021-04-19 | End: 2021-04-19 | Stop reason: HOSPADM

## 2021-04-19 RX ORDER — MORPHINE SULFATE 4 MG/ML
4 INJECTION, SOLUTION INTRAMUSCULAR; INTRAVENOUS
Status: COMPLETED | OUTPATIENT
Start: 2021-04-19 | End: 2021-04-19

## 2021-04-19 RX ORDER — NALOXONE HYDROCHLORIDE 0.4 MG/ML
0.4 INJECTION, SOLUTION INTRAMUSCULAR; INTRAVENOUS; SUBCUTANEOUS
Status: DISCONTINUED | OUTPATIENT
Start: 2021-04-19 | End: 2021-04-19 | Stop reason: HOSPADM

## 2021-04-19 RX ORDER — HYDROMORPHONE HYDROCHLORIDE 1 MG/ML
0.5 INJECTION, SOLUTION INTRAMUSCULAR; INTRAVENOUS; SUBCUTANEOUS
Status: COMPLETED | OUTPATIENT
Start: 2021-04-19 | End: 2021-04-19

## 2021-04-19 RX ORDER — LIDOCAINE 40 MG/G
CREAM TOPICAL
Status: DISCONTINUED | OUTPATIENT
Start: 2021-04-19 | End: 2021-04-19 | Stop reason: HOSPADM

## 2021-04-19 RX ORDER — MULTIVIT-MIN/IRON/FOLIC ACID/K 18-600-40
1000 CAPSULE ORAL DAILY
COMMUNITY
End: 2024-07-25

## 2021-04-19 RX ORDER — SODIUM CHLORIDE, SODIUM LACTATE, POTASSIUM CHLORIDE, CALCIUM CHLORIDE 600; 310; 30; 20 MG/100ML; MG/100ML; MG/100ML; MG/100ML
INJECTION, SOLUTION INTRAVENOUS CONTINUOUS PRN
Status: DISCONTINUED | OUTPATIENT
Start: 2021-04-19 | End: 2021-04-19

## 2021-04-19 RX ORDER — DEXAMETHASONE SODIUM PHOSPHATE 4 MG/ML
INJECTION, SOLUTION INTRA-ARTICULAR; INTRALESIONAL; INTRAMUSCULAR; INTRAVENOUS; SOFT TISSUE PRN
Status: DISCONTINUED | OUTPATIENT
Start: 2021-04-19 | End: 2021-04-19

## 2021-04-19 RX ORDER — HYDROCODONE BITARTRATE AND ACETAMINOPHEN 5; 325 MG/1; MG/1
1 TABLET ORAL
Status: COMPLETED | OUTPATIENT
Start: 2021-04-19 | End: 2021-04-19

## 2021-04-19 RX ORDER — ONDANSETRON 2 MG/ML
INJECTION INTRAMUSCULAR; INTRAVENOUS PRN
Status: DISCONTINUED | OUTPATIENT
Start: 2021-04-19 | End: 2021-04-19

## 2021-04-19 RX ORDER — SODIUM CHLORIDE, SODIUM LACTATE, POTASSIUM CHLORIDE, CALCIUM CHLORIDE 600; 310; 30; 20 MG/100ML; MG/100ML; MG/100ML; MG/100ML
INJECTION, SOLUTION INTRAVENOUS CONTINUOUS
Status: DISCONTINUED | OUTPATIENT
Start: 2021-04-19 | End: 2021-04-19 | Stop reason: HOSPADM

## 2021-04-19 RX ORDER — GLYCOPYRROLATE 0.2 MG/ML
INJECTION, SOLUTION INTRAMUSCULAR; INTRAVENOUS PRN
Status: DISCONTINUED | OUTPATIENT
Start: 2021-04-19 | End: 2021-04-19

## 2021-04-19 RX ORDER — PROPOFOL 10 MG/ML
INJECTION, EMULSION INTRAVENOUS PRN
Status: DISCONTINUED | OUTPATIENT
Start: 2021-04-19 | End: 2021-04-19

## 2021-04-19 RX ORDER — PROPOFOL 10 MG/ML
INJECTION, EMULSION INTRAVENOUS CONTINUOUS PRN
Status: DISCONTINUED | OUTPATIENT
Start: 2021-04-19 | End: 2021-04-19

## 2021-04-19 RX ORDER — LIDOCAINE HYDROCHLORIDE 20 MG/ML
INJECTION, SOLUTION INFILTRATION; PERINEURAL PRN
Status: DISCONTINUED | OUTPATIENT
Start: 2021-04-19 | End: 2021-04-19

## 2021-04-19 RX ORDER — HYDROMORPHONE HYDROCHLORIDE 1 MG/ML
.3-.5 INJECTION, SOLUTION INTRAMUSCULAR; INTRAVENOUS; SUBCUTANEOUS EVERY 5 MIN PRN
Status: DISCONTINUED | OUTPATIENT
Start: 2021-04-19 | End: 2021-04-19 | Stop reason: HOSPADM

## 2021-04-19 RX ORDER — NALOXONE HYDROCHLORIDE 0.4 MG/ML
0.2 INJECTION, SOLUTION INTRAMUSCULAR; INTRAVENOUS; SUBCUTANEOUS
Status: DISCONTINUED | OUTPATIENT
Start: 2021-04-19 | End: 2021-04-19 | Stop reason: HOSPADM

## 2021-04-19 RX ORDER — FENTANYL CITRATE 50 UG/ML
25-50 INJECTION, SOLUTION INTRAMUSCULAR; INTRAVENOUS
Status: DISCONTINUED | OUTPATIENT
Start: 2021-04-19 | End: 2021-04-19 | Stop reason: HOSPADM

## 2021-04-19 RX ORDER — HYDROMORPHONE HYDROCHLORIDE 1 MG/ML
.3-.5 INJECTION, SOLUTION INTRAMUSCULAR; INTRAVENOUS; SUBCUTANEOUS EVERY 10 MIN PRN
Status: DISCONTINUED | OUTPATIENT
Start: 2021-04-19 | End: 2021-04-19 | Stop reason: HOSPADM

## 2021-04-19 RX ORDER — AMOXICILLIN 250 MG
1-2 CAPSULE ORAL 2 TIMES DAILY
Qty: 30 TABLET | Refills: 0 | Status: SHIPPED | OUTPATIENT
Start: 2021-04-19 | End: 2022-06-23

## 2021-04-19 RX ORDER — MEPERIDINE HYDROCHLORIDE 25 MG/ML
12.5 INJECTION INTRAMUSCULAR; INTRAVENOUS; SUBCUTANEOUS
Status: DISCONTINUED | OUTPATIENT
Start: 2021-04-19 | End: 2021-04-19 | Stop reason: HOSPADM

## 2021-04-19 RX ORDER — FENTANYL CITRATE 50 UG/ML
50-100 INJECTION, SOLUTION INTRAMUSCULAR; INTRAVENOUS
Status: DISCONTINUED | OUTPATIENT
Start: 2021-04-19 | End: 2021-04-19 | Stop reason: HOSPADM

## 2021-04-19 RX ORDER — NEOSTIGMINE METHYLSULFATE 1 MG/ML
VIAL (ML) INJECTION PRN
Status: DISCONTINUED | OUTPATIENT
Start: 2021-04-19 | End: 2021-04-19

## 2021-04-19 RX ORDER — ONDANSETRON 4 MG/1
4 TABLET, ORALLY DISINTEGRATING ORAL EVERY 30 MIN PRN
Status: DISCONTINUED | OUTPATIENT
Start: 2021-04-19 | End: 2021-04-19 | Stop reason: HOSPADM

## 2021-04-19 RX ORDER — FENTANYL CITRATE 50 UG/ML
INJECTION, SOLUTION INTRAMUSCULAR; INTRAVENOUS PRN
Status: DISCONTINUED | OUTPATIENT
Start: 2021-04-19 | End: 2021-04-19

## 2021-04-19 RX ORDER — IOPAMIDOL 755 MG/ML
94 INJECTION, SOLUTION INTRAVASCULAR ONCE
Status: COMPLETED | OUTPATIENT
Start: 2021-04-19 | End: 2021-04-19

## 2021-04-19 RX ORDER — PIPERACILLIN SODIUM, TAZOBACTAM SODIUM 3; .375 G/15ML; G/15ML
3.38 INJECTION, POWDER, LYOPHILIZED, FOR SOLUTION INTRAVENOUS ONCE
Status: COMPLETED | OUTPATIENT
Start: 2021-04-19 | End: 2021-04-19

## 2021-04-19 RX ORDER — KETOROLAC TROMETHAMINE 30 MG/ML
INJECTION, SOLUTION INTRAMUSCULAR; INTRAVENOUS PRN
Status: DISCONTINUED | OUTPATIENT
Start: 2021-04-19 | End: 2021-04-19

## 2021-04-19 RX ORDER — HYDROCODONE BITARTRATE AND ACETAMINOPHEN 5; 325 MG/1; MG/1
1 TABLET ORAL EVERY 4 HOURS PRN
Qty: 10 TABLET | Refills: 0 | Status: SHIPPED | OUTPATIENT
Start: 2021-04-19 | End: 2021-07-27

## 2021-04-19 RX ADMIN — PHENYLEPHRINE HYDROCHLORIDE 50 MCG: 10 INJECTION INTRAVENOUS at 12:28

## 2021-04-19 RX ADMIN — PHENYLEPHRINE HYDROCHLORIDE 50 MCG: 10 INJECTION INTRAVENOUS at 12:34

## 2021-04-19 RX ADMIN — FENTANYL CITRATE 100 MCG: 50 INJECTION, SOLUTION INTRAMUSCULAR; INTRAVENOUS at 12:22

## 2021-04-19 RX ADMIN — KETOROLAC TROMETHAMINE 15 MG: 30 INJECTION, SOLUTION INTRAMUSCULAR at 13:05

## 2021-04-19 RX ADMIN — SUCCINYLCHOLINE CHLORIDE 100 MG: 20 INJECTION, SOLUTION INTRAMUSCULAR; INTRAVENOUS; PARENTERAL at 12:23

## 2021-04-19 RX ADMIN — LIDOCAINE HYDROCHLORIDE 100 MG: 20 INJECTION, SOLUTION INFILTRATION; PERINEURAL at 12:22

## 2021-04-19 RX ADMIN — GLYCOPYRROLATE 0.8 MG: 0.2 INJECTION, SOLUTION INTRAMUSCULAR; INTRAVENOUS at 13:10

## 2021-04-19 RX ADMIN — IOPAMIDOL 94 ML: 755 INJECTION, SOLUTION INTRAVENOUS at 09:51

## 2021-04-19 RX ADMIN — SODIUM CHLORIDE 67 ML: 9 INJECTION, SOLUTION INTRAVENOUS at 09:52

## 2021-04-19 RX ADMIN — ONDANSETRON 4 MG: 2 INJECTION INTRAMUSCULAR; INTRAVENOUS at 12:39

## 2021-04-19 RX ADMIN — HYDROCODONE BITARTRATE AND ACETAMINOPHEN 1 TABLET: 5; 325 TABLET ORAL at 14:29

## 2021-04-19 RX ADMIN — PROPOFOL 200 MG: 10 INJECTION, EMULSION INTRAVENOUS at 12:22

## 2021-04-19 RX ADMIN — MIDAZOLAM 2 MG: 1 INJECTION INTRAMUSCULAR; INTRAVENOUS at 12:19

## 2021-04-19 RX ADMIN — PROPOFOL 50 MCG/KG/MIN: 10 INJECTION, EMULSION INTRAVENOUS at 12:25

## 2021-04-19 RX ADMIN — DEXAMETHASONE SODIUM PHOSPHATE 4 MG: 4 INJECTION, SOLUTION INTRA-ARTICULAR; INTRALESIONAL; INTRAMUSCULAR; INTRAVENOUS; SOFT TISSUE at 12:29

## 2021-04-19 RX ADMIN — PIPERACILLIN SODIUM AND TAZOBACTAM SODIUM 3.38 G: 3; .375 INJECTION, POWDER, LYOPHILIZED, FOR SOLUTION INTRAVENOUS at 10:41

## 2021-04-19 RX ADMIN — ROCURONIUM BROMIDE 10 MG: 10 INJECTION INTRAVENOUS at 12:28

## 2021-04-19 RX ADMIN — ONDANSETRON 4 MG: 2 INJECTION INTRAMUSCULAR; INTRAVENOUS at 09:25

## 2021-04-19 RX ADMIN — HYDROMORPHONE HYDROCHLORIDE 0.5 MG: 1 INJECTION, SOLUTION INTRAMUSCULAR; INTRAVENOUS; SUBCUTANEOUS at 10:57

## 2021-04-19 RX ADMIN — SODIUM CHLORIDE, POTASSIUM CHLORIDE, SODIUM LACTATE AND CALCIUM CHLORIDE: 600; 310; 30; 20 INJECTION, SOLUTION INTRAVENOUS at 12:18

## 2021-04-19 RX ADMIN — HYDROMORPHONE HYDROCHLORIDE 0.5 MG: 1 INJECTION, SOLUTION INTRAMUSCULAR; INTRAVENOUS; SUBCUTANEOUS at 14:07

## 2021-04-19 RX ADMIN — HYDROMORPHONE HYDROCHLORIDE 0.5 MG: 1 INJECTION, SOLUTION INTRAMUSCULAR; INTRAVENOUS; SUBCUTANEOUS at 13:35

## 2021-04-19 RX ADMIN — NEOSTIGMINE METHYLSULFATE 4 MG: 1 INJECTION, SOLUTION INTRAVENOUS at 13:10

## 2021-04-19 RX ADMIN — SODIUM CHLORIDE 1000 ML: 9 INJECTION, SOLUTION INTRAVENOUS at 09:21

## 2021-04-19 RX ADMIN — BUPIVACAINE HYDROCHLORIDE AND EPINEPHRINE BITARTRATE 38 ML: 5; .005 INJECTION, SOLUTION EPIDURAL; INTRACAUDAL; PERINEURAL at 12:44

## 2021-04-19 RX ADMIN — MORPHINE SULFATE 4 MG: 4 INJECTION, SOLUTION INTRAMUSCULAR; INTRAVENOUS at 09:26

## 2021-04-19 ASSESSMENT — ENCOUNTER SYMPTOMS
DIARRHEA: 1
FEVER: 0
NAUSEA: 1
ABDOMINAL PAIN: 1

## 2021-04-19 ASSESSMENT — MIFFLIN-ST. JEOR: SCORE: 1573.58

## 2021-04-19 ASSESSMENT — LIFESTYLE VARIABLES: TOBACCO_USE: 0

## 2021-04-19 ASSESSMENT — COPD QUESTIONNAIRES: COPD: 0

## 2021-04-19 ASSESSMENT — PAIN SCALES - GENERAL: PAINLEVEL: EXTREME PAIN (8)

## 2021-04-19 NOTE — ANESTHESIA PREPROCEDURE EVALUATION
Anesthesia Pre-Procedure Evaluation    Patient: Griselda Bell   MRN: [id number] : 1985        Preoperative Diagnosis: Acute appendicitis with localized peritonitis, without perforation, abscess, or gangrene [K35.30]   Procedure : Procedure(s):  APPENDECTOMY, LAPAROSCOPIC     Past Medical History:   Diagnosis Date     Migraine      Obese       Past Surgical History:   Procedure Laterality Date     ARTHROSCOPY SHOULDER RT/LT      labral repair     DILATION AND EVACUATION N/A 2020    Procedure: DILATION AND EVACUATION, UTERUS-;  Surgeon: Travis Hernandez MD;  Location: [facility] OR     EXAM UNDER ANESTHESIA RECTUM N/A 2019    Procedure: EXAM UNDER ANESTHESIA;  Surgeon: Diana Steward MD;  Location:  OR     LEEP TX, CERVICAL  ?      Allergies   Allergen Reactions     Shellfish-Derived Products Swelling      Social History     Tobacco Use     Smoking status: Never Smoker     Smokeless tobacco: Never Used   Substance Use Topics     Alcohol use: Yes     Frequency: 2-4 times a month     Comment: 1 drink when does have etoh      Wt Readings from Last 1 Encounters:   21 85.1 kg (187 lb 9.6 oz)        Anesthesia Evaluation   Pt has had prior anesthetic. Type: General.    No history of anesthetic complications       ROS/MED HX  ENT/Pulmonary:    (-) tobacco use, asthma, COPD and sleep apnea   Neurologic:     (+) migraines,     Cardiovascular:    (-) hypertension and CAD   METS/Exercise Tolerance:     Hematologic:  - neg hematologic  ROS     Musculoskeletal:       GI/Hepatic:    (-) GERD and liver disease   Renal/Genitourinary:    (-) renal disease   Endo:     (+) Obesity,  (-) Type I DM and Type II DM   Psychiatric/Substance Use:       Infectious Disease:       Malignancy:       Other:            Physical Exam    Airway        Mallampati: II   TM distance: > 3 FB   Neck ROM: full   Mouth opening: > 3 cm    Respiratory Devices and Support         Dental  no notable dental history          Cardiovascular   cardiovascular exam normal          Pulmonary   pulmonary exam normal                OUTSIDE LABS:  CBC:   Lab Results   Component Value Date    WBC 8.3 04/19/2021    HGB 13.4 04/19/2021    HGB 13.4 08/05/2020    HCT 38.8 04/19/2021     04/19/2021     BMP:   Lab Results   Component Value Date     04/19/2021    POTASSIUM 3.6 04/19/2021    POTASSIUM 3.9 05/09/2018    CHLORIDE 106 04/19/2021    CO2 25 04/19/2021    BUN 11 04/19/2021    CR 0.60 04/19/2021    CR 0.57 05/09/2018    GLC 91 04/19/2021    GLC 78 08/05/2020     COAGS:   Lab Results   Component Value Date    INR 1.01 04/19/2021     POC:   Lab Results   Component Value Date    BGM 80 08/05/2020    HCG Negative 04/19/2021    HCGS Positive (A) 08/05/2020     HEPATIC:   Lab Results   Component Value Date    ALBUMIN 3.8 04/19/2021    PROTTOTAL 7.4 04/19/2021    ALT 21 04/19/2021    AST 12 04/19/2021    ALKPHOS 54 04/19/2021    BILITOTAL 1.2 04/19/2021     OTHER:   Lab Results   Component Value Date    DOUG 9.0 04/19/2021    TSH 1.480 05/09/2018       Anesthesia Plan    ASA Status:  2, emergent    NPO Status:  NPO Appropriate    Anesthesia Type: General.     - Airway: ETT   Induction: RSI.   Maintenance: Balanced.        Consents    Anesthesia Plan(s) and associated risks, benefits, and realistic alternatives discussed. Questions answered and patient/representative(s) expressed understanding.     - Discussed with:  Patient      - Extended Intubation/Ventilatory Support Discussed: No.      - Patient is DNR/DNI Status: No    Use of blood products discussed: No .     Postoperative Care    Pain management: IV analgesics.     - Plan for long acting post-op opioid use   PONV prophylaxis: Ondansetron (or other 5HT-3), Dexamethasone or Solumedrol     Comments:                Neftali Trotter MD

## 2021-04-19 NOTE — PHARMACY-ADMISSION MEDICATION HISTORY
Pharmacy Medication History  Admission medication history interview status for the 4/19/2021  admission is complete. See EPIC admission navigator for prior to admission medications     Location of Interview: Phone  Medication history sources: Patient      Additional medication history information:   1) Patient took 600mg of ibuprofen at 2AM but doesn't typically take this medication.     Medication reconciliation completed by provider prior to medication history? No    Time spent in this activity: 5 minutes    Prior to Admission medications    Medication Sig Last Dose Taking? Auth Provider   Prenatal Multivit-Min-Fe-FA (PRENATAL VITAMINS PO) Take 1 tablet by mouth daily  4/18/2021 at Unknown time Yes Reported, Patient   Vitamin D, Cholecalciferol, 25 MCG (1000 UT) TABS Take 1,000 Units by mouth daily 4/18/2021 at Unknown time Yes Unknown, Entered By History       The information provided in this note is only as accurate as the sources available at the time of update(s)

## 2021-04-19 NOTE — DISCHARGE INSTRUCTIONS
Today you received Toradol, an antiinflammatory medication similar to Ibuprofen.  You should not take other antiinflammatory medication, such as Ibuprofen, Motrin, Advil, Aleve, Naprosyn, etc until 7:30 pm tonight.       Same Day Surgery Discharge Instructions for  Sedation and General Anesthesia       It's not unusual to feel dizzy, light-headed or faint for up to 24 hours after surgery or while taking pain medication.  If you have these symptoms: sit for a few minutes before standing and have someone assist you when you get up to walk or use the bathroom.      You should rest and relax for the next 24 hours. We recommend you make arrangements to have an adult stay with you for at least 24 hours after your discharge.  Avoid hazardous and strenuous activity.      DO NOT DRIVE any vehicle or operate mechanical equipment for 24 hours following the end of your surgery.  Even though you may feel normal, your reactions may be affected by the medication you have received.      Do not drink alcoholic beverages for 24 hours following surgery.       Slowly progress to your regular diet as you feel able. It's not unusual to feel nauseated and/or vomit after receiving anesthesia.  If you develop these symptoms, drink clear liquids (apple juice, ginger ale, broth, 7-up, etc. ) until you feel better.  If your nausea and vomiting persists for 24 hours, please notify your surgeon.        All narcotic pain medications, along with inactivity and anesthesia, can cause constipation. Drinking plenty of liquids and increasing fiber intake will help.      For any questions of a medical nature, call your surgeon.      Do not make important decisions for 24 hours.      If you had general anesthesia, you may have a sore throat for a couple of days related to the breathing tube used during surgery.  You may use Cepacol lozenges to help with this discomfort.  If it worsens or if you develop a fever, contact your surgeon.       If you feel  your pain is not well managed with the pain medications prescribed by your surgeon, please contact your surgeon's office to let them know so they can address your concerns.           CoVid 19 Information    We want to give you information regarding Covid. Please consult your primary care provider with any questions you might have.     Patient who have symptoms (cough, fever, or shortness of breath), need to isolate for 7 days from when symptoms started OR 72 hours after fever resolves (without fever reducing medications) AND improvement of respiratory symptoms (whichever is longer).      Isolate yourself at home (in own room/own bathroom if possible)    Do Not allow any visitors    Do Not go to work or school    Do Not go to Christianity,  centers, shopping, or other public places.    Do Not shake hands.    Avoid close and intimate contact with others (hugging, kissing).    Follow CDC recommendations for household cleaning of frequently touched services.     After the initial 7 days, continue to isolate yourself from household members as much as possible. To continue decrease the risk of community spread and exposure, you and any members of your household should limit activities in public for 14 days after starting home isolation.     You can reference the following CDC link for helpful home isolation/care tips:  https://www.cdc.gov/coronavirus/2019-ncov/downloads/10Things.pdf    Protect Others:    Cover Your Mouth and Nose with a mask, disposable tissue or wash cloth to avoid spreading germs to others.    Wash your hands and face frequently with soap and water    Call Your Primary Doctor If: Breathing difficulty develops or you become worse.    For more information about COVID19 and options for caring for yourself at home, please visit the CDC website at https://www.cdc.gov/coronavirus/2019-ncov/about/steps-when-sick.html  For more options for care at [facility], please visit our website at  [email or web address]              [facility]  Discharge Instructions: Post-Operative Laparoscopic Appendectomy    ACTIVITY    Expect to feel tired after your surgery.  This will gradually resolve.      Take frequent, short walks and increase your activity gradually.      Avoid strenuous physical activity or heavy lifting greater than 15-20 lbs. for 2-3 weeks.  You may climb stairs.    You may drive without restrictions when you are not using any prescription pain medication and feel comfortable in a car.    You may return to work/school when you are comfortable without any prescription pain medication.    WOUND CARE    You may remove your outer dressing or Band-Aids and shower 48 hours after the surgery.  Pat your incisions dry and leave them open to air.  Re-apply dressing (Band-Aids or gauze/tape) as needed for comfort or drainage.    You may have steri-strips (looks like white tape) on your incision.  You may peel off the steri-strips 2 weeks after your surgery if they have not peeled off on their own.     Do not soak your incisions in a tub or pool for 2 weeks.     Do not apply any lotions, creams, or ointments to your incisions.    A ridge under your incisions is normal and will gradually resolve.    DIET    Start with liquids, then gradually resume your regular diet as tolerated.  Avoid heavy, spicy, and greasy meals for 2-3 days.    Drink plenty of fluids to stay hydrated.    PAIN    Expect some tenderness and discomfort at the incision sites.  Use the prescribed pain medication at your discretion.  Expect gradual resolution of your pain over several days.    You may take ibuprofen with food (unless you have been told not to) or acetaminophen/Tylenol instead of or in addition to your prescribed pain medication.  If you are taking Norco, do not take any additional acetaminophen/Tylenol.    Do not drink alcohol or drive while you are taking pain  medications.    You may apply ice to your incisions in 20 minute intervals as needed for the next 48 hours.  After that time, consider switching to heat if you prefer.    EXPECTATIONS    Pain medications can cause constipation.  Limit use when possible.  Take over the counter stool softener/stimulant, such as Colace or Senna, 1-2 times a day with plenty of water.  You may take a mild over the counter laxative, such as Miralax or a suppository, as needed.  You make discontinue these medications once you are having regular bowel movements and/or are no longer taking your narcotic pain medication.     You may have shoulder or upper back discomfort due to the gas used in surgery.  This is temporary and should resolve in 48-72 hours.  Short, frequent walks may help with this.    If you are unable to urinate, or feel as though you are not emptying your bladder adequately, we recommend you call our office and/or seek care at an ER or Urgent Care facility if after hours.    FOLLOW UP    Our office will contact you in approximately 2 weeks to check on your progress and answer any questions you may have.  If you are doing well, you will not need to return for a follow up appointment.  If any concerns are identified over the phone, we will help you make an appointment to see a provider.     If you have not received a phone call, have any questions or concerns, or would like to be seen, please call us at [phone] and ask to speak with our nurse.  We are located at [address] [place] [address].    CALL OUR OFFICE -[phone] IF YOU HAVE:     Chills or fever above 101 F.    Increased redness, warmth, or drainage at your incisions.    Significant bleeding.    Pain not relieved by your pain medication or rest.    Increasing pain after the first 48 hours.    Any other concerns or questions.                   ** If you have concerns or questions about your procedure,    please contact Dr. Coronado at   [phone] **

## 2021-04-19 NOTE — PROGRESS NOTES
"    Assessment & Plan     Abdominal pain, right lower quadrant  Started having pain in her right foot quadrant  Started yesterday afternoon  Sudden onset  No radiation anywhere  Pain worse with movement  No nausea  No fever  However has no appetite  She has to force herself to eat last night  No diarrhea  No chills or rigors  Pain constant and unremitting  She is tender in the right lower quadrant  Tenderness positive at the McBurney's point  No guarding or rigidity  Bowel sounds active  She is due for her monthly cycle next Saturday  Sexually active and trying for a baby, however this pain is not like her normal monthly cycle pain which is more cramping in nature  Differential diagnosis include acute appendicitis versus right-sided diverticulitis versus colitis  Because of the severity of her tenderness and the fact that she appears to be in distress from pain even with minimal palpation I feel that she needs to be evaluated in the ER to expedite the diagnosis  She is going to the [facility] ER  I called the [facility] ER and gave her the details to the intake team at 8:25 AM on 4/19/2021        20 minutes spent on the date of the encounter doing chart review, patient visit and documentation        BMI:   Estimated body mass index is 29.38 kg/m  as calculated from the following:    Height as of this encounter: 1.702 m (5' 7\").    Weight as of this encounter: 85.1 kg (187 lb 9.6 oz).           Return in about 1 day (around 4/20/2021).    Carlos Fuentes MD  [facility] KANWAL Petit is a 36 year old who presents for the following health issues     HPI     Abdominal/Flank Pain  Onset/Duration: yesterday afternoon  Description:   Character: Dull ache  Location: RT lower quadrant  Radiation: None  Intensity: moderate  Progression of Symptoms:  same and constant  Accompanying Signs & Symptoms:  Fever/Chills: no  Gas/Bloating: YES  Nausea: YES  Vomitting: no  Diarrhea: no  Constipation: " YES  Dysuria or Hematuria: no  History:   Trauma: no  Previous similar pain: no  Previous tests done: none  Precipitating factors:   Does the pain change with:     Food: no    Bowel Movement: no    Urination: no   Other factors:  no  Therapies tried and outcome: None  No LMP recorded.          Review of Systems   Constitutional, HEENT, cardiovascular, pulmonary, gi and gu systems are negative, except as otherwise noted.      Objective    There were no vitals taken for this visit.  There is no height or weight on file to calculate BMI.  Physical Exam   GENERAL: healthy, alert and no distress  EYES: Eyes grossly normal to inspection, PERRL and conjunctivae and sclerae normal  RESP: lungs clear to auscultation - no rales, rhonchi or wheezes  CV: regular rate and rhythm, normal S1 S2, no S3 or S4, no murmur, click or rub, no peripheral edema and peripheral pulses strong  ABDOMEN: Tender in the right lower quadrant  Exquisite tenderness even with minimal palpation  No guarding or rigidity  Bowel sounds active  No flank tenderness  MS: no gross musculoskeletal defects noted, no edema  SKIN: no suspicious lesions or rashes  NEURO: Normal strength and tone, mentation intact and speech normal  PSYCH: mentation appears normal, affect normal/bright

## 2021-04-19 NOTE — ED NOTES
"[facility]  ED Nurse Handoff Report    ED Chief complaint: Abdominal Pain (right lower abd pain since yesterday  with nausea and diarrhea)      ED Diagnosis:   Final diagnoses:   Acute appendicitis with localized peritonitis, without perforation, abscess, or gangrene       Code Status: full code    Allergies:   Allergies   Allergen Reactions     Shellfish-Derived Products Swelling       Patient Story: pt developed RLQ pain, nausea and diarrhea yesterday evening.  Focused Assessment:  AOX4. VSS. C/o RLQ pain. Pain improved after pain meds    Treatments and/or interventions provided: NS, zofran, Morphine, dilaudid  Patient's response to treatments and/or interventions: resting comfortably in bed    To be done/followed up on inpatient unit:  plan for surgery    Does this patient have any cognitive concerns?: none    Activity level - Baseline/Home:  Independent  Activity Level - Current:   Independent    Patient's Preferred language: English   Needed?: No    Isolation: None  Infection: Not Applicable  Patient tested for COVID 19 prior to admission: YES  Bariatric?: No    Vital Signs:   Vitals:    04/19/21 0849 04/19/21 1010 04/19/21 1015 04/19/21 1030   BP: 131/73 121/80  125/83   Pulse: 102 88  90   Resp: 18      Temp: 97.7  F (36.5  C)      TempSrc: Temporal      SpO2: 100%  99% 100%   Height: 1.702 m (5' 7\")          Cardiac Rhythm:     Was the PSS-3 completed:   Yes  What interventions are required if any?               Family Comments:  at bedside, supportive  OBS brochure/video discussed/provided to patient/family: N/A              Name of person given brochure if not patient: na              Relationship to patient: na    For the majority of the shift this patient's behavior was Green.   Behavioral interventions performed were na.    ED NURSE PHONE NUMBER: [phone]         "

## 2021-04-19 NOTE — LETTER
[facility]  [facility]  [address], [address]  [place] [address]  Phone: [phone]      Date: 4/19    To whom it may concern,    RE: Griselda Bell    Patient underwent an urgent, necessary surgical procedure on 4/19 and has restrictions following this surgery. She is unable to travel/fly due these restrictions. Please contact our office for questions or concerns.      Sincerely,  KEM Mulligan Dr.

## 2021-04-19 NOTE — ANESTHESIA PROCEDURE NOTES
Airway       Patient location during procedure: OR ([facility] - Operating Room or Procedural Area)       Procedure Start/Stop Times: 4/19/2021 12:25 PM  Staff -        Anesthesiologist:  Neftali Trotter MD       CRNA: Cheryl Leos APRN CRNA       Performed By: CRNAIndications and Patient Condition       Indications for airway management: kenna-procedural       Induction type:intravenous       Mask difficulty assessment: 0 - not attempted    Final Airway Details       Final airway type: endotracheal airway       Successful airway: ETT - single  Endotracheal Airway Details        ETT size (mm): 7.0       Cuffed: yes       Successful intubation technique: direct laryngoscopy       DL Blade Type: Rice 2       Grade View of Cords: 1       Adjucts: stylet       Position: Right       Measured from: gums/teeth       Secured at (cm): 21       Bite block used: None    Post intubation assessment        Number of attempts at approach: 1       Number of other approaches attempted: 0       Secured with: pink tape and commercial tube haines       Ease of procedure: easy       Dentition: Intact and Unchanged    Medication(s) Administered   Medication Administration Time: 4/19/2021 12:25 PM

## 2021-04-19 NOTE — PATIENT INSTRUCTIONS
Patient Education     Abdominal Pain  Abdominal pain is pain in the stomach or belly area. Everyone has this pain from time to time. In many cases it goes away on its own. But abdominal pain can sometimes be due to a serious problem, such as appendicitis. So it s important to know when to get help.    Causes of abdominal pain  There are many possible causes of abdominal pain. Common causes in adults include:    Constipation, diarrhea, or gas    Stomach acid flowing back up into the esophagus (acid reflux or heartburn)    Severe acid reflux, called GERD (gastroesophageal reflux disease)    A sore in the lining of the stomach or small intestine (peptic ulcer)    Inflammation of the gallbladder, liver, or pancreas    Gallstones or kidney stones    Appendicitis     Intestinal blockage     An internal organ pushing through a muscle or other tissue (hernia)    Urinary tract infections    In women, menstrual cramps, fibroids, ovarian cysts, pelvic inflammatory disease, or endometriosis    Inflammation or infection of the intestines, including Crohn's disease and ulcerative colitis    Irritable bowel syndrome  Diagnosing the cause of abdominal pain  Your healthcare provider will give you a physical exam help find the cause of your pain. If needed, you will have tests. Belly pain has many possible causes. So it can be hard to find the reason for your pain. Giving details about your pain can help. Tell your provider where and when you feel the pain, and what makes it better or worse. Also let your provider know if you have other symptoms such as:    Fever    Tiredness    Upset stomach (nausea)    Vomiting    Changes in bathroom habits    Blood in the stool or black, tarry stool    Weight loss that you can't explain (involuntary weight loss?)  Also report any family history of stomach or intestinal problems, or cancers. Tell your provider about all your alcohol use and drug use. Tell your provider about all medicines you  use, including herbs, vitamins, and supplements.  Treating abdominal pain  Some causes of pain need emergency medical treatment right away. These include appendicitis or a bowel blockage. Other problems can be treated with rest, fluids, or medicines. Your healthcare provider can give you specific instructions for treatment or self-care based on what is causing your pain.     If you have vomiting or diarrhea, sip water or other clear fluids. When you are ready to eat solid foods again, start with small amounts of easy-to-digest, low-fat foods. These include apple sauce, toast, or crackers.  When to get medical care  Call 911 or go to the hospital right away if you:    Can t pass stool and are vomiting    Are vomiting blood or have bloody diarrhea or black, tarry diarrhea    Have chest, neck, or shoulder pain    Feel like you might pass out    Have pain in your shoulder blades with nausea    Have sudden, severe belly pain    Have new, severe pain unlike any you have felt before    Have a belly that is rigid, hard, and hurts to touch  Call your healthcare provider if you have:    Pain for more than 5 days    Bloating for more than 2 days    Diarrhea for more than 5 days    A fever of 100.4 F (38 C) or higher, or as directed by your healthcare provider    Pain that gets worse    Weight loss for no reason    Continued lack of appetite    Blood in your stool  How to prevent abdominal pain  Here are some tips to help prevent abdominal pain:    Eat smaller amounts of food at each meal.    Don't eat greasy, fried, or other high-fat foods.    Don't eat foods that give you gas.    Exercise regularly.    Drink plenty of fluids.  To help prevent GERD symptoms:    Quit smoking.    Reduce alcohol and foods that increase stomach acid.    Don't use aspirin or over-the-counter pain and fever medicines, if possible. This includes nonsteroidal anti-inflammatory drugs (NSAIDs).    Lose excess weight.    Finish eating at least 2 hours  before you go to bed or lie down.    Raise the head of your bed.  [employer] last reviewed this educational content on 4/1/2019 2000-2021 [employer]. All rights reserved. This information is not intended as a substitute for professional medical care. Always follow your healthcare professional's instructions.

## 2021-04-19 NOTE — ANESTHESIA POSTPROCEDURE EVALUATION
Patient: Griselda Bell    Procedure(s):  APPENDECTOMY, LAPAROSCOPIC    Diagnosis:Acute appendicitis with localized peritonitis, without perforation, abscess, or gangrene [K35.30]  Diagnosis Additional Information: No value filed.    Anesthesia Type:  General    Note:  Disposition: Outpatient   Postop Pain Control: Uneventful            Sign Out: Well controlled pain   PONV: No   Neuro/Psych: Uneventful            Sign Out: Acceptable/Baseline neuro status   Airway/Respiratory: Uneventful            Sign Out: Acceptable/Baseline resp. status   CV/Hemodynamics: Uneventful            Sign Out: Acceptable CV status   Other NRE: NONE   DID A NON-ROUTINE EVENT OCCUR? No         Last vitals:  Vitals:    04/19/21 1410 04/19/21 1420 04/19/21 1500   BP: 116/73 114/79 121/74   Pulse: 82 74 79   Resp: 9 8 10   Temp:   36.9  C (98.4  F)   SpO2: 100% 96% 95%       Last vitals prior to Anesthesia Care Transfer:  CRNA VITALS  4/19/2021 1254 - 4/19/2021 1354      4/19/2021             NIBP:  129/78    Pulse:  91    NIBP Mean:  90    Resp Rate (set):  10          Electronically Signed By: Neftali Trotter MD  April 19, 2021  3:15 PM

## 2021-04-19 NOTE — ED PROVIDER NOTES
"  History   Chief Complaint:  Abdominal Pain (right lower abd pain since yesterday  with nausea and diarrhea)       HPI   Griselda Bell is a 36 year old female who presents with abdominal pain. The patient reports that a dull pain in her abdomen began suddenly yesterday afternoon. It is painful to walk and she had trouble sleeping last night. She has never previously experienced similar symptoms. Nausea and diarrhea have accompanied the pain. The patient reports that she is trying to get pregnant again (not using fertility treatment), but is still having regular menstrual periods. Her last occurred about a month ago. She denies fever or past abdominal surgeries.    Review of Systems   Constitutional: Negative for fever.   Gastrointestinal: Positive for abdominal pain, diarrhea and nausea.   All other systems reviewed and are negative.        Allergies:  No reported medication allergies    Medications:  No reported current medications    Past Medical History:    Migraine  Obese  Fetus with trisomy 13, single gestation  Post partum depression    Past Surgical History:    Labral repair  Dilation and evacuation  Cervical Leep    Family History:    Diabetes, father  Colon polyps, brother    Social History:  Drinks alcohol a couple nights per week  Accompanied by  in ED    Physical Exam     Patient Vitals for the past 24 hrs:   BP Temp Temp src Pulse Resp SpO2 Height   04/19/21 1030 125/83 -- -- 90 -- 100 % --   04/19/21 1015 -- -- -- -- -- 99 % --   04/19/21 1010 121/80 -- -- 88 -- -- --   04/19/21 0849 131/73 97.7  F (36.5  C) Temporal 102 18 100 % 1.702 m (5' 7\")       Physical Exam  General: Patient is alert and normal appearing.  HEENT: Head atraumatic    Eyes: pupils equal and reactive. Conjunctiva clear   Nares: patent   Oropharynx: no lesions, uvula midline, no palatal draping, normal voice, no trismus  Neck: Supple without lymphadenopathy, no meningismus  Chest: Heart regular rate and rhythm. "   Lungs: Equal clear to auscultation with no wheeze or rales  Abdomen: Soft, right lower quadrant tenderness to palpation with guarding and no rebound, nondistended, normal bowel sounds  Back: No costovertebral angle tenderness, no midline C, T or L spine tenderness  Neuro: Grossly nonfocal, normal speech, strength equal bilaterally, CN 2-12 intact  Extremities: No deformities, equal radial and DP pulses. No clubbing, cyanosis.  No edema  Skin: Warm and dry with no rash.       Emergency Department Course     Imaging:  CT Abdomen Pelvis w Contrast   1.  CT findings most consistent with acute appendicitis.  2.  Small periumbilical hernia containing mesenteric fat.    GIANCARLO SUAZO MD    Read per radiology    Laboratory:  Asymptomatic COVID19 Virus PCR by nasopharyngeal swab pending    CBC: WBC 8.3, HGB 13.4,     INR: 1.01    CMP: AWNL (Creatinine 0.60)     UA with microscopic: AWNL     HCG Qualitative: Negative     Emergency Department Course:  Reviewed:  I reviewed nursing notes, vitals, past medical history and care everywhere    Assessments:  0903 I obtained history and examined the patient as noted above.   0932 I rechecked the patient and explained findings.     Consults:   1112 I spoke with Dr. Cliff MD, the admitting physician    Interventions:  0921 Normal Saline 1000 mL IV  0925 Zofran 4 mg IV  0926 Morphine 4 mg IV  1041 Zosyn 3.375 g IV  1057 Dilaudid 0.5 mg IV    Disposition:  The patient was admitted to the hospital under the care of Dr. Catalina Coronado MD.       Impression & Plan       Medical Decision Making:  Griselda Bell is a 36 year old female who presents with abdominal pain and the CT scan confirms appendicitis.  This is consistent with her clinical exam.  There is no evidence of rupture or abscess at this time. Her pain has been controlled with interventions in the Emergency Department.  IV antibiotics started in the Emergency Department. The case was discussed with the  on-call surgeon and the patient will be going to the operating room.  An inpatient bed has been arranged for after surgery.  Patient is hemodynamically stable in ED.  Questions were answered.      Covid-19  Griselda Bell was evaluated during a global COVID-19 pandemic, which necessitated consideration that the patient might be at risk for infection with the SARS-CoV-2 virus that causes COVID-19.   Applicable protocols for evaluation were followed during the patient's care.   COVID-19 was considered as part of the patient's evaluation. The plan for testing is:  a test was obtained during this visit.    Diagnosis:    ICD-10-CM    1. Acute appendicitis with localized peritonitis, without perforation, abscess, or gangrene  K35.30 Asymptomatic SARS-CoV-2 COVID-19 Virus (Coronavirus) by PCR     Case Request: APPENDECTOMY, LAPAROSCOPIC     Case Request: APPENDECTOMY, LAPAROSCOPIC   2. Acute appendicitis with localized peritonitis, without perforation, abscess, or gangrene  K35.30 Asymptomatic SARS-CoV-2 COVID-19 Virus (Coronavirus) by PCR     Case Request: APPENDECTOMY, LAPAROSCOPIC     Case Request: APPENDECTOMY, LAPAROSCOPIC    Added automatically from request for surgery [id number]       Discharge Medications:  New Prescriptions    No medications on file       Scribe Disclosure:  Andreas FRANKLIN, am serving as a scribe at 9:05 AM on 4/19/2021 to document services personally performed by Kathie Sharma MD based on my observations and the provider's statements to me.              Kathie Sharma MD  04/19/21 8613

## 2021-04-19 NOTE — OP NOTE
PREOPERATIVE DIAGNOSIS: Acute appendicitis.      POSTOPERATIVE DIAGNOSIS: Acute appendicitis.      PROCEDURE: Laparoscopic appendectomy.      SURGEON: Catalina Coronado MD     ASSISTANT:  Ciera Celestin PA-C  The physician s assistant was medically necessary for their expertise in camera management, suctioning and retraction.    ANESTHESIA: GET.     COMPLICATIONS: None.     BLOOD LOSS: Minimal.     FINDINGS: Acute appendicitis, no perforation.     INDICATIONS:Griselda Bell is a 36 year old  with one day history of abdominal pain. An abdominal CT was performed which showed acute appendicitis. I explained the risks, benefits, complications including but not limited to bleeding, infection, possible need to open, possible postop hematoma, seroma, bowel or bladder injury, all which require additional procedures. The patient did agree and did sign consent.       DETAILS OF PROCEDURE: The patient was brought to the operating room per anesthesia, placed in supine position, intubated without difficulty. They were given perioperative antibiotics.  The patient was prepped and draped in the usual fashion using ChloraPrep and the surgical timeout was then performed, verifying the correct surgeon, site, procedure and patient. All in the room were in agreement.    A 5mm 0 degree laparoscope was inserted and the visiport used to obtain entrance to the abdomen. Insufflation was connected and the abdomen insufflated. Initial evaluation of the abdomen revealed mild inflammation in the right lower quadrant and revealed no trocar injuries. The abdomen was insufflated with pressure of 15, which the patient tolerated well, a 2nd 5mm port was placed suprapubically and a 12mm port placed infraumbilically under direct visualization. The appendix was found and appeared indurated and hyperemic at the tip. The appendix was traveling lateral to the cecum and was adherent to the surrounding pericolonic fat. It was not adherent to the  lateral wall. The base of the cecum was not  thickened. The terminal ileum was normal. There was no perforation. The ligasure device was used to divide the mesoappendix.  An Endo-PEE blue load was then fired across the base of the appendix without issues.  The staple line was inspected and found to be hemostatic.  The appendix was placed an EndoCatch bag and removed through the umbilical trocar. The area was explored. Staple lines were completely intact. There was no bleeding. The right lower quadrant was irrigated with saline and suctioned.The pelvis showed no acute findings. The right ovary was larger than the left and mildly hyperemic. A picture was taken. The uterus was slightly enlarged. All trocars were taken out under direct visualization. The fascia was closed with an 0 vicryl sutures using the matt etta device in a figure of eight fashion.     Marcaine 0.5% injected to all the wounds. They were irrigated and closed with 4-0 Monocryl in subcuticular fashion.Steri strips and bandaids were applied. The patient tolerated the procedure well and was awoken from anesthesia and transferred to PACU in stable condition. All sponge, instrument, and needle counts were correct at the conclusion of the procedure.      Catalina Coronado MD  [facility]  [phone]

## 2021-04-19 NOTE — ANESTHESIA CARE TRANSFER NOTE
Patient: Griselda Bell    Procedure(s):  APPENDECTOMY, LAPAROSCOPIC    Diagnosis: Acute appendicitis with localized peritonitis, without perforation, abscess, or gangrene [K35.30]  Diagnosis Additional Information: No value filed.    Anesthesia Type:   General     Note:    Oropharynx: spontaneously breathing  Level of Consciousness: awake and drowsy  Oxygen Supplementation: face mask  Level of Supplemental Oxygen (L/min / FiO2): 6  Independent Airway: airway patency satisfactory and stable  Dentition: dentition unchanged  Vital Signs Stable: post-procedure vital signs reviewed and stable  Report to RN Given: handoff report given  Patient transferred to: PACU  Comments: Neuromuscular blockade reversed after TOF 4/4, spontaneous respirations, oropharynx suctioned with soft flexible catheter, airway patent after extubation.  Oxygen via facemask at 6 liters per minute to PACU. Oxygen tubing connected to wall O2 in PACU, Yashira Hugger warmer connected to patient gown, report on patient's clinical status given to PACU RN, RN questions answered.     Handoff Report: Identifed the Patient, Identified the Reponsible Provider, Reviewed the pertinent medical history, Discussed the surgical course, Reviewed Intra-OP anesthesia mangement and issues during anesthesia, Set expectations for post-procedure period and Allowed opportunity for questions and acknowledgement of understanding      Vitals: (Last set prior to Anesthesia Care Transfer)  CRNA VITALS  4/19/2021 1254 - 4/19/2021 1328      4/19/2021             Resp Rate (set):  10        Electronically Signed By: JASWINDER Grewal CRNA  April 19, 2021  1:28 PM

## 2021-04-19 NOTE — H&P
General Surgery Consultation    Griselda Bell MRN# [id number]   Age: 36 year old YOB: 1985     Date of Admission:  4/19/2021    Reason for consult:            Abdominal pain       Requesting physician:            Dr. Sharma                  Chief Complaint:   Abdominal pain     History is obtained from the patient         History of Present Illness:   This patient is a 36 year old  female who presents with right lower quadrant abdominal pain for 1 day. She reports she began having right lower abdominal pain yesterday afternoon. It progressed throughout the evening and she had trouble sleeping. Pain is worse with movement. No similar pain in the past. She is nauseated. No emesis. Had diarrhea yesterday. No prior abdominal surgeries. She and her  are trying to get pregnant. She is due for her period Saturday. UPT negative.             Past Medical History:    has a past medical history of Migraine and Obese.          Past Surgical History:     Past Surgical History:   Procedure Laterality Date     ARTHROSCOPY SHOULDER RT/LT  2006    labral repair     DILATION AND EVACUATION N/A 8/5/2020    Procedure: DILATION AND EVACUATION, UTERUS-;  Surgeon: Travis Hernandez MD;  [facility]     EXAM UNDER ANESTHESIA RECTUM N/A 11/14/2019    Procedure: EXAM UNDER ANESTHESIA;  Surgeon: Diana Steward MD;  Location:  OR     [facility], CERVICAL  2015?             Social History:     Social History     Tobacco Use     Smoking status: Never Smoker     Smokeless tobacco: Never Used   Substance Use Topics     Alcohol use: Yes     Frequency: 2-4 times a month     Comment: 1 drink when does have etoh             Family History:   Reviewed         Allergies:     Allergies   Allergen Reactions     Shellfish-Derived Products Swelling             Medications:   No current facility-administered medications on file prior to encounter.   Prenatal Multivit-Min-Fe-FA (PRENATAL VITAMINS PO), Take 1  "tablet by mouth daily   Vitamin D, Cholecalciferol, 25 MCG (1000 UT) TABS, Take 1,000 Units by mouth daily               Review of Systems:   A 10 point Review of Systems is negative other than noted in the HPI.          Physical Exam:   /83   Pulse 90   Temp 97.7  F (36.5  C) (Temporal)   Resp 18   Ht 1.702 m (5' 7\")   LMP 03/27/2021   SpO2 100%   BMI 29.38 kg/m    General - Well developed, well nourished female in no apparent distress  Psych: normal affect, pleasant  HEENT:  Head normocephalic and atraumatic, pupils equal and round, conjunctivae clear, no scleral icterus, external ears and nose normal  Neck: Supple without thyromegaly or masses  Lymphatic: No cervical, or supraclavicular lymphadenopathy  Lungs: Clear to auscultation bilaterally  Heart: regular rate and rhythm, no murmurs  Abdomen:   soft, rounded, non-distended with moderate tenderness noted in the right lower quadrant. No rebound or guarding. no masses palpated, small umbilical hernia.  Extremities: Warm without edema  Neurologic: nonfocal  Psychiatric: Mood and affect appropriate  Skin: Without lesions or rashes, or juandice         Data:     Lab Results   Component Value Date    WBC 8.3 04/19/2021     Lab Results   Component Value Date    HGB 13.4 04/19/2021     Lab Results   Component Value Date     04/19/2021     Last Basic Metabolic Panel:  Lab Results   Component Value Date     04/19/2021      Lab Results   Component Value Date    POTASSIUM 3.6 04/19/2021     Lab Results   Component Value Date    CHLORIDE 106 04/19/2021     Lab Results   Component Value Date    DOUG 9.0 04/19/2021     Lab Results   Component Value Date    CO2 25 04/19/2021     Lab Results   Component Value Date    BUN 11 04/19/2021     Lab Results   Component Value Date    CR 0.60 04/19/2021     Lab Results   Component Value Date    GLC 91 04/19/2021       Liver Function Studies -   Recent Labs   Lab Test 04/19/21  0922   PROTTOTAL 7.4   ALBUMIN 3.8 "   BILITOTAL 1.2   ALKPHOS 54   AST 12   ALT 21       All labs and imaging was personally reviewed.     Imaging:    Results for orders placed or performed during the hospital encounter of 04/19/21   CT Abdomen Pelvis w Contrast    Narrative    CT ABDOMEN PELVIS W CONTRAST 4/19/2021 10:01 AM    CLINICAL HISTORY: RLQ abdominal pain, appendicitis suspected (Age >=  14y)    TECHNIQUE: CT scan of the abdomen and pelvis was performed following  injection of IV contrast. Multiplanar reformats were obtained. Dose  reduction techniques were used.  CONTRAST: 94mL Isovue-370    COMPARISON: None.    FINDINGS:   LOWER CHEST: Unremarkable    HEPATOBILIARY: Unremarkable    PANCREAS: Unremarkable    SPLEEN: Unremarkable    ADRENAL GLANDS: Unremarkable    KIDNEYS/BLADDER: Lobulated contours. No hydronephrosis.    BOWEL: There is a retrocecal appendix. There are inflammatory changes  about the appendix. The appendix is dilated with a maximum diameter of  9 mm.    The bowel otherwise appears grossly unremarkable.    There is a small amount of free fluid within the dependent pelvis.  This is most likely physiologic and/or reactive.    PELVIC ORGANS: Probable involuting cyst in the right ovary.    LYMPH NODES: No enlarged lymph nodes are identified in the abdomen or  pelvis.    VASCULATURE: Unremarkable.    ADDITIONAL FINDINGS: Small periumbilical hernia containing mesenteric  fat.    MUSCULOSKELETAL: Unremarkable.      Impression    IMPRESSION:   1.  CT findings most consistent with acute appendicitis.  2.  Small periumbilical hernia containing mesenteric fat.    GIANCARLO SUAZO MD             Assessment and Plan:   Assessment:   Griselda Bell is a 36 year old with acute appendicitis.       Plan:   I discussed the pathophysiology of appendicitis and the need for surgical intervention. I have also discussed the risks, benefits, complications including but not limited to bleeding, infection, possible injury to the bowel or  ureter, possible anastomotic dehiscence, possible post operative abscess, possible postoperative MI, PE, or other anesthetic complications. If one of these complications did arise the patient could require further surgery. The patient thoroughly understood the conversation and will sign consent.     Catalina Coronado MD

## 2021-04-21 LAB — COPATH REPORT: NORMAL

## 2021-04-25 ENCOUNTER — HEALTH MAINTENANCE LETTER (OUTPATIENT)
Age: 36
End: 2021-04-25

## 2021-05-03 ENCOUNTER — TELEPHONE (OUTPATIENT)
Facility: CLINIC | Age: 36
End: 2021-05-03

## 2021-05-03 NOTE — TELEPHONE ENCOUNTER
[facility]  Post op call note - LAPAROSCOPIC APPENDECTOMY  May 3, 2021     Griselda Bell was called for an update regarding his recovery.  She underwent a laparoscopic appendectomy and primary umbilical hernia repair by Dr. Coronado on 4/19.  Today she tells me she is doing well and denies any complaints.  She currently does not need any pain medications.  She is eating a normal diet and her bowels are regular.  The patient states she is slowly resuming normal activity.  She states her wounds are healing well and the steri strips are off at her umbilical incision and on at the other incisions.  She denies any erythema or drainage at her wounds.  The patient denies fever/chills, n/v/d, abdominal pain, changes in BM, or wound concerns.  She does report that she was pregnant at the time of her surgery and her urine pregnancy test was too early to have tested positive.  Her OB does not have concerns with this.  She does ask questions of signs and symptoms of umbilical hernia if it returns with her pregnancy.    The pathology revealed acute appendicitis without perforation or atypical cellular proliferations.  This was discussed with the patient.  She was instructed to remove steri strips and continue to keep wounds clean.  She was advised to advance her activity as tolerated with no heavy lifting > 20 lbs or strenuous exercise x 1-2 weeks.  She may follow up with our clinic in the future if umbilical hernia returns during her pregnancy and she wants a repair with mesh.  The patient states all of her questions were answered and she understands our discussion.  She agrees to follow up as needed and to call our office with any concerns.      Ciera Celestin PA-C  [facility]  [phone]          Please route or send letter to:  Primary Care Provider (PCP)

## 2021-05-24 ENCOUNTER — TELEPHONE (OUTPATIENT)
Dept: SURGERY | Facility: CLINIC | Age: 36
End: 2021-05-24

## 2021-05-24 NOTE — TELEPHONE ENCOUNTER
S/p lap appy  Date: 4/19/21  Surgeon: Dr. Coronado    Patient reports that over the weekend she started with tenderness above and to the right of umbilicus. Denies any swelling lump or bulge in the area.    No redness or drainage from incision, no fever.      Denies any new or unusual activity, though she is approx.6 weeks pregnant and has been vomiting recently.      We discussed that tenderness may be due to pressure on incision site from vomiting.  She will monitor the area and call if worsening pain or tenderness, new symptoms or lump/bulge.    She will try to keep a small pillow handy, so that she can hold over incision sites when she is vomiting.      If vomiting worsening, she should discuss with OB.     Pt verbalizes understanding and agrees with plan.

## 2021-05-24 NOTE — TELEPHONE ENCOUNTER
Name of caller: Patient    Reason for Call:  Symptoms  Tenderness upper right part of navel area.  Wondering if this is something to be concerned aoout.    Surgeon:  Dr. Coronado    Recent Surgery:  Yes.    If yes, when & what type:    4/19/21    APPENDECTOMY, LAPAROSCOPIC    Best phone number to reach pt at is: [phone]    Ok to leave a message with medical info? Yes.

## 2021-07-01 ENCOUNTER — TRANSFERRED RECORDS (OUTPATIENT)
Dept: HEALTH INFORMATION MANAGEMENT | Facility: CLINIC | Age: 36
End: 2021-07-01

## 2021-07-01 LAB — PAP SMEAR - HIM PATIENT REPORTED: NORMAL

## 2021-07-25 DIAGNOSIS — Z11.59 ENCOUNTER FOR SCREENING FOR OTHER VIRAL DISEASES: ICD-10-CM

## 2021-07-26 ENCOUNTER — TELEPHONE (OUTPATIENT)
Dept: MATERNAL FETAL MEDICINE | Facility: CLINIC | Age: 36
End: 2021-07-26

## 2021-07-26 DIAGNOSIS — O02.1 IUFD AT LESS THAN 20 WEEKS OF GESTATION: Primary | ICD-10-CM

## 2021-07-26 NOTE — TELEPHONE ENCOUNTER
Writer called and spoke with Marisabel after nurse from [facility] [place] and said pt wants a phone call today regarding when she could get in for D&E.  Pt was very teary on the phone. Pt denied wanting to hurt herself or others at this time. Writer explained that the order and prenatals were sent to [facility] and they were looking into when the patient could be scheduled and someone would call her back tomorrow. Pt stated if she couldn't get in this week, she may want to consider her options of IOL.   Writer offered [facility] Stefanie Villatoro to call patient as pt had 15 week loss with D&E for Trisomy 13 about a year ago and now unexpected 16w6d loss.  Pt willing and would appreciate Stefanie Villatoro calling pt.  Order placed and message routed to Stefanie to schedule apt. Stefanie Headley, RN

## 2021-07-27 ENCOUNTER — TELEPHONE (OUTPATIENT)
Dept: BEHAVIORAL HEALTH | Facility: CLINIC | Age: 36
End: 2021-07-27

## 2021-07-27 ENCOUNTER — PREP FOR PROCEDURE (OUTPATIENT)
Dept: OBGYN | Facility: CLINIC | Age: 36
End: 2021-07-27

## 2021-07-27 ENCOUNTER — ANESTHESIA EVENT (OUTPATIENT)
Dept: SURGERY | Facility: CLINIC | Age: 36
End: 2021-07-27
Payer: COMMERCIAL

## 2021-07-27 ENCOUNTER — TELEPHONE (OUTPATIENT)
Dept: OBGYN | Facility: CLINIC | Age: 36
End: 2021-07-27

## 2021-07-27 ENCOUNTER — LAB (OUTPATIENT)
Dept: URGENT CARE | Facility: URGENT CARE | Age: 36
End: 2021-07-27
Attending: OBSTETRICS & GYNECOLOGY
Payer: COMMERCIAL

## 2021-07-27 DIAGNOSIS — Z20.822 ENCOUNTER FOR LABORATORY TESTING FOR COVID-19 VIRUS: Primary | ICD-10-CM

## 2021-07-27 DIAGNOSIS — O02.1 FETAL DEMISE BEFORE 20 WEEKS WITH RETENTION OF DEAD FETUS: Primary | ICD-10-CM

## 2021-07-27 DIAGNOSIS — Z20.822 ENCOUNTER FOR LABORATORY TESTING FOR COVID-19 VIRUS: ICD-10-CM

## 2021-07-27 LAB — SARS-COV-2 RNA RESP QL NAA+PROBE: NEGATIVE

## 2021-07-27 PROCEDURE — U0003 INFECTIOUS AGENT DETECTION BY NUCLEIC ACID (DNA OR RNA); SEVERE ACUTE RESPIRATORY SYNDROME CORONAVIRUS 2 (SARS-COV-2) (CORONAVIRUS DISEASE [COVID-19]), AMPLIFIED PROBE TECHNIQUE, MAKING USE OF HIGH THROUGHPUT TECHNOLOGIES AS DESCRIBED BY CMS-2020-01-R: HCPCS

## 2021-07-27 PROCEDURE — U0005 INFEC AGEN DETEC AMPLI PROBE: HCPCS

## 2021-07-27 RX ORDER — HYDROXYZINE HYDROCHLORIDE 50 MG/1
50 TABLET, FILM COATED ORAL
COMMUNITY
End: 2022-06-23

## 2021-07-27 RX ORDER — MISOPROSTOL 200 UG/1
TABLET ORAL
Qty: 2 TABLET | Refills: 0 | Status: ON HOLD | OUTPATIENT
Start: 2021-07-27 | End: 2021-07-28

## 2021-07-27 RX ORDER — ACETAMINOPHEN 325 MG/1
975 TABLET ORAL ONCE
Status: CANCELLED | OUTPATIENT
Start: 2021-07-27 | End: 2021-07-27

## 2021-07-27 RX ORDER — DOXYCYCLINE 100 MG/1
100 CAPSULE ORAL ONCE
Status: CANCELLED | OUTPATIENT
Start: 2021-07-27 | End: 2021-07-27

## 2021-07-27 NOTE — TELEPHONE ENCOUNTER
Confirmed surgery date, time and location, 7/28/21, arrival time at 10:10a.m with nothing to eat eight hours before scheduled surgery time, clear liquids up to two hours before, h&p will be done day of surgery, COVID testing ordered.

## 2021-07-27 NOTE — PROGRESS NOTES
Scheduling request send for D&E for IUFD at 16+6 weeks, , referral from [facility].     Shanell Kaplan MD, FACOG

## 2021-07-27 NOTE — TELEPHONE ENCOUNTER
Patient given insructions for miso and covid testing.  Would like to speak with  at time of procedure if possible.  ASHLEE paged.

## 2021-07-27 NOTE — TELEPHONE ENCOUNTER
Received referral from [facility] with M[facility].  Reached out to patient and scheduled video visit for 8/2.     Stefanie Villatoro [facility]  Behavioral Health Clinician

## 2021-07-28 ENCOUNTER — HOSPITAL ENCOUNTER (OUTPATIENT)
Facility: CLINIC | Age: 36
Discharge: HOME | End: 2021-07-28
Attending: OBSTETRICS & GYNECOLOGY | Admitting: OBSTETRICS & GYNECOLOGY
Payer: COMMERCIAL

## 2021-07-28 ENCOUNTER — ANESTHESIA (OUTPATIENT)
Dept: SURGERY | Facility: CLINIC | Age: 36
End: 2021-07-28
Payer: COMMERCIAL

## 2021-07-28 VITALS
SYSTOLIC BLOOD PRESSURE: 117 MMHG | OXYGEN SATURATION: 99 % | BODY MASS INDEX: 28.44 KG/M2 | WEIGHT: 181.22 LBS | TEMPERATURE: 97.9 F | HEIGHT: 67 IN | RESPIRATION RATE: 14 BRPM | DIASTOLIC BLOOD PRESSURE: 77 MMHG

## 2021-07-28 DIAGNOSIS — O02.1 FETAL DEMISE BEFORE 20 WEEKS WITH RETENTION OF DEAD FETUS: ICD-10-CM

## 2021-07-28 LAB
ABO/RH(D): NORMAL
ANTIBODY SCREEN: NEGATIVE
GLUCOSE BLDC GLUCOMTR-MCNC: 81 MG/DL (ref 70–99)
HGB BLD-MCNC: 13.5 G/DL (ref 11.7–15.7)
SPECIMEN EXPIRATION DATE: NORMAL

## 2021-07-28 PROCEDURE — 76998 US GUIDE INTRAOP: CPT | Mod: 26 | Performed by: STUDENT IN AN ORGANIZED HEALTH CARE EDUCATION/TRAINING PROGRAM

## 2021-07-28 PROCEDURE — 88305 TISSUE EXAM BY PATHOLOGIST: CPT | Mod: 26 | Performed by: PATHOLOGY

## 2021-07-28 PROCEDURE — 86900 BLOOD TYPING SEROLOGIC ABO: CPT | Performed by: OBSTETRICS & GYNECOLOGY

## 2021-07-28 PROCEDURE — 88313 SPECIAL STAINS GROUP 2: CPT | Mod: 26 | Performed by: PATHOLOGY

## 2021-07-28 PROCEDURE — 272N000001 HC OR GENERAL SUPPLY STERILE: Performed by: OBSTETRICS & GYNECOLOGY

## 2021-07-28 PROCEDURE — 250N000013 HC RX MED GY IP 250 OP 250 PS 637: Performed by: OBSTETRICS & GYNECOLOGY

## 2021-07-28 PROCEDURE — 250N000011 HC RX IP 250 OP 636: Performed by: STUDENT IN AN ORGANIZED HEALTH CARE EDUCATION/TRAINING PROGRAM

## 2021-07-28 PROCEDURE — 36415 COLL VENOUS BLD VENIPUNCTURE: CPT | Performed by: OBSTETRICS & GYNECOLOGY

## 2021-07-28 PROCEDURE — 85018 HEMOGLOBIN: CPT | Performed by: OBSTETRICS & GYNECOLOGY

## 2021-07-28 PROCEDURE — 250N000013 HC RX MED GY IP 250 OP 250 PS 637: Performed by: STUDENT IN AN ORGANIZED HEALTH CARE EDUCATION/TRAINING PROGRAM

## 2021-07-28 PROCEDURE — 88313 SPECIAL STAINS GROUP 2: CPT | Mod: TC | Performed by: OBSTETRICS & GYNECOLOGY

## 2021-07-28 PROCEDURE — 370N000017 HC ANESTHESIA TECHNICAL FEE, PER MIN: Performed by: OBSTETRICS & GYNECOLOGY

## 2021-07-28 PROCEDURE — 250N000013 HC RX MED GY IP 250 OP 250 PS 637

## 2021-07-28 PROCEDURE — 258N000003 HC RX IP 258 OP 636: Performed by: STUDENT IN AN ORGANIZED HEALTH CARE EDUCATION/TRAINING PROGRAM

## 2021-07-28 PROCEDURE — 710N000012 HC RECOVERY PHASE 2, PER MINUTE: Performed by: OBSTETRICS & GYNECOLOGY

## 2021-07-28 PROCEDURE — 999N000141 HC STATISTIC PRE-PROCEDURE NURSING ASSESSMENT: Performed by: OBSTETRICS & GYNECOLOGY

## 2021-07-28 PROCEDURE — 88305 TISSUE EXAM BY PATHOLOGIST: CPT | Mod: TC | Performed by: OBSTETRICS & GYNECOLOGY

## 2021-07-28 PROCEDURE — 360N000075 HC SURGERY LEVEL 2, PER MIN: Performed by: OBSTETRICS & GYNECOLOGY

## 2021-07-28 PROCEDURE — 250N000009 HC RX 250: Performed by: OBSTETRICS & GYNECOLOGY

## 2021-07-28 PROCEDURE — 59820 CARE OF MISCARRIAGE: CPT | Mod: GC | Performed by: STUDENT IN AN ORGANIZED HEALTH CARE EDUCATION/TRAINING PROGRAM

## 2021-07-28 PROCEDURE — 250N000009 HC RX 250: Performed by: STUDENT IN AN ORGANIZED HEALTH CARE EDUCATION/TRAINING PROGRAM

## 2021-07-28 RX ORDER — LIDOCAINE 40 MG/G
CREAM TOPICAL
Status: DISCONTINUED | OUTPATIENT
Start: 2021-07-28 | End: 2021-08-02 | Stop reason: HOSPADM

## 2021-07-28 RX ORDER — ONDANSETRON 2 MG/ML
4 INJECTION INTRAMUSCULAR; INTRAVENOUS EVERY 30 MIN PRN
Status: DISCONTINUED | OUTPATIENT
Start: 2021-07-28 | End: 2021-08-02 | Stop reason: HOSPADM

## 2021-07-28 RX ORDER — IBUPROFEN 200 MG
800 TABLET ORAL ONCE
Status: DISCONTINUED | OUTPATIENT
Start: 2021-07-28 | End: 2021-08-02 | Stop reason: HOSPADM

## 2021-07-28 RX ORDER — ONDANSETRON 4 MG/1
4 TABLET, ORALLY DISINTEGRATING ORAL EVERY 30 MIN PRN
Status: DISCONTINUED | OUTPATIENT
Start: 2021-07-28 | End: 2021-08-02 | Stop reason: HOSPADM

## 2021-07-28 RX ORDER — FERRIC SUBSULFATE 0.21 G/G
LIQUID TOPICAL PRN
Status: DISCONTINUED | OUTPATIENT
Start: 2021-07-28 | End: 2021-08-02 | Stop reason: HOSPADM

## 2021-07-28 RX ORDER — LIDOCAINE HYDROCHLORIDE 20 MG/ML
INJECTION, SOLUTION INFILTRATION; PERINEURAL PRN
Status: DISCONTINUED | OUTPATIENT
Start: 2021-07-28 | End: 2021-07-28

## 2021-07-28 RX ORDER — PROPOFOL 10 MG/ML
INJECTION, EMULSION INTRAVENOUS CONTINUOUS PRN
Status: DISCONTINUED | OUTPATIENT
Start: 2021-07-28 | End: 2021-07-28

## 2021-07-28 RX ORDER — OXYCODONE HYDROCHLORIDE 5 MG/1
5 TABLET ORAL
Status: COMPLETED | OUTPATIENT
Start: 2021-07-28 | End: 2021-07-28

## 2021-07-28 RX ORDER — HYDRALAZINE HYDROCHLORIDE 20 MG/ML
2.5-5 INJECTION INTRAMUSCULAR; INTRAVENOUS EVERY 10 MIN PRN
Status: DISCONTINUED | OUTPATIENT
Start: 2021-07-28 | End: 2021-08-02 | Stop reason: HOSPADM

## 2021-07-28 RX ORDER — PROPOFOL 10 MG/ML
INJECTION, EMULSION INTRAVENOUS PRN
Status: DISCONTINUED | OUTPATIENT
Start: 2021-07-28 | End: 2021-07-28

## 2021-07-28 RX ORDER — ONDANSETRON 2 MG/ML
INJECTION INTRAMUSCULAR; INTRAVENOUS PRN
Status: DISCONTINUED | OUTPATIENT
Start: 2021-07-28 | End: 2021-07-28

## 2021-07-28 RX ORDER — SODIUM CHLORIDE, SODIUM LACTATE, POTASSIUM CHLORIDE, CALCIUM CHLORIDE 600; 310; 30; 20 MG/100ML; MG/100ML; MG/100ML; MG/100ML
INJECTION, SOLUTION INTRAVENOUS CONTINUOUS PRN
Status: DISCONTINUED | OUTPATIENT
Start: 2021-07-28 | End: 2021-07-28

## 2021-07-28 RX ORDER — SODIUM CHLORIDE, SODIUM LACTATE, POTASSIUM CHLORIDE, CALCIUM CHLORIDE 600; 310; 30; 20 MG/100ML; MG/100ML; MG/100ML; MG/100ML
INJECTION, SOLUTION INTRAVENOUS CONTINUOUS
Status: DISCONTINUED | OUTPATIENT
Start: 2021-07-28 | End: 2021-08-02 | Stop reason: HOSPADM

## 2021-07-28 RX ORDER — METHYLERGONOVINE MALEATE 0.2 MG/ML
INJECTION INTRAVENOUS PRN
Status: DISCONTINUED | OUTPATIENT
Start: 2021-07-28 | End: 2021-07-28

## 2021-07-28 RX ORDER — ACETAMINOPHEN 325 MG/1
975 TABLET ORAL ONCE
Status: DISCONTINUED | OUTPATIENT
Start: 2021-07-28 | End: 2021-07-29

## 2021-07-28 RX ORDER — FENTANYL CITRATE 50 UG/ML
INJECTION, SOLUTION INTRAMUSCULAR; INTRAVENOUS PRN
Status: DISCONTINUED | OUTPATIENT
Start: 2021-07-28 | End: 2021-07-28

## 2021-07-28 RX ORDER — ACETAMINOPHEN 325 MG/1
975 TABLET ORAL ONCE
Status: COMPLETED | OUTPATIENT
Start: 2021-07-28 | End: 2021-07-28

## 2021-07-28 RX ORDER — FENTANYL CITRATE 50 UG/ML
25 INJECTION, SOLUTION INTRAMUSCULAR; INTRAVENOUS EVERY 5 MIN PRN
Status: ACTIVE | OUTPATIENT
Start: 2021-07-28 | End: 2021-07-28

## 2021-07-28 RX ORDER — DOXYCYCLINE 100 MG/1
100 CAPSULE ORAL ONCE
Status: COMPLETED | OUTPATIENT
Start: 2021-07-28 | End: 2021-07-28

## 2021-07-28 RX ORDER — HYDROMORPHONE HYDROCHLORIDE 1 MG/ML
0.2 INJECTION, SOLUTION INTRAMUSCULAR; INTRAVENOUS; SUBCUTANEOUS EVERY 5 MIN PRN
Status: ACTIVE | OUTPATIENT
Start: 2021-07-28 | End: 2021-07-28

## 2021-07-28 RX ORDER — ACETAMINOPHEN 325 MG/1
975 TABLET ORAL ONCE
Status: DISCONTINUED | OUTPATIENT
Start: 2021-07-28 | End: 2021-08-02 | Stop reason: HOSPADM

## 2021-07-28 RX ORDER — METHYLERGONOVINE MALEATE 0.2 MG/1
0.2 TABLET ORAL EVERY 6 HOURS
Qty: 6 TABLET | Refills: 0 | Status: SHIPPED | OUTPATIENT
Start: 2021-07-28 | End: 2022-06-23

## 2021-07-28 RX ORDER — KETOROLAC TROMETHAMINE 30 MG/ML
INJECTION, SOLUTION INTRAMUSCULAR; INTRAVENOUS PRN
Status: DISCONTINUED | OUTPATIENT
Start: 2021-07-28 | End: 2021-07-28

## 2021-07-28 RX ORDER — SODIUM CHLORIDE, SODIUM LACTATE, POTASSIUM CHLORIDE, CALCIUM CHLORIDE 600; 310; 30; 20 MG/100ML; MG/100ML; MG/100ML; MG/100ML
INJECTION, SOLUTION INTRAVENOUS CONTINUOUS
Status: DISCONTINUED | OUTPATIENT
Start: 2021-07-28 | End: 2021-07-29

## 2021-07-28 RX ORDER — HYDROXYZINE HYDROCHLORIDE 10 MG/1
10 TABLET, FILM COATED ORAL
Status: DISCONTINUED | OUTPATIENT
Start: 2021-07-28 | End: 2021-08-02 | Stop reason: HOSPADM

## 2021-07-28 RX ADMIN — ACETAMINOPHEN 975 MG: 325 TABLET, FILM COATED ORAL at 10:46

## 2021-07-28 RX ADMIN — FENTANYL CITRATE 25 MCG: 50 INJECTION, SOLUTION INTRAMUSCULAR; INTRAVENOUS at 13:52

## 2021-07-28 RX ADMIN — METHYLERGONOVINE MALEATE 200 MCG: 0.2 INJECTION INTRAVENOUS at 13:33

## 2021-07-28 RX ADMIN — FENTANYL CITRATE 50 MCG: 50 INJECTION, SOLUTION INTRAMUSCULAR; INTRAVENOUS at 12:45

## 2021-07-28 RX ADMIN — SODIUM CHLORIDE, POTASSIUM CHLORIDE, SODIUM LACTATE AND CALCIUM CHLORIDE: 600; 310; 30; 20 INJECTION, SOLUTION INTRAVENOUS at 12:42

## 2021-07-28 RX ADMIN — KETOROLAC TROMETHAMINE 30 MG: 30 INJECTION, SOLUTION INTRAMUSCULAR at 13:40

## 2021-07-28 RX ADMIN — LIDOCAINE HYDROCHLORIDE 60 MG: 20 INJECTION, SOLUTION INFILTRATION; PERINEURAL at 12:45

## 2021-07-28 RX ADMIN — PROPOFOL 150 MCG/KG/MIN: 10 INJECTION, EMULSION INTRAVENOUS at 12:45

## 2021-07-28 RX ADMIN — OXYCODONE HYDROCHLORIDE 5 MG: 5 TABLET ORAL at 14:47

## 2021-07-28 RX ADMIN — DOXYCYCLINE 100 MG: 100 CAPSULE ORAL at 10:46

## 2021-07-28 RX ADMIN — MIDAZOLAM 2 MG: 1 INJECTION INTRAMUSCULAR; INTRAVENOUS at 12:42

## 2021-07-28 RX ADMIN — PROPOFOL 70 MG: 10 INJECTION, EMULSION INTRAVENOUS at 12:45

## 2021-07-28 RX ADMIN — ONDANSETRON 4 MG: 2 INJECTION INTRAMUSCULAR; INTRAVENOUS at 13:26

## 2021-07-28 ASSESSMENT — MIFFLIN-ST. JEOR: SCORE: 1544.63

## 2021-07-28 ASSESSMENT — ENCOUNTER SYMPTOMS: SEIZURES: 0

## 2021-07-28 NOTE — PROGRESS NOTES
Received referral for social work to see.  Met with Marisabel and her spouse Zurdo this morning to offer support.      Marisabel and Zurdo are both very tearful as they are grieving the loss of this baby girl.  They openly share they also carry grief from their previous loss of a baby girl just one year ago.  Through her tears, Marisabel expresses comfort in knowing that their babies are eternally together forever.  Marisabel and Zurdo are leaning into one another for support.  They have a 2 year-old daughter that gives them reasons to smile amidst their sadness.      Marisabel identifies a very strong support system of family and friends.  Many have brought meals and extended heart-felt kindness in these recent weeks.      Marisabel and Zurdo have chosen hospital arrangements/burial for their baby.  They chose the same for their previous loss and did participate in the quarterly [facility] service.  They plan to attend the burial again this November.      Social work provided grief education.  Marisabel is wanting support and psychotherapy as she processes these losses.  She has an appointment with psychotherapist, SULMA Gonzalez next week.     Social work shared pregnancy and infant loss resources including:    [facility] [place] [facility] [place]   [facility]     Provided family with my direct contact information and encouraged them to be in touch if they have needs.

## 2021-07-28 NOTE — ANESTHESIA POSTPROCEDURE EVALUATION
Patient: Griselda Bell    Procedure(s):  pelvic exam under anesthesia, DILATION AND EVACUATION, UTERUS    Diagnosis:Fetal demise before 20 weeks with retention of dead fetus [O02.1]  Diagnosis Additional Information: No value filed.    Anesthesia Type:  General    Note:     Postop Pain Control: Uneventful            Sign Out: Well controlled pain   PONV: No   Neuro/Psych: Uneventful            Sign Out: Acceptable/Baseline neuro status   Airway/Respiratory: Uneventful            Sign Out: Acceptable/Baseline resp. status   CV/Hemodynamics: Uneventful            Sign Out: Acceptable CV status; No obvious hypovolemia; No obvious fluid overload   Other NRE: NONE   DID A NON-ROUTINE EVENT OCCUR? No           Last vitals:  Vitals Value Taken Time   /66 07/28/21 1349   Temp 36.6  C (97.9  F) 07/28/21 1349   Pulse     Resp 12 07/28/21 1349   SpO2 97 % 07/28/21 1400       Electronically Signed By: Campbell Hong MD  July 28, 2021  3:18 PM

## 2021-07-28 NOTE — ANESTHESIA PREPROCEDURE EVALUATION
Anesthesia Pre-Procedure Evaluation    Patient: Griselda Bell   MRN: [id number] : 1985        Preoperative Diagnosis: Fetal demise before 20 weeks with retention of dead fetus [O02.1]   Procedure : Procedure(s):  pelvic exam under anesthesia, DILATION AND EVACUATION, UTERUS     Past Medical History:   Diagnosis Date     Migraine      Obese       Past Surgical History:   Procedure Laterality Date     ARTHROSCOPY SHOULDER RT/LT      labral repair     DILATION AND EVACUATION N/A 2020    Procedure: DILATION AND EVACUATION, UTERUS-;  Surgeon: Travis Hernandez MD;  [facility] OR     EXAM UNDER ANESTHESIA RECTUM N/A 2019    Procedure: EXAM UNDER ANESTHESIA;  Surgeon: Diana Steward MD;  Location:  OR     LAPAROSCOPIC APPENDECTOMY N/A 2021    Procedure: APPENDECTOMY, LAPAROSCOPIC;  Surgeon: Catalina Coronado MD;  Location:  OR     LEEP TX, CERVICAL  ?      Allergies   Allergen Reactions     Shellfish-Derived Products Swelling      Social History     Tobacco Use     Smoking status: Never Smoker     Smokeless tobacco: Never Used   Substance Use Topics     Alcohol use: Yes     Comment: 1 drink when does have etoh      Wt Readings from Last 1 Encounters:   21 85.1 kg (187 lb 9.6 oz)        Anesthesia Evaluation   Pt has had prior anesthetic. Type: General.    No history of anesthetic complications       ROS/MED HX  ENT/Pulmonary:  - neg pulmonary ROS     Neurologic:     (+) migraines,  (-) no seizures, no CVA and no TIA   Cardiovascular:  - neg cardiovascular ROS  (-) murmur   METS/Exercise Tolerance:     Hematologic:  - neg hematologic  ROS     Musculoskeletal:  - neg musculoskeletal ROS     GI/Hepatic:  - neg GI/hepatic ROS     Renal/Genitourinary:  - neg Renal ROS  (-) renal disease   Endo:  - neg endo ROS  (-) Type II DM   Psychiatric/Substance Use:  - neg psychiatric ROS     Infectious Disease:  - neg infectious disease ROS     Malignancy:  - neg malignancy ROS      Other: Comment: ~17 weeks pregnant with recent fetal demise           Physical Exam    Airway        Mallampati: I   TM distance: > 3 FB   Neck ROM: full   Mouth opening: > 3 cm    Respiratory Devices and Support         Dental  no notable dental history         Cardiovascular          Rhythm and rate: regular and normal (-) no murmur    Pulmonary           breath sounds clear to auscultation           OUTSIDE LABS:  CBC:   Lab Results   Component Value Date    WBC 8.3 04/19/2021    HGB 13.4 04/19/2021    HGB 13.4 08/05/2020    HCT 38.8 04/19/2021     04/19/2021     BMP:   Lab Results   Component Value Date     04/19/2021    POTASSIUM 3.6 04/19/2021    POTASSIUM 3.9 05/09/2018    CHLORIDE 106 04/19/2021    CO2 25 04/19/2021    BUN 11 04/19/2021    CR 0.60 04/19/2021    CR 0.57 05/09/2018    GLC 91 04/19/2021    GLC 78 08/05/2020     COAGS:   Lab Results   Component Value Date    INR 1.01 04/19/2021     POC:   Lab Results   Component Value Date    BGM 80 08/05/2020    HCG Negative 04/19/2021    HCGS Positive (A) 08/05/2020     HEPATIC:   Lab Results   Component Value Date    ALBUMIN 3.8 04/19/2021    PROTTOTAL 7.4 04/19/2021    ALT 21 04/19/2021    AST 12 04/19/2021    ALKPHOS 54 04/19/2021    BILITOTAL 1.2 04/19/2021     OTHER:   Lab Results   Component Value Date    DOUG 9.0 04/19/2021    TSH 1.480 05/09/2018       Anesthesia Plan    ASA Status:  2      Anesthesia Type: General.     - Airway: Native airway   Induction: Intravenous.   Maintenance: TIVA (propofol).        Consents            Postoperative Care    Pain management: IV analgesics, Oral pain medications.   PONV prophylaxis: Ondansetron (or other 5HT-3)     Comments:    Discussed plan for IV anesthetic with native airway, possible need for placement of LMA or ETT if not tolerating. Discussed risks of PONV, post op pain, and possible dental/oral damage if needs airway manipulation.             Pancho Cleary   CA-1

## 2021-07-28 NOTE — H&P
"Preoperative H&P    CC: Fetal demise at 16w6d    HPI: Griselda Bell is a 36 year old  who was diagnosed with intrauterine fetal demise and presents for D&E. She feels physically well today. No complaints.     PMHx: negative  PSHx: D&E, appendectomy  Meds: none  Allergies: Shellfish  Fam Hx: no issues with anesthesia  OB Hx:  x1, D&E for aneuploidy, IUFD this pregnancy     PE:  /88   Temp 97.2  F (36.2  C) (Oral)   Resp 16   Ht 1.702 m (5' 7\")   Wt 82.2 kg (181 lb 3.5 oz)   SpO2 99%   BMI 28.38 kg/m    Gen: NAD, tearful  CV: RRR  Resp: nonlabored    Results for orders placed or performed during the hospital encounter of 21   Hemoglobin     Status: Normal   Result Value Ref Range    Hemoglobin 13.5 11.7 - 15.7 g/dL   Glucose by meter     Status: Normal   Result Value Ref Range    GLUCOSE BY METER POCT 81 70 - 99 mg/dL   Adult Type and Screen     Status: None   Result Value Ref Range    ABO/RH(D) B POS     Antibody Screen Negative Negative    SPECIMEN EXPIRATION DATE [id number]    ABO/Rh type and screen     Status: None    Narrative    The following orders were created for panel order ABO/Rh type and screen.  Procedure                               Abnormality         Status                     ---------                               -----------         ------                     Adult Type and Screen[[id number]]                            Final result                 Please view results for these tests on the individual orders.      A/P:  Griselda Bell is a 36 year old  who was diagnosed with intrauterine fetal demise at 16w6d. She was counseled on options and elected for D&C. Risks, benefits, and alternatives were discussed. Informed consent obtained.     Raina Fofana MD  OB/GYN PGY-4  21 12:14 PM     Appreciate note by Dr. Fofana. Patient has been seen and examined by me with the resident, agree with above note.     Johnna Felix MD    "

## 2021-07-28 NOTE — BRIEF OP NOTE
[facility]    Brief Operative Note    Pre-operative diagnosis: Fetal demise before 20 weeks with retention of dead fetus [O02.1]  Post-operative diagnosis Same as pre-operative diagnosis    Procedure: Procedure(s):  pelvic exam under anesthesia, DILATION AND EVACUATION, UTERUS  Surgeon: Surgeon(s) and Role:     * Johnna Felix MD - Primary     * Krystal Arzate MD - Assisting     * Raina Fofana MD - Resident - Assisting  Anesthesia: Choice   Estimated blood loss: 30 cc  Drains: None  Specimens:   ID Type Source Tests Collected by Time Destination   1 :  Products of Conception Products of Conception SURGICAL PATHOLOGY EXAM Johnna Felix MD 7/28/2021  1:06 PM      Findings:   Findings consistent with 16w fetal loss, four fetal extremities, spine, and calvarium as well as placental tissue visualized. Thin endometrial stripe visualized on US at conclusion of procedure..  Complications: None.  Implants: * No implants in log *      Raina Fofana MD  OB/GYN PGY-4  07/28/21 1:48 PM

## 2021-07-28 NOTE — DISCHARGE INSTRUCTIONS
Discharge Instructions: Following a Dilation   and Curettage/Dilation and Evacuation    What to expect:    Expect small to moderate amount of vaginal bleeding which should taper off in 4-5 days. It should not be heavier than your regular menstrual flow.    Do not douche, and use a pad rather than tampons.     No intercourse until bleeding has ceased.    Activity:    Rest the day of surgery. You may resume normal activity the next day.    You may bathe or shower.    Avoid heavy lifting (10-15 lbs) for one week.    Comfort:    The amount of discomfort you can expect is very unpredictable. If you have pain that cannot be controlled with non-aspirin pain relievers or with the prescription you may have received, you should notify your doctor.    Abdominal cramping (like menstrual cramps) or low back ache are common and should not be a cause for concern. You will be drowsy and weak the day of surgery and possibly the following day.    Diet:    You have no restrictions on your diet. Following surgery, drink plenty of fluids and eat a light meal.    Nausea:    The anesthesia medications you received during your surgical procedure may produce some nausea.    If you feel nauseated, stay in bed, keep your head down and try drinking fluids such as Seven-Up, tea or soup.    Notify Physician at once if you experience:    A fever over 100.4 degrees (a low grade fever under 100 degrees is usual after surgery).    Heavy flow and/or passing large clots. Saturating more than 1 pad per hour for 2 or more hours.     Severe pain or cramps.    Important numbers  [facility] ([address]) - [place]: [phone]   [facility] ([address]) : [phone]  Rev. 5/12        Same-Day Surgery   Adult Discharge Orders & Instructions     For 24 hours after surgery:  1. Get plenty of rest.  A responsible adult must stay with you for at least 24 hours after you leave the hospital.   2. Pain medication  can slow your reflexes. Do not drive or use heavy equipment.  If you have weakness or tingling, don't drive or use heavy equipment until this feeling goes away.  3. Mixing alcohol and pain medication can cause dizziness and slow your breathing. It can even be fatal. Do not drink alcohol while taking pain medication.  4. Avoid strenuous or risky activities.  Ask for help when climbing stairs.   5. You may feel lightheaded.  If so, sit for a few minutes before standing.  Have someone help you get up.   6. If you have nausea (feel sick to your stomach), drink only clear liquids such as apple juice, ginger ale, broth or 7-Up.  Rest may also help.  Be sure to drink enough fluids.  Move to a regular diet as you feel able. Take pain medications with a small amount of solid food, such as toast or crackers, to avoid nausea.   7. A slight fever is normal. Call the doctor if your fever is over 100 F (37.7 C) (taken under the tongue) or lasts longer than 24 hours.  8. You may have a dry mouth, muscle aches, trouble sleeping or a sore throat.  These symptoms should go away after 24 hours.  9. Do not make important or legal decisions.   Pain Management:      1. Take pain medication (if prescribed) for pain as directed by your physician.        2. WARNING: If the pain medication you have been prescribed contains Tylenol  (acetaminophen), DO NOT take additional doses of Tylenol (acetaminophen).     Call your doctor for any of the followin.  Signs of infection (fever, growing tenderness at the surgery site, severe pain, a large amount of drainage or bleeding, foul-smelling drainage, redness, swelling).    2.  It has been over 8 to 10 hours since surgery and you are still not able to urinate (pee).    3.  Headache for over 24 hours.    4.  Numbness, tingling or weakness the day after surgery (if you had spinal anesthesia).  To contact a doctor, call _____________________________________ or:      [phone] and ask for the  Resident On Call for:          __________________________________________ (answered 24 hours a day)      Emergency Department:  [place] [facility]: [phone]  [place] [facility]: [phone]               Rev. 10/2014

## 2021-07-28 NOTE — ANESTHESIA CARE TRANSFER NOTE
Patient: Griselda Bell    Procedure(s):  pelvic exam under anesthesia, DILATION AND EVACUATION, UTERUS    Diagnosis: Fetal demise before 20 weeks with retention of dead fetus [O02.1]  Diagnosis Additional Information: No value filed.    Anesthesia Type:   General     Note:    Oropharynx: oropharynx clear of all foreign objects and spontaneously breathing  Level of Consciousness: drowsy  Oxygen Supplementation: face mask  Level of Supplemental Oxygen (L/min / FiO2): 6  Independent Airway: airway patency satisfactory and stable  Dentition: dentition unchanged  Vital Signs Stable: post-procedure vital signs reviewed and stable  Report to RN Given: handoff report given  Patient transferred to: Phase II  Comments: Vitally stable. Was having cramping pain and was treated with 25 mcg of fentanyl.        Vitals:  Vitals Value Taken Time   BP     Temp     Pulse     Resp     SpO2         Electronically Signed By: Pancho Cleary  July 28, 2021  1:58 PM

## 2021-07-29 NOTE — OP NOTE
OPERATIVE REPORT    Preoperative Diagnosis:   - Fetal demise at 16w6d    Postoperative Diagnosis:   - Same    Procedure:       1)  D&E  2)  Ultrasound guidance    Surgeon:     Johnna Felix MD    Assistant:     MD Raina Mitchell MD, PGY4     EBL:      30 cc    IVF:      600 cc Crystalloid    Anesthesia:     MAC and local    UOP:      Not measured    Specimens to pathology:   POCs    Complications:    None    Indication: Griselda Bell is a 36 year old female  who was diagnosed with IUFD at 16w6d. Risks, benefits, and alternatives to the procedure were discussed and the patient was consented.    Findings: Findings consistent with 16w fetal loss, four fetal extremities, spine, and calvarium as well as placental tissue visualized. Thin endometrial stripe visualized on US at conclusion of procedure    PROCEDURE:  The patient was taken to the operating room with IV running.  She was placed in dorsal lithotomy position using yellowfin stirrups in a safe position.  Bimanual exam was performed and the uterus was 16 weeks size. She was prepped and draped in the usual sterile fashion. The team then paused to verify the patient's identity and procedure to be performed. All equipment required was ready and available.    A speculum was placed in the vagina and tenaculum was placed on the anterior cervical lip. Serial dilators were used to dilate her cervix to 49 Fr. Under ultrasound visualization the amniotic sac was ruptured and the fluid was drained using a size 12 rigid curved cannula to electric suction.  The fetus and placenta were removed in multiple pieces with multiple passes of the Bierer forceps.  The suction cannula was then reinserted and used with to fully empty the uterus.  The uterus contracted at the culmination of the procedure and a gritty texture was appreciated. She received methergine 200 mcg IM. All instruments were removed from patient's vagina and there was minimal  bleeding at the conclusion of the case.  A bimanual exam showed good uterine tone and no abnormalities.    The tissue was inspected and the procedure was determined to be complete.  The patient tolerated the procedure well.  She was taken to the recovery room awake and in stable condition.  She received oral doxycycline for perioperative prophylaxis.     Ultrasound guidance:  Continuous realtime ultrasound guidance was performed through the entire procedure, showing no evidence of uterine trauma or retained tissue.  A thin endometrial stripe was seen at the conclusion of the case.  No complications or abnormalities were revealed by ultrasound.      Raina Fofana MD  OB/GYN PGY-4  07/29/21 8:12 AM     I was present and scrubbed for entire procedure.   Johnna Felix MD

## 2021-08-02 ENCOUNTER — VIRTUAL VISIT (OUTPATIENT)
Dept: BEHAVIORAL HEALTH | Facility: CLINIC | Age: 36
End: 2021-08-02
Payer: COMMERCIAL

## 2021-08-02 DIAGNOSIS — F43.20 ADJUSTMENT DISORDER, UNSPECIFIED TYPE: Primary | ICD-10-CM

## 2021-08-02 PROCEDURE — 90834 PSYTX W PT 45 MINUTES: CPT | Mod: 95 | Performed by: SOCIAL WORKER

## 2021-08-02 ASSESSMENT — PATIENT HEALTH QUESTIONNAIRE - PHQ9
SUM OF ALL RESPONSES TO PHQ QUESTIONS 1-9: 9
10. IF YOU CHECKED OFF ANY PROBLEMS, HOW DIFFICULT HAVE THESE PROBLEMS MADE IT FOR YOU TO DO YOUR WORK, TAKE CARE OF THINGS AT HOME, OR GET ALONG WITH OTHER PEOPLE: SOMEWHAT DIFFICULT
SUM OF ALL RESPONSES TO PHQ QUESTIONS 1-9: 9

## 2021-08-02 ASSESSMENT — COLUMBIA-SUICIDE SEVERITY RATING SCALE - C-SSRS
ATTEMPT LIFETIME: NO
ATTEMPT PAST THREE MONTHS: NO
2. HAVE YOU ACTUALLY HAD ANY THOUGHTS OF KILLING YOURSELF?: NO
TOTAL  NUMBER OF INTERRUPTED ATTEMPTS PAST 3 MONTHS: NO
2. HAVE YOU ACTUALLY HAD ANY THOUGHTS OF KILLING YOURSELF LIFETIME?: NO
TOTAL  NUMBER OF ABORTED OR SELF INTERRUPTED ATTEMPTS PAST LIFETIME: NO
1. IN THE PAST MONTH, HAVE YOU WISHED YOU WERE DEAD OR WISHED YOU COULD GO TO SLEEP AND NOT WAKE UP?: NO
6. HAVE YOU EVER DONE ANYTHING, STARTED TO DO ANYTHING, OR PREPARED TO DO ANYTHING TO END YOUR LIFE?: NO
TOTAL  NUMBER OF INTERRUPTED ATTEMPTS LIFETIME: NO
1. IN THE PAST MONTH, HAVE YOU WISHED YOU WERE DEAD OR WISHED YOU COULD GO TO SLEEP AND NOT WAKE UP?: NO
2. HAVE YOU ACTUALLY HAD ANY THOUGHTS OF KILLING YOURSELF LIFETIME?: NO
2. HAVE YOU ACTUALLY HAD ANY THOUGHTS OF KILLING YOURSELF?: NO
6. HAVE YOU EVER DONE ANYTHING, STARTED TO DO ANYTHING, OR PREPARED TO DO ANYTHING TO END YOUR LIFE?: NO
ATTEMPT PAST THREE MONTHS: NO
6. HAVE YOU EVER DONE ANYTHING, STARTED TO DO ANYTHING, OR PREPARED TO DO ANYTHING TO END YOUR LIFE?: NO
1. IN THE PAST MONTH, HAVE YOU WISHED YOU WERE DEAD OR WISHED YOU COULD GO TO SLEEP AND NOT WAKE UP?: NO
ATTEMPT LIFETIME: NO
TOTAL  NUMBER OF INTERRUPTED ATTEMPTS LIFETIME: NO
1. IN THE PAST MONTH, HAVE YOU WISHED YOU WERE DEAD OR WISHED YOU COULD GO TO SLEEP AND NOT WAKE UP?: NO
TOTAL  NUMBER OF ABORTED OR SELF INTERRUPTED ATTEMPTS PAST 3 MONTHS: NO
TOTAL  NUMBER OF INTERRUPTED ATTEMPTS PAST 3 MONTHS: NO
TOTAL  NUMBER OF ABORTED OR SELF INTERRUPTED ATTEMPTS PAST 3 MONTHS: NO
TOTAL  NUMBER OF ABORTED OR SELF INTERRUPTED ATTEMPTS PAST LIFETIME: NO
6. HAVE YOU EVER DONE ANYTHING, STARTED TO DO ANYTHING, OR PREPARED TO DO ANYTHING TO END YOUR LIFE?: NO

## 2021-08-02 NOTE — PROGRESS NOTES
[facility] Maternal Fetal Medicine [facility]- Integrated Behavioral Health Services  August 2, 2021    Behavioral Health Clinician Progress Note    Patient Name: Girselda Bell      Telemedicine Visit: The patient's condition can be safely assessed and treated via synchronous audio and visual telemedicine encounter.      Reason for Telemedicine Visit: Services only offered telehealth    Originating Site (Patient Location): Patient's home    Distant Site (Provider Location): [facility] Clinics: Maternal Fetal Medicine    Consent:  The patient/guardian has verbally consented to: the potential risks and benefits of telemedicine (video visit) versus in person care; bill my insurance or make self-payment for services provided; and responsibility for payment of non-covered services.     Mode of Communication:  Video Conference via InvitedHome    As the provider I attest to compliance with applicable laws and regulations related to telemedicine.         Service Type:  Individual     Session Start Time: 2:32 pm  Session End Time: 3: 24 pm      Session Length: 38 - 52      Attendees: Patient    Visit Activities (Refresh list every visit): [facility] and [facility] [place] Only    Diagnostic Assessment Date: Deferred due to clinical needs of patient  Treatment Plan Review Date: Deferred due to clinical needs of patient. Will complete after the diagnostic assessment.  See Flowsheets for today's PHQ-9 and COLLINS-7 results  Previous PHQ-9:   PHQ-9 SCORE 8/2/2021   PHQ-9 Total Score MyChart 9 (Mild depression)   PHQ-9 Total Score 9     Previous COLLINS-7: No flowsheet data found.    CATERINA LEVEL:  CATERINA Score (Last Two) 11/7/2019   CATERINA Raw Score 30   Activation Score 56   CATERINA Level 3       DATA  Extended Session (60+ minutes): No  Interactive Complexity: No  Crisis: No  [facility] Patient: No    Treatment Objective(s) Addressed in This Session:  Target Behavior(s): Grief and loss    Grief / Loss: will increase understanding of normal grieving process,  "will engage in effective approach to address and resolve grief/loss issues and will process grief/loss issues in an adaptive manner    Current Stressors / Issues:  Patient referred to [facility] from P[facility] with [facility].  Patient attended her routine prenatal appointment one week ago when she learned that there was not a fetal heartbeat. She stated that until that appointment, there were no concerns with the pregnancy.  Patient stated that two days later she had a D& E performed.  Patient stated that almost exactly one year ago she had to undergo a medical interruption of pregnancy due to Trisomy 13.  She shared that this most recent loss has felt more difficult to process and cope with.     Patient reflected upon her previous pregnancy loss, including learning early on after receiving her NIPT that there may be concerns for Trisomy 13. She stated that she started to prepare herself for ending the pregnancy, and had time to grieve before her procedure.  Patient stated that with this most recent loss, she experienced anxiety prior to the NIPT, but once she received the results of the NIPT and was in the second trimester, she was feeling excited and beginning to tell people.     Patient stated that it has been difficult since she does not know \"why\" she experienced this loss. She shared that she is wondering how she will be able to cope with this.  She stated that she is trying to reflect on the pregnancy and identify if she did something to cause the loss. She shared that she is searching for answers.  Patient discussed additional challenges after this procedure since her milk in, and its another reminder of the loss.  She shared that she is now trying to figure out how to tell others about the loss or what to say when they ask her about it.    Patient stated that she is trying to stay busy, and has been focusing on house projects. She shared that it has been difficult for her to work.     Patient stated that she and her  " are grieving differently, but denied feeling alone or isolated in her grief.  Patient shared that she is thinking of the future and is asking herself if they should try again to conceive or accept her family of 3. She stated that she wants to, but is also feeling like her family is not complete. Patient stated that she worries about how her anxiety may feel in a subsequent pregnancy based on her prior two pregnancies.     Throughout session, [facility] validated and normalized patient's reactions, provided psycho-education on  grief and loss, and confirmed patient's normative grief response. Explored with patient strategies to help manage grief symptoms over the next week.    Progress on Treatment Objective(s) / Homework:  New Objective established this session - CONTEMPLATION (Considering change and yet undecided); Intervened by assessing the negative and positive thinking (ambivalence) about behavior change    Motivational Interviewing    MI Intervention: Expressed Empathy/Understanding, Permission to raise concern or advise, Open-ended questions and Reframe     Change Talk Expressed by the Patient: Ability to change Reasons to change Need to change    Provider Response to Change Talk: E - Evoked more info from patient about behavior change, A - Affirmed patient's thoughts, decisions, or attempts at behavior change, R - Reflected patient's change talk and S - Summarized patient's change talk statements    Also provided psychoeducation about behavioral health condition, symptoms, and treatment options    Care Plan review completed: No    Medication Review:  No current psychiatric medications prescribed    Medication Compliance:  NA    Changes in Health Issues:   None reported    Chemical Use Review:   Substance Use: Chemical use reviewed, no active concerns identified      Tobacco Use: No current tobacco use.      Assessment: Current Emotional / Mental Status (status of significant symptoms):  Risk status (Self /  Other harm or suicidal ideation)  Patient denies a history of suicidal ideation, suicide attempts, self-injurious behavior, homicidal ideation, homicidal behavior and and other safety concerns  Patient denies current fears or concerns for personal safety.  Patient denies current or recent suicidal ideation or behaviors.  Patient denies current or recent homicidal ideation or behaviors.  Patient denies current or recent self injurious behavior or ideation.  Patient denies other safety concerns.  A safety and risk management plan has not been developed at this time, however patient was encouraged to call [place] [phone] should there be a change in any of these risk factors.    Appearance:   Appropriate   Eye Contact:   Good   Psychomotor Behavior: Normal   Attitude:   Cooperative   Orientation:   All  Speech   Rate / Production: Normal    Volume:  Normal   Mood:    Grieving  Affect:    Tearful  Thought Content:  Clear   Thought Form:  Coherent  Logical   Insight:    Good     Diagnoses:  1. Adjustment disorder, unspecified type        Collateral Reports Completed:  Communicated with: VERNON    Plan: (Homework, other):  Patient was given information about behavioral services and encouraged to schedule a follow up appointment with the clinic [facility] in 1 week.  She was also given psycho-education on  grief and loss, and behavioral coping skills to help manage grief symptoms.  CD Recommendations: No indications of CD issues.  Stefanie Villatoro, SHADSW      ______________________________________________________________________    SULMA Gonzalez  2021

## 2021-08-03 ENCOUNTER — DOCUMENTATION ONLY (OUTPATIENT)
Dept: BEHAVIORAL HEALTH | Facility: CLINIC | Age: 36
End: 2021-08-03

## 2021-08-03 ASSESSMENT — PATIENT HEALTH QUESTIONNAIRE - PHQ9: SUM OF ALL RESPONSES TO PHQ QUESTIONS 1-9: 9

## 2021-08-09 ENCOUNTER — VIRTUAL VISIT (OUTPATIENT)
Dept: BEHAVIORAL HEALTH | Facility: CLINIC | Age: 36
End: 2021-08-09
Payer: COMMERCIAL

## 2021-08-09 DIAGNOSIS — F43.20 ADJUSTMENT DISORDER, UNSPECIFIED TYPE: Primary | ICD-10-CM

## 2021-08-09 PROCEDURE — 90834 PSYTX W PT 45 MINUTES: CPT | Mod: 95 | Performed by: SOCIAL WORKER

## 2021-08-09 NOTE — PROGRESS NOTES
[facility] Maternal Fetal Medicine Clinic- Integrated Behavioral Health Services  August 9, 2021    Behavioral Health Clinician Progress Note    Patient Name: Griselda Bell      Telemedicine Visit: The patient's condition can be safely assessed and treated via synchronous audio and visual telemedicine encounter.      Reason for Telemedicine Visit: Services only offered telehealth    Originating Site (Patient Location): Patient's place of employment    Distant Site (Provider Location): [facility] Clinics: Maternal Fetal Medicine    Consent:  The patient/guardian has verbally consented to: the potential risks and benefits of telemedicine (video visit) versus in person care; bill my insurance or make self-payment for services provided; and responsibility for payment of non-covered services.     Mode of Communication:  Video Conference via mDialog    As the provider I attest to compliance with applicable laws and regulations related to telemedicine.         Service Type:  Individual     Session Start Time: 8:32 am  Session End Time: 9: 24 am      Session Length: 38 - 52      Attendees: Patient    Visit Activities (Refresh list every visit): [facility] and [facility] Only    Diagnostic Assessment Date: Deferred due to clinical needs of patient  Treatment Plan Review Date: Deferred due to clinical needs of patient. Will complete after the diagnostic assessment.  See Flowsheets for today's PHQ-9 and COLLINS-7 results  Previous PHQ-9:   PHQ-9 SCORE 8/2/2021   PHQ-9 Total Score MyChart 9 (Mild depression)   PHQ-9 Total Score 9     Previous COLLINS-7:   COLLINS-7 SCORE 8/4/2021   Total Score 8 (mild anxiety)   Total Score 8       CATERINA LEVEL:  CATERINA Score (Last Two) 11/7/2019   CATERINA Raw Score 30   Activation Score 56   CATERINA Level 3       DATA  Extended Session (60+ minutes): No  Interactive Complexity: No  Crisis: No  [facility] Patient: No    Treatment Objective(s) Addressed in This Session:  Target Behavior(s): Grief and loss    Grief /  "Loss: will increase understanding of normal grieving process, will engage in effective approach to address and resolve grief/loss issues and will process grief/loss issues in an adaptive manner    Current Stressors / Issues:  Patient reported that she has returned to work.  She shared that she is having \"good days and bad days\". Patient expressing concern about heightened anxiety, primarily in the evenings. She stated that she is worrying about harm or something happening to her daughter, Yady. Patient discussed how the anxiety is making it difficult for her to fall asleep and stay asleep as she \"can't turn it off\".  Explored potential factors contributing to the anxiety, including her grief, postpartum hormones, and busy schedule during the day creating a distraction from her grief. Explored strategies to help reduce anxiety including giving self unstructured time prior to bedtime, strategies to help in the moment when trying to fall asleep (deep breathing, writing down worries on a journal to \"save\" until brain is more rested).     Patient stated that she has found running and walking to be helpful strategies to manage anxiety, but is concerned about how to manage anxiety when at work when she cannot utilize these coping skills. Introduced mindfulness through sensory engagement.     Patient described intensity of sadness with this loss versus her loss with Trisomy 13. Patient continues to wonder \"why\" it occurred since she still has no answers and has not yet received the pathology report. She shared that it would be helpful to have an answer, since she can continue to wonder if she did something to cause it, and she feels like there being an answer may help her to feel a greater sense of control. Discussed challenges when there is no answer, the shift it can cause in belief systems.      Patient stated that she is continuing to feel like her family is not complete, but she is not physically or emotionally " "prepared for another pregnancy. Patient shared that her doctor offered to rule out other medical conditions/factors that may be impacting pregnancies, but she is uncertain if she wants to pursue this.     Patient expressed concern about \"going through the motions\" when walking with friends.     Progress on Treatment Objective(s) / Homework:  Satisfactory progress - ACTION (Actively working towards change); Intervened by reinforcing change plan / affirming steps taken    Motivational Interviewing    MI Intervention: Expressed Empathy/Understanding, Permission to raise concern or advise, Open-ended questions and Reframe     Change Talk Expressed by the Patient: Ability to change Reasons to change Need to change    Provider Response to Change Talk: E - Evoked more info from patient about behavior change, A - Affirmed patient's thoughts, decisions, or attempts at behavior change, R - Reflected patient's change talk and S - Summarized patient's change talk statements    Cognitive Behavioral Therapy: Discussed connection between thoughts, feelings, and behaviors. Taught patient how to identify cognitive distortions, and assisted patient to identify own cognitive distortions. Taught and engaged patient in cognitive restructuring techniques.    Also provided psychoeducation about behavioral health condition, symptoms, and treatment options    Care Plan review completed: No    Medication Review:  No current psychiatric medications prescribed    Medication Compliance:  NA    Changes in Health Issues:   None reported    Chemical Use Review:   Substance Use: Chemical use reviewed, no active concerns identified      Tobacco Use: No current tobacco use.      Assessment: Current Emotional / Mental Status (status of significant symptoms):  Risk status (Self / Other harm or suicidal ideation)  Patient denies a history of suicidal ideation, suicide attempts, self-injurious behavior, homicidal ideation, homicidal behavior and and " other safety concerns  Patient denies current fears or concerns for personal safety.  Patient denies current or recent suicidal ideation or behaviors.  Patient denies current or recent homicidal ideation or behaviors.  Patient denies current or recent self injurious behavior or ideation.  Patient denies other safety concerns.  A safety and risk management plan has not been developed at this time, however patient was encouraged to call [place] [phone] should there be a change in any of these risk factors.    Appearance:   Appropriate   Eye Contact:   Good   Psychomotor Behavior: Normal   Attitude:   Cooperative   Orientation:   All  Speech   Rate / Production: Normal    Volume:  Normal   Mood:    Anxious  Grieving  Affect:    Tearful  Thought Content:  Clear   Thought Form:  Coherent  Logical   Insight:    Good     Diagnoses:  1. Adjustment disorder, unspecified type        Collateral Reports Completed:  Not Applicable    Plan: (Homework, other):  Patient was given information about behavioral services and encouraged to schedule a follow up appointment with the clinic [facility] in 1 week.  She was also given behavioral coping skills to help manage grief symptoms, CBT techniques to help manage anxiety, grounding techniques to reduce feelings of fight or flight.  CD Recommendations: No indications of CD issues.  Stefanie Villatoro, LICSW      ______________________________________________________________________    Stefanie Villatoro, SULMA  August 2, 2021

## 2021-08-16 ENCOUNTER — VIRTUAL VISIT (OUTPATIENT)
Dept: BEHAVIORAL HEALTH | Facility: CLINIC | Age: 36
End: 2021-08-16
Payer: COMMERCIAL

## 2021-08-16 DIAGNOSIS — F43.23 ADJUSTMENT DISORDER WITH MIXED ANXIETY AND DEPRESSED MOOD: Primary | ICD-10-CM

## 2021-08-16 PROCEDURE — 90791 PSYCH DIAGNOSTIC EVALUATION: CPT | Mod: 95 | Performed by: SOCIAL WORKER

## 2021-08-16 NOTE — PROGRESS NOTES
[facility] Maternal Fetal Medicine [facility]  Provider Name:  Stefanie Villatoro     Credentials:  MSW, SULMA    PATIENT'S NAME: Griselda Bell  PREFERRED NAME: Marisabel  PRONOUNS:   She, her, hers  MRN: [id number]  : 1985  ADDRESS: [address]  [facility] [address]  ACCT. NUMBER:  [id number]  DATE OF SERVICE: 21  START TIME:  8:32 am  END TIME: 9:25  am   PREFERRED PHONE: [phone]  May we leave a program related message: Yes  SERVICE MODALITY:  Video Visit:      Provider verified identity through the following two step process.  Patient provided:  Patient  and Patient address    Telemedicine Visit: The patient's condition can be safely assessed and treated via synchronous audio and visual telemedicine encounter.      Reason for Telemedicine Visit: Services only offered telehealth    Originating Site (Patient Location): Patient's home    Distant Site (Provider Location): [facility]  Maternal Fetal Medicine    Consent:  The patient/guardian has verbally consented to: the potential risks and benefits of telemedicine (video visit) versus in person care; bill my insurance or make self-payment for services provided; and responsibility for payment of non-covered services.     Patient would like the video invitation sent by:  My Chart    Mode of Communication:  Video Conference via [employer] How To    As the provider I attest to compliance with applicable laws and regulations related to telemedicine.    UNIVERSAL ADULT Mental Health DIAGNOSTIC ASSESSMENT    Identifying Information:  Patient is a 36 year old,  female.  The pronoun use throughout this assessment reflects the patient's chosen pronoun.  Patient was referred for an assessment by Maternal Fetal Medicine .  Patient attended the session alone.     Chief Complaint:   The reason for seeking services at this time is: Patient reported that she recently had a D&E after learning at her 16 week prenatal appointment that the  "baby no longer had a heart beat. Patient stated that prior to the appointment, she had been experiencing a normal and healthy pregnancy. Patient stated that she is unsure of what led to the loss, and has been finding it difficult to cope with not knowing why the loss occurred. Patient stated that she has a history of a previous pregnancy loss, due to a termination after receiving a diagnosis of Trisomy 21.  Patient stated that this experience of loss has been more difficult as she quickly became excited after she reached the first trimester milestone. Patient stated that she can feel overwhelmed by the grief, and is trying to navigate how to move forward with work, within relationships, and a potential subsequent pregnancy.     The problem(s) began : after learning of pregnancy loss at 16 week appointment. Patient has not attempted to resolve these concerns in the past.    Social/Family History:  Patient reported they grew up in [place].  They were raised by biological parents.  Parents stayed .. Patient reported that their childhood was \"normal and happy\".  Patient described their current relationships with family of origin as \"really great\".  Patient stated that she has had the ability to talk to her mother about her loses, and her mother has had a similar experience which has been helpful.     The patient describes their cultural background as : white, .  Cultural influences and impact on patient's life structure, values, norms, and healthcare: Family focused.  Contextual influences on patient's health include: Individual Factors history of two pregnancy loses.   These factors will be addressed in the Preliminary Treatment plan.  Patient identified their preferred language to be English. Patient reported they does not need the assistance of an  or other support involved in therapy.     Patient reported had no significant delays in developmental tasks.   Patient's highest education level " was graduate school. Patient identified the following learning problems: none reported.  Modifications will not be used to assist communication in therapy.   Patient reports they are  able to understand written materials.    Patient reported the following relationship history: Patient's current relationship status is  for 4 years.   Patient identified their sexual orientation as heterosexual.  Patient reported having one child. Yady who is 2 1/2 years old. Patient identified parents, siblings, friends and spouse as part of their support system.  Patient identified the quality of these relationships as stable and meaningful.      Patient's current living/housing situation involves staying in own home/apartment.  They live with their  and daughter, and they report that housing is stable.     Patient is currently employed full time and reports they are not able to function appropriately at work.. Patient stated that she works for the [employer] as a . Patient stated that her manager has been understanding of needing flexibility at this time. Patient reports their finances are obtained through employment and partner.  Patient does not identify finances as a current stressor.      Patient reported that they have not been involved with the legal system.  Patient denies being on probation / parole / under the jurisdiction of the court.    Patient's Strengths and Limitations:  Patient identified the following strengths or resources that will help them succeed in treatment: commitment to health and well being, friends / good social support, family support, insight, intelligence and motivation. Things that may interfere with the patient's success in treatment include: none identified.     Personal and Family Medical History:   Patient does report a family history of mental health concerns.  Patient reports family history includes Arrhythmia in her maternal grandfather; Colon Polyps  in her brother; Diabetes in her father.. History of anxiety on the maternal side of the family.     Patient does report Mental Health Diagnosis and/or Treatment.  Patient Patient reported the following previous diagnoses which include(s): anxiety, postpartum depression and anxiety.  Patient reported symptoms began : current symptoms began after she learned that her baby did not have a heart beat at her 16 week appointment.   Patient has received mental health services in the past: medication management after previous loss.  Psychiatric Hospitalizations: None.  Patient denies a history of civil commitment.  Patient is not receiving other mental health services.  These include none.       Patient has had a physical exam to rule out medical causes for current symptoms.  Date of last physical exam was within the past year. Client was encouraged to follow up with PCP if symptoms were to develop. The patient has a [facility] Provider, who is named Za Buckley..  Patient reports the following current medical concerns: recent D&E.  Patient denies any issues with pain..   There are not significant appetite / nutritional concerns / weight changes.   Patient does not report a history of head injury / trauma / cognitive impairment.     Patient cannot supply information needed to verify current medications    Medication Adherence:  Patient reports taking prescribed medications as prescribed.    Patient Allergies:    Allergies   Allergen Reactions     Shellfish-Derived Products Swelling       Medical History:    Past Medical History:   Diagnosis Date     Migraine      Obese          Current Mental Status Exam:   Appearance:  Appropriate    Eye Contact:  Good   Psychomotor:  Normal       Gait / station:  no problem  Attitude / Demeanor: Cooperative  Interested Pleasant  Speech      Rate / Production: Normal/ Responsive      Volume:  Normal  volume      Language:  intact  Mood:   Anxious   Grieving  Affect:   Tearful   Thought Content: Clear   Thought Process: Coherent  Goal Directed  Logical       Associations: No loosening of associations  Insight:   Good   Judgment:  Intact   Orientation:  All  Attention/concentration: Good    Rating Scales:    PHQ9:    PHQ-9 SCORE 8/2/2021   PHQ-9 Total Score MyChart 9 (Mild depression)   PHQ-9 Total Score 9   ;    GAD7:    COLLINS-7 SCORE 8/4/2021   Total Score 8 (mild anxiety)   Total Score 8         CGI:     Clinical Global Impressions  First:  Considering your total clinical experience with this particular patient population, how severe are the patient's symptoms at this time?: 3 (8/16/2021 11:28 AM)      Most recent:  Compared to the patient's condition at the START of treatment, this patient's condition is: 3 (8/16/2021 11:28 AM)      Substance Use:  Patient did not report a family history of substance use concerns; see medical history section for details.  Patient has not received chemical dependency treatment in the past.  Patient has not ever been to detox.      Patient is not currently receiving any chemical dependency treatment. Patient reported the following problems as a result of their substance use:  N/A.    Patient reports using alcohol : 1 glass of wine per night.  Patient denies using tobacco.  Patient denies using cannabis.  Patient reports using caffeine: 1 cup of coffee per day.   Patient reports using/abusing the following substance(s). Patient reported no other substance use.     CAGE- AID:  No flowsheet data found.    Substance Use: No symptoms    Based on the negative CAGE score and clinical interview there  are not indications of drug or alcohol abuse.      Significant Losses / Trauma / Abuse / Neglect Issues:   Patient did not serve in the .  There are indications or report of significant loss, trauma, abuse or neglect issues related to: pregnancy loss due to Trisomy 21,  pregnancy loss at 16 weeks (current), friend in severe car  accident in high school.  Concerns for possible neglect are not present.     Safety Assessment:   Current Safety Concerns:  [place] Suicide Severity Rating Scale (Lifetime/Recent)  [place] Suicide Severity Rating (Lifetime/Recent) 8/2/2021 8/2/2021   1. Wish to be Dead (Lifetime) No No   1. Wish to be Dead (Recent) No No   2. Non-Specific Active Suicidal Thoughts (Lifetime) No No   2. Non-Specific Active Suicidal Thoughts (Recent) No No   Actual Attempt (Lifetime) No No   Actual Attempt (Past 3 Months) No No   Has subject engaged in non-suicidal self-injurious behavior? (Lifetime) No No   Has subject engaged in non-suicidal self-injurious behavior? (Past 3 Months) No No   Interrupted Attempts (Lifetime) No No   Interrupted Attempts (Past 3 Months) No No   Aborted or Self-Interrupted Attempt (Lifetime) No No   Aborted or Self-Interrupted Attempt (Past 3 Months) No No   Preparatory Acts or Behavior (Lifetime) No No   Preparatory Acts or Behavior (Past 3 Months) No No   Most Recent Attempt Actual Lethality Code NA NA   Most Lethal Attempt Actual Lethality Code NA NA   Initial/First Attempt Actual Lethality Code NA NA     Patient denies current homicidal ideation and behaviors.  Patient denies current self-injurious ideation and behaviors.    Patient denied risk behaviors associated with substance use.  Patient denies any high risk behaviors associated with mental health symptoms.  Patient reports the following current concerns for their personal safety: None.  Patient reports there are not   firearms in the house.           History of Safety Concerns:  Patient denied a history of homicidal ideation.     Patient denied a history of personal safety concerns.    Patient denied a history of assaultive behaviors.    Patient denied a history of sexual assault behaviors.     Patient denied a history of risk behaviors associated with substance use.  Patient denies any history of high risk behaviors associated with mental  health symptoms.  Patient reports the following protective factors:  Future orientated, supportive family and friends, employed, child in the home, no prior risk history    Risk Plan:  See Recommendations for Safety and Risk Management Plan    Review of Symptoms per patient report:  Depression: Change in sleep, Change in energy level, Change in appetite, Low self-worth, Feeling sad, down, or depressed and Frequent crying  Margo:  No Symptoms  Psychosis: No Symptoms  Anxiety: Excessive worry, Nervousness, Sleep disturbance, Ruminations and Poor concentration  Panic:  No symptoms  Post Traumatic Stress Disorder:  No Symptoms   Eating Disorder: No Symptoms  ADD / ADHD:  No symptoms  Conduct Disorder: No symptoms  Autism Spectrum Disorder: No symptoms  Obsessive Compulsive Disorder: No Symptoms    Patient reports the following compulsive behaviors and treatment history: No symptoms. N/A.      Diagnostic Criteria:   A. The development of emotional or behavioral symptoms in response to an identifiable stressor(s) occurring within 3 months of the onset of the stressor(s)  B. These symptoms or behaviors are clinically significant, as evidenced by one or both of the following:       - Marked distress that is out of proportion to the severity/intensity of the stressor (with consideration for external context & culture)       - Significant impairment in social, occupational, or other important areas of functioning  C. The stress-related disturbance does not meet criteria for another disorder & is not not an exacerbation of another mental disorder  D. The symptoms do not represent normal bereavement  E. Once the stressor or its consequences have terminated, the symptoms do not persist for more than an additional 6 months       * Adjustment Disorder with Mixed Anxiety and Depressed Mood: The predominant manifestation is a combination of depression and anxiety    Functional Status:  Patient reports the following functional  impairments: self-care and social interactions.     WHODAS:   WHODAS 2.0 Total Score 8/4/2021   Total Score 15   Total Score MyChart 15     Nonprogrammatic care:  Patient is requesting basic services to address current mental health concerns.    Clinical Summary:  1. Reason for assessment: Patient recently had D&E after learning at her 16 week appointment that her baby no longer had a fetal heart beat. Patient requesting support as she navigates her grief and loss.   2. Psychosocial, Cultural and Contextual Factors: history of pregnancy termination due to Trisomy 21, recent loss at 16 weeks  .  3. Principal DSM5 Diagnoses  (Sustained by DSM5 Criteria Listed Above):   Adjustment Disorders  309.28 (F43.23) With mixed anxiety and depressed mood.  4. Other Diagnoses that is relevant to services: None identified  5. Provisional Diagnosis:  None identified   6. Prognosis: Return to Normal Functioning and Expect Improvement.  7. Likely consequences of symptoms if not treated: worsening grief and loss impacting ability to care for self and family  8. Client strengths include:  educated, good listener, insightful, intelligent, motivated, open to learning, open to suggestions / feedback, support of family, friends and providers and wants to learn .     Recommendations:     1. Plan for Safety and Risk Management:   Recommended that patient call 911 or go to the local ED should there be a change in any of these risk factors..          Report to child / adult protection services was NA.     2. Patient's identified cultural concerns will be addressed by : continuing to explore how culture impacts grief reaction.     3. Initial Treatment will focus on:    Grief / Loss - pregnancy loss.     4. Resources/Service Plan:    services are not indicated.   Modifications to assist communication are not indicated.   Additional disability accommodations are not indicated.      5. Collaboration:   Collaboration / coordination of  treatment will be initiated with the following  support professionals: Provided update to [facility].      6.  Referrals:   The following referral(s) will be initiated: No referrals. Patient will receive [facility] services with SULMA Gonzalez. Next Scheduled Appointment: 8/25.     A Release of Information has been obtained for the following: no OSBALDO needed.    7. ELIJAH:    ELIJAH:  Discussed the general effects of drugs and alcohol on health and well-being. Provider gave patient printed information about the effects of chemical use on their health and well being. Recommendations:  No additional recommendations .     8. Records:   These were reviewed at time of assessment.   Information in this assessment was obtained from the medical record and provided by patient who is a good historian.   Patient will have open access to their mental health medical record.      Provider Name/ Credentials:  SULMA Addison  August 16, 2021

## 2021-08-24 ENCOUNTER — OFFICE VISIT (OUTPATIENT)
Dept: OBGYN | Facility: CLINIC | Age: 36
End: 2021-08-24
Attending: OBSTETRICS & GYNECOLOGY
Payer: COMMERCIAL

## 2021-08-24 VITALS
HEIGHT: 67 IN | BODY MASS INDEX: 28.38 KG/M2 | SYSTOLIC BLOOD PRESSURE: 131 MMHG | HEART RATE: 83 BPM | DIASTOLIC BLOOD PRESSURE: 92 MMHG

## 2021-08-24 DIAGNOSIS — Z87.59 HISTORY OF IUFD: Primary | ICD-10-CM

## 2021-08-24 PROCEDURE — G0463 HOSPITAL OUTPT CLINIC VISIT: HCPCS

## 2021-08-24 PROCEDURE — 99024 POSTOP FOLLOW-UP VISIT: CPT | Performed by: OBSTETRICS & GYNECOLOGY

## 2021-08-24 NOTE — PROGRESS NOTES
"[facility] Visit    CC: Post op visit    S: 36 year old  here for post op visit from D&E for IUFD at 16wks. Overall doing okay since surgery. Had bleeding which was light and stopped last week. No fever, chills, cramping or abnormal discharge. Is working with  psych which she feels is helpful. Unsure about pregnancy plans for the future as this is unfortunately her 2nd D&E, but is having testing with her primary OBGYN to evaluate for any causes of this IUFD. Previous pregnancy loss was trisomy 13.     O: BP (!) 131/92   Pulse 83   Ht 1.702 m (5' 7\")   BMI 28.38 kg/m    General: No distress, tearful    A:36 year old s/p D&E for IUFD    P: Pathology still pending, will touch base today for results. Will call Marisabel once results are released.   Follow up with primary OBGYN as scheduled  All questions answered.       Johnna Felix MD FACOG  "

## 2021-08-24 NOTE — LETTER
"2021       RE: Griselda Bell  [address]  [facility] [address]     Dear Colleague,    Thank you for referring your patient, Griselda Bell, to the [facility] [place] at [facility]. Please see a copy of my visit note below.    [facility]    CC: Post op visit    S: 36 year old  here for post op visit from D&E for IUFD at 16wks. Overall doing okay since surgery. Had bleeding which was light and stopped last week. No fever, chills, cramping or abnormal discharge. Is working with  psych which she feels is helpful. Unsure about pregnancy plans for the future as this is unfortunately her 2nd D&E, but is having testing with her primary OBGYN to evaluate for any causes of this IUFD. Previous pregnancy loss was trisomy 13.     O: BP (!) 131/92   Pulse 83   Ht 1.702 m (5' 7\")   BMI 28.38 kg/m    General: No distress, tearful    A:36 year old s/p D&E for IUFD    P: Pathology still pending, will touch base today for results. Will call Marisabel once results are released.   Follow up with primary OBGYN as scheduled  All questions answered.       Johnna Felix MD FACOG    "

## 2021-08-25 ENCOUNTER — VIRTUAL VISIT (OUTPATIENT)
Dept: BEHAVIORAL HEALTH | Facility: CLINIC | Age: 36
End: 2021-08-25
Payer: COMMERCIAL

## 2021-08-25 DIAGNOSIS — F43.23 ADJUSTMENT DISORDER WITH MIXED ANXIETY AND DEPRESSED MOOD: Primary | ICD-10-CM

## 2021-08-25 PROCEDURE — 90832 PSYTX W PT 30 MINUTES: CPT | Mod: 95 | Performed by: SOCIAL WORKER

## 2021-08-25 NOTE — PROGRESS NOTES
[facility] Maternal Fetal Medicine Clinic- Integrated Behavioral Health Services  August 25, 2021    Behavioral Health Clinician Progress Note    Patient Name: Griselda Bell      Telemedicine Visit: The patient's condition can be safely assessed and treated via synchronous audio and visual telemedicine encounter.      Reason for Telemedicine Visit: Services only offered telehealth    Originating Site (Patient Location): Patient's home    Distant Site (Provider Location): [facility] Clinics: [facility]    Consent:  The patient/guardian has verbally consented to: the potential risks and benefits of telemedicine (video visit) versus in person care; bill my insurance or make self-payment for services provided; and responsibility for payment of non-covered services.     Mode of Communication:  Video Conference via [employer]    As the provider I attest to compliance with applicable laws and regulations related to telemedicine.         Service Type:  Individual     Session Start Time: 8:38 am  Session End Time: 9:00 am      Session Length: 16 - 37      Attendees: Patient    Visit Activities (Refresh list every visit): [facility] Only    Diagnostic Assessment Date: 8/16/21  Treatment Plan Review Date: 8/25/21  See Flowsheets for today's PHQ-9 and COLLINS-7 results  Previous PHQ-9:   PHQ-9 SCORE 8/2/2021   PHQ-9 Total Score MyChart 9 (Mild depression)   PHQ-9 Total Score 9     Previous COLLINS-7:   COLLINS-7 SCORE 8/4/2021   Total Score 8 (mild anxiety)   Total Score 8       CATERINA LEVEL:  CATERINA Score (Last Two) 11/7/2019   CATERINA Raw Score 30   Activation Score 56   CATERINA Level 3       DATA  Extended Session (60+ minutes): No  Interactive Complexity: No  Crisis: No  [facility] Patient: No    Treatment Objective(s) Addressed in This Session:  Target Behavior(s): Grief and loss    Grief / Loss: will increase understanding of normal grieving process, will engage in effective approach to address and resolve grief/loss issues and  "will process grief/loss issues in an adaptive manner    Current Stressors / Issues:  Patient reported that she had her follow up appointment yesterday with the OBGYN who completed the D&E. She stated that it was an overwhelming experience being in the clinic and having many aspects of the trauma being re-triggered. Patient shared that she has not yet received the pathology report, and continues to desire to have any more information that may be available to her. Patient stated that she has a follow up appointment with her primary OBGYN next week to review tests that can be completed due to two loses in a row.     Patient shared that after the experience in the clinic, she went to work and had a conversation with her director. She shared that it was helpful since she was able to learn about the director's experience with loss to help her feel less alone. Patient discussed that she can experience some guilt for her grief when she compares it to others', their stressors, and their loses. Continued to explore how to help patient accept her grief and reduce comparisons of grief.     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 Patient expressing gratitude and appreciation for the support she has received thus far from friends, and is nervous about how she may feel.     She shared that overall she feels \"better\", and grief is primarily triggered before appointments.    Progress on Treatment Objective(s) / Homework:  Satisfactory progress - ACTION (Actively working towards change); Intervened by reinforcing change plan / affirming steps taken    Motivational Interviewing    MI Intervention: Expressed Empathy/Understanding, Permission to raise concern or advise, Open-ended questions and Reframe     Change Talk Expressed by the Patient: Ability to change Reasons to change Need to change    Provider Response to Change Talk: E - Evoked more info from patient about behavior change, A - Affirmed " patient's thoughts, decisions, or attempts at behavior change, R - Reflected patient's change talk and S - Summarized patient's change talk statements    Cognitive Behavioral Therapy: Discussed connection between thoughts, feelings, and behaviors. Taught patient how to identify cognitive distortions, and assisted patient to identify own cognitive distortions. Taught and engaged patient in cognitive restructuring techniques.    Also provided psychoeducation about behavioral health condition, symptoms, and treatment options    Care Plan review completed: No    Medication Review:  No current psychiatric medications prescribed    Medication Compliance:  NA    Changes in Health Issues:   None reported    Chemical Use Review:   Substance Use: Chemical use reviewed, no active concerns identified      Tobacco Use: No current tobacco use.      Assessment: Current Emotional / Mental Status (status of significant symptoms):  Risk status (Self / Other harm or suicidal ideation)  Patient denies a history of suicidal ideation, suicide attempts, self-injurious behavior, homicidal ideation, homicidal behavior and and other safety concerns  Patient denies current fears or concerns for personal safety.  Patient denies current or recent suicidal ideation or behaviors.  Patient denies current or recent homicidal ideation or behaviors.  Patient denies current or recent self injurious behavior or ideation.  Patient denies other safety concerns.  A safety and risk management plan has not been developed at this time, however patient was encouraged to call [place] [phone] should there be a change in any of these risk factors.    Appearance:   Appropriate   Eye Contact:   Good   Psychomotor Behavior: Normal   Attitude:   Cooperative   Orientation:   All  Speech   Rate / Production: Normal    Volume:  Normal   Mood:    Anxious  Grieving  Affect:    Tearful  Thought Content:  Clear   Thought Form:  Coherent  Logical   Insight:    Good  "    Diagnoses:  1. Adjustment disorder with mixed anxiety and depressed mood        Collateral Reports Completed:  Not Applicable    Plan: (Homework, other):  Patient was given information about behavioral services and encouraged to schedule a follow up appointment with the clinic [facility] in 1 week.  She was also given behavioral coping skills to help manage grief symptoms, CBT techniques to help manage anxiety, grounding techniques to reduce feelings of fight or flight.  CD Recommendations: No indications of CD issues.  Stefanie Villatoro, [facility]        Stefanie Irving, [facility]  2021    ______________________________________________________________________    Integrated Primary Care Behavioral Health Treatment Plan    Patient's Name: Griselda Bell  YOB: 1985    Date: 21    DSM-V Diagnoses: Adjustment Disorders  309.28 (F43.23) With mixed anxiety and depressed mood  Psychosocial / Contextual Factors: history of pregnancy termination due to Trisomy 21, recent loss at 16 weeks  with D&E  WHODAS:   WHODAS 2.0 Total Score 2021   Total Score 15   Total Score MyChart 15         Referral / Collaboration:  Referral to another professional/service is not indicated at this time..    Anticipated number of session or this episode of care: 3-4      MeasurableTreatment Goal(s) related to diagnosis / functional impairment(s)  Goal 1: Patient will will engage in effective approach to address and resolve grief/loss issues and will process grief/loss issues in an adaptive manner    I will know I've met my goal when I feel like the grief is less intense\"    Objective #A (Patient Action)    Patient will increase understanding of steps in the grief process.  Status: New - Date: 21     Intervention(s)  [facility] will teach about  grief and loss.    Objective #B  Patient will use cognitive strategies identified in therapy to challenge anxious thoughts related to the pregnancy loss.  Status: New - Date: " 8/25/21     Intervention(s)  [facility] will teach cognitive restructuring and defusion techniques.    Objective #C  Patient will increase confidence in ability to navigate interactions with others after the loss.  Status: New - Date: 8/25/21     Intervention(s)  [facility] will assist patient to navigate situations through role play and cognitive rehearsal.    Patient has reviewed and agreed to the above plan.      Stefanie Villatoro, [facility]  August 25, 2021

## 2021-08-31 ENCOUNTER — VIRTUAL VISIT (OUTPATIENT)
Dept: BEHAVIORAL HEALTH | Facility: CLINIC | Age: 36
End: 2021-08-31
Payer: COMMERCIAL

## 2021-08-31 DIAGNOSIS — F43.23 ADJUSTMENT DISORDER WITH MIXED ANXIETY AND DEPRESSED MOOD: Primary | ICD-10-CM

## 2021-08-31 PROCEDURE — 90832 PSYTX W PT 30 MINUTES: CPT | Mod: 95 | Performed by: SOCIAL WORKER

## 2021-08-31 NOTE — PROGRESS NOTES
[facility] Maternal Fetal Medicine Clinic- Integrated Behavioral Health Services  August 31, 2021    Behavioral Health Clinician Progress Note    Patient Name: Griselda Bell      Telemedicine Visit: The patient's condition can be safely assessed and treated via synchronous audio and visual telemedicine encounter.      Reason for Telemedicine Visit: Services only offered telehealth    Originating Site (Patient Location): Patient's home    Distant Site (Provider Location): Provider Remote Location    Consent:  The patient/guardian has verbally consented to: the potential risks and benefits of telemedicine (video visit) versus in person care; bill my insurance or make self-payment for services provided; and responsibility for payment of non-covered services.     Mode of Communication:  Video Conference via [employer]    As the provider I attest to compliance with applicable laws and regulations related to telemedicine.         Service Type:  Individual     Session Start Time: 8:31 am  Session End Time: 9:00 am      Session Length: 16 - 37      Attendees: Patient    Visit Activities (Refresh list every visit): [facility] Only    Diagnostic Assessment Date: 8/16/21  Treatment Plan Review Date: 8/25/21  See Flowsheets for today's PHQ-9 and COLLINS-7 results  Previous PHQ-9:   PHQ-9 SCORE 8/2/2021   PHQ-9 Total Score MyChart 9 (Mild depression)   PHQ-9 Total Score 9     Previous COLLINS-7:   COLLINS-7 SCORE 8/4/2021   Total Score 8 (mild anxiety)   Total Score 8       CATERINA LEVEL:  CATERINA Score (Last Two) 11/7/2019   CATERINA Raw Score 30   Activation Score 56   CATERINA Level 3       DATA  Extended Session (60+ minutes): No  Interactive Complexity: No  Crisis: No  [facility] Patient: No    Treatment Objective(s) Addressed in This Session:  Target Behavior(s): Grief and loss    Grief / Loss: will increase understanding of normal grieving process, will engage in effective approach to address and resolve grief/loss issues and will process grief/loss  "issues in an adaptive manner    Current Stressors / Issues:  Patient reported that she had a positive experiencing on her weekend trip, including during 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 She stated that she had a more overwhelming experience when she got her period during the shower, but feels like the reaction was more related to having her period again.     Patient discussed experiences at her appointment with her primary OBGYN yesterday. She stated that it was overwhelming being back in the clinic since the last time she was in the clinic is when they could not detect the fetal heart beat. Patient discussed additional layers of frustration since she has still not received the pathology report back, and there is a lot on hold in terms of next steps until the results are received. Patient discussed the lack of information has re-triggered her experience with her previous pregnancy with delays, miscommunication, and mishandling. Validated and normalized patient's experiences, and continued to explore the underlying emotions in these experiences. Patient discussed ongoing sadness with lack of information about \"why\" she experienced this pregnancy loss, and explored the frustration she feels as more is outside her control.     Patient discussed feeling like she had taken \"steps backwards\" in her grief. Validated her experience given the triggers and recent experiences, and normalized that overall she continues to be doing well in managing how she feels and navigating her recent experiences. Reviewed recommendations to return to walking, focusing on eating well, sleeping.  Discussed breath work and mindfulness skills through imagery and re-shifting focus on what is within her control.     Progress on Treatment Objective(s) / Homework:  Satisfactory progress - ACTION (Actively working towards change); Intervened by reinforcing change plan / affirming steps taken    Motivational Interviewing    MI Intervention: " Expressed Empathy/Understanding, Permission to raise concern or advise, Open-ended questions and Reframe     Change Talk Expressed by the Patient: Ability to change Reasons to change Need to change    Provider Response to Change Talk: E - Evoked more info from patient about behavior change, A - Affirmed patient's thoughts, decisions, or attempts at behavior change, R - Reflected patient's change talk and S - Summarized patient's change talk statements    Cognitive Behavioral Therapy: Discussed connection between thoughts, feelings, and behaviors. Taught patient how to identify cognitive distortions, and assisted patient to identify own cognitive distortions. Taught and engaged patient in cognitive restructuring techniques.    Also provided psychoeducation about behavioral health condition, symptoms, and treatment options    Care Plan review completed: No    Medication Review:  No current psychiatric medications prescribed    Medication Compliance:  NA    Changes in Health Issues:   None reported    Chemical Use Review:   Substance Use: Chemical use reviewed, no active concerns identified      Tobacco Use: No current tobacco use.      Assessment: Current Emotional / Mental Status (status of significant symptoms):  Risk status (Self / Other harm or suicidal ideation)  Patient denies a history of suicidal ideation, suicide attempts, self-injurious behavior, homicidal ideation, homicidal behavior and and other safety concerns  Patient denies current fears or concerns for personal safety.  Patient denies current or recent suicidal ideation or behaviors.  Patient denies current or recent homicidal ideation or behaviors.  Patient denies current or recent self injurious behavior or ideation.  Patient denies other safety concerns.  A safety and risk management plan has not been developed at this time, however patient was encouraged to call [facility] should there be a change in any of these risk  "factors.    Appearance:   Appropriate   Eye Contact:   Good   Psychomotor Behavior: Normal   Attitude:   Cooperative   Orientation:   All  Speech   Rate / Production: Normal    Volume:  Normal   Mood:    Anxious  Grieving  Affect:    Tearful  Thought Content:  Clear   Thought Form:  Coherent  Logical   Insight:    Good     Diagnoses:  1. Adjustment disorder with mixed anxiety and depressed mood        Collateral Reports Completed:  Not Applicable    Plan: (Homework, other):  Patient was given information about behavioral services and encouraged to schedule a follow up appointment with the clinic [facility] in 1 week.  She was also given behavioral coping skills to help manage grief symptoms, CBT techniques to help manage anxiety, grounding techniques to reduce feelings of fight or flight.  CD Recommendations: No indications of CD issues.  Stefanie Villatoro, [facility]        Stefanie Villatoro, [facility]  August 25, 2021    ______________________________________________________________________    Integrated Primary Care Behavioral Health Treatment Plan    Patient's Name: Griselda Bell  YOB: 1985    Date: 8/25/21    DSM-V Diagnoses: Adjustment Disorders  309.28 (F43.23) With mixed anxiety and depressed mood  Psychosocial / Contextual Factors: history of pregnancy termination due to Trisomy 21, recent loss at 16 weeks  with D&E  WHODAS:   WHODAS 2.0 Total Score 8/4/2021   Total Score 15   Total Score MyChart 15         Referral / Collaboration:  Referral to another professional/service is not indicated at this time..    Anticipated number of session or this episode of care: 3-4      MeasurableTreatment Goal(s) related to diagnosis / functional impairment(s)  Goal 1: Patient will will engage in effective approach to address and resolve grief/loss issues and will process grief/loss issues in an adaptive manner    I will know I've met my goal when I feel like the grief is less intense\"    Objective #A (Patient " Action)    Patient will increase understanding of steps in the grief process.  Status: New - Date: 21     Intervention(s)  [facility] will teach about  grief and loss.    Objective #B  Patient will use cognitive strategies identified in therapy to challenge anxious thoughts related to the pregnancy loss.  Status: New - Date: 21     Intervention(s)  [facility] will teach cognitive restructuring and defusion techniques.    Objective #C  Patient will increase confidence in ability to navigate interactions with others after the loss.  Status: New - Date: 21     Intervention(s)  [facility] will assist patient to navigate situations through role play and cognitive rehearsal.    Patient has reviewed and agreed to the above plan.      Stefanie Villatoro, [facility]  2021

## 2021-09-02 DIAGNOSIS — N71.1 CHRONIC ENDOMETRITIS: Primary | ICD-10-CM

## 2021-09-02 LAB
PATH REPORT.COMMENTS IMP SPEC: NORMAL
PATH REPORT.FINAL DX SPEC: NORMAL
PATH REPORT.GROSS SPEC: NORMAL
PATH REPORT.MICROSCOPIC SPEC OTHER STN: NORMAL
PATH REPORT.RELEVANT HX SPEC: NORMAL
PHOTO IMAGE: NORMAL

## 2021-09-02 RX ORDER — DOXYCYCLINE 100 MG/1
100 CAPSULE ORAL 2 TIMES DAILY
Qty: 20 CAPSULE | Refills: 0 | Status: SHIPPED | OUTPATIENT
Start: 2021-09-02 | End: 2022-06-23

## 2021-09-03 NOTE — PROGRESS NOTES
Called [facility] to review pathology. No identifiable cause for IUFD. Discussed chronic endometritis, unclear if related to loss. Discussed option of antibiotics, though unclear if beneficial. Reviewed with primary OBGYN who agreed with treatment. Rx doxy sent to [facility] pharmacy.     Johnna Felix MD

## 2021-09-08 ENCOUNTER — VIRTUAL VISIT (OUTPATIENT)
Dept: BEHAVIORAL HEALTH | Facility: CLINIC | Age: 36
End: 2021-09-08
Payer: COMMERCIAL

## 2021-09-08 DIAGNOSIS — F43.23 ADJUSTMENT DISORDER WITH MIXED ANXIETY AND DEPRESSED MOOD: Primary | ICD-10-CM

## 2021-09-08 PROCEDURE — 90832 PSYTX W PT 30 MINUTES: CPT | Mod: 95 | Performed by: SOCIAL WORKER

## 2021-09-08 NOTE — PROGRESS NOTES
[facility] Maternal Fetal Medicine [facility]- Integrated Behavioral Health Services  September 8, 2021    Behavioral Health Clinician Progress Note    Patient Name: Griselda Bell      Telemedicine Visit: The patient's condition can be safely assessed and treated via synchronous audio and visual telemedicine encounter.      Reason for Telemedicine Visit: Services only offered telehealth    Originating Site (Patient Location): Patient's place of employment    Distant Site (Provider Location): [facility]    Consent:  The patient/guardian has verbally consented to: the potential risks and benefits of telemedicine (video visit) versus in person care; bill my insurance or make self-payment for services provided; and responsibility for payment of non-covered services.     Mode of Communication:  Video Conference via [email or web address]    As the provider I attest to compliance with applicable laws and regulations related to telemedicine.         Service Type:  Individual     Session Start Time: 8:34 am  Session End Time: 9:02 am      Session Length: 16 - 37      Attendees: Patient    Visit Activities (Refresh list every visit): [facility] Only    Diagnostic Assessment Date: 8/16/21  Treatment Plan Review Date: 8/25/21  See Flowsheets for today's PHQ-9 and COLLINS-7 results  Previous PHQ-9:   PHQ-9 SCORE 8/2/2021   PHQ-9 Total Score MyChart 9 (Mild depression)   PHQ-9 Total Score 9     Previous COLLINS-7:   COLLINS-7 SCORE 8/4/2021   Total Score 8 (mild anxiety)   Total Score 8       CATERINA LEVEL:  CATERINA Score (Last Two) 11/7/2019   CATERINA Raw Score 30   Activation Score 56   CATERINA Level 3       DATA  Extended Session (60+ minutes): No  Interactive Complexity: No  Crisis: No  [facility] Patient: No    Treatment Objective(s) Addressed in This Session:  Target Behavior(s): Grief and loss    Grief / Loss: will increase understanding of normal grieving process, will engage in effective approach to address and resolve grief/loss  issues and will process grief/loss issues in an adaptive manner    Current Stressors / Issues:  Patient stated that she did receive the results of the pathology report back, and there was no clear etiology for her loss. 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 Patient shared that she now has a pending referral to rheumatology. Patient discussed the anxiety associated with this update as she thinks about what this means for her. She discussed having many unanswered questions with current challenges while she waits to be able to ask her question to a rheumatologist. Patient in agreement that it would be helpful to avoid and/or reduce personal research, and explored alternative reactions/behaviors when she feels anxious or nervous about the future.     Progress on Treatment Objective(s) / Homework:  No improvement - ACTION (Actively working towards change); Intervened by reinforcing change plan / affirming steps taken    Motivational Interviewing    MI Intervention: Expressed Empathy/Understanding, Permission to raise concern or advise, Open-ended questions and Reframe     Change Talk Expressed by the Patient: Ability to change Reasons to change Need to change    Provider Response to Change Talk: E - Evoked more info from patient about behavior change, A - Affirmed patient's thoughts, decisions, or attempts at behavior change, R - Reflected patient's change talk and S - Summarized patient's change talk statements    Cognitive Behavioral Therapy: Discussed connection between thoughts, feelings, and behaviors. Taught patient how to identify cognitive distortions, and assisted patient to identify own cognitive distortions. Taught and engaged patient in cognitive restructuring techniques.    Also provided psychoeducation about behavioral health  condition, symptoms, and treatment options    Care Plan review completed: No    Medication Review:  No current psychiatric medications prescribed    Medication Compliance:  NA    Changes in Health Issues:   None reported    Chemical Use Review:   Substance Use: Chemical use reviewed, no active concerns identified      Tobacco Use: No current tobacco use.      Assessment: Current Emotional / Mental Status (status of significant symptoms):  Risk status (Self / Other harm or suicidal ideation)  Patient denies a history of suicidal ideation, suicide attempts, self-injurious behavior, homicidal ideation, homicidal behavior and and other safety concerns  Patient denies current fears or concerns for personal safety.  Patient denies current or recent suicidal ideation or behaviors.  Patient denies current or recent homicidal ideation or behaviors.  Patient denies current or recent self injurious behavior or ideation.  Patient denies other safety concerns.  A safety and risk management plan has not been developed at this time, however patient was encouraged to call [place] [phone] should there be a change in any of these risk factors.    Appearance:   Appropriate   Eye Contact:   Good   Psychomotor Behavior: Normal   Attitude:   Cooperative   Orientation:   All  Speech   Rate / Production: Normal    Volume:  Normal   Mood:    Anxious   Affect:    Appropriate   Thought Content:  Clear   Thought Form:  Coherent  Logical   Insight:    Good     Diagnoses:  1. Adjustment disorder with mixed anxiety and depressed mood        Collateral Reports Completed:  Not Applicable    Plan: (Homework, other):  Patient was given information about behavioral services and encouraged to schedule a follow up appointment with the clinic [facility] [place] in 1 week.  She was also given behavioral coping skills to help manage grief symptoms, CBT techniques to help manage anxiety, grounding techniques to reduce feelings of fight or flight.  CD Recommendations:  "No indications of CD issues.  Stefanie Irving, [facility]        Stefanie Irving, [facility]  2021    ______________________________________________________________________    Integrated Primary Care Behavioral Health Treatment Plan    Patient's Name: Griselda Bell  YOB: 1985    Date: 21    DSM-V Diagnoses: Adjustment Disorders  309.28 (F43.23) With mixed anxiety and depressed mood  Psychosocial / Contextual Factors: history of pregnancy termination due to Trisomy 21, recent loss at 16 weeks  with D&E  WHODAS:   WHODAS 2.0 Total Score 2021   Total Score 15   Total Score MyChart 15         Referral / Collaboration:  Referral to another professional/service is not indicated at this time..    Anticipated number of session or this episode of care: 3-4      MeasurableTreatment Goal(s) related to diagnosis / functional impairment(s)  Goal 1: Patient will will engage in effective approach to address and resolve grief/loss issues and will process grief/loss issues in an adaptive manner    I will know I've met my goal when I feel like the grief is less intense\"    Objective #A (Patient Action)    Patient will increase understanding of steps in the grief process.  Status: New - Date: 21     Intervention(s)  [facility] will teach about  grief and loss.    Objective #B  Patient will use cognitive strategies identified in therapy to challenge anxious thoughts related to the pregnancy loss.  Status: New - Date: 21     Intervention(s)  [facility] will teach cognitive restructuring and defusion techniques.    Objective #C  Patient will increase confidence in ability to navigate interactions with others after the loss.  Status: New - Date: 21     Intervention(s)  [facility] will assist patient to navigate situations through role play and cognitive rehearsal.    Patient has reviewed and agreed to the above plan.      Stefanie Irving, [facility]  2021  "

## 2021-09-16 ENCOUNTER — VIRTUAL VISIT (OUTPATIENT)
Dept: BEHAVIORAL HEALTH | Facility: CLINIC | Age: 36
End: 2021-09-16
Payer: COMMERCIAL

## 2021-09-16 DIAGNOSIS — F43.23 ADJUSTMENT DISORDER WITH MIXED ANXIETY AND DEPRESSED MOOD: Primary | ICD-10-CM

## 2021-09-16 PROCEDURE — 90832 PSYTX W PT 30 MINUTES: CPT | Mod: 95 | Performed by: SOCIAL WORKER

## 2021-09-16 NOTE — PROGRESS NOTES
[facility] Maternal Fetal Medicine [facility]- Integrated Behavioral Health Services  September 16, 2021    Behavioral Health Clinician Progress Note    Patient Name: Griselda Bell      Telemedicine Visit: The patient's condition can be safely assessed and treated via synchronous audio and visual telemedicine encounter.      Reason for Telemedicine Visit: Services only offered telehealth    Originating Site (Patient Location): Patient's place of employment    Distant Site (Provider Location): [facility]    Consent:  The patient/guardian has verbally consented to: the potential risks and benefits of telemedicine (video visit) versus in person care; bill my insurance or make self-payment for services provided; and responsibility for payment of non-covered services.     Mode of Communication:  Video Conference via [employer]    As the provider I attest to compliance with applicable laws and regulations related to telemedicine.         Service Type:  Individual     Session Start Time: 8:33 am  Session End Time: 9: 10 am      Session Length: 16 - 37      Attendees: Patient    Visit Activities (Refresh list every visit): [facility] Only    Diagnostic Assessment Date: 8/16/21  Treatment Plan Review Date: 8/25/21  See Flowsheets for today's PHQ-9 and COLLINS-7 results  Previous PHQ-9:   PHQ-9 SCORE 8/2/2021   PHQ-9 Total Score MyChart 9 (Mild depression)   PHQ-9 Total Score 9     Previous COLLINS-7:   COLLINS-7 SCORE 8/4/2021   Total Score 8 (mild anxiety)   Total Score 8       CATERINA LEVEL:  ACTERINA Score (Last Two) 11/7/2019   CATERINA Raw Score 30   Activation Score 56   CATERINA Level 3       DATA  Extended Session (60+ minutes): No  Interactive Complexity: No  Crisis: No  [facility] Patient: No    Treatment Objective(s) Addressed in This Session:  Target Behavior(s): Grief and loss    Grief / Loss: will increase understanding of normal grieving process, will engage in effective approach to address and resolve grief/loss  issues and will process grief/loss issues in an adaptive manner    Current Stressors / Issues:  Patient discussed ongoing anxiety as she waits to follow up with rheumatology next week.  Patient stated that she tries to reduce the amount of information she looks up, but finds that it can be difficult at times. Patient reflected upon how she feels hyper-sensitive to sensations in her body as she is starting to interpret normal symptoms and experiences as a symptom of Sjorgen's.  Patient shared that she knows that it is not logical, but finds it difficult to focus on the logic in the moment. Patient described panic symptoms in the evening that have been more difficult to manage. Explored DBT grounding techniques to help reduce symptoms. Continued to normalize and validate the anxiety during this time of uncertainty. Patient receptive to continuing to explore how to explore meaningful activities to help her take it one day at a time.     Progress on Treatment Objective(s) / Homework:  No improvement - ACTION (Actively working towards change); Intervened by reinforcing change plan / affirming steps taken    Motivational Interviewing    MI Intervention: Expressed Empathy/Understanding, Permission to raise concern or advise, Open-ended questions and Reframe     Change Talk Expressed by the Patient: Ability to change Reasons to change Need to change    Provider Response to Change Talk: E - Evoked more info from patient about behavior change, A - Affirmed patient's thoughts, decisions, or attempts at behavior change, R - Reflected patient's change talk and S - Summarized patient's change talk statements    Cognitive Behavioral Therapy: Discussed connection between thoughts, feelings, and behaviors. Taught patient how to identify cognitive distortions, and assisted patient to identify own cognitive distortions. Taught and engaged patient in cognitive restructuring techniques.     Mindfulness-Based Strategies    Discussed  skills based on development and application of mindfulness    Also provided psychoeducation about behavioral health condition, symptoms, and treatment options    Care Plan review completed: No    Medication Review:  No current psychiatric medications prescribed    Medication Compliance:  NA    Changes in Health Issues:   None reported    Chemical Use Review:   Substance Use: Chemical use reviewed, no active concerns identified      Tobacco Use: No current tobacco use.      Assessment: Current Emotional / Mental Status (status of significant symptoms):  Risk status (Self / Other harm or suicidal ideation)  Patient denies a history of suicidal ideation, suicide attempts, self-injurious behavior, homicidal ideation, homicidal behavior and and other safety concerns  Patient denies current fears or concerns for personal safety.  Patient denies current or recent suicidal ideation or behaviors.  Patient denies current or recent homicidal ideation or behaviors.  Patient denies current or recent self injurious behavior or ideation.  Patient denies other safety concerns.  A safety and risk management plan has not been developed at this time, however patient was encouraged to call [facility] should there be a change in any of these risk factors.    Appearance:   Appropriate   Eye Contact:   Good   Psychomotor Behavior: Normal   Attitude:   Cooperative   Orientation:   All  Speech   Rate / Production: Normal    Volume:  Normal   Mood:    Anxious   Affect:    Appropriate   Thought Content:  Clear   Thought Form:  Coherent  Logical   Insight:    Good     Diagnoses:  1. Adjustment disorder with mixed anxiety and depressed mood        Collateral Reports Completed:  Communicated with: [facility] to discuss potential implications of Sjorgen's on future pregnancies    Plan: (Homework, other):  Patient was given information about behavioral services and encouraged to schedule a follow up appointment with the clinic [facility] in 1 week.  " She was also given CBT techniques to help manage anxiety, grounding techniques to reduce feelings of fight or flight.  CD Recommendations: No indications of CD issues.  Stefan[facility] Villatoro, [facility]        Stefanie Irving, [facility]  2021    ______________________________________________________________________    Integrated Primary Care Behavioral Health Treatment Plan    Patient's Name: Griselda Bell  YOB: 1985    Date: 21    DSM-V Diagnoses: Adjustment Disorders  309.28 (F43.23) With mixed anxiety and depressed mood  Psychosocial / Contextual Factors: history of pregnancy termination due to Trisomy 21, recent loss at 16 weeks  with D&E  WHODAS:   WHODAS 2.0 Total Score 2021   Total Score 15   Total Score MyChart 15         Referral / Collaboration:  Referral to another professional/service is not indicated at this time..    Anticipated number of session or this episode of care: 3-4      MeasurableTreatment Goal(s) related to diagnosis / functional impairment(s)  Goal 1: Patient will will engage in effective approach to address and resolve grief/loss issues and will process grief/loss issues in an adaptive manner    I will know I've met my goal when I feel like the grief is less intense\"    Objective #A (Patient Action)    Patient will increase understanding of steps in the grief process.  Status: New - Date: 21     Intervention(s)  [facility] will teach about  grief and loss.    Objective #B  Patient will use cognitive strategies identified in therapy to challenge anxious thoughts related to the pregnancy loss.  Status: New - Date: 21     Intervention(s)  [facility] will teach cognitive restructuring and defusion techniques.    Objective #C  Patient will increase confidence in ability to navigate interactions with others after the loss.  Status: New - Date: 21     Intervention(s)  [facility] will assist patient to navigate situations through role play and cognitive " rehearsal.    Patient has reviewed and agreed to the above plan.      Stefanie Villatoro, [facility]  August 25, 2021

## 2021-09-22 ENCOUNTER — TRANSFERRED RECORDS (OUTPATIENT)
Dept: HEALTH INFORMATION MANAGEMENT | Facility: CLINIC | Age: 36
End: 2021-09-22

## 2021-09-23 ENCOUNTER — VIRTUAL VISIT (OUTPATIENT)
Dept: BEHAVIORAL HEALTH | Facility: CLINIC | Age: 36
End: 2021-09-23
Payer: COMMERCIAL

## 2021-09-23 DIAGNOSIS — F43.23 ADJUSTMENT DISORDER WITH MIXED ANXIETY AND DEPRESSED MOOD: Primary | ICD-10-CM

## 2021-09-23 PROCEDURE — 90832 PSYTX W PT 30 MINUTES: CPT | Mod: 95 | Performed by: SOCIAL WORKER

## 2021-09-23 NOTE — PROGRESS NOTES
[facility] Maternal Fetal Medicine [facility]- Integrated Behavioral Health Services  September 23, 2021    Behavioral Health Clinician Progress Note    Patient Name: Griselda Bell      Telemedicine Visit: The patient's condition can be safely assessed and treated via synchronous audio and visual telemedicine encounter.      Reason for Telemedicine Visit: Services only offered telehealth    Originating Site (Patient Location): Patient's place of employment    Distant Site (Provider Location): [facility]    Consent:  The patient/guardian has verbally consented to: the potential risks and benefits of telemedicine (video visit) versus in person care; bill my insurance or make self-payment for services provided; and responsibility for payment of non-covered services.     Mode of Communication:  Video Conference via [employer]    As the provider I attest to compliance with applicable laws and regulations related to telemedicine.         Service Type:  Individual     Session Start Time: 8:34 am  Session End Time: 9: 07 am      Session Length: 16 - 37      Attendees: Patient    Visit Activities (Refresh list every visit): [facility] Only    Diagnostic Assessment Date: 8/16/21  Treatment Plan Review Date: 8/25/21  See Flowsheets for today's PHQ-9 and COLLINS-7 results  Previous PHQ-9:   PHQ-9 SCORE 8/2/2021   PHQ-9 Total Score MyChart 9 (Mild depression)   PHQ-9 Total Score 9     Previous COLLINS-7:   COLLINS-7 SCORE 8/4/2021   Total Score 8 (mild anxiety)   Total Score 8       CATERINA LEVEL:  CATERINA Score (Last Two) 11/7/2019   CATERINA Raw Score 30   Activation Score 56   CATERINA Level 3       DATA  Extended Session (60+ minutes): No  Interactive Complexity: No  Crisis: No  [facility] Patient: No    Treatment Objective(s) Addressed in This Session:  Target Behavior(s): Grief and loss    Grief / Loss: will increase understanding of normal grieving process, will engage in effective approach to address and resolve grief/loss  "issues and will process grief/loss issues in an adaptive manner    Current Stressors / Issues:  Patient expressed relief after recent rheumatology appointment. She discussed the appointment, how it felt, and what she learned. Patient discussed relief in regards to her health and her daughter's health, and shared that she is feels more hopeful about a future pregnancy if she chooses to move forward with one. Patient discussed lingering questions, with patient expressing intent to follow up with her primary OBGYN as she expresses anxiety about some of the potential ways a pregnancy may be impacted by Sjorgen's. Patient shared that she continues to wonder if she feels \"ready\" or if she wants to pursue another pregnancy as she anticipates significant anxiety would accompany an entire pregnancy.  Validated and normalized her anxiety, and highlighted factors during this time that may be heightening her emotions.  Discussed potential options for treatment of anxiety during a subsequent pregnancy, and encouraged ongoing conversations with her providers to learn more about what to anticipate and expect before making a decision about her next steps.    Progress on Treatment Objective(s) / Homework:  Satisfactory progress - ACTION (Actively working towards change); Intervened by reinforcing change plan / affirming steps taken    Motivational Interviewing    MI Intervention: Expressed Empathy/Understanding, Permission to raise concern or advise, Open-ended questions and Reframe     Change Talk Expressed by the Patient: Ability to change Reasons to change Need to change    Provider Response to Change Talk: E - Evoked more info from patient about behavior change, A - Affirmed patient's thoughts, decisions, or attempts at behavior change, R - Reflected patient's change talk and S - Summarized patient's change talk statements    Cognitive Behavioral Therapy: Discussed connection between thoughts, feelings, and behaviors. Taught " patient how to identify cognitive distortions, and assisted patient to identify own cognitive distortions. Taught and engaged patient in cognitive restructuring techniques.     Mindfulness-Based Strategies    Discussed skills based on development and application of mindfulness    Also provided psychoeducation about behavioral health condition, symptoms, and treatment options    Care Plan review completed: No    Medication Review:  No current psychiatric medications prescribed    Medication Compliance:  NA    Changes in Health Issues:   None reported    Chemical Use Review:   Substance Use: Chemical use reviewed, no active concerns identified      Tobacco Use: No current tobacco use.      Assessment: Current Emotional / Mental Status (status of significant symptoms):  Risk status (Self / Other harm or suicidal ideation)  Patient denies a history of suicidal ideation, suicide attempts, self-injurious behavior, homicidal ideation, homicidal behavior and and other safety concerns  Patient denies current fears or concerns for personal safety.  Patient denies current or recent suicidal ideation or behaviors.  Patient denies current or recent homicidal ideation or behaviors.  Patient denies current or recent self injurious behavior or ideation.  Patient denies other safety concerns.  A safety and risk management plan has not been developed at this time, however patient was encouraged to call [place] [phone] should there be a change in any of these risk factors.    Appearance:   Appropriate   Eye Contact:   Good   Psychomotor Behavior: Normal   Attitude:   Cooperative   Orientation:   All  Speech   Rate / Production: Normal    Volume:  Normal   Mood:    Anxious   Affect:    Appropriate   Thought Content:  Clear   Thought Form:  Coherent  Logical   Insight:    Good     Diagnoses:  1. Adjustment disorder with mixed anxiety and depressed mood        Collateral Reports Completed:  Not Applicable    Plan: (Homework,  "other):  Patient was given information about behavioral services and encouraged to schedule a follow up appointment with the clinic [facility] in 2 weeks.  She was also given CBT techniques to help manage anxiety, grounding techniques to reduce feelings of fight or flight.  CD Recommendations: No indications of CD issues.  Stefanie Villatoro, [facility]        Stefanie Irving, [facility]  2021    ______________________________________________________________________    Integrated Primary Care Behavioral Health Treatment Plan    Patient's Name: Griselda Bell  YOB: 1985    Date: 21    DSM-V Diagnoses: Adjustment Disorders  309.28 (F43.23) With mixed anxiety and depressed mood  Psychosocial / Contextual Factors: history of pregnancy termination due to Trisomy 21, recent loss at 16 weeks  with D&E  WHODAS:   WHODAS 2.0 Total Score 2021   Total Score 15   Total Score MyChart 15         Referral / Collaboration:  Referral to another professional/service is not indicated at this time..    Anticipated number of session or this episode of care: 3-4      MeasurableTreatment Goal(s) related to diagnosis / functional impairment(s)  Goal 1: Patient will will engage in effective approach to address and resolve grief/loss issues and will process grief/loss issues in an adaptive manner    I will know I've met my goal when I feel like the grief is less intense\"    Objective #A (Patient Action)    Patient will increase understanding of steps in the grief process.  Status: New - Date: 21     Intervention(s)  [facility] will teach about  grief and loss.    Objective #B  Patient will use cognitive strategies identified in therapy to challenge anxious thoughts related to the pregnancy loss.  Status: New - Date: 21     Intervention(s)  [facility] will teach cognitive restructuring and defusion techniques.    Objective #C  Patient will increase confidence in ability to navigate interactions with others after the " loss.  Status: New - Date: 8/25/21     Intervention(s)  [facility] will assist patient to navigate situations through role play and cognitive rehearsal.    Patient has reviewed and agreed to the above plan.      SULMA Gonzalez  August 25, 2021

## 2021-10-06 ENCOUNTER — TRANSFERRED RECORDS (OUTPATIENT)
Dept: HEALTH INFORMATION MANAGEMENT | Facility: CLINIC | Age: 36
End: 2021-10-06

## 2021-10-07 ENCOUNTER — VIRTUAL VISIT (OUTPATIENT)
Dept: BEHAVIORAL HEALTH | Facility: CLINIC | Age: 36
End: 2021-10-07
Payer: COMMERCIAL

## 2021-10-07 DIAGNOSIS — F43.23 ADJUSTMENT DISORDER WITH MIXED ANXIETY AND DEPRESSED MOOD: Primary | ICD-10-CM

## 2021-10-07 PROCEDURE — 90832 PSYTX W PT 30 MINUTES: CPT | Mod: 95 | Performed by: SOCIAL WORKER

## 2021-10-07 NOTE — PROGRESS NOTES
[facility] Maternal Fetal Medicine [facility]- Integrated Behavioral Health Services  October 7, 2021    Behavioral Health Clinician Progress Note    Patient Name: Griselda Bell      Telemedicine Visit: The patient's condition can be safely assessed and treated via synchronous audio and visual telemedicine encounter.      Reason for Telemedicine Visit: Services only offered telehealth    Originating Site (Patient Location): Patient's place of employment    Distant Site (Provider Location): [facility]    Consent:  The patient/guardian has verbally consented to: the potential risks and benefits of telemedicine (video visit) versus in person care; bill my insurance or make self-payment for services provided; and responsibility for payment of non-covered services.     Mode of Communication:  Video Conference via [employer]    As the provider I attest to compliance with applicable laws and regulations related to telemedicine.         Service Type:  Individual     Session Start Time: 9:17 am  Session End Time: 9:54 am      Session Length: 16 - 37      Attendees: Patient    Visit Activities (Refresh list every visit): [facility] Only    Diagnostic Assessment Date: 8/16/21  Treatment Plan Review Date: 8/25/21  See Flowsheets for today's PHQ-9 and COLLINS-7 results  Previous PHQ-9:   PHQ-9 SCORE 8/2/2021   PHQ-9 Total Score MyChart 9 (Mild depression)   PHQ-9 Total Score 9     Previous COLLINS-7:   COLLINS-7 SCORE 8/4/2021   Total Score 8 (mild anxiety)   Total Score 8       CATERINA LEVEL:  CATERINA Score (Last Two) 11/7/2019   CATERINA Raw Score 30   Activation Score 56   CATERINA Level 3       DATA  Extended Session (60+ minutes): No  Interactive Complexity: No  Crisis: No  [facility] Patient: No    Treatment Objective(s) Addressed in This Session:  Target Behavior(s): Grief and loss    Grief / Loss: will increase understanding of normal grieving process, will engage in effective approach to address and resolve grief/loss  issues and will process grief/loss issues in an adaptive manner    Current Stressors / Issues:  Patient reflected upon recent news and outcome of appointments with rheumatology. 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 She discussed how this may lead to a diagnosis of Sjorgen's or Lupus in the future, or may never develop further. Patient continues to process through the anxiety of what this means for her future. Patient discussed how this stress is amplified as she continues to grieve her pregnancy loss, and shared that she continues to notice her grief in the form of panic symptoms. Patient stated that it has become easier to manage and respond to panic symptoms, but reflected upon recent triggers. Patient hoping that with time the response will feel less intense.  Patient now starting medication to be on 3 months prior to conception, and has plans to schedule a preconception consult with [facility]. Patient voiced frustration with not being able to control and manage the timing of a pregnancy and the need for high medical intervention, as she continues to cope with belief adjustment related to what it takes to be pregnancy and to achieve a healthy pregnancy. Continued to explore with patient how to best utilize the next 3 months as she waits to conceive again, with patient expressing desire to become more active and finds more time for connection with her . Discussed ways to begin to trust her body again based on negative feelings and resentment following her loses and now potential autoimmune disorder.    Progress on Treatment Objective(s) / Homework:  Satisfactory progress - ACTION (Actively working towards change); Intervened by reinforcing change plan / affirming steps taken    Motivational Interviewing    MI Intervention: Expressed Empathy/Understanding, Permission to raise concern or advise, Open-ended  questions and Reframe     Change Talk Expressed by the Patient: Ability to change Reasons to change Need to change    Provider Response to Change Talk: E - Evoked more info from patient about behavior change, A - Affirmed patient's thoughts, decisions, or attempts at behavior change, R - Reflected patient's change talk and S - Summarized patient's change talk statements    Cognitive Behavioral Therapy: Discussed connection between thoughts, feelings, and behaviors. Taught patient how to identify cognitive distortions, and assisted patient to identify own cognitive distortions. Taught and engaged patient in cognitive restructuring techniques.     Mindfulness-Based Strategies    Discussed skills based on development and application of mindfulness    Also provided psychoeducation about behavioral health condition, symptoms, and treatment options    Care Plan review completed: No    Medication Review:  No current psychiatric medications prescribed    Medication Compliance:  NA    Changes in Health Issues:   None reported    Chemical Use Review:   Substance Use: Chemical use reviewed, no active concerns identified      Tobacco Use: No current tobacco use.      Assessment: Current Emotional / Mental Status (status of significant symptoms):  Risk status (Self / Other harm or suicidal ideation)  Patient denies a history of suicidal ideation, suicide attempts, self-injurious behavior, homicidal ideation, homicidal behavior and and other safety concerns  Patient denies current fears or concerns for personal safety.  Patient denies current or recent suicidal ideation or behaviors.  Patient denies current or recent homicidal ideation or behaviors.  Patient denies current or recent self injurious behavior or ideation.  Patient denies other safety concerns.  A safety and risk management plan has not been developed at this time, however patient was encouraged to call [facility] should there be a change in any of these  "risk factors.    Appearance:   Appropriate   Eye Contact:   Good   Psychomotor Behavior: Normal   Attitude:   Cooperative   Orientation:   All  Speech   Rate / Production: Normal    Volume:  Normal   Mood:    Anxious   Affect:    Appropriate   Thought Content:  Clear   Thought Form:  Coherent  Logical   Insight:    Good     Diagnoses:  1. Adjustment disorder with mixed anxiety and depressed mood        Collateral Reports Completed:  Not Applicable    Plan: (Homework, other):  Patient was given information about behavioral services and encouraged to schedule a follow up appointment with the clinic [facility] in 2 weeks.  She was also given CBT techniques to help manage anxiety, grounding techniques to reduce feelings of fight or flight.  CD Recommendations: No indications of CD issues.  Stefanie Villatoro, [facility]        Stefanie Irving, [facility]  August 25, 2021    ______________________________________________________________________    Integrated Primary Care Behavioral Health Treatment Plan    Patient's Name: Griselda Bell  YOB: 1985    Date: 8/25/21    DSM-V Diagnoses: Adjustment Disorders  309.28 (F43.23) With mixed anxiety and depressed mood  Psychosocial / Contextual Factors: history of pregnancy termination due to Trisomy 21, recent loss at 16 weeks  with D&E  WHODAS:   WHODAS 2.0 Total Score 8/4/2021   Total Score 15   Total Score MyChart 15         Referral / Collaboration:  Referral to another professional/service is not indicated at this time..    Anticipated number of session or this episode of care: 3-4      MeasurableTreatment Goal(s) related to diagnosis / functional impairment(s)  Goal 1: Patient will will engage in effective approach to address and resolve grief/loss issues and will process grief/loss issues in an adaptive manner    I will know I've met my goal when I feel like the grief is less intense\"    Objective #A (Patient Action)    Patient will increase understanding of steps in the grief " process.  Status: New - Date: 21     Intervention(s)  [facility] will teach about  grief and loss.    Objective #B  Patient will use cognitive strategies identified in therapy to challenge anxious thoughts related to the pregnancy loss.  Status: New - Date: 21     Intervention(s)  [facility] will teach cognitive restructuring and defusion techniques.    Objective #C  Patient will increase confidence in ability to navigate interactions with others after the loss.  Status: New - Date: 21     Intervention(s)  [facility] will assist patient to navigate situations through role play and cognitive rehearsal.    Patient has reviewed and agreed to the above plan.      Stefanie Villatoro, [facility]  2021

## 2021-10-10 ENCOUNTER — HEALTH MAINTENANCE LETTER (OUTPATIENT)
Age: 36
End: 2021-10-10

## 2021-10-20 ENCOUNTER — VIRTUAL VISIT (OUTPATIENT)
Dept: BEHAVIORAL HEALTH | Facility: CLINIC | Age: 36
End: 2021-10-20
Payer: COMMERCIAL

## 2021-10-20 DIAGNOSIS — F43.23 ADJUSTMENT DISORDER WITH MIXED ANXIETY AND DEPRESSED MOOD: Primary | ICD-10-CM

## 2021-10-20 PROCEDURE — 90834 PSYTX W PT 45 MINUTES: CPT | Mod: 95 | Performed by: SOCIAL WORKER

## 2021-10-20 NOTE — PROGRESS NOTES
[facility] Maternal Fetal Medicine [facility]- Integrated Behavioral Health Services  October 20, 2021    Behavioral Health Clinician Progress Note    Patient Name: Girselda Bell      Telemedicine Visit: The patient's condition can be safely assessed and treated via synchronous audio and visual telemedicine encounter.      Reason for Telemedicine Visit: Services only offered telehealth    Originating Site (Patient Location): Patient's place of employment    Distant Site (Provider Location): [facility]    Consent:  The patient/guardian has verbally consented to: the potential risks and benefits of telemedicine (video visit) versus in person care; bill my insurance or make self-payment for services provided; and responsibility for payment of non-covered services.     Mode of Communication:  Video Conference via [employer]    As the provider I attest to compliance with applicable laws and regulations related to telemedicine.         Service Type:  Individual     Session Start Time: 8:18 am  Session End Time: 9:00 am      Session Length: 38 - 52      Attendees: Patient    Visit Activities (Refresh list every visit): [facility] Only    Diagnostic Assessment Date: 8/16/21  Treatment Plan Review Date: 8/25/21  See Flowsheets for today's PHQ-9 and COLLINS-7 results  Previous PHQ-9:   PHQ-9 SCORE 8/2/2021   PHQ-9 Total Score MyChart 9 (Mild depression)   PHQ-9 Total Score 9     Previous COLLINS-7:   COLLINS-7 SCORE 8/4/2021   Total Score 8 (mild anxiety)   Total Score 8       CATERINA LEVEL:  CATERINA Score (Last Two) 11/7/2019   CATERINA Raw Score 30   Activation Score 56   CATERINA Level 3       DATA  Extended Session (60+ minutes): No  Interactive Complexity: No  Crisis: No  [facility] Patient: No    Treatment Objective(s) Addressed in This Session:  Target Behavior(s): Grief and loss    Grief / Loss: will increase understanding of normal grieving process, will engage in effective approach to address and resolve grief/loss  issues and will process grief/loss issues in an adaptive manner    Current Stressors / Issues:  Patient reported that overall she is doing better. She feels like her mood is improving each day and the grief reactions are less intense. She shared that she feels like the anxiety/panic symptoms are also improving, and becoming easier to manage. Patient reflected upon recent consult with M and found this to be helpful. She stated that she was able to learn that her most recent pregnancy loss was not a result of heart block, and she was able to learn more about the efficacy of her medication to prevent heart block in 99% of situations. Patient shared that she is feeling more confident moving forward with a subsequent pregnancy in light of her antibodies for Sjorgen's, but is now finding the anxiety about another less more present. Patient shared that she and her  can start trying to conceive now, and she is now reflecting upon readiness. Patient unsure if she feels ready to experience anxiety and the fear of another loss.  Patient stated that she has been getting into a routine with movement and exercise, and is finding it to be helpful to help her with grounding and reconnecting with her body in a positive way. Patient discussed the upcoming hospital burial, and the comfort that she feels that her two previous loses/babies are together. Continued to review and explore options for treatment of anxiety during subsequent pregnancy and questions to consider when thinking about readiness to conceive after a loss.    Progress on Treatment Objective(s) / Homework:  Satisfactory progress - ACTION (Actively working towards change); Intervened by reinforcing change plan / affirming steps taken    Motivational Interviewing    MI Intervention: Expressed Empathy/Understanding, Permission to raise concern or advise, Open-ended questions and Reframe     Change Talk Expressed by the Patient: Ability to change Reasons to change  Need to change    Provider Response to Change Talk: E - Evoked more info from patient about behavior change, A - Affirmed patient's thoughts, decisions, or attempts at behavior change, R - Reflected patient's change talk and S - Summarized patient's change talk statements    Cognitive Behavioral Therapy: Discussed connection between thoughts, feelings, and behaviors. Taught patient how to identify cognitive distortions, and assisted patient to identify own cognitive distortions. Taught and engaged patient in cognitive restructuring techniques.     Mindfulness-Based Strategies    Discussed skills based on development and application of mindfulness    Also provided psychoeducation about behavioral health condition, symptoms, and treatment options    Care Plan review completed: No    Medication Review:  No current psychiatric medications prescribed    Medication Compliance:  NA    Changes in Health Issues:   None reported    Chemical Use Review:   Substance Use: Chemical use reviewed, no active concerns identified      Tobacco Use: No current tobacco use.      Assessment: Current Emotional / Mental Status (status of significant symptoms):  Risk status (Self / Other harm or suicidal ideation)  Patient denies a history of suicidal ideation, suicide attempts, self-injurious behavior, homicidal ideation, homicidal behavior and and other safety concerns  Patient denies current fears or concerns for personal safety.  Patient denies current or recent suicidal ideation or behaviors.  Patient denies current or recent homicidal ideation or behaviors.  Patient denies current or recent self injurious behavior or ideation.  Patient denies other safety concerns.  A safety and risk management plan has not been developed at this time, however patient was encouraged to call [place] [phone] should there be a change in any of these risk factors.    Appearance:   Appropriate   Eye Contact:   Good   Psychomotor Behavior: Normal  "  Attitude:   Cooperative   Orientation:   All  Speech   Rate / Production: Normal    Volume:  Normal   Mood:    Anxious   Affect:    Appropriate   Thought Content:  Clear   Thought Form:  Coherent  Logical   Insight:    Good     Diagnoses:  1. Adjustment disorder with mixed anxiety and depressed mood        Collateral Reports Completed:  Not Applicable    Plan: (Homework, other):  Patient was given information about behavioral services and encouraged to schedule a follow up appointment with the clinic [facility] in 2 weeks.  She was also given CBT techniques to help manage anxiety, grounding techniques to reduce feelings of fight or flight.   CD Recommendations: No indications of CD issues.  Stefanie Villatoro, [facility]        Stefanie Irving, [facility]  2021    ______________________________________________________________________    Integrated Primary Care Behavioral Health Treatment Plan    Patient's Name: Griselda Bell  YOB: 1985    Date: 21    DSM-V Diagnoses: Adjustment Disorders  309.28 (F43.23) With mixed anxiety and depressed mood  Psychosocial / Contextual Factors: history of pregnancy termination due to Trisomy 21, recent loss at 16 weeks  with D&E  WHODAS:   WHODAS 2.0 Total Score 2021   Total Score 15   Total Score MyChart 15         Referral / Collaboration:  Referral to another professional/service is not indicated at this time..    Anticipated number of session or this episode of care: 3-4      MeasurableTreatment Goal(s) related to diagnosis / functional impairment(s)  Goal 1: Patient will will engage in effective approach to address and resolve grief/loss issues and will process grief/loss issues in an adaptive manner    I will know I've met my goal when I feel like the grief is less intense\"    Objective #A (Patient Action)    Patient will increase understanding of steps in the grief process.  Status: New - Date: 21     Intervention(s)  [facility] will teach about  grief " and loss.    Objective #B  Patient will use cognitive strategies identified in therapy to challenge anxious thoughts related to the pregnancy loss.  Status: New - Date: 8/25/21     Intervention(s)  [facility] will teach cognitive restructuring and defusion techniques.    Objective #C  Patient will increase confidence in ability to navigate interactions with others after the loss.  Status: New - Date: 8/25/21     Intervention(s)  [facility] will assist patient to navigate situations through role play and cognitive rehearsal.    Patient has reviewed and agreed to the above plan.      Stefanie Villatoro, [facility]  August 25, 2021

## 2021-11-04 ENCOUNTER — VIRTUAL VISIT (OUTPATIENT)
Dept: BEHAVIORAL HEALTH | Facility: CLINIC | Age: 36
End: 2021-11-04
Payer: COMMERCIAL

## 2021-11-04 DIAGNOSIS — F43.23 ADJUSTMENT DISORDER WITH MIXED ANXIETY AND DEPRESSED MOOD: Primary | ICD-10-CM

## 2021-11-04 PROCEDURE — 90834 PSYTX W PT 45 MINUTES: CPT | Mod: 95 | Performed by: SOCIAL WORKER

## 2021-11-04 NOTE — PROGRESS NOTES
[facility] Maternal Fetal Medicine [facility]- Integrated Behavioral Health Services  November 4, 2021    Behavioral Health Clinician Progress Note    Patient Name: Griselda Bell      Telemedicine Visit: The patient's condition can be safely assessed and treated via synchronous audio and visual telemedicine encounter.      Reason for Telemedicine Visit: Services only offered telehealth    Originating Site (Patient Location): Patient's place of employment    Distant Site (Provider Location): [facility]    Consent:  The patient/guardian has verbally consented to: the potential risks and benefits of telemedicine (video visit) versus in person care; bill my insurance or make self-payment for services provided; and responsibility for payment of non-covered services.     Mode of Communication:  Video Conference via [employer]    As the provider I attest to compliance with applicable laws and regulations related to telemedicine.         Service Type:  Individual     Session Start Time: 8:33 am  Session End Time: 8:55 am      Session Length: 38 - 52      Attendees: Patient    Visit Activities (Refresh list every visit): [facility] Only    Diagnostic Assessment Date: 8/16/21  Treatment Plan Review Date: 8/25/21  See Flowsheets for today's PHQ-9 and COLLINS-7 results  Previous PHQ-9:   PHQ-9 SCORE 8/2/2021   PHQ-9 Total Score MyChart 9 (Mild depression)   PHQ-9 Total Score 9     Previous COLLINS-7:   COLLINS-7 SCORE 8/4/2021   Total Score 8 (mild anxiety)   Total Score 8       CATERINA LEVEL:  CATERINA Score (Last Two) 11/7/2019   CATERINA Raw Score 30   Activation Score 56   CATERINA Level 3       DATA  Extended Session (60+ minutes): No  Interactive Complexity: No  Crisis: No  [facility] Patient: No    Treatment Objective(s) Addressed in This Session:  Target Behavior(s): Grief and loss    Grief / Loss: will increase understanding of normal grieving process, will engage in effective approach to address and resolve grief/loss  issues and will process grief/loss issues in an adaptive manner    Current Stressors / Issues:  Patient reported that overall she continues to feel like she is feeling better, but shared a return of grief as she prepares for the [facility] service this weekend hosted by the hospital. Patient stated that she never anticipated that she would be attending the service again after attending last year. Patient shared that at the same time of the return of her grief, she and her  are trying to decide if they want to try to conceive this coming week. Validated and normalized the worsening of symptoms when combining these events and stressors. Continued to explore feelings related to the upcoming service. Explored potential questions to assist with patient identifying interest and readiness to try to conceive again.  Discussed capacity for anxiety during a pregnancy, ability to cope if another loss occurs, and feelings of hope and excitement about a new pregnancy. Patient shared that overall she is hopeful and excited and feels like she could cope with an early loss. Patient worries about her ability to cope with another second trimester loss, but is trying to focus on the logic and knowledge that this would be rare.  Explored potential triggers for anxiety throughout the pregnancy, with patient expressing awareness of the predictable pattern that the anxiety may have with an ability to plan around it. Continued to discuss available support for anxiety , with patient already downloading the pregnancy after loss donna for her phone and her expressing interest in a pregnancy after loss support group through [facility].    Progress on Treatment Objective(s) / Homework:  Satisfactory progress - ACTION (Actively working towards change); Intervened by reinforcing change plan / affirming steps taken    Motivational Interviewing    MI Intervention: Expressed Empathy/Understanding, Permission to raise concern or advise, Open-ended  questions and Reframe     Change Talk Expressed by the Patient: Ability to change Reasons to change Need to change    Provider Response to Change Talk: E - Evoked more info from patient about behavior change, A - Affirmed patient's thoughts, decisions, or attempts at behavior change, R - Reflected patient's change talk and S - Summarized patient's change talk statements    Cognitive Behavioral Therapy: Discussed connection between thoughts, feelings, and behaviors. Taught patient how to identify cognitive distortions, and assisted patient to identify own cognitive distortions. Taught and engaged patient in cognitive restructuring techniques.     Mindfulness-Based Strategies    Discussed skills based on development and application of mindfulness    Also provided psychoeducation about behavioral health condition, symptoms, and treatment options    Care Plan review completed: No    Medication Review:  No current psychiatric medications prescribed    Medication Compliance:  NA    Changes in Health Issues:   None reported    Chemical Use Review:   Substance Use: Chemical use reviewed, no active concerns identified      Tobacco Use: No current tobacco use.      Assessment: Current Emotional / Mental Status (status of significant symptoms):  Risk status (Self / Other harm or suicidal ideation)  Patient denies a history of suicidal ideation, suicide attempts, self-injurious behavior, homicidal ideation, homicidal behavior and and other safety concerns  Patient denies current fears or concerns for personal safety.  Patient denies current or recent suicidal ideation or behaviors.  Patient denies current or recent homicidal ideation or behaviors.  Patient denies current or recent self injurious behavior or ideation.  Patient denies other safety concerns.  A safety and risk management plan has not been developed at this time, however patient was encouraged to call [facility] should there be a change in any of these  risk factors.    Appearance:   Appropriate   Eye Contact:   Good   Psychomotor Behavior: Normal   Attitude:   Cooperative   Orientation:   All  Speech   Rate / Production: Normal    Volume:  Normal   Mood:    Anxious   Affect:    Appropriate   Thought Content:  Clear   Thought Form:  Coherent  Logical   Insight:    Good     Diagnoses:  1. Adjustment disorder with mixed anxiety and depressed mood        Collateral Reports Completed:  Not Applicable    Plan: (Homework, other):  Patient was given information about behavioral services and encouraged to schedule a follow up appointment with the clinic [facility] in 3 weeks.  She was also given CBT techniques to help manage anxiety, grounding techniques to reduce feelings of fight or flight.  Ongoing questions to consider as she contemplates interest and readiness for a subsequent pregnancy.  CD Recommendations: No indications of CD issues.  Stefanie Villatoro, [facility]        SULMA Gonzalez  August 25, 2021    ______________________________________________________________________    Integrated Primary Care Behavioral Health Treatment Plan    Patient's Name: Griselda Bell  YOB: 1985    Date: 8/25/21    DSM-V Diagnoses: Adjustment Disorders  309.28 (F43.23) With mixed anxiety and depressed mood  Psychosocial / Contextual Factors: history of pregnancy termination due to Trisomy 21, recent loss at 16 weeks  with D&E  WHODAS:   WHODAS 2.0 Total Score 8/4/2021   Total Score 15   Total Score MyChart 15         Referral / Collaboration:  Referral to another professional/service is not indicated at this time..    Anticipated number of session or this episode of care: 3-4      MeasurableTreatment Goal(s) related to diagnosis / functional impairment(s)  Goal 1: Patient will will engage in effective approach to address and resolve grief/loss issues and will process grief/loss issues in an adaptive manner    I will know I've met my goal when I feel like the grief is less  "intense\"    Objective #A (Patient Action)    Patient will increase understanding of steps in the grief process.  Status: New - Date: 21     Intervention(s)  [facility] will teach about  grief and loss.    Objective #B  Patient will use cognitive strategies identified in therapy to challenge anxious thoughts related to the pregnancy loss.  Status: New - Date: 21     Intervention(s)  [facility] will teach cognitive restructuring and defusion techniques.    Objective #C  Patient will increase confidence in ability to navigate interactions with others after the loss.  Status: New - Date: 21     Intervention(s)  [facility] will assist patient to navigate situations through role play and cognitive rehearsal.    Patient has reviewed and agreed to the above plan.      Stefanie Villatoro, [facility]  2021  "

## 2021-11-23 ENCOUNTER — IMMUNIZATION (OUTPATIENT)
Dept: NURSING | Facility: CLINIC | Age: 36
End: 2021-11-23
Payer: COMMERCIAL

## 2021-11-23 PROCEDURE — 0004A PR COVID VAC PFIZER DIL RECON 30 MCG/0.3 ML IM: CPT

## 2021-11-23 PROCEDURE — 91300 PR COVID VAC PFIZER DIL RECON 30 MCG/0.3 ML IM: CPT

## 2021-11-24 ENCOUNTER — VIRTUAL VISIT (OUTPATIENT)
Dept: BEHAVIORAL HEALTH | Facility: CLINIC | Age: 36
End: 2021-11-24
Payer: COMMERCIAL

## 2021-11-24 DIAGNOSIS — F43.23 ADJUSTMENT DISORDER WITH MIXED ANXIETY AND DEPRESSED MOOD: Primary | ICD-10-CM

## 2021-11-24 PROCEDURE — 90834 PSYTX W PT 45 MINUTES: CPT | Mod: 95 | Performed by: SOCIAL WORKER

## 2021-11-24 NOTE — PROGRESS NOTES
[facility]- Integrated Behavioral Health Services  November 24, 2021    Behavioral Health Clinician Progress Note    Patient Name: Griselda Bell      Telemedicine Visit: The patient's condition can be safely assessed and treated via synchronous audio and visual telemedicine encounter.      Reason for Telemedicine Visit: Services only offered telehealth    Originating Site (Patient Location): Patient's place of employment    Distant Site (Provider Location): [facility]    Consent:  The patient/guardian has verbally consented to: the potential risks and benefits of telemedicine (video visit) versus in person care; bill my insurance or make self-payment for services provided; and responsibility for payment of non-covered services.     Mode of Communication:  Video Conference via [employer]    As the provider I attest to compliance with applicable laws and regulations related to telemedicine.         Service Type:  Individual     Session Start Time: 11: 01 am  Session End Time: 11: 53 am      Session Length: 38 - 52      Attendees: Patient    Visit Activities (Refresh list every visit): [facility] Only    Diagnostic Assessment Date: 8/16/21  Treatment Plan Review Date: 8/25/21  See Flowsheets for today's PHQ-9 and COLLINS-7 results  Previous PHQ-9:   PHQ-9 SCORE 8/2/2021   PHQ-9 Total Score MyChart 9 (Mild depression)   PHQ-9 Total Score 9     Previous COLLINS-7:   COLLINS-7 SCORE 8/4/2021   Total Score 8 (mild anxiety)   Total Score 8       CATERINA LEVEL:  CATERINA Score (Last Two) 11/7/2019   CATERINA Raw Score 30   Activation Score 56   CATERINA Level 3       DATA  Extended Session (60+ minutes): No  Interactive Complexity: No  Crisis: No  [facility] Patient: No    Treatment Objective(s) Addressed in This Session:  Target Behavior(s): Grief and loss    Grief / Loss: will increase understanding of normal grieving process, will engage in effective approach to address and resolve  "grief/loss issues and will process grief/loss issues in an adaptive manner    Current Stressors / Issues:  Patient reported ongoing improvement with her mental health, anxiety, and grief. She stated that she attended the group burial, and found that it was helpful for her. She stated that she also was approached to share her story with the news/media, and she agreed. Patient reported that she feels ready to share her story, and discussed how it feels right to her to be able to talk about pregnancy loss in a public way since she believes that there are misunderstandings about what it all entails. Patient shared that as a result of how she is feeling that she is doing, she and her  decided to try to get pregnant this past month. She shared that she was disappointed when she learned that she was not pregnant, which was complicated by being at a wedding amongst friends who are currently pregnancy. Validated and normalized the hope and excitement that comes with a prospective pregnancy, and then sadness upon learning that she is not. Normalized the strong desire to have a pregnancy as her grief has decreased since she has been wanting to have another baby/child for many months now given her two previous loses. Began to explore how to approach upcoming months as she attempts pregnancy, as she stated that she can be \"intense\" about the approach. Discussed her underlying fear of having a difficult time becoming pregnant, despite having no previous difficulties.      Progress on Treatment Objective(s) / Homework:  Stable - MAINTENANCE (Working to maintain change, with risk of relapse); Intervened by continuing to positively reinforce healthy behavior choice     Motivational Interviewing    MI Intervention: Expressed Empathy/Understanding, Permission to raise concern or advise, Open-ended questions and Reframe     Change Talk Expressed by the Patient: Ability to change Reasons to change Need to change    Provider " Response to Change Talk: E - Evoked more info from patient about behavior change, A - Affirmed patient's thoughts, decisions, or attempts at behavior change, R - Reflected patient's change talk and S - Summarized patient's change talk statements    Cognitive Behavioral Therapy: Discussed connection between thoughts, feelings, and behaviors. Taught patient how to identify cognitive distortions, and assisted patient to identify own cognitive distortions. Taught and engaged patient in cognitive restructuring techniques.     Mindfulness-Based Strategies    Discussed skills based on development and application of mindfulness    Also provided psychoeducation about behavioral health condition, symptoms, and treatment options    Care Plan review completed: No    Medication Review:  No current psychiatric medications prescribed    Medication Compliance:  NA    Changes in Health Issues:   None reported    Chemical Use Review:   Substance Use: Chemical use reviewed, no active concerns identified      Tobacco Use: No current tobacco use.      Assessment: Current Emotional / Mental Status (status of significant symptoms):  Risk status (Self / Other harm or suicidal ideation)  Patient denies a history of suicidal ideation, suicide attempts, self-injurious behavior, homicidal ideation, homicidal behavior and and other safety concerns  Patient denies current fears or concerns for personal safety.  Patient denies current or recent suicidal ideation or behaviors.  Patient denies current or recent homicidal ideation or behaviors.  Patient denies current or recent self injurious behavior or ideation.  Patient denies other safety concerns.  A safety and risk management plan has not been developed at this time, however patient was encouraged to call [place] [phone] should there be a change in any of these risk factors.    Appearance:   Appropriate   Eye Contact:   Good   Psychomotor Behavior: Normal   Attitude:   Cooperative  "  Orientation:   All  Speech   Rate / Production: Normal    Volume:  Normal   Mood:    Anxious   Affect:    Appropriate   Thought Content:  Clear   Thought Form:  Coherent  Logical   Insight:    Good     Diagnoses:  1. Adjustment disorder with mixed anxiety and depressed mood        Collateral Reports Completed:  Not Applicable    Plan: (Homework, other):  Patient was given information about behavioral services and encouraged to schedule a follow up appointment with the clinic [facility] in 1 month.  She was also given CBT techniques to help with preparing for subsequent pregnancies. CD Recommendations: No indications of CD issues.  Stefanie Villatoro, [facility]        [facility]  [facility]    ______________________________________________________________________    Integrated Primary Care Behavioral Health Treatment Plan    Patient's Name: Griselda Bell  YOB: 1985    Date: 21    DSM-V Diagnoses: Adjustment Disorders  309.28 (F43.23) With mixed anxiety and depressed mood  Psychosocial / Contextual Factors: history of pregnancy termination due to Trisomy 21, recent loss at 16 weeks  with D&E  WHODAS:   WHODAS 2.0 Total Score 2021   Total Score 15   Total Score [facility] [place] [facility]       Referral / Collaboration:  Referral to another professional/service is not indicated at this time..    Anticipated number of session or this episode of care: 3-4      MeasurableTreatment Goal(s) related to diagnosis / functional impairment(s)  Goal 1: Patient will will engage in effective approach to address and resolve grief/loss issues and will process grief/loss issues in an adaptive manner    I will know I've met my goal when I feel like the grief is less intense\"    Objective #A (Patient Action)    Patient will increase understanding of steps in the grief process.  Status: New - Date: 21     Intervention(s)  [facility] will teach about  grief and loss.    Objective #B  Patient will use cognitive " strategies identified in therapy to challenge anxious thoughts related to the pregnancy loss.  Status: New - Date: 8/25/21     Intervention(s)  [facility] will teach cognitive restructuring and defusion techniques.    Objective #C  Patient will increase confidence in ability to navigate interactions with others after the loss.  Status: New - Date: 8/25/21     Intervention(s)  [facility] will assist patient to navigate situations through role play and cognitive rehearsal.    Patient has reviewed and agreed to the above plan.      SULMA Gonzalez  August 25, 2021

## 2021-12-03 ENCOUNTER — E-VISIT (OUTPATIENT)
Dept: URGENT CARE | Facility: CLINIC | Age: 36
End: 2021-12-03
Payer: COMMERCIAL

## 2021-12-03 DIAGNOSIS — R09.81 CONGESTION OF PARANASAL SINUS: Primary | ICD-10-CM

## 2021-12-03 DIAGNOSIS — J01.90 ACUTE BACTERIAL SINUSITIS: ICD-10-CM

## 2021-12-03 DIAGNOSIS — B96.89 ACUTE BACTERIAL SINUSITIS: ICD-10-CM

## 2021-12-03 PROCEDURE — 99421 OL DIG E/M SVC 5-10 MIN: CPT | Performed by: EMERGENCY MEDICINE

## 2021-12-03 NOTE — PATIENT INSTRUCTIONS
Dear Griselda Bell    After reviewing your responses, I've been able to diagnose you with?a sinus infection caused by bacteria.?     Based on your responses and diagnosis, I have prescribed Augmetin to treat your symptoms. I have sent this to your pharmacy.? I have also ordered covid testing.        It is also important to stay well hydrated, get lots of rest and take over-the-counter decongestants,?tylenol?or ibuprofen if you?are able to?take those medications per your primary care provider to help relieve discomfort.?     It is important that you take?all of?your prescribed medication even if your symptoms are improving after a few doses.? Taking?all of?your medicine helps prevent the symptoms from returning.?     If your symptoms worsen, you develop severe headache, vomiting, high fever (>102), or are not improving in 7 days, please contact your primary care provider for an appointment or visit any of our convenient Walk-in Care or Urgent Care Centers to be seen which can be found on our website?here.?     Thanks again for choosing?us?as your health care partner,?   ?  Neftali Umaña MD?

## 2021-12-04 ENCOUNTER — LAB (OUTPATIENT)
Dept: URGENT CARE | Facility: URGENT CARE | Age: 36
End: 2021-12-04
Attending: EMERGENCY MEDICINE
Payer: COMMERCIAL

## 2021-12-04 DIAGNOSIS — R09.81 CONGESTION OF PARANASAL SINUS: ICD-10-CM

## 2021-12-04 PROCEDURE — U0003 INFECTIOUS AGENT DETECTION BY NUCLEIC ACID (DNA OR RNA); SEVERE ACUTE RESPIRATORY SYNDROME CORONAVIRUS 2 (SARS-COV-2) (CORONAVIRUS DISEASE [COVID-19]), AMPLIFIED PROBE TECHNIQUE, MAKING USE OF HIGH THROUGHPUT TECHNOLOGIES AS DESCRIBED BY CMS-2020-01-R: HCPCS

## 2021-12-04 PROCEDURE — U0005 INFEC AGEN DETEC AMPLI PROBE: HCPCS

## 2021-12-05 LAB — SARS-COV-2 RNA RESP QL NAA+PROBE: NEGATIVE

## 2021-12-27 ENCOUNTER — VIRTUAL VISIT (OUTPATIENT)
Dept: BEHAVIORAL HEALTH | Facility: CLINIC | Age: 36
End: 2021-12-27
Payer: COMMERCIAL

## 2021-12-27 DIAGNOSIS — F43.23 ADJUSTMENT DISORDER WITH MIXED ANXIETY AND DEPRESSED MOOD: Primary | ICD-10-CM

## 2021-12-27 PROCEDURE — 90834 PSYTX W PT 45 MINUTES: CPT | Mod: 95 | Performed by: SOCIAL WORKER

## 2021-12-27 ASSESSMENT — PATIENT HEALTH QUESTIONNAIRE - PHQ9
10. IF YOU CHECKED OFF ANY PROBLEMS, HOW DIFFICULT HAVE THESE PROBLEMS MADE IT FOR YOU TO DO YOUR WORK, TAKE CARE OF THINGS AT HOME, OR GET ALONG WITH OTHER PEOPLE: NOT DIFFICULT AT ALL
SUM OF ALL RESPONSES TO PHQ QUESTIONS 1-9: 2
SUM OF ALL RESPONSES TO PHQ QUESTIONS 1-9: 2

## 2021-12-27 NOTE — PROGRESS NOTES
[facility]- Integrated Behavioral Health Services  December 27, 2021    Behavioral Health Clinician Progress Note    Patient Name: Griselda Bell      Telemedicine Visit: The patient's condition can be safely assessed and treated via synchronous audio and visual telemedicine encounter.      Reason for Telemedicine Visit: Services only offered telehealth    Originating Site (Patient Location): Patient's place of employment    Distant Site (Provider Location): [facility]    Consent:  The patient/guardian has verbally consented to: the potential risks and benefits of telemedicine (video visit) versus in person care; bill my insurance or make self-payment for services provided; and responsibility for payment of non-covered services.     Mode of Communication:  Video Conference via [employer]    As the provider I attest to compliance with applicable laws and regulations related to telemedicine.         Service Type:  Individual     Session Start Time: 10:30 am  Session End Time: 11: 22 am      Session Length: 38 - 52      Attendees: Patient    Visit Activities (Refresh list every visit): [facility] Only    Diagnostic Assessment Date: 8/16/21  Treatment Plan Review Date: 12/27/21  See Flowsheets for today's PHQ-9 and COLLINS-7 results  Previous PHQ-9:   PHQ-9 SCORE 8/2/2021 12/27/2021   PHQ-9 Total Score MyChart 9 (Mild depression) 2 (Minimal depression)   PHQ-9 Total Score 9 2     Previous COLLINS-7:   COLLINS-7 SCORE 8/4/2021   Total Score 8 (mild anxiety)   Total Score 8       CATERINA LEVEL:  CATERINA Score (Last Two) 11/7/2019   CATERINA Raw Score 30   Activation Score 56   CATERINA Level 3       DATA  Extended Session (60+ minutes): No  Interactive Complexity: No  Crisis: No  [facility] Patient: No    Treatment Objective(s) Addressed in This Session:  Target Behavior(s): Grief and loss    Grief / Loss: will increase understanding of normal grieving process, will engage in effective  approach to address and resolve grief/loss issues and will process grief/loss issues in an adaptive manner    Current Stressors / Issues:  Patient reported an intense increase in grief, including insomnia and physical symptoms of panic in the evenings. Patient discussed recent high stress related to her daughter's exposure to COVID resulting in working and parenting at home. She stated that her grief has been triggered by not achieving a pregnancy this past month, the holidays, and now approaching her due date.  Patient stated that she has been busy during the day, with symptoms being present at night when she has time to rest, relax, and release. Patient reported moments where she can feel overwhelmed with the grief, and worries that she will continue to feel this intense grief. Continued to validate and normalize patient's grief reaction, and explored ways to continue to grieve in a healthy manner. Explored ways to give permission and space to grieve during the day to prevent onset at night. Identified ways to engage in pro-self behaviors to promote relaxation to counter physical symptoms of anxiety. Explored with patient cognitive de-fusion techniques to utilize when she begins to note rumination about not achieving a pregnancy as she worries about bad outcomes occurring with fertility because of her two recent pregnancies.     Progress on Treatment Objective(s) / Homework:  Worsening - PREPARATION (Decided to change - considering how); Intervened by negotiating a change plan and determining options / strategies for behavior change, identifying triggers, exploring social supports, and working towards setting a date to begin behavior change    Motivational Interviewing    MI Intervention: Expressed Empathy/Understanding, Permission to raise concern or advise, Open-ended questions and Reframe     Change Talk Expressed by the Patient: Ability to change Reasons to change Need to change    Provider Response to Change  Talk: E - Evoked more info from patient about behavior change, A - Affirmed patient's thoughts, decisions, or attempts at behavior change, R - Reflected patient's change talk and S - Summarized patient's change talk statements    Cognitive Behavioral Therapy: Discussed connection between thoughts, feelings, and behaviors. Taught patient how to identify cognitive distortions, and assisted patient to identify own cognitive distortions. Taught and engaged patient in cognitive restructuring techniques.     Mindfulness-Based Strategies    Discussed skills based on development and application of mindfulness    Also provided psychoeducation about behavioral health condition, symptoms, and treatment options    Care Plan review completed: No    Medication Review:  No current psychiatric medications prescribed    Medication Compliance:  NA    Changes in Health Issues:   None reported    Chemical Use Review:   Substance Use: Chemical use reviewed, no active concerns identified      Tobacco Use: No current tobacco use.      Assessment: Current Emotional / Mental Status (status of significant symptoms):  Risk status (Self / Other harm or suicidal ideation)  Patient denies a history of suicidal ideation, suicide attempts, self-injurious behavior, homicidal ideation, homicidal behavior and and other safety concerns  Patient denies current fears or concerns for personal safety.  Patient denies current or recent suicidal ideation or behaviors.  Patient denies current or recent homicidal ideation or behaviors.  Patient denies current or recent self injurious behavior or ideation.  Patient denies other safety concerns.  A safety and risk management plan has not been developed at this time, however patient was encouraged to call [place] [phone] should there be a change in any of these risk factors.    Appearance:   Appropriate   Eye Contact:   Good   Psychomotor Behavior: Normal   Attitude:   Cooperative  "  Orientation:   All  Speech   Rate / Production: Normal    Volume:  Normal   Mood:    Anxious   Affect:    Tearful  Thought Content:  Clear   Thought Form:  Coherent  Logical   Insight:    Good     Diagnoses:  1. Adjustment disorder with mixed anxiety and depressed mood        Collateral Reports Completed:  Not Applicable    Plan: (Homework, other):  Patient was given information about behavioral services and encouraged to schedule a follow up appointment with the clinic [facility] in 2 weeks.  She was also given ongoing CBT techniques to assist with grief. CD Recommendations: No indications of CD issues.  Stefanie Villatoro, [facility]        Ste[facility]  [facility]    ______________________________________________________________________    Integrated Primary Care Behavioral Health Treatment Plan    Patient's Name: Griselda Bell  YOB: 1985    Date: 21    DSM-V Diagnoses: Adjustment Disorders  309.28 (F43.23) With mixed anxiety and depressed mood  Psychosocial / Contextual Factors: history of pregnancy termination due to Trisomy 21, recent loss at 16 weeks  with D&E  WHODAS:   WHODAS 2.0 Total Score 2021   Total Score 15   [facility]       Referral / Collaboration:  Referral to another professional/service is not indicated at this time..    Anticipated number of session or this episode of care: 6-8      MeasurableTreatment Goal(s) related to diagnosis / functional impairment(s)  Goal 1: Patient will will engage in effective approach to address and resolve grief/loss issues and will process grief/loss issues in an adaptive manner    I will know I've met my goal when I feel like the grief is less intense\"    Objective #A (Patient Action)    Patient will increase understanding of steps in the grief process.  Status: Continued - Date(s):21     Intervention(s)  [facility] will teach about  grief and loss.    Objective #B  Patient will use cognitive strategies identified in " therapy to challenge anxious thoughts related to the pregnancy loss.  Status: Continued - Date(s):12/27/21     Intervention(s)  [facility] will teach cognitive restructuring and defusion techniques.    Objective #C  Patient will increase confidence in ability to navigate interactions with others after the loss.  Status: Continued - Date(s):  12/27/21    Intervention(s)  [facility] will assist patient to navigate situations through role play and cognitive rehearsal.    Patient has reviewed and agreed to the above plan.      Stefanie Villatoro, [facility]  December 27, 2021

## 2021-12-28 ASSESSMENT — PATIENT HEALTH QUESTIONNAIRE - PHQ9: SUM OF ALL RESPONSES TO PHQ QUESTIONS 1-9: 2

## 2022-01-14 ENCOUNTER — VIRTUAL VISIT (OUTPATIENT)
Dept: BEHAVIORAL HEALTH | Facility: CLINIC | Age: 37
End: 2022-01-14
Payer: COMMERCIAL

## 2022-01-14 DIAGNOSIS — F43.23 ADJUSTMENT DISORDER WITH MIXED ANXIETY AND DEPRESSED MOOD: Primary | ICD-10-CM

## 2022-01-14 PROCEDURE — 90834 PSYTX W PT 45 MINUTES: CPT | Mod: 95 | Performed by: SOCIAL WORKER

## 2022-01-14 NOTE — PROGRESS NOTES
[facility]aternal [facility]etal [facility]- Integrated Behavioral Health Services  January 14, 2022    Behavioral Health Clinician Progress Note    Patient Name: Griselda Bell      Telemedicine Visit: The patient's condition can be safely assessed and treated via synchronous audio and visual telemedicine encounter.      Reason for Telemedicine Visit: Patient has requested telehealth visit and Services only offered telehealth    Originating Site (Patient Location): Patient's home    Distant Site (Provider Location): Provider's remote setting    Consent:  The patient/guardian has verbally consented to: the potential risks and benefits of telemedicine (video visit) versus in person care; bill my insurance or make self-payment for services provided; and responsibility for payment of non-covered services.     Mode of Communication:  Video Conference via [employer]    As the provider I attest to compliance with applicable laws and regulations related to telemedicine.         Service Type:  Individual     Session Start Time: 1:04 pm  Session End Time : 1:56 pm      Session Length: 38 - 52      Attendees: Patient    Visit Activities (Refresh list every visit): [facility] Only    Diagnostic Assessment Date: 8/16/21  Treatment Plan Review Date: 12/27/21  See Flowsheets for today's PHQ-9 and COLLINS-7 results  Previous PHQ-9:   PHQ-9 SCORE 8/2/2021 12/27/2021   PHQ-9 Total Score MyChart 9 (Mild depression) 2 (Minimal depression)   PHQ-9 Total Score 9 2     Previous COLLINS-7:   COLLINS-7 SCORE 8/4/2021   Total Score 8 (mild anxiety)   Total Score 8       CATERINA LEVEL:  CATERINA Score (Last Two) 11/7/2019   CATERINA Raw Score 30   Activation Score 56   CATERINA Level 3       DATA  Extended Session (60+ minutes): No  Interactive Complexity: No  Crisis: No  [facility] Patient: No    Treatment Objective(s) Addressed in This Session:  Target Behavior(s): Grief and loss    Grief / Loss: will increase understanding of normal grieving process, will engage in  "effective approach to address and resolve grief/loss issues and will process grief/loss issues in an adaptive manner    Current Stressors / Issues:  Patient reported improvement since last session, but discussed sadness and anger secondary to not achieving pregnancy this past month. Patient reflected upon her experiences when she learned that she was not pregnant including the disappointment of not being pregnancy combined with a trigger for her grief.  Continued to validate and normalize her reactions and ongoing waves of grief, with her ability to cope being impacted by the ongoing pandemic and stress of having a young child without access to a vaccine. Patient discussed frustration with feeling like they did \"everything right\" and it still not working out. Validated and normalized range of emotions and ongoing realization of lack of control over this important aspect of her life. Patient stated that she does have a follow up appointment next week with her OBGYN to discuss her fertility, but is experiencing anxiety about outcomes and what be discovered. Patient confirmed that life feels like it is \"on hold\" and frustrations with feeling like she is in a holding pattern. Began to explore how to focus on \"what is\" versus \"what if and what was\" and ways to focus on meaningful and valuable activities and tasks help each day move forward.     Progress on Treatment Objective(s) / Homework:  Satisfactory progress - ACTION (Actively working towards change); Intervened by reinforcing change plan / affirming steps taken    Motivational Interviewing    MI Intervention: Expressed Empathy/Understanding, Permission to raise concern or advise, Open-ended questions and Reframe     Change Talk Expressed by the Patient: Ability to change Reasons to change Need to change    Provider Response to Change Talk: E - Evoked more info from patient about behavior change, A - Affirmed patient's thoughts, decisions, or attempts at behavior " change, R - Reflected patient's change talk and S - Summarized patient's change talk statements    Cognitive Behavioral Therapy: Discussed connection between thoughts, feelings, and behaviors. Taught patient how to identify cognitive distortions, and assisted patient to identify own cognitive distortions. Taught and engaged patient in cognitive restructuring techniques.     Mindfulness-Based Strategies    Discussed skills based on development and application of mindfulness    Also provided psychoeducation about behavioral health condition, symptoms, and treatment options    Care Plan review completed: No    Medication Review:  No current psychiatric medications prescribed    Medication Compliance:  NA    Changes in Health Issues:   None reported    Chemical Use Review:   Substance Use: Chemical use reviewed, no active concerns identified      Tobacco Use: No current tobacco use.      Assessment: Current Emotional / Mental Status (status of significant symptoms):  Risk status (Self / Other harm or suicidal ideation)  Patient denies a history of suicidal ideation, suicide attempts, self-injurious behavior, homicidal ideation, homicidal behavior and and other safety concerns  Patient denies current fears or concerns for personal safety.  Patient denies current or recent suicidal ideation or behaviors.  Patient denies current or recent homicidal ideation or behaviors.  Patient denies current or recent self injurious behavior or ideation.  Patient denies other safety concerns.  A safety and risk management plan has not been developed at this time, however patient was encouraged to call [place] [phone] should there be a change in any of these risk factors.    Appearance:   Appropriate   Eye Contact:   Good   Psychomotor Behavior: Normal   Attitude:   Cooperative   Orientation:   All  Speech   Rate / Production: Normal    Volume:  Normal   Mood:    Anxious   Affect:    Appropriate   Thought Content:  Clear   Thought  "Form:  Coherent  Logical   Insight:    Good     Diagnoses:  1. Adjustment disorder with mixed anxiety and depressed mood        Collateral Reports Completed:  Not Applicable    Plan: (Homework, other):  Patient was given information about behavioral services and encouraged to schedule a follow up appointment with the clinic [facility] in 3 weeks.  She was also given ongoing CBT techniques to assist with grief. CD Recommendations: No indications of CD issues.  Stefanie Irving, [facility]        Stefanie Irving, [facility]  2021    ______________________________________________________________________    Integrated Primary Care Behavioral Health Treatment Plan    Patient's Name: Griselda Bell  YOB: 1985    Date: 21    DSM-V Diagnoses: Adjustment Disorders  309.28 (F43.23) With mixed anxiety and depressed mood  Psychosocial / Contextual Factors: history of pregnancy termination due to Trisomy 21, recent loss at 16 weeks  with D&E  WHODAS:   WHODAS 2.0 Total Score 2021   Total Score 15   Total Score MyChart 15       Referral / Collaboration:  Referral to another professional/service is not indicated at this time..    Anticipated number of session or this episode of care: 6-8      MeasurableTreatment Goal(s) related to diagnosis / functional impairment(s)  Goal 1: Patient will will engage in effective approach to address and resolve grief/loss issues and will process grief/loss issues in an adaptive manner    I will know I've met my goal when I feel like the grief is less intense\"    Objective #A (Patient Action)    Patient will increase understanding of steps in the grief process.  Status: Continued - Date(s):21     Intervention(s)  [facility] will teach about  grief and loss.    Objective #B  Patient will use cognitive strategies identified in therapy to challenge anxious thoughts related to the pregnancy loss.  Status: Continued - Date(s):21     Intervention(s)  [facility] will teach " cognitive restructuring and defusion techniques.    Objective #C  Patient will increase confidence in ability to navigate interactions with others after the loss.  Status: Continued - Date(s):  12/27/21    Intervention(s)  [facility] will assist patient to navigate situations through role play and cognitive rehearsal.    Patient has reviewed and agreed to the above plan.      Stefanie Villatoro, [facility]  December 27, 2021

## 2022-02-10 ENCOUNTER — VIRTUAL VISIT (OUTPATIENT)
Dept: BEHAVIORAL HEALTH | Facility: CLINIC | Age: 37
End: 2022-02-10
Payer: COMMERCIAL

## 2022-02-10 DIAGNOSIS — F43.23 ADJUSTMENT DISORDER WITH MIXED ANXIETY AND DEPRESSED MOOD: Primary | ICD-10-CM

## 2022-02-10 PROCEDURE — 90834 PSYTX W PT 45 MINUTES: CPT | Mod: 95 | Performed by: SOCIAL WORKER

## 2022-02-10 ASSESSMENT — PATIENT HEALTH QUESTIONNAIRE - PHQ9
10. IF YOU CHECKED OFF ANY PROBLEMS, HOW DIFFICULT HAVE THESE PROBLEMS MADE IT FOR YOU TO DO YOUR WORK, TAKE CARE OF THINGS AT HOME, OR GET ALONG WITH OTHER PEOPLE: SOMEWHAT DIFFICULT
SUM OF ALL RESPONSES TO PHQ QUESTIONS 1-9: 3
SUM OF ALL RESPONSES TO PHQ QUESTIONS 1-9: 3

## 2022-02-10 NOTE — PROGRESS NOTES
[facility] Maternal Fetal Medicine Clinic- Integrated Behavioral Health Services  February 10, 2022    Behavioral Health Clinician Progress Note    Patient Name: Griselda Bell      Telemedicine Visit: The patient's condition can be safely assessed and treated via synchronous audio and visual telemedicine encounter.      Reason for Telemedicine Visit: Patient has requested telehealth visit and Services only offered telehealth    Originating Site (Patient Location): Patient's home    Distant Site (Provider Location): [facility]    Consent:  The patient/guardian has verbally consented to: the potential risks and benefits of telemedicine (video visit) versus in person care; bill my insurance or make self-payment for services provided; and responsibility for payment of non-covered services.     Mode of Communication:  Video Conference via [employer]    As the provider I attest to compliance with applicable laws and regulations related to telemedicine.         Service Type:  Individual     Session Start Time: 8:31 am  Session End Time : 9:22 am      Session Length: 38 - 52      Attendees: Patient    Visit Activities (Refresh list every visit): [facility] Only    Diagnostic Assessment Date: 8/16/21  Treatment Plan Review Date: 12/27/21  See Flowsheets for today's PHQ-9 and COLLINS-7 results  Previous PHQ-9:   PHQ-9 SCORE 8/2/2021 12/27/2021 2/10/2022   PHQ-9 Total Score MyChart 9 (Mild depression) 2 (Minimal depression) 3 (Minimal depression)   PHQ-9 Total Score 9 2 3     Previous COLLINS-7:   COLLINS-7 SCORE 8/4/2021   Total Score 8 (mild anxiety)   Total Score 8       CATERINA LEVEL:  CATERINA Score (Last Two) 11/7/2019   CATERINA Raw Score 30   Activation Score 56   CATERINA Level 3       DATA  Extended Session (60+ minutes): No  Interactive Complexity: No  Crisis: No  [facility] Patient: No    Treatment Objective(s) Addressed in This Session:  Target Behavior(s): Grief and loss    Grief / Loss: will  increase understanding of normal grieving process, will engage in effective approach to address and resolve grief/loss issues and will process grief/loss issues in an adaptive manner    Current Stressors / Issues:  Patient reported overall she feels like she is doing well, but discussed episodic moments of anxiety, grief, and sadness. She shared that she recently started her period and she experienced that intense sadness for not being pregnant this month. Patient shared that last month she had numerous polyps removed and had been hopeful that she would have achieved a pregnancy since that was successful in the past and lead to a pregnancy. Patient has follow up appointments with her OBGYN, but has started to consider how much longer she wants to try to achieve a pregnancy. She recognizes that she has not been trying for too many months yet, but thinks it may be helpful to start to think about moving forward as a family of three. Patient was open to questions to consider and process through with her  and how to continue to feel like life is moving forward as they continue to consider pregnancy. Patient discussed recent connect with a friend who has had similar experiences with loses, felt positive about the connection, but only to feel sadness when her friend said that she was not ready to connect in that way. Patient shared that her friend made the comment about her choice to cope by avoiding her reality, with patient expressing sadness about not being able to connect with her , while understanding, due to the benefits of feeling like someone could actually relate. Patient discussed upcoming work trip, a plan to meet with a contractor for a house project, and hope for the future as her toddler may be eligible for a COVID vaccine soon.      Progress on Treatment Objective(s) / Homework:  Stable - ACTION (Actively working towards change); Intervened by reinforcing change plan / affirming steps  taken    Motivational Interviewing    MI Intervention: Expressed Empathy/Understanding, Permission to raise concern or advise, Open-ended questions and Reframe     Change Talk Expressed by the Patient: Ability to change Reasons to change Need to change    Provider Response to Change Talk: E - Evoked more info from patient about behavior change, A - Affirmed patient's thoughts, decisions, or attempts at behavior change, R - Reflected patient's change talk and S - Summarized patient's change talk statements    Cognitive Behavioral Therapy: Discussed connection between thoughts, feelings, and behaviors. Taught patient how to identify cognitive distortions, and assisted patient to identify own cognitive distortions. Taught and engaged patient in cognitive restructuring techniques.     Mindfulness-Based Strategies    Discussed skills based on development and application of mindfulness    Also provided psychoeducation about behavioral health condition, symptoms, and treatment options    Care Plan review completed: No    Medication Review:  No current psychiatric medications prescribed    Medication Compliance:  NA    Changes in Health Issues:   None reported    Chemical Use Review:   Substance Use: Chemical use reviewed, no active concerns identified      Tobacco Use: No current tobacco use.      Assessment: Current Emotional / Mental Status (status of significant symptoms):  Risk status (Self / Other harm or suicidal ideation)  Patient denies a history of suicidal ideation, suicide attempts, self-injurious behavior, homicidal ideation, homicidal behavior and and other safety concerns  Patient denies current fears or concerns for personal safety.  Patient denies current or recent suicidal ideation or behaviors.  Patient denies current or recent homicidal ideation or behaviors.  Patient denies current or recent self injurious behavior or ideation.  Patient denies other safety concerns.  A safety and risk management plan  has not been developed at this time, however patient was encouraged to call [place] [phone] should there be a change in any of these risk factors.    Appearance:   Appropriate   Eye Contact:   Good   Psychomotor Behavior: Normal   Attitude:   Cooperative   Orientation:   All  Speech   Rate / Production: Normal    Volume:  Normal   Mood:    Anxious   Affect:    Tearful  Thought Content:  Clear   Thought Form:  Coherent  Logical   Insight:    Good     Diagnoses:  1. Adjustment disorder with mixed anxiety and depressed mood        Collateral Reports Completed:  Not Applicable    Plan: (Homework, other):  Patient was given information about behavioral services and encouraged to schedule a follow up appointment with the clinic [facility] in 1 month.  She was also given ongoing CBT techniques to assist with grief and anxiety. CD Recommendations: No indications of CD issues.  Stefanie Villatoro, [facility]        Stefanie Villatoro [facility]  August 25, 2021    ______________________________________________________________________    Integrated Primary Care Behavioral Health Treatment Plan    Patient's Name: Griselda Bell  YOB: 1985    Date: 12/27/21    DSM-V Diagnoses: Adjustment Disorders  309.28 (F43.23) With mixed anxiety and depressed mood  Psychosocial / Contextual Factors: history of pregnancy termination due to Trisomy 21, recent loss at 16 weeks  with D&E  WHODAS:   WHODAS 2.0 Total Score 8/4/2021   Total Score 15   Total Score MyChart 15       Referral / Collaboration:  Referral to another professional/service is not indicated at this time..    Anticipated number of session or this episode of care: 6-8      MeasurableTreatment Goal(s) related to diagnosis / functional impairment(s)  Goal 1: Patient will will engage in effective approach to address and resolve grief/loss issues and will process grief/loss issues in an adaptive manner    I will know I've met my goal when I feel like the grief is less  "intense\"    Objective #A (Patient Action)    Patient will increase understanding of steps in the grief process.  Status: Continued - Date(s):21     Intervention(s)  [facility] will teach about  grief and loss.    Objective #B  Patient will use cognitive strategies identified in therapy to challenge anxious thoughts related to the pregnancy loss.  Status: Continued - Date(s):21     Intervention(s)  [facility] will teach cognitive restructuring and defusion techniques.    Objective #C  Patient will increase confidence in ability to navigate interactions with others after the loss.  Status: Continued - Date(s):  21    Intervention(s)  [facility] will assist patient to navigate situations through role play and cognitive rehearsal.    Patient has reviewed and agreed to the above plan.      Stefanie Villatoro [facility]  2021  "

## 2022-02-11 ASSESSMENT — PATIENT HEALTH QUESTIONNAIRE - PHQ9: SUM OF ALL RESPONSES TO PHQ QUESTIONS 1-9: 3

## 2022-03-09 ENCOUNTER — VIRTUAL VISIT (OUTPATIENT)
Dept: BEHAVIORAL HEALTH | Facility: CLINIC | Age: 37
End: 2022-03-09
Payer: COMMERCIAL

## 2022-03-09 DIAGNOSIS — F43.23 ADJUSTMENT DISORDER WITH MIXED ANXIETY AND DEPRESSED MOOD: Primary | ICD-10-CM

## 2022-03-09 PROCEDURE — 90834 PSYTX W PT 45 MINUTES: CPT | Mod: 95 | Performed by: SOCIAL WORKER

## 2022-03-09 ASSESSMENT — ANXIETY QUESTIONNAIRES
5. BEING SO RESTLESS THAT IT IS HARD TO SIT STILL: NOT AT ALL
2. NOT BEING ABLE TO STOP OR CONTROL WORRYING: SEVERAL DAYS
7. FEELING AFRAID AS IF SOMETHING AWFUL MIGHT HAPPEN: SEVERAL DAYS
1. FEELING NERVOUS, ANXIOUS, OR ON EDGE: SEVERAL DAYS
4. TROUBLE RELAXING: NOT AT ALL
GAD7 TOTAL SCORE: 5
6. BECOMING EASILY ANNOYED OR IRRITABLE: SEVERAL DAYS
3. WORRYING TOO MUCH ABOUT DIFFERENT THINGS: SEVERAL DAYS
GAD7 TOTAL SCORE: 5
7. FEELING AFRAID AS IF SOMETHING AWFUL MIGHT HAPPEN: SEVERAL DAYS

## 2022-03-09 NOTE — PROGRESS NOTES
[facility] Maternal Fetal [facility]- Integrated Behavioral Health Services  March 9, 2022    Behavioral Health Clinician Progress Note    Patient Name: Griselda Bell      Telemedicine Visit: The patient's condition can be safely assessed and treated via synchronous audio and visual telemedicine encounter.      Reason for Telemedicine Visit: Patient has requested telehealth visit and Services only offered telehealth    Originating Site (Patient Location): Patient's home    Distant Site (Provider Location): [facility] [place]    Consent:  The patient/guardian has verbally consented to: the potential risks and benefits of telemedicine (video visit) versus in person care; bill my insurance or make self-payment for services provided; and responsibility for payment of non-covered services.     Mode of Communication:  Video Conference via [employer]    As the provider I attest to compliance with applicable laws and regulations related to telemedicine.         Service Type:  Individual     Session Start Time: 8:32 am  Session End Time 9: 24 am      Session Length: 38 - 52      Attendees: Patient    Visit Activities (Refresh list every visit): [facility] Only    Diagnostic Assessment Date: 8/16/21  Treatment Plan Review Date: 12/27/21  See Flowsheets for today's PHQ-9 and COLLINS-7 results  Previous PHQ-9:   PHQ-9 SCORE 8/2/2021 12/27/2021 2/10/2022   PHQ-9 Total Score MyChart 9 (Mild depression) 2 (Minimal depression) 3 (Minimal depression)   PHQ-9 Total Score 9 2 3     Previous COLLINS-7:   COLLINS-7 SCORE 8/4/2021 3/9/2022   Total Score 8 (mild anxiety) 5 (mild anxiety)   Total Score 8 5       CATERINA LEVEL:  CATERINA Score (Last Two) 11/7/2019   CATERINA Raw Score 30   Activation Score 56   CATERINA Level 3       DATA  Extended Session (60+ minutes): No  Interactive Complexity: No  Crisis: No  [facility] Patient: No    Treatment Objective(s) Addressed in This Session:  Target Behavior(s): Grief and  loss    Grief / Loss: will increase understanding of normal grieving process, will engage in effective approach to address and resolve grief/loss issues and will process grief/loss issues in an adaptive manner    Current Stressors / Issues:  Patient reported that she took a home pregnancy test yesterday, got negative results, and felt intense emotion in the moment. She shared that it was hard to cope with the emotions and was feeling like she did not want to try to conceive again. Patient shared that she was preparing to accept being a family of three when she took two home pregnancy tests this morning that were positive. She discussed feeling like she is in a state of shock and reflected upon not knowing what to think and feel. Validated and normalized range of emotions and current processing of the news that she is pregnant. Continued to identify what will be helpful in the next week and explored how she wants to utilize and manage supports. Patient expressing concern that she will feel this way for the entire pregnancy but was open and receptive to psycho-education about the experience of pregnancy after loss.     Progress on Treatment Objective(s) / Homework:  Worsening - ACTION (Actively working towards change); Intervened by reinforcing change plan / affirming steps taken    Motivational Interviewing    MI Intervention: Expressed Empathy/Understanding, Permission to raise concern or advise, Open-ended questions and Reframe     Change Talk Expressed by the Patient: Ability to change Reasons to change Need to change    Provider Response to Change Talk: E - Evoked more info from patient about behavior change, A - Affirmed patient's thoughts, decisions, or attempts at behavior change, R - Reflected patient's change talk and S - Summarized patient's change talk statements    Cognitive Behavioral Therapy: Discussed connection between thoughts, feelings, and behaviors. Taught patient how to identify cognitive  distortions, and assisted patient to identify own cognitive distortions. Taught and engaged patient in cognitive restructuring techniques.     Mindfulness-Based Strategies    Discussed skills based on development and application of mindfulness    Also provided psychoeducation about behavioral health condition, symptoms, and treatment options    Care Plan review completed: No    Medication Review:  No current psychiatric medications prescribed    Medication Compliance:  NA    Changes in Health Issues:   None reported    Chemical Use Review:   Substance Use: Chemical use reviewed, no active concerns identified      Tobacco Use: No current tobacco use.      Assessment: Current Emotional / Mental Status (status of significant symptoms):  Risk status (Self / Other harm or suicidal ideation)  Patient denies a history of suicidal ideation, suicide attempts, self-injurious behavior, homicidal ideation, homicidal behavior and and other safety concerns  Patient denies current fears or concerns for personal safety.  Patient denies current or recent suicidal ideation or behaviors.  Patient denies current or recent homicidal ideation or behaviors.  Patient denies current or recent self injurious behavior or ideation.  Patient denies other safety concerns.  A safety and risk management plan has not been developed at this time, however patient was encouraged to call [place] [phone] should there be a change in any of these risk factors.    Appearance:   Appropriate   Eye Contact:   Good   Psychomotor Behavior: Normal   Attitude:   Cooperative   Orientation:   All  Speech   Rate / Production: Normal    Volume:  Normal   Mood:    Anxious   Affect:    Tearful  Thought Content:  Clear   Thought Form:  Coherent  Logical   Insight:    Good     Diagnoses:  1. Adjustment disorder with mixed anxiety and depressed mood        Collateral Reports Completed:  Not Applicable    Plan: (Homework, other):  Patient was given information about  "behavioral services and encouraged to schedule a follow up appointment with the clinic [facility] in 2 weeks.  She was also given ongoing CBT techniques to assist with anxiety. Patient given resources on pregnancy after loss support groups. CD Recommendations: No indications of CD issues.  Stefanie Villatoro, SULMA Harrisonah Irving, SULMA  2021    ______________________________________________________________________    Integrated Primary Care Behavioral Health Treatment Plan    Patient's Name: Griselda Bell  YOB: 1985                                                Individual Treatment Plan    Date of Creation: 21  Date Treatment Plan Last Reviewed/Revised: 21    DSM-V Diagnoses: Adjustment Disorders  309.28 (F43.23) With mixed anxiety and depressed mood  Psychosocial / Contextual Factors: history of pregnancy termination due to Trisomy 21, recent loss at 16 weeks  with D&E  WHODAS:   WHODAS 2.0 Total Score 2021   Total Score 15   Total Score MyChart 15     Referral / Collaboration:  Referral to another professional/service is not indicated at this time..    Anticipated number of session for this episode of care: 8-10  Anticipation frequency of session: Monthly  Anticipated Duration of each session: 38-52 minutes  Treatment plan will be reviewed in 90 days or when goals have been changed.       MeasurableTreatment Goal(s) related to diagnosis / functional impairment(s)  Goal 1: Patient will will engage in effective approach to address and resolve grief/loss issues and will process grief/loss issues in an adaptive manner    I will know I've met my goal when I feel like the grief is less intense\"    Objective #A (Patient Action)    Patient will increase understanding of steps in the grief process.  Status: Continued - Date(s):21     Intervention(s)  [facility] will teach about  grief and loss.    Objective #B  Patient will use cognitive strategies identified in therapy to " challenge anxious thoughts related to the pregnancy loss.  Status: Continued - Date(s):12/27/21     Intervention(s)  [facility] will teach cognitive restructuring and defusion techniques.    Objective #C  Patient will increase confidence in ability to navigate interactions with others after the loss.  Status: Continued - Date(s):  12/27/21    Intervention(s)  [facility] will assist patient to navigate situations through role play and cognitive rehearsal.    Patient has reviewed and agreed to the above plan.    SULAM Gonzalez  December 27, 2021

## 2022-03-10 ASSESSMENT — ANXIETY QUESTIONNAIRES: GAD7 TOTAL SCORE: 5

## 2022-03-24 ENCOUNTER — VIRTUAL VISIT (OUTPATIENT)
Dept: BEHAVIORAL HEALTH | Facility: CLINIC | Age: 37
End: 2022-03-24
Payer: COMMERCIAL

## 2022-03-24 DIAGNOSIS — F43.23 ADJUSTMENT DISORDER WITH MIXED ANXIETY AND DEPRESSED MOOD: Primary | ICD-10-CM

## 2022-03-24 PROCEDURE — 90834 PSYTX W PT 45 MINUTES: CPT | Mod: 95 | Performed by: SOCIAL WORKER

## 2022-03-24 ASSESSMENT — ANXIETY QUESTIONNAIRES
2. NOT BEING ABLE TO STOP OR CONTROL WORRYING: SEVERAL DAYS
GAD7 TOTAL SCORE: 4
1. FEELING NERVOUS, ANXIOUS, OR ON EDGE: SEVERAL DAYS
7. FEELING AFRAID AS IF SOMETHING AWFUL MIGHT HAPPEN: SEVERAL DAYS
5. BEING SO RESTLESS THAT IT IS HARD TO SIT STILL: NOT AT ALL
6. BECOMING EASILY ANNOYED OR IRRITABLE: NOT AT ALL
GAD7 TOTAL SCORE: 4
3. WORRYING TOO MUCH ABOUT DIFFERENT THINGS: SEVERAL DAYS
GAD7 TOTAL SCORE: 4
7. FEELING AFRAID AS IF SOMETHING AWFUL MIGHT HAPPEN: SEVERAL DAYS
4. TROUBLE RELAXING: NOT AT ALL

## 2022-03-24 ASSESSMENT — PATIENT HEALTH QUESTIONNAIRE - PHQ9
SUM OF ALL RESPONSES TO PHQ QUESTIONS 1-9: 2
SUM OF ALL RESPONSES TO PHQ QUESTIONS 1-9: 2
10. IF YOU CHECKED OFF ANY PROBLEMS, HOW DIFFICULT HAVE THESE PROBLEMS MADE IT FOR YOU TO DO YOUR WORK, TAKE CARE OF THINGS AT HOME, OR GET ALONG WITH OTHER PEOPLE: NOT DIFFICULT AT ALL

## 2022-03-24 NOTE — PROGRESS NOTES
[facility] Maternal Fetal Medicine Clinic- Integrated Behavioral Health Services  March 24, 2022    Behavioral Health Clinician Progress Note    Patient Name: Griselda Bell      Telemedicine Visit: The patient's condition can be safely assessed and treated via synchronous audio and visual telemedicine encounter.      Reason for Telemedicine Visit: Patient has requested telehealth visit and Services only offered telehealth    Originating Site (Patient Location): Patient's home    Distant Site (Provider Location): Provider- remote home office     Consent:  The patient/guardian has verbally consented to: the potential risks and benefits of telemedicine (video visit) versus in person care; bill my insurance or make self-payment for services provided; and responsibility for payment of non-covered services.     Mode of Communication:  Video Conference via [employer]    As the provider I attest to compliance with applicable laws and regulations related to telemedicine.         Service Type:  Individual     Session Start Time: 3: 02 pm  Session End Time  3:40 pm       Session Length: 38 - 52      Attendees: Patient    Visit Activities (Refresh list every visit): [facility] Only    Diagnostic Assessment Date: 8/16/21  Treatment Plan Review Date: 12/27/21  See Flowsheets for today's PHQ-9 and COLLINS-7 results  Previous PHQ-9:   PHQ-9 SCORE 12/27/2021 2/10/2022 3/24/2022   PHQ-9 Total Score MyChart 2 (Minimal depression) 3 (Minimal depression) 2 (Minimal depression)   PHQ-9 Total Score 2 3 2     Previous COLLINS-7:   COLLINS-7 SCORE 8/4/2021 3/9/2022 3/24/2022   Total Score 8 (mild anxiety) 5 (mild anxiety) 4 (minimal anxiety)   Total Score 8 5 4       CATERINA LEVEL:  CATERINA Score (Last Two) 11/7/2019   CATERINA Raw Score 30   Activation Score 56   CATERINA Level 3       DATA  Extended Session (60+ minutes): No  Interactive Complexity: No  Crisis: No  BH Patient: No    Treatment Objective(s) Addressed in This Session:  Target Behavior(s): Grief  and loss    Grief / Loss: will increase understanding of normal grieving process, will engage in effective approach to address and resolve grief/loss issues and will process grief/loss issues in an adaptive manner    Current Stressors / Issues:  Patient stated that after she learned of her pregnancy, she had lab work completed, and experienced acute anxiety as there was confusion if she was experiencing a viable pregnancy, a miscarriage, or a possible ectopic pregnancy. Patient shared that her miscarriage started while she was starting a work trip to [place], and she has been in action mode with work and then returning home to a sick child. Patient shared that she has not had time to reflect upon how she feels or grieve, but shared that in the one week she thought she was pregnant, she had started to have hope and envision a future with another member to her family. Patient with feelings of anxiety related to how to move forward and if she feels prepared to be pregnant again. Validated and normalized her experiences, but also encouraged time to reflect and grieve before feeling need to make a decision about her future. Patient expressed hope that this weekend will bring more time to slow down and reflect.     Progress on Treatment Objective(s) / Homework:  Worsening - ACTION (Actively working towards change); Intervened by reinforcing change plan / affirming steps taken    Motivational Interviewing    MI Intervention: Expressed Empathy/Understanding, Permission to raise concern or advise, Open-ended questions and Reframe     Change Talk Expressed by the Patient: Ability to change Reasons to change Need to change    Provider Response to Change Talk: E - Evoked more info from patient about behavior change, A - Affirmed patient's thoughts, decisions, or attempts at behavior change, R - Reflected patient's change talk and S - Summarized patient's change talk statements    Cognitive Behavioral Therapy: Discussed connection  between thoughts, feelings, and behaviors. Taught patient how to identify cognitive distortions, and assisted patient to identify own cognitive distortions. Taught and engaged patient in cognitive restructuring techniques.     Mindfulness-Based Strategies    Discussed skills based on development and application of mindfulness    Also provided psychoeducation about behavioral health condition, symptoms, and treatment options    Care Plan review completed: No    Medication Review:  No current psychiatric medications prescribed    Medication Compliance:  NA    Changes in Health Issues:   None reported    Chemical Use Review:   Substance Use: Chemical use reviewed, no active concerns identified      Tobacco Use: No current tobacco use.      Assessment: Current Emotional / Mental Status (status of significant symptoms):  Risk status (Self / Other harm or suicidal ideation)  Patient denies a history of suicidal ideation, suicide attempts, self-injurious behavior, homicidal ideation, homicidal behavior and and other safety concerns  Patient denies current fears or concerns for personal safety.  Patient denies current or recent suicidal ideation or behaviors.  Patient denies current or recent homicidal ideation or behaviors.  Patient denies current or recent self injurious behavior or ideation.  Patient denies other safety concerns.  A safety and risk management plan has not been developed at this time, however patient was encouraged to call [place] [phone] should there be a change in any of these risk factors.    Appearance:   Appropriate   Eye Contact:   Good   Psychomotor Behavior: Normal   Attitude:   Cooperative   Orientation:   All  Speech   Rate / Production: Normal    Volume:  Normal   Mood:    Anxious   Affect:    Tearful  Thought Content:  Clear   Thought Form:  Coherent  Logical   Insight:    Good     Diagnoses:  1. Adjustment disorder with mixed anxiety and depressed mood        Collateral Reports  "Completed:  Not Applicable    Plan: (Homework, other):  Patient was given information about behavioral services and encouraged to schedule a follow up appointment with the clinic [facility] in 2 weeks.  She was also given ongoing CBT techniques to assist with anxiety and recommendations to slow down and reflect. CD Recommendations: No indications of CD issues.  Stefanie Irving, [facility]        Stefanie Villatoro, [facility]  2021    ______________________________________________________________________    Integrated Primary Care Behavioral Health Treatment Plan    Patient's Name: rGiselda Bell  YOB: 1985                                                Individual Treatment Plan    Date of Creation: 21  Date Treatment Plan Last Reviewed/Revised: 21    DSM-V Diagnoses: Adjustment Disorders  309.28 (F43.23) With mixed anxiety and depressed mood  Psychosocial / Contextual Factors: history of pregnancy termination due to Trisomy 21, recent loss at 16 weeks  with D&E  WHODAS:   WHODAS 2.0 Total Score 2021   Total Score 15   Total Score MyChart 15     Referral / Collaboration:  Referral to another professional/service is not indicated at this time..    Anticipated number of session for this episode of care: 8-10  Anticipation frequency of session: Monthly  Anticipated Duration of each session: 38-52 minutes  Treatment plan will be reviewed in 90 days or when goals have been changed.       MeasurableTreatment Goal(s) related to diagnosis / functional impairment(s)  Goal 1: Patient will will engage in effective approach to address and resolve grief/loss issues and will process grief/loss issues in an adaptive manner    I will know I've met my goal when I feel like the grief is less intense\"    Objective #A (Patient Action)    Patient will increase understanding of steps in the grief process.  Status: Continued - Date(s):21     Intervention(s)  [facility] will teach about  grief and " loss.    Objective #B  Patient will use cognitive strategies identified in therapy to challenge anxious thoughts related to the pregnancy loss.  Status: Continued - Date(s):12/27/21     Intervention(s)  [facility] will teach cognitive restructuring and defusion techniques.    Objective #C  Patient will increase confidence in ability to navigate interactions with others after the loss.  Status: Continued - Date(s):  12/27/21    Intervention(s)  [facility] will assist patient to navigate situations through role play and cognitive rehearsal.    Patient has reviewed and agreed to the above plan.    Stefanie Villatoro, [facility]  December 27, 2021

## 2022-03-25 ASSESSMENT — PATIENT HEALTH QUESTIONNAIRE - PHQ9: SUM OF ALL RESPONSES TO PHQ QUESTIONS 1-9: 2

## 2022-03-25 ASSESSMENT — ANXIETY QUESTIONNAIRES: GAD7 TOTAL SCORE: 4

## 2022-04-08 ENCOUNTER — VIRTUAL VISIT (OUTPATIENT)
Dept: BEHAVIORAL HEALTH | Facility: CLINIC | Age: 37
End: 2022-04-08
Payer: COMMERCIAL

## 2022-04-08 DIAGNOSIS — F43.23 ADJUSTMENT DISORDER WITH MIXED ANXIETY AND DEPRESSED MOOD: Primary | ICD-10-CM

## 2022-04-08 PROCEDURE — 90834 PSYTX W PT 45 MINUTES: CPT | Mod: 95 | Performed by: SOCIAL WORKER

## 2022-04-08 NOTE — PROGRESS NOTES
[facility]etal [facility] Clinic- Integrated Behavioral Health Services  April 8, 2022    Behavioral Health Clinician Progress Note    Patient Name: Griselda Bell      Telemedicine Visit: The patient's condition can be safely assessed and treated via synchronous audio and visual telemedicine encounter.      Reason for Telemedicine Visit: Patient has requested telehealth visit and Services only offered telehealth    Originating Site (Patient Location): Patient's home    Distant Site (Provider Location):  [facility] Office    Consent:  The patient/guardian has verbally consented to: the potential risks and benefits of telemedicine (video visit) versus in person care; bill my insurance or make self-payment for services provided; and responsibility for payment of non-covered services.     Mode of Communication:  Video Conference via [employer]    As the provider I attest to compliance with applicable laws and regulations related to telemedicine.         Service Type:  Individual     Session Start Time: 8:34 am  Session End Time  9: 22 am       Session Length: 38 - 52      Attendees: Patient    Visit Activities (Refresh list every visit): [facility] Only    Diagnostic Assessment Date: 8/16/21  Treatment Plan Review Date: 4/8/22, next review due 7/8/22  See Flowsheets for today's PHQ-9 and COLLINS-7 results  Previous PHQ-9:   PHQ-9 SCORE 12/27/2021 2/10/2022 3/24/2022   PHQ-9 Total Score MyChart 2 (Minimal depression) 3 (Minimal depression) 2 (Minimal depression)   PHQ-9 Total Score 2 3 2     Previous COLLINS-7:   COLLINS-7 SCORE 8/4/2021 3/9/2022 3/24/2022   Total Score 8 (mild anxiety) 5 (mild anxiety) 4 (minimal anxiety)   Total Score 8 5 4       CATERINA LEVEL:  CATERINA Score (Last Two) 11/7/2019   CATERINA Raw Score 30   Activation Score 56   CATERINA Level 3       DATA  Extended Session (60+ minutes): No  Interactive Complexity: No  Crisis: No  [facility] Patient: No    Treatment Objective(s) Addressed in  This Session:  Target Behavior(s): Grief and loss    Grief / Loss: will increase understanding of normal grieving process, will engage in effective approach to address and resolve grief/loss issues and will process grief/loss issues in an adaptive manner    Current Stressors / Issues:  Patient shared that she has had time to reflect and process through the last couple of weeks. She stated that she feels more accepting of the miscarriage and feels like she is able to move forward in a way that feels different than the past few weeks. Patient stated that she feels like she wants to continue to try to conceive, but is also starting to return to thoughts of being a family of 3 or looking into adoption. Patient stated that she feels hopeful that they will be able to make a decision that feels right for her and her family. She reported less anxiety as she waits to learn if she is pregnant next week and feels like she can focus on other parts of her that help her shift her focus onto other topics as needed.  Discussed Accelerated Resolution Therapy with patient expressing interest in scheduling future ART session to help her process through her past pregnancy loses.     Progress on Treatment Objective(s) / Homework:  Satisfactory progress - ACTION (Actively working towards change); Intervened by reinforcing change plan / affirming steps taken    Motivational Interviewing    MI Intervention: Expressed Empathy/Understanding, Permission to raise concern or advise, Open-ended questions and Reframe     Change Talk Expressed by the Patient: Ability to change Reasons to change Need to change    Provider Response to Change Talk: E - Evoked more info from patient about behavior change, A - Affirmed patient's thoughts, decisions, or attempts at behavior change, R - Reflected patient's change talk and S - Summarized patient's change talk statements    Cognitive Behavioral Therapy: Discussed connection between thoughts, feelings, and  behaviors. Taught patient how to identify cognitive distortions, and assisted patient to identify own cognitive distortions. Taught and engaged patient in cognitive restructuring techniques.     Mindfulness-Based Strategies    Discussed skills based on development and application of mindfulness    Also provided psychoeducation about behavioral health condition, symptoms, and treatment options    Care Plan review completed: No    Medication Review:  No current psychiatric medications prescribed    Medication Compliance:  NA    Changes in Health Issues:   None reported    Chemical Use Review:   Substance Use: Chemical use reviewed, no active concerns identified      Tobacco Use: No current tobacco use.      Assessment: Current Emotional / Mental Status (status of significant symptoms):  Risk status (Self / Other harm or suicidal ideation)  Patient denies a history of suicidal ideation, suicide attempts, self-injurious behavior, homicidal ideation, homicidal behavior and and other safety concerns  Patient denies current fears or concerns for personal safety.  Patient denies current or recent suicidal ideation or behaviors.  Patient denies current or recent homicidal ideation or behaviors.  Patient denies current or recent self injurious behavior or ideation.  Patient denies other safety concerns.  A safety and risk management plan has not been developed at this time, however patient was encouraged to call [place] [phone] should there be a change in any of these risk factors.    Appearance:   Appropriate   Eye Contact:   Good   Psychomotor Behavior: Normal   Attitude:   Cooperative   Orientation:   All  Speech   Rate / Production: Normal    Volume:  Normal   Mood:    Normal  Affect:    Appropriate   Thought Content:  Clear   Thought Form:  Coherent  Logical   Insight:    Good     Diagnoses:  1. Adjustment disorder with mixed anxiety and depressed mood        Collateral Reports Completed:  Not Applicable    Plan:  "(Homework, other):  Patient was given information about behavioral services and encouraged to schedule a follow up appointment with the clinic [facility] in 2 weeks for an ART session  She was also given ongoing CBT techniques. CD Recommendations: No indications of CD issues.  Stefanie Villatoro, [facility]        Stefanie Irving, [facility]  2021    ______________________________________________________________________    Integrated Primary Care Behavioral Health Treatment Plan    Patient's Name: Griselda Bell  YOB: 1985                                                Individual Treatment Plan    Date of Creation: 21  Date Treatment Plan Last Reviewed/Revised: 22    DSM-V Diagnoses: Adjustment Disorders  309.28 (F43.23) With mixed anxiety and depressed mood  Psychosocial / Contextual Factors: history of pregnancy termination due to Trisomy 21, recent loss at 16 weeks  with D&E  WHODAS:   WHODAS 2.0 Total Score 2021   Total Score 15   Total Score [id number]     15     Sent Promis to patient to complete via [employer].     Referral / Collaboration:  Referral to another professional/service is not indicated at this time..    Anticipated number of session for this episode of care: 8-10  Anticipation frequency of session: Monthly  Anticipated Duration of each session: 38-52 minutes  Treatment plan will be reviewed in 90 days or when goals have been changed.       MeasurableTreatment Goal(s) related to diagnosis / functional impairment(s)  Goal 1: Patient will will engage in effective approach to address and resolve grief/loss issues and will process grief/loss issues in an adaptive manner    I will know I've met my goal when I feel like the grief is less intense\"    Objective #A (Patient Action)    Patient will increase understanding of steps in the grief process.  Status: Continued - Date(s): 22    Intervention(s)  [facility] will teach about  grief and loss.    Objective #B  Patient will use " cognitive strategies identified in therapy to challenge anxious thoughts related to the pregnancy loss.  Status: Continued - Date(s): 4/8/22    Intervention(s)  [facility] will teach cognitive restructuring and defusion techniques.    Objective #C  Patient will increase confidence in ability to navigate interactions with others after the loss.  Status: Continued - Date(s):  4/8/22    Intervention(s)  [facility] will assist patient to navigate situations through role play and cognitive rehearsal.    Patient has reviewed and agreed to the above plan.    Stefanie Villatoro, [facility]  April 8, 2022

## 2022-04-11 ENCOUNTER — TRANSFERRED RECORDS (OUTPATIENT)
Dept: HEALTH INFORMATION MANAGEMENT | Facility: CLINIC | Age: 37
End: 2022-04-11
Payer: COMMERCIAL

## 2022-04-25 ENCOUNTER — OFFICE VISIT (OUTPATIENT)
Dept: MATERNAL FETAL MEDICINE | Facility: CLINIC | Age: 37
End: 2022-04-25
Attending: SOCIAL WORKER
Payer: COMMERCIAL

## 2022-04-25 DIAGNOSIS — F43.23 ADJUSTMENT DISORDER WITH MIXED ANXIETY AND DEPRESSED MOOD: ICD-10-CM

## 2022-04-25 PROCEDURE — 90834 PSYTX W PT 45 MINUTES: CPT | Performed by: SOCIAL WORKER

## 2022-04-25 NOTE — PROGRESS NOTES
[facility] Maternal Fetal Medicine Clinic- Integrated Behavioral Health Services  April 25, 2022    Behavioral Health Clinician Progress Note    Patient Name: Griselda Bell           Service Type:  Individual      Service Location:   Face to Face in Clinic     Session Start Time: 2:32 pm  Session End Time: 3:21 pm      Session Length: 38 - 52      Attendees: Patient     Service Modality:  In-person    Visit Activities (Refresh list every visit): [facility] Only    Diagnostic Assessment Date: 8/16/21  Treatment Plan Review Date: 4/8/22, next review due 7/8/22  See Flowsheets for today's PHQ-9 and COLLINS-7 results  Previous PHQ-9:   PHQ-9 SCORE 12/27/2021 2/10/2022 3/24/2022   PHQ-9 Total Score MyChart 2 (Minimal depression) 3 (Minimal depression) 2 (Minimal depression)   PHQ-9 Total Score 2 3 2     Previous COLLINS-7:   COLLINS-7 SCORE 8/4/2021 3/9/2022 3/24/2022   Total Score 8 (mild anxiety) 5 (mild anxiety) 4 (minimal anxiety)   Total Score 8 5 4       CATERINA LEVEL:  CATERINA Score (Last Two) 11/7/2019   CATERINA Raw Score 30   Activation Score 56   CATERINA Level 3       DATA  Extended Session (60+ minutes): No  Interactive Complexity: No  Crisis: No  [facility] Patient: No    Treatment Objective(s) Addressed in This Session:  Target Behavior(s): Grief and loss    Grief / Loss: will increase understanding of normal grieving process, will engage in effective approach to address and resolve grief/loss issues and will process grief/loss issues in an adaptive manner    Current Stressors / Issues:  Patient reported overall doing well. She had follow up appointment with rheumatology with no significant findings. Patient stated that she and her partner are going to try and conceive again next month but she is starting to ask more questions about adoption. Patient expressed interest in ART session today to process through 2nd trimester miscarriage. Patient ranked ELIJAH at beginning of session as a 8/10. Patient tolerated session without complication.  Patient ended session with ELIJAH as a 2/10.  Patient expressed immediate relief and acceptance as she reflects back on her experience with the loss.      Progress on Treatment Objective(s) / Homework:  Satisfactory progress - ACTION (Actively working towards change); Intervened by reinforcing change plan / affirming steps taken    Motivational Interviewing    MI Intervention: Expressed Empathy/Understanding, Permission to raise concern or advise, Open-ended questions and Reframe     Change Talk Expressed by the Patient: Ability to change Reasons to change Need to change    Provider Response to Change Talk: E - Evoked more info from patient about behavior change, A - Affirmed patient's thoughts, decisions, or attempts at behavior change, R - Reflected patient's change talk and S - Summarized patient's change talk statements    Cognitive Behavioral Therapy: Discussed connection between thoughts, feelings, and behaviors. Taught patient how to identify cognitive distortions, and assisted patient to identify own cognitive distortions. Taught and engaged patient in cognitive restructuring techniques.     Mindfulness-Based Strategies    Discussed skills based on development and application of mindfulness    Also provided psychoeducation about behavioral health condition, symptoms, and treatment options    Care Plan review completed: No    Medication Review:  No current psychiatric medications prescribed    Medication Compliance:  NA    Changes in Health Issues:   None reported    Chemical Use Review:   Substance Use: Chemical use reviewed, no active concerns identified      Tobacco Use: No current tobacco use.      Assessment: Current Emotional / Mental Status (status of significant symptoms):  Risk status (Self / Other harm or suicidal ideation)  Patient denies a history of suicidal ideation, suicide attempts, self-injurious behavior, homicidal ideation, homicidal behavior and and other safety concerns  Patient denies  current fears or concerns for personal safety.  Patient denies current or recent suicidal ideation or behaviors.  Patient denies current or recent homicidal ideation or behaviors.  Patient denies current or recent self injurious behavior or ideation.  Patient denies other safety concerns.  A safety and risk management plan has not been developed at this time, however patient was encouraged to call [place] [phone] should there be a change in any of these risk factors.    Appearance:   Appropriate   Eye Contact:   Good   Psychomotor Behavior: Normal   Attitude:   Cooperative   Orientation:   All  Speech   Rate / Production: Normal    Volume:  Normal   Mood:    Normal  Affect:    Appropriate   Thought Content:  Clear   Thought Form:  Coherent  Logical   Insight:    Good     Diagnoses:  1. Adjustment disorder with mixed anxiety and depressed mood        Collateral Reports Completed:  Not Applicable    Plan: (Homework, other):  Patient was given information about behavioral services and encouraged to schedule a follow up appointment with the clinic [facility] in 2 weeks.  Patient participated in ART session, no tasks given between visits.  CD Recommendations: No indications of CD issues.  Stefanie Villatoro, [facility]        Stefanie Villatoro [facility]  August 25, 2021    ______________________________________________________________________    Integrated Primary Care Behavioral Health Treatment Plan    Patient's Name: Griselda Bell  YOB: 1985                                                Individual Treatment Plan    Date of Creation: 8/25/21  Date Treatment Plan Last Reviewed/Revised: 4/8/22    DSM-V Diagnoses: Adjustment Disorders  309.28 (F43.23) With mixed anxiety and depressed mood  Psychosocial / Contextual Factors: history of pregnancy termination due to Trisomy 21, recent loss at 16 weeks  with D&E  WHODAS:   WHODAS 2.0 Total Score 8/4/2021   Total Score 15   Total Score MyChart     15     Sent Promis to patient  "to complete via [employer].     Referral / Collaboration:  Referral to another professional/service is not indicated at this time..    Anticipated number of session for this episode of care: 8-10  Anticipation frequency of session: Monthly  Anticipated Duration of each session: 38-52 minutes  Treatment plan will be reviewed in 90 days or when goals have been changed.       MeasurableTreatment Goal(s) related to diagnosis / functional impairment(s)  Goal 1: Patient will will engage in effective approach to address and resolve grief/loss issues and will process grief/loss issues in an adaptive manner    I will know I've met my goal when I feel like the grief is less intense\"    Objective #A (Patient Action)    Patient will increase understanding of steps in the grief process.  Status: Continued - Date(s): 22    Intervention(s)  [facility] will teach about  grief and loss.    Objective #B  Patient will use cognitive strategies identified in therapy to challenge anxious thoughts related to the pregnancy loss.  Status: Continued - Date(s): 22    Intervention(s)  [facility] will teach cognitive restructuring and defusion techniques.    Objective #C  Patient will increase confidence in ability to navigate interactions with others after the loss.  Status: Continued - Date(s):  22    Intervention(s)  [facility] will assist patient to navigate situations through role play and cognitive rehearsal.    Patient has reviewed and agreed to the above plan.    Stefanie Villatoro, [facility]  2022  "

## 2022-05-11 ENCOUNTER — VIRTUAL VISIT (OUTPATIENT)
Dept: BEHAVIORAL HEALTH | Facility: CLINIC | Age: 37
End: 2022-05-11
Payer: COMMERCIAL

## 2022-05-11 DIAGNOSIS — F43.23 ADJUSTMENT DISORDER WITH MIXED ANXIETY AND DEPRESSED MOOD: Primary | ICD-10-CM

## 2022-05-11 PROCEDURE — 90834 PSYTX W PT 45 MINUTES: CPT | Mod: 95 | Performed by: SOCIAL WORKER

## 2022-05-11 ASSESSMENT — PATIENT HEALTH QUESTIONNAIRE - PHQ9
SUM OF ALL RESPONSES TO PHQ QUESTIONS 1-9: 1
SUM OF ALL RESPONSES TO PHQ QUESTIONS 1-9: 1
10. IF YOU CHECKED OFF ANY PROBLEMS, HOW DIFFICULT HAVE THESE PROBLEMS MADE IT FOR YOU TO DO YOUR WORK, TAKE CARE OF THINGS AT HOME, OR GET ALONG WITH OTHER PEOPLE: NOT DIFFICULT AT ALL

## 2022-05-11 NOTE — PROGRESS NOTES
[facility]- Integrated Behavioral Health Services  May 11, 2022    Behavioral Health Clinician Progress Note    Patient Name: Griselda Bell      Telemedicine Visit: The patient's condition can be safely assessed and treated via synchronous audio and visual telemedicine encounter.      Reason for Telemedicine Visit: Patient has requested telehealth visit and Services only offered telehealth    Originating Site (Patient Location): Patient's home    Distant Site (Provider Location):  [facility]    Consent:  The patient/guardian has verbally consented to: the potential risks and benefits of telemedicine (video visit) versus in person care; bill my insurance or make self-payment for services provided; and responsibility for payment of non-covered services.     Mode of Communication:  Video Conference via [employer]    As the provider I attest to compliance with applicable laws and regulations related to telemedicine.         Service Type:  Individual     Session Start Time: 8:32 am  Session End Time  9: 24 am       Session Length: 38 - 52      Attendees: Patient    Visit Activities (Refresh list every visit): [facility] Only    Diagnostic Assessment Date: 8/16/21  Treatment Plan Review Date: 4/8/22, next review due 7/8/22  See Flowsheets for today's PHQ-9 and COLLINS-7 results  Previous PHQ-9:   PHQ-9 SCORE 2/10/2022 3/24/2022 5/11/2022   PHQ-9 Total Score MyChart 3 (Minimal depression) 2 (Minimal depression) 1 (Minimal depression)   PHQ-9 Total Score 3 2 1     Previous COLLINS-7:   COLLINS-7 SCORE 8/4/2021 3/9/2022 3/24/2022   Total Score 8 (mild anxiety) 5 (mild anxiety) 4 (minimal anxiety)   Total Score 8 5 4       CATERINA LEVEL:  CATERINA Score (Last Two) 11/7/2019   CATERINA Raw Score 30   Activation Score 56   CATERINA Level 3       DATA  Extended Session (60+ minutes): No  Interactive Complexity: No  Crisis: No  State mental health facility Patient: No    Treatment Objective(s) Addressed  in This Session:  Target Behavior(s): Grief and loss    Grief / Loss: will increase understanding of normal grieving process, will engage in effective approach to address and resolve grief/loss issues and will process grief/loss issues in an adaptive manner    Current Stressors / Issues:  Patient reported that she learned earlier this week that she is pregnant. Patient reflected upon how it has felt for her to receive this news, including the acute anxiety as she wonders about viability. Patient is concerned about how her mental health may feel as her pregnancy progresses if this is how it feels this week.  Continued to validate, normalize, and provide psycho-education regarding pregnancy after loss and continued to explore how to cope with and manage anxieties in these early weeks. Patient has noticed changes since ART session including no physiological responses when being in clinic settings which she genuinely appreciates. During session, also noted patient expressed confidence in her ability to cope if another loss were to occur and her ability to continue to go to work even when she experienced roller coaster of emotions. Patient in agreement, and is also continuing to recognize greater acceptance for what the uncertainty of what future of her family may look like.    Progress on Treatment Objective(s) / Homework:  Satisfactory progress - ACTION (Actively working towards change); Intervened by reinforcing change plan / affirming steps taken    Motivational Interviewing    MI Intervention: Expressed Empathy/Understanding, Permission to raise concern or advise, Open-ended questions and Reframe     Change Talk Expressed by the Patient: Ability to change Reasons to change Need to change    Provider Response to Change Talk: E - Evoked more info from patient about behavior change, A - Affirmed patient's thoughts, decisions, or attempts at behavior change, R - Reflected patient's change talk and S - Summarized  patient's change talk statements    Cognitive Behavioral Therapy: Discussed connection between thoughts, feelings, and behaviors. Taught patient how to identify cognitive distortions, and assisted patient to identify own cognitive distortions. Taught and engaged patient in cognitive restructuring techniques.     Mindfulness-Based Strategies    Discussed skills based on development and application of mindfulness    Also provided psychoeducation about behavioral health condition, symptoms, and treatment options    Care Plan review completed: No    Medication Review:  No current psychiatric medications prescribed    Medication Compliance:  NA    Changes in Health Issues:   None reported    Chemical Use Review:   Substance Use: Chemical use reviewed, no active concerns identified      Tobacco Use: No current tobacco use.      Assessment: Current Emotional / Mental Status (status of significant symptoms):  Risk status (Self / Other harm or suicidal ideation)  Patient denies a history of suicidal ideation, suicide attempts, self-injurious behavior, homicidal ideation, homicidal behavior and and other safety concerns  Patient denies current fears or concerns for personal safety.  Patient denies current or recent suicidal ideation or behaviors.  Patient denies current or recent homicidal ideation or behaviors.  Patient denies current or recent self injurious behavior or ideation.  Patient denies other safety concerns.  A safety and risk management plan has not been developed at this time, however patient was encouraged to call [facility] should there be a change in any of these risk factors.    Appearance:   Appropriate   Eye Contact:   Good   Psychomotor Behavior: Normal   Attitude:   Cooperative   Orientation:   All  Speech   Rate / Production: Normal    Volume:  Normal   Mood:    Normal  Affect:    Appropriate   Thought Content:  Clear   Thought Form:  Coherent  Logical   Insight:    Good     Diagnoses:  1.  Adjustment disorder with mixed anxiety and depressed mood        Collateral Reports Completed:  Not Applicable    Plan: (Homework, other):  Patient was given information about behavioral services and encouraged to schedule a follow up appointment with the clinic [facility] in 2 weeks.  She was also given ongoing CBT techniques. Patient informed of provider's resignation in mid-. Patient would like a referral for a  therapist with plan to have at least two more sessions before this provider's last day.  Will plan on one virtual appoitnment and one more ART session. CD Recommendations: No indications of CD issues.  Stefanie Villatoro, [facility]        Stefanie Villatoro, SHAD  2021    ______________________________________________________________________    Integrated Primary Care Behavioral Health Treatment Plan    Patient's Name: Griselda Bell  YOB: 1985                                                Individual Treatment Plan    Date of Creation: 21  Date Treatment Plan Last Reviewed/Revised: 22    DSM-V Diagnoses: Adjustment Disorders  309.28 (F43.23) With mixed anxiety and depressed mood  Psychosocial / Contextual Factors: history of pregnancy termination due to Trisomy 13, history of pregnancy loss at 16 weeks  with D&E  WHODAS:   WHODAS 2.0 Total Score 2021   Total Score 15   Total Score MyChart     15     Promis completed 22    Referral / Collaboration:  Referral to another professional/service is not indicated at this time..    Anticipated number of session for this episode of care: 8-10  Anticipation frequency of session: Monthly  Anticipated Duration of each session: 38-52 minutes  Treatment plan will be reviewed in 90 days or when goals have been changed.       MeasurableTreatment Goal(s) related to diagnosis / functional impairment(s)  Goal 1: Patient will will engage in effective approach to address and resolve grief/loss issues and will process grief/loss issues  "in an adaptive manner    I will know I've met my goal when I feel like the grief is less intense\"    Objective #A (Patient Action)    Patient will increase understanding of steps in the grief process.  Status: Continued - Date(s): 22    Intervention(s)  [facility] will teach about  grief and loss.    Objective #B  Patient will use cognitive strategies identified in therapy to challenge anxious thoughts related to the pregnancy loss.  Status: Continued - Date(s): 22    Intervention(s)  [facility] will teach cognitive restructuring and defusion techniques.    Objective #C  Patient will increase confidence in ability to navigate interactions with others after the loss.  Status: Continued - Date(s):  22    Intervention(s)  [facility] will assist patient to navigate situations through role play and cognitive rehearsal.    Patient has reviewed and agreed to the above plan.    Stefanie Villatoro, [facility]  2022  "

## 2022-05-12 ASSESSMENT — PATIENT HEALTH QUESTIONNAIRE - PHQ9: SUM OF ALL RESPONSES TO PHQ QUESTIONS 1-9: 1

## 2022-05-21 ENCOUNTER — HEALTH MAINTENANCE LETTER (OUTPATIENT)
Age: 37
End: 2022-05-21

## 2022-05-26 ENCOUNTER — VIRTUAL VISIT (OUTPATIENT)
Dept: BEHAVIORAL HEALTH | Facility: CLINIC | Age: 37
End: 2022-05-26
Payer: COMMERCIAL

## 2022-05-26 DIAGNOSIS — F43.23 ADJUSTMENT DISORDER WITH MIXED ANXIETY AND DEPRESSED MOOD: Primary | ICD-10-CM

## 2022-05-26 PROCEDURE — 90834 PSYTX W PT 45 MINUTES: CPT | Mod: 95 | Performed by: SOCIAL WORKER

## 2022-05-26 NOTE — PROGRESS NOTES
[facility] Maternal [facility]etal Medicine [facility]- Integrated Behavioral Health Services  May 26, 2022    Behavioral Health Clinician Progress Note    Patient Name: Griselda eBll      Telemedicine Visit: The patient's condition can be safely assessed and treated via synchronous audio and visual telemedicine encounter.      Reason for Telemedicine Visit: Patient has requested telehealth visit and Services only offered telehealth    Originating Site (Patient Location): Patient's home    Distant Site (Provider Location):  [facility]    Consent:  The patient/guardian has verbally consented to: the potential risks and benefits of telemedicine (video visit) versus in person care; bill my insurance or make self-payment for services provided; and responsibility for payment of non-covered services.     Mode of Communication:  Video Conference via [employer]++    As the provider I attest to compliance with applicable laws and regulations related to telemedicine.         Service Type:  Individual     Session Start Time: 8:32 am  Session End Time  9:18 am       Session Length: 38 - 52      Attendees: Patient    Visit Activities (Refresh list every visit): [facility] Only    Diagnostic Assessment Date: 8/16/21  Treatment Plan Review Date: 4/8/22, next review due 7/8/22  See Flowsheets for today's PHQ-9 and COLLINS-7 results  Previous PHQ-9:   PHQ-9 SCORE 2/10/2022 3/24/2022 5/11/2022   PHQ-9 Total Score MyChart 3 (Minimal depression) 2 (Minimal depression) 1 (Minimal depression)   PHQ-9 Total Score 3 2 1     Previous COLLINS-7:   COLLINS-7 SCORE 8/4/2021 3/9/2022 3/24/2022   Total Score 8 (mild anxiety) 5 (mild anxiety) 4 (minimal anxiety)   Total Score 8 5 4       CATERINA LEVEL:  CATERINA Score (Last Two) 11/7/2019   CATERINA Raw Score 30   Activation Score 56   CATERINA Level 3       DATA  Extended Session (60+ minutes): No  Interactive Complexity: No  Crisis: No  [facility] Patient: No    Treatment Objective(s) Addressed in  This Session:  Target Behavior(s): Grief and loss    Grief / Loss: will increase understanding of normal grieving process, will engage in effective approach to address and resolve grief/loss issues and will process grief/loss issues in an adaptive manner    Current Stressors / Issues:  Patient reported that she and her family all have recently tested positive for COVID. She shared that she is slowly recovering, but reflected upon the experience of having COVID, working, caring for her daughter, and managing anxiety related to her pregnancy. Patient shared that she has received news about her HCG levels increasing as one would anticipate and hope, and had been coping well overall until last night when she started to hold an intense fear about miscarriage. Patient is noticing less hope and optimism with each subsequent pregnancy about the pregnancy continuing. While she knows it makes sense given all that she has gone through ,she wishes she could feel more excited like she did with her first pregnancy. Patient was open to exploring how to manage her fears and hold emotions during this time.      Progress on Treatment Objective(s) / Homework:  No improvement - PREPARATION (Decided to change - considering how); Intervened by negotiating a change plan and determining options / strategies for behavior change, identifying triggers, exploring social supports, and working towards setting a date to begin behavior change    Motivational Interviewing    MI Intervention: Expressed Empathy/Understanding, Permission to raise concern or advise, Open-ended questions and Reframe     Change Talk Expressed by the Patient: Ability to change Reasons to change Need to change    Provider Response to Change Talk: E - Evoked more info from patient about behavior change, A - Affirmed patient's thoughts, decisions, or attempts at behavior change, R - Reflected patient's change talk and S - Summarized patient's change talk  statements    Cognitive Behavioral Therapy: Discussed connection between thoughts, feelings, and behaviors. Taught patient how to identify cognitive distortions, and assisted patient to identify own cognitive distortions. Taught and engaged patient in cognitive restructuring techniques.     Mindfulness-Based Strategies    Discussed skills based on development and application of mindfulness    Also provided psychoeducation about behavioral health condition, symptoms, and treatment options    Care Plan review completed: No    Medication Review:  No current psychiatric medications prescribed    Medication Compliance:  NA    Changes in Health Issues:   None reported    Chemical Use Review:   Substance Use: Chemical use reviewed, no active concerns identified      Tobacco Use: No current tobacco use.      Assessment: Current Emotional / Mental Status (status of significant symptoms):  Risk status (Self / Other harm or suicidal ideation)  Patient denies a history of suicidal ideation, suicide attempts, self-injurious behavior, homicidal ideation, homicidal behavior and and other safety concerns  Patient denies current fears or concerns for personal safety.  Patient denies current or recent suicidal ideation or behaviors.  Patient denies current or recent homicidal ideation or behaviors.  Patient denies current or recent self injurious behavior or ideation.  Patient denies other safety concerns.  A safety and risk management plan has not been developed at this time, however patient was encouraged to call [facility] should there be a change in any of these risk factors.    Appearance:   Appropriate   Eye Contact:   Good   Psychomotor Behavior: Normal   Attitude:   Cooperative   Orientation:   All  Speech   Rate / Production: Normal    Volume:  Normal   Mood:    Normal  Affect:    Appropriate   Thought Content:  Clear   Thought Form:  Coherent  Logical   Insight:    Good     Diagnoses:  1. Adjustment disorder with  mixed anxiety and depressed mood        Collateral Reports Completed:  Not Applicable    Plan: (Homework, other):  Patient was given information about behavioral services and encouraged to schedule a follow up appointment with the clinic [facility] in 2 weeks for ART session.  She was also given ongoing CBT techniques. Patient has been informed of provider's resignation in mid-. Patient has been given referral information for  therapists with plan to have at least two more sessions before this provider's last day.  CD Recommendations: No indications of CD issues.  Stefanie Villatoro, [facility]  2021    ______________________________________________________________________    Integrated Primary Care Behavioral Health Treatment Plan    Patient's Name: Griselda Bell  YOB: 1985                                                Individual Treatment Plan    Date of Creation: 21  Date Treatment Plan Last Reviewed/Revised: 22    DSM-V Diagnoses: Adjustment Disorders  309.28 (F43.23) With mixed anxiety and depressed mood  Psychosocial / Contextual Factors: history of pregnancy termination due to Trisomy 13, history of pregnancy loss at 16 weeks  with D&E  WHODAS:   WHODAS 2.0 Total Score 2021   Total Score 15   Total Score MyChart     15     Promis completed 22    Referral / Collaboration:  Referral to another professional/service is not indicated at this time..    Anticipated number of session for this episode of care: 8-10  Anticipation frequency of session: Monthly  Anticipated Duration of each session: 38-52 minutes  Treatment plan will be reviewed in 90 days or when goals have been changed.       MeasurableTreatment Goal(s) related to diagnosis / functional impairment(s)  Goal 1: Patient will will engage in effective approach to address and resolve grief/loss issues and will process grief/loss issues in an adaptive manner    I will know I've met my  "goal when I feel like the grief is less intense\"    Objective #A (Patient Action)    Patient will increase understanding of steps in the grief process.  Status: Continued - Date(s): 22    Intervention(s)  [facility] will teach about  grief and loss.    Objective #B  Patient will use cognitive strategies identified in therapy to challenge anxious thoughts related to the pregnancy loss.  Status: Continued - Date(s): 22    Intervention(s)  [facility] will teach cognitive restructuring and defusion techniques.    Objective #C  Patient will increase confidence in ability to navigate interactions with others after the loss.  Status: Continued - Date(s):  22    Intervention(s)  [facility] will assist patient to navigate situations through role play and cognitive rehearsal.    Patient has reviewed and agreed to the above plan.    Stefanie Villatoro, Health system  2022  " pt w/ etoh abuse / copd/ failure to thrive w/ limited mobility /hx breast ca  /w sob/hyponatremia /likely sec to poor po intake  pulm / gi evals  ?blood in stool  ppis  pulm meds asper pulm   ciwa protocol  dvt proph   f/u bmp   renal eval

## 2022-06-07 ENCOUNTER — OFFICE VISIT (OUTPATIENT)
Dept: BEHAVIORAL HEALTH | Facility: CLINIC | Age: 37
End: 2022-06-07
Payer: COMMERCIAL

## 2022-06-07 DIAGNOSIS — F43.23 ADJUSTMENT DISORDER WITH MIXED ANXIETY AND DEPRESSED MOOD: Primary | ICD-10-CM

## 2022-06-07 PROCEDURE — 90837 PSYTX W PT 60 MINUTES: CPT | Performed by: SOCIAL WORKER

## 2022-06-07 NOTE — PROGRESS NOTES
[facility]- Integrated Behavioral Health Services  June 7, 2022    Behavioral Health Clinician Progress Note    Patient Name: Griselda Bell       Service Type:  Individual       Service Location:   Face to face in clinic     Session Start Time: 3:00 pm  Session End Time  4:00 pm       Session Length: 53 - 60      Attendees: Patient      Type: In-person    Visit Activities (Refresh list every visit): [facility] Only    Diagnostic Assessment Date: 8/16/21  Treatment Plan Review Date: 4/8/22, next review due 7/8/22  See Flowsheets for today's PHQ-9 and COLLINS-7 results  Previous PHQ-9:   PHQ-9 SCORE 2/10/2022 3/24/2022 5/11/2022   PHQ-9 Total Score MyChart 3 (Minimal depression) 2 (Minimal depression) 1 (Minimal depression)   PHQ-9 Total Score 3 2 1     Previous COLLINS-7:   COLLINS-7 SCORE 8/4/2021 3/9/2022 3/24/2022   Total Score 8 (mild anxiety) 5 (mild anxiety) 4 (minimal anxiety)   Total Score 8 5 4       CATERINA LEVEL:  CATERINA Score (Last Two) 11/7/2019   CATERINA Raw Score 30   Activation Score 56   CATERINA Level 3       DATA  Extended Session (60+ minutes): No  Interactive Complexity: No  Crisis: No  [facility] Patient: No    Treatment Objective(s) Addressed in This Session:  Target Behavior(s): Grief and loss    Grief / Loss: will increase understanding of normal grieving process, will engage in effective approach to address and resolve grief/loss issues and will process grief/loss issues in an adaptive manner    Current Stressors / Issues:  Patient reported recent miscarriage that occurred shortly after last visit. She stated that she felt like she had time to process through the loss, but then had a girls weekend in [place] where she woke up from a panic attack. Patient discussed the lasting impacts of this panic attack including anxiety when her heart race starts to increase and/or when she prepares to go to bed as she worries about another panic attack occurring. Patient open to ART session to focus on this  panic attack. Patient reported ELIJAH at start of session as 9/10. Patient had notable tightness and sensations in her chest and shoulders throughout the visit, but decreased in intensity during bilateral eye movement. Patient participated in image replacement that led to neurtral feelings. Ended session with patient reported ELIJAH of a 4/10. Patient to follow up with OBGYN to discuss concerns (to determine if there is a need to rule out medical concerns) and explore medication options for her anxiety/panic.    Progress on Treatment Objective(s) / Homework:  No improvement - PREPARATION (Decided to change - considering how); Intervened by negotiating a change plan and determining options / strategies for behavior change, identifying triggers, exploring social supports, and working towards setting a date to begin behavior change    Motivational Interviewing    MI Intervention: Expressed Empathy/Understanding, Permission to raise concern or advise, Open-ended questions and Reframe     Change Talk Expressed by the Patient: Ability to change Reasons to change Need to change    Provider Response to Change Talk: E - Evoked more info from patient about behavior change, A - Affirmed patient's thoughts, decisions, or attempts at behavior change, R - Reflected patient's change talk and S - Summarized patient's change talk statements    Accelerated Resolution Therapy: Utilized basic script protocol for processing through recent panic attack.     Care Plan review completed: No    Medication Review:  No current psychiatric medications prescribed    Medication Compliance:  NA    Changes in Health Issues:   None reported    Chemical Use Review:   Substance Use: Chemical use reviewed, no active concerns identified      Tobacco Use: No current tobacco use.      Assessment: Current Emotional / Mental Status (status of significant symptoms):  Risk status (Self / Other harm or suicidal ideation)  Patient denies a history of suicidal  ideation, suicide attempts, self-injurious behavior, homicidal ideation, homicidal behavior and and other safety concerns  Patient denies current fears or concerns for personal safety.  Patient denies current or recent suicidal ideation or behaviors.  Patient denies current or recent homicidal ideation or behaviors.  Patient denies current or recent self injurious behavior or ideation.  Patient denies other safety concerns.  A safety and risk management plan has not been developed at this time, however patient was encouraged to call [place] [phone] should there be a change in any of these risk factors.    Appearance:   Appropriate   Eye Contact:   Good   Psychomotor Behavior: Normal   Attitude:   Cooperative   Orientation:   All  Speech   Rate / Production: Normal    Volume:  Normal   Mood:    Anxious   Affect:    Tearful  Thought Content:  Clear   Thought Form:  Coherent  Logical   Insight:    Good     Diagnoses:  1. Adjustment disorder with mixed anxiety and depressed mood        Collateral Reports Completed:  Not Applicable    Plan: (Homework, other):  Patient was given information about behavioral services and encouraged to schedule a follow up appointment with the clinic [facility] in 1 week for final ART session.  Patient has previously been informed of provider's resignation in . Patient has been given referral information for  therapists.  CD Recommendations: No indications of CD issues.  Stefanie Villatoro, [facility]        Stefanie Villatoro, [facility]  2021    ______________________________________________________________________    Integrated Primary Care Behavioral Health Treatment Plan    Patient's Name: Griselda Bell  YOB: 1985                                                Individual Treatment Plan    Date of Creation: 21  Date Treatment Plan Last Reviewed/Revised: 22    DSM-V Diagnoses: Adjustment Disorders  309.28 (F43.23) With mixed anxiety and depressed  "mood  Psychosocial / Contextual Factors: history of pregnancy termination due to Trisomy 13, history of pregnancy loss at 16 weeks  with D&E  WHODAS:   WHODAS 2.0 Total Score 2021   Total Score 15   Total Score MyChart     15     Promis completed 22    Referral / Collaboration:  Referral to another professional/service is not indicated at this time..    Anticipated number of session for this episode of care: 8-10  Anticipation frequency of session: Monthly  Anticipated Duration of each session: 38-52 minutes  Treatment plan will be reviewed in 90 days or when goals have been changed.       MeasurableTreatment Goal(s) related to diagnosis / functional impairment(s)  Goal 1: Patient will will engage in effective approach to address and resolve grief/loss issues and will process grief/loss issues in an adaptive manner    I will know I've met my goal when I feel like the grief is less intense\"    Objective #A (Patient Action)    Patient will increase understanding of steps in the grief process.  Status: Continued - Date(s): 22    Intervention(s)  [facility] will teach about  grief and loss.    Objective #B  Patient will use cognitive strategies identified in therapy to challenge anxious thoughts related to the pregnancy loss.  Status: Continued - Date(s): 22    Intervention(s)  [facility] will teach cognitive restructuring and defusion techniques.    Objective #C  Patient will increase confidence in ability to navigate interactions with others after the loss.  Status: Continued - Date(s):  22    Intervention(s)  [facility] will assist patient to navigate situations through role play and cognitive rehearsal.    Patient has reviewed and agreed to the above plan.    Stefanie Villatoro [facility]  2022  "

## 2022-06-16 ENCOUNTER — OFFICE VISIT (OUTPATIENT)
Dept: BEHAVIORAL HEALTH | Facility: CLINIC | Age: 37
End: 2022-06-16
Payer: COMMERCIAL

## 2022-06-16 DIAGNOSIS — F43.23 ADJUSTMENT DISORDER WITH MIXED ANXIETY AND DEPRESSED MOOD: Primary | ICD-10-CM

## 2022-06-16 PROCEDURE — 90834 PSYTX W PT 45 MINUTES: CPT | Performed by: SOCIAL WORKER

## 2022-06-16 NOTE — PROGRESS NOTES
"[facility] Maternal Fetal Medicine [facility]- Integrated Behavioral Health Services  June 16, 2022    Behavioral Health Clinician Progress Note    Patient Name: Griselda Bell       Service Type:  Individual       Service Location:   Face to face in clinic     Session Start Time: 2:30 pm  Session End Time  3:22 pm       Session Length: 38 - 52      Attendees: Patient      Type: In-person    Visit Activities (Refresh list every visit): [facility] Only    Diagnostic Assessment Date: 8/16/21  Treatment Plan Review Date: 4/8/22, next review due 7/8/22  See Flowsheets for today's PHQ-9 and COLLINS-7 results  Previous PHQ-9:   PHQ-9 SCORE 2/10/2022 3/24/2022 5/11/2022   PHQ-9 Total Score MyChart 3 (Minimal depression) 2 (Minimal depression) 1 (Minimal depression)   PHQ-9 Total Score 3 2 1     Previous COLLINS-7:   COLLINS-7 SCORE 8/4/2021 3/9/2022 3/24/2022   Total Score 8 (mild anxiety) 5 (mild anxiety) 4 (minimal anxiety)   Total Score 8 5 4       CATERINA LEVEL:  CATERINA Score (Last Two) 11/7/2019   CATERINA Raw Score 30   Activation Score 56   CATERINA Level 3       DATA  Extended Session (60+ minutes): No  Interactive Complexity: No  Crisis: No  [facility] Patient: No    Treatment Objective(s) Addressed in This Session:  Target Behavior(s): Grief and loss    Grief / Loss: will increase understanding of normal grieving process, will engage in effective approach to address and resolve grief/loss issues and will process grief/loss issues in an adaptive manner    Current Stressors / Issues:  Patient reported anxiety about having another panic has decreased and she has been noticing less anxiety prior to bed and when she thinks about exercising. Patient requesting final ART session to help her process through past events where she has received \"hard\" news and has had uncertainty about whether a pregnancy would be viable.  Incorporated aspects of her medical termination of pregnancy and then recent miscarriages. Patient started session with ELIJAH as a 6/7-10. " Patient's emotions and sensations decreased throughout session through use of bilateral eye movement. Patient ended session with a ELIJAH of 2/10. Discussed termination and transition to next therapist.     Progress on Treatment Objective(s) / Homework:  Satisfactory progress - ACTION (Actively working towards change); Intervened by reinforcing change plan / affirming steps taken    Motivational Interviewing    MI Intervention: Expressed Empathy/Understanding, Permission to raise concern or advise, Open-ended questions and Reframe     Change Talk Expressed by the Patient: Ability to change Reasons to change Need to change    Provider Response to Change Talk: E - Evoked more info from patient about behavior change, A - Affirmed patient's thoughts, decisions, or attempts at behavior change, R - Reflected patient's change talk and S - Summarized patient's change talk statements    Accelerated Resolution Therapy: Utilized basic script protocol for processing through recent panic attack.     Care Plan review completed: No    Medication Review:  No current psychiatric medications prescribed    Medication Compliance:  NA    Changes in Health Issues:   None reported    Chemical Use Review:   Substance Use: Chemical use reviewed, no active concerns identified      Tobacco Use: No current tobacco use.      Assessment: Current Emotional / Mental Status (status of significant symptoms):  Risk status (Self / Other harm or suicidal ideation)  Patient denies a history of suicidal ideation, suicide attempts, self-injurious behavior, homicidal ideation, homicidal behavior and and other safety concerns  Patient denies current fears or concerns for personal safety.  Patient denies current or recent suicidal ideation or behaviors.  Patient denies current or recent homicidal ideation or behaviors.  Patient denies current or recent self injurious behavior or ideation.  Patient denies other safety concerns.  A safety and risk management plan  has not been developed at this time, however patient was encouraged to call [place] [phone] should there be a change in any of these risk factors.    Appearance:   Appropriate   Eye Contact:   Good   Psychomotor Behavior: Normal   Attitude:   Cooperative   Orientation:   All  Speech   Rate / Production: Normal    Volume:  Normal   Mood:    Euthymic  Affect:    Appropriate   Thought Content:  Clear   Thought Form:  Coherent  Logical   Insight:    Good     Diagnoses:  1. Adjustment disorder with mixed anxiety and depressed mood        Collateral Reports Completed:  Not Applicable    Plan: (Homework, other):  Final session with this patient due to provider resignation. Patient previously provided referral information for  therapists.  Patient attempted to schedule with [facility] clinic, without hearing back. Patient to call again and then explore other options in the community.  CD Recommendations: No indications of CD issues.  SULMA Gonzalez LICSW  2021    ______________________________________________________________________    Integrated Primary Care Behavioral Health Treatment Plan    Patient's Name: Griselda Bell  YOB: 1985                                                Individual Treatment Plan    Date of Creation: 21  Date Treatment Plan Last Reviewed/Revised: 22    DSM-V Diagnoses: Adjustment Disorders  309.28 (F43.23) With mixed anxiety and depressed mood  Psychosocial / Contextual Factors: history of pregnancy termination due to Trisomy 13, history of pregnancy loss at 16 weeks  with D&E  WHODAS:   WHODAS 2.0 Total Score 2021   Total Score 15   Total Score MyChart     15     Promis completed 22    Referral / Collaboration:  Referral to another professional/service is not indicated at this time..    Anticipated number of session for this episode of care: 8-10  Anticipation frequency of session: Monthly  Anticipated Duration  "of each session: 38-52 minutes  Treatment plan will be reviewed in 90 days or when goals have been changed.       MeasurableTreatment Goal(s) related to diagnosis / functional impairment(s)  Goal 1: Patient will will engage in effective approach to address and resolve grief/loss issues and will process grief/loss issues in an adaptive manner    I will know I've met my goal when I feel like the grief is less intense\"    Objective #A (Patient Action)    Patient will increase understanding of steps in the grief process.  Status: Continued - Date(s): 22    Intervention(s)  [facility] will teach about  grief and loss.    Objective #B  Patient will use cognitive strategies identified in therapy to challenge anxious thoughts related to the pregnancy loss.  Status: Continued - Date(s): 22    Intervention(s)  [facility] will teach cognitive restructuring and defusion techniques.    Objective #C  Patient will increase confidence in ability to navigate interactions with others after the loss.  Status: Continued - Date(s):  22    Intervention(s)  [facility] will assist patient to navigate situations through role play and cognitive rehearsal.    Patient has reviewed and agreed to the above plan.    Stefanie Villatoro, [facility]  2022  "

## 2022-06-17 ENCOUNTER — TELEPHONE (OUTPATIENT)
Dept: PSYCHIATRY | Facility: CLINIC | Age: 37
End: 2022-06-17
Payer: COMMERCIAL

## 2022-06-17 NOTE — TELEPHONE ENCOUNTER
This writer contacted Marisabel to schedule an initial intake appointment with the [facility] following transfer/referral from Stefanie Villatoro [facility], [facility] Behavior[facility]. Stefanie reported that Marisabel has been completing Accelerated Resolution Therapy work with her following experiences of pregnancy loss, grief, and a past history of  trauma. Marisabel and I scheduled an initial telehealth appointment for next  at 9 a.m. Provided direct contact information and encouraged her to contact me with any questions or concerns prior to our appointment.     Joann Moore, PhD  [facility]   Supervising Psychologist: Hailey Phan PsyD, LP

## 2022-06-21 NOTE — PROGRESS NOTES
SUBJECTIVE:   CC: Griselda Bell is an 37 year old woman who presents for preventive health visit.       Patient has been advised of split billing requirements and indicates understanding: Yes  HPI    , 37 year old female, mother of 3 yo daughter.  [place]  [place] [facility] [place]     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  She does have a 3-year-old daughter.  She has been diagnosed in the process of work-up for recurrent miscarriages, LIZ & SSA positive, she is under care of rheumatology Dr. Robles and started on Plaquenil.      She is here for complete physical.  Her main concern today is to discuss ongoing mood/anxiety.  She reports after her most recent miscarriage she was with her girlfriends on a medication and experienced first most severe anxiety attack.  Since then she has experienced small milder anxiety attack last 1 was few nights ago that woke her up in the middle of night.  She has been seeing therapist regularly over a period of time.  She reports supportive family, friends and good rapport with her therapist.      In past she used Lexapro and gained 20 pounds, she would not like to be on the Lexapro though it did help mood.  She also tried Prozac and found no change.  She is hoping to get pregnant and at the same time would like to be on antianxiety that may be relatively safe in pregnancy as well.        Dad has diabetes, she is wondering about screening for diabetes.  She is not fasting for labs.      Today's PHQ-2 Score:   PHQ-2 (  [employer]) 2022   Q1: Little interest or pleasure in doing things 0   Q2: Feeling down, depressed or hopeless 0   PHQ-2 Score 0   PHQ-2 Total Score (12-17 Years)- Positive if 3 or more points; Administer PHQ-A if positive -   Q1: Little interest or pleasure in doing things Not at all   Q2: Feeling down, depressed or hopeless Not at all   PHQ-2 Score 0       Abuse: Current or Past  (Physical, Sexual or Emotional) - No  Do you feel safe in your environment? Yes    Have you ever done Advance Care Planning? (For example, a Health Directive, POLST, or a discussion with a medical provider or your loved ones about your wishes): No, advance care planning information given to patient to review.  Patient declined advance care planning discussion at this time.    Social History     Tobacco Use     Smoking status: Never Smoker     Smokeless tobacco: Never Used   Substance Use Topics     Alcohol use: Yes     Comment: 1 drink when does have etoh     If you drink alcohol do you typically have >3 drinks per day or >7 drinks per week? No    Alcohol Use 6/23/2022   Prescreen: >3 drinks/day or >7 drinks/week? No   Prescreen: >3 drinks/day or >7 drinks/week? -       Reviewed orders with patient.  Reviewed health maintenance and updated orders accordingly - Yes  BP Readings from Last 3 Encounters:   06/23/22 117/83   08/24/21 (!) 131/92   07/28/21 117/77    Wt Readings from Last 3 Encounters:   06/23/22 82.1 kg (181 lb)   07/28/21 82.2 kg (181 lb 3.5 oz)   04/19/21 85.1 kg (187 lb 9.6 oz)                    Breast Cancer Screening:  Any new diagnosis of family breast, ovarian, or bowel cancer? No    FHS-7: No flowsheet data found.  click delete button to remove this line now    Pertinent mammograms are reviewed under the imaging tab.    History of abnormal Pap smear: NO - age 30- 65 PAP every 3 years recommended     Reviewed and updated as needed this visit by clinical staff   Tobacco  Allergies  Meds  Problems  Med Hx  Surg Hx  Fam Hx            Reviewed and updated as needed this visit by Provider   Tobacco  Allergies  Meds  Problems  Med Hx  Surg Hx  Fam Hx               Review of Systems  CONSTITUTIONAL: NEGATIVE for fever, chills, change in weight  INTEGUMENTARY/SKIN: NEGATIVE for worrisome rashes, moles or lesions  EYES: NEGATIVE for vision changes or irritation  ENT: NEGATIVE for ear, mouth  and throat problems  RESP: NEGATIVE for significant cough or SOB  BREAST: NEGATIVE for masses, tenderness or discharge  CV: NEGATIVE for chest pain, palpitations or peripheral edema  GI: NEGATIVE for nausea, abdominal pain, heartburn, or change in bowel habits  : NEGATIVE for unusual urinary or vaginal symptoms. No vaginal bleeding.  MUSCULOSKELETAL: NEGATIVE for significant arthralgias or myalgia  NEURO: NEGATIVE for weakness, dizziness or paresthesias  PSYCHIATRIC: NEGATIVE for changes in mood or affect      OBJECTIVE:   /83   Pulse 100   Temp 97.5  F (36.4  C) (Temporal)   Resp 16   Wt 82.1 kg (181 lb)   LMP 05/29/2022   SpO2 100%   Breastfeeding No   BMI 28.35 kg/m    Physical Exam  GENERAL: healthy, alert and no distress  EYES: Eyes grossly normal to inspection, PERRL and conjunctivae and sclerae normal  HENT: ear canals and TM's normal, nose and mouth without ulcers or lesions  NECK: no adenopathy, no asymmetry, masses, or scars and thyroid normal to palpation  RESP: lungs clear to auscultation - no rales, rhonchi or wheezes  BREAST: normal without masses, tenderness or nipple discharge and no palpable axillary masses or adenopathy  CV: regular rate and rhythm, normal S1 S2, no S3 or S4, no murmur, click or rub, no peripheral edema and peripheral pulses strong  ABDOMEN: soft, nontender, no hepatosplenomegaly, no masses and bowel sounds normal  MS: no gross musculoskeletal defects noted, no edema  SKIN: no suspicious lesions or rashes  NEURO: Normal strength and tone, mentation intact and speech normal  PSYCH: mentation appears normal, affect normal/bright    Diagnostic Test Results:  Labs reviewed in Epic  Results for orders placed or performed in visit on 06/23/22 (from the past 24 hour(s))   Extra Tube    Narrative    The following orders were created for panel order Extra Tube.  Procedure                               Abnormality         Status                     ---------                                -----------         ------                     Extra Red Top Tube[[id number]]                               In process                 Extra Purple Top Tube[[id number]]                            In process                   Please view results for these tests on the individual orders.       ASSESSMENT/PLAN:   Griselda was seen today for physical.    Diagnoses and all orders for this visit:    Routine general medical examination at a health care facility  Pap smear was canceled today because she reports she had a normal Pap smear probably in 2021, no available records and she has an appointment coming up with her OB/GYN      Anxiety  -  Multidisciplinary approach with routine regular counseling/physical activity of choice.  We discussed     s medication, SSRIs are still preferred.  Unfortunately they may be associated with weight changes.  I think sertraline would be better if she is trying to conceive and during the pregnancy.  We will start on a lower dose and monitor symptoms closely.  She is also advised to discuss medication with her OB and MFM, as she is following them closely for preconceptual counseling and work-up as well.        Ertraline (ZOLOFT) 50 MG tablet; Take 1 tablet (50 mg) by mouth daily  -     EMOTIONAL / BEHAVIORAL ASSESSMENT  Potential medication side effects were discussed with the patient; let me know if any occur.  Follow-up in a month virtual visit as telephone only appointment is appropriate.  Follow-up earlier if question with mood or medication.    Screening for HIV (human immunodeficiency virus)  -     Cancel: HIV Antigen Antibody Combo; Future  Need for hepatitis C screening test  -     Cancel: Hepatitis C Screen Reflex to HCV RNA Quant and Genotype; Future  Above test canceled because she reports she definitely has have them checked as negative at her OB.  Cervical cancer screening  -Up-to-date as per patient, no available records.   Cancel: Pap Screen with HPV -  "recommended age 30 - 65 years    Screening for diabetes mellitus  A1c pending  LIZ positive  SS-A antibody positive  - considered this problem when making today's plans  - no interventions today       -followed by specialist/Dr Robles    History of recurrent miscarriages, not currently pregnant  - considered this problem when making today's plans  - no interventions today       -followed by specialist ob at [facility] [place]  Other orders  -     REVIEW OF HEALTH MAINTENANCE PROTOCOL ORDERS  -     Extra Tube; Future  -     Extra Tube        Patient has been advised of split billing requirements and indicates understanding: Yes    COUNSELING:  Reviewed preventive health counseling, as reflected in patient instructions       Regular exercise       Healthy diet/nutrition       Vision screening       Hearing screening    Estimated body mass index is 28.35 kg/m  as calculated from the following:    Height as of 8/24/21: 1.702 m (5' 7\").    Weight as of this encounter: 82.1 kg (181 lb).    Weight management plan: Discussed healthy diet and exercise guidelines    She reports that she has never smoked. She has never used smokeless tobacco.      Counseling Resources:  ATP IV Guidelines  Pooled Cohorts Equation Calculator  Breast Cancer Risk Calculator  BRCA-Related Cancer Risk Assessment: FHS-7 Tool  FRAX Risk Assessment  ICSI Preventive Guidelines  Dietary Guidelines for Americans, 2010  USDA's MyPlate  ASA Prophylaxis  Lung CA Screening    Renetta Long MD  [facility]  "

## 2022-06-23 ENCOUNTER — MYC MEDICAL ADVICE (OUTPATIENT)
Dept: FAMILY MEDICINE | Facility: CLINIC | Age: 37
End: 2022-06-23

## 2022-06-23 ENCOUNTER — OFFICE VISIT (OUTPATIENT)
Dept: FAMILY MEDICINE | Facility: CLINIC | Age: 37
End: 2022-06-23
Payer: COMMERCIAL

## 2022-06-23 VITALS
SYSTOLIC BLOOD PRESSURE: 117 MMHG | TEMPERATURE: 97.5 F | RESPIRATION RATE: 16 BRPM | OXYGEN SATURATION: 100 % | WEIGHT: 181 LBS | DIASTOLIC BLOOD PRESSURE: 83 MMHG | HEART RATE: 100 BPM | BODY MASS INDEX: 28.35 KG/M2

## 2022-06-23 DIAGNOSIS — Z13.1 SCREENING FOR DIABETES MELLITUS: ICD-10-CM

## 2022-06-23 DIAGNOSIS — Z00.00 ROUTINE GENERAL MEDICAL EXAMINATION AT A HEALTH CARE FACILITY: Primary | ICD-10-CM

## 2022-06-23 DIAGNOSIS — F41.9 ANXIETY: ICD-10-CM

## 2022-06-23 DIAGNOSIS — Z11.59 NEED FOR HEPATITIS C SCREENING TEST: ICD-10-CM

## 2022-06-23 DIAGNOSIS — N96 HISTORY OF RECURRENT MISCARRIAGES, NOT CURRENTLY PREGNANT: ICD-10-CM

## 2022-06-23 DIAGNOSIS — Z11.4 SCREENING FOR HIV (HUMAN IMMUNODEFICIENCY VIRUS): ICD-10-CM

## 2022-06-23 DIAGNOSIS — Z12.4 CERVICAL CANCER SCREENING: ICD-10-CM

## 2022-06-23 DIAGNOSIS — R76.8 ANA POSITIVE: ICD-10-CM

## 2022-06-23 DIAGNOSIS — R76.8 SS-A ANTIBODY POSITIVE: ICD-10-CM

## 2022-06-23 LAB
ANION GAP SERPL CALCULATED.3IONS-SCNC: 7 MMOL/L (ref 3–14)
BUN SERPL-MCNC: 16 MG/DL (ref 7–30)
CALCIUM SERPL-MCNC: 9.3 MG/DL (ref 8.5–10.1)
CHLORIDE BLD-SCNC: 114 MMOL/L (ref 94–109)
CO2 SERPL-SCNC: 23 MMOL/L (ref 20–32)
CREAT SERPL-MCNC: 0.72 MG/DL (ref 0.52–1.04)
GFR SERPL CREATININE-BSD FRML MDRD: >90 ML/MIN/1.73M2
GLUCOSE BLD-MCNC: 90 MG/DL (ref 70–99)
HBA1C MFR BLD: 4.9 % (ref 0–5.6)
HOLD SPECIMEN: NORMAL
HOLD SPECIMEN: NORMAL
POTASSIUM BLD-SCNC: 4.4 MMOL/L (ref 3.4–5.3)
SODIUM SERPL-SCNC: 144 MMOL/L (ref 133–144)

## 2022-06-23 PROCEDURE — 99395 PREV VISIT EST AGE 18-39: CPT | Performed by: FAMILY MEDICINE

## 2022-06-23 PROCEDURE — 36415 COLL VENOUS BLD VENIPUNCTURE: CPT | Performed by: FAMILY MEDICINE

## 2022-06-23 PROCEDURE — 96127 BRIEF EMOTIONAL/BEHAV ASSMT: CPT | Performed by: FAMILY MEDICINE

## 2022-06-23 PROCEDURE — 83036 HEMOGLOBIN GLYCOSYLATED A1C: CPT | Performed by: FAMILY MEDICINE

## 2022-06-23 PROCEDURE — 80048 BASIC METABOLIC PNL TOTAL CA: CPT | Performed by: FAMILY MEDICINE

## 2022-06-23 RX ORDER — HYDROXYCHLOROQUINE SULFATE 200 MG/1
TABLET, FILM COATED ORAL
COMMUNITY
Start: 2022-03-31 | End: 2023-08-24

## 2022-06-23 RX ORDER — PROGESTERONE 200 MG/1
CAPSULE ORAL
COMMUNITY
Start: 2022-04-02 | End: 2023-08-24

## 2022-06-23 RX ORDER — CYANOCOBALAMIN/FOLIC AC/VIT B6 2-2.5-25MG
1 TABLET ORAL DAILY
COMMUNITY
Start: 2022-06-02 | End: 2023-08-24

## 2022-06-23 ASSESSMENT — ENCOUNTER SYMPTOMS
CHILLS: 0
WEAKNESS: 0
COUGH: 0
NERVOUS/ANXIOUS: 1
HEMATOCHEZIA: 0
SHORTNESS OF BREATH: 0
FREQUENCY: 0
NAUSEA: 0
FEVER: 0
PARESTHESIAS: 0
EYE PAIN: 0
JOINT SWELLING: 0
HEARTBURN: 0
CONSTIPATION: 0
SORE THROAT: 0
MYALGIAS: 0
DIZZINESS: 0
PALPITATIONS: 0
DIARRHEA: 0
BREAST MASS: 0
DYSURIA: 0
HEADACHES: 0
ABDOMINAL PAIN: 0
HEMATURIA: 0
ARTHRALGIAS: 0

## 2022-06-23 ASSESSMENT — ANXIETY QUESTIONNAIRES
GAD7 TOTAL SCORE: 7
1. FEELING NERVOUS, ANXIOUS, OR ON EDGE: SEVERAL DAYS
2. NOT BEING ABLE TO STOP OR CONTROL WORRYING: SEVERAL DAYS
6. BECOMING EASILY ANNOYED OR IRRITABLE: SEVERAL DAYS
GAD7 TOTAL SCORE: 7
5. BEING SO RESTLESS THAT IT IS HARD TO SIT STILL: SEVERAL DAYS
7. FEELING AFRAID AS IF SOMETHING AWFUL MIGHT HAPPEN: SEVERAL DAYS
3. WORRYING TOO MUCH ABOUT DIFFERENT THINGS: SEVERAL DAYS

## 2022-06-23 ASSESSMENT — PATIENT HEALTH QUESTIONNAIRE - PHQ9: 5. POOR APPETITE OR OVEREATING: SEVERAL DAYS

## 2022-06-24 ENCOUNTER — VIRTUAL VISIT (OUTPATIENT)
Dept: PSYCHIATRY | Facility: CLINIC | Age: 37
End: 2022-06-24
Attending: PSYCHOLOGIST
Payer: COMMERCIAL

## 2022-06-24 DIAGNOSIS — F41.0 GENERALIZED ANXIETY DISORDER WITH PANIC ATTACKS: Primary | ICD-10-CM

## 2022-06-24 DIAGNOSIS — F41.1 GENERALIZED ANXIETY DISORDER WITH PANIC ATTACKS: Primary | ICD-10-CM

## 2022-06-24 PROCEDURE — 96131 PSYCL TST EVAL PHYS/QHP EA: CPT | Mod: GT

## 2022-06-24 PROCEDURE — 90791 PSYCH DIAGNOSTIC EVALUATION: CPT | Mod: GT

## 2022-06-24 PROCEDURE — 96130 PSYCL TST EVAL PHYS/QHP 1ST: CPT | Mod: GT

## 2022-06-24 PROCEDURE — 96136 PSYCL/NRPSYC TST PHY/QHP 1ST: CPT | Mod: GT

## 2022-06-24 ASSESSMENT — ANXIETY QUESTIONNAIRES
3. WORRYING TOO MUCH ABOUT DIFFERENT THINGS: SEVERAL DAYS
6. BECOMING EASILY ANNOYED OR IRRITABLE: SEVERAL DAYS
2. NOT BEING ABLE TO STOP OR CONTROL WORRYING: SEVERAL DAYS
4. TROUBLE RELAXING: SEVERAL DAYS
GAD7 TOTAL SCORE: 7
1. FEELING NERVOUS, ANXIOUS, OR ON EDGE: SEVERAL DAYS
GAD7 TOTAL SCORE: 7
5. BEING SO RESTLESS THAT IT IS HARD TO SIT STILL: SEVERAL DAYS
GAD7 TOTAL SCORE: 7
7. FEELING AFRAID AS IF SOMETHING AWFUL MIGHT HAPPEN: SEVERAL DAYS
7. FEELING AFRAID AS IF SOMETHING AWFUL MIGHT HAPPEN: SEVERAL DAYS
8. IF YOU CHECKED OFF ANY PROBLEMS, HOW DIFFICULT HAVE THESE MADE IT FOR YOU TO DO YOUR WORK, TAKE CARE OF THINGS AT HOME, OR GET ALONG WITH OTHER PEOPLE?: SOMEWHAT DIFFICULT

## 2022-06-30 ENCOUNTER — VIRTUAL VISIT (OUTPATIENT)
Dept: PSYCHIATRY | Facility: CLINIC | Age: 37
End: 2022-06-30
Attending: PSYCHOLOGIST
Payer: COMMERCIAL

## 2022-06-30 DIAGNOSIS — F41.0 GENERALIZED ANXIETY DISORDER WITH PANIC ATTACKS: Primary | ICD-10-CM

## 2022-06-30 DIAGNOSIS — F41.1 GENERALIZED ANXIETY DISORDER WITH PANIC ATTACKS: Primary | ICD-10-CM

## 2022-06-30 PROCEDURE — 90837 PSYTX W PT 60 MINUTES: CPT | Mod: GT

## 2022-07-05 ENCOUNTER — OFFICE VISIT (OUTPATIENT)
Dept: PSYCHIATRY | Facility: CLINIC | Age: 37
End: 2022-07-05
Payer: COMMERCIAL

## 2022-07-05 DIAGNOSIS — N94.6 DYSMENORRHEA: ICD-10-CM

## 2022-07-05 DIAGNOSIS — F41.0 PANIC ATTACK: ICD-10-CM

## 2022-07-05 DIAGNOSIS — G47.00 INSOMNIA, UNSPECIFIED TYPE: Primary | ICD-10-CM

## 2022-07-05 DIAGNOSIS — G44.209 TENSION HEADACHE: ICD-10-CM

## 2022-07-05 NOTE — PROGRESS NOTES
Ms. Bell, is a 37 year old female is here today for initial acupuncture treatment for main complaint of Insomnia, Panic Attack, and Dysmenorrhea    Referred by provider to address ongoing insomnia accompanied by panic attacks.   Onset early June.   Woke up in middle of the night with racing heart, numbness in upper extremities.   To manage: Has been doing therapy, and 4 days ago started Zoloft to see if this can help her get a handle on this. Lack of sleep is starting to affect daytime ability to function and energy.   Occur at night only.    Has now developed the ability to sense when a panic attack is coming, so can head it off before it hits. Finds herself engaging these strategies a variable frequency. Once at beginning of June, once last week, once again this last weekend. Sleep is disrupted nightly.     Patient is referred by: Self / Joann Moore    Secondary Complaints: Chronic dysmenorrhea. Cycle is regular, every 30 days. Experiences pain during Days 1-2 of menses. Recent history of miscarriage at 16 weeks x 2.     Observation:   General: Well appearing, well nourished, in no distress. Oriented X3, intact recent and remote memory, judgment and insight, normal mood and affect.   Skin: Good turgor, no rash, unusual bruising or prominent lesions  Hair: Normal texture and distribution.  Nails: Normal color, no deformities  Head: Normocephalic, atraumatic, no visible or palpable masses, depressions, or scarring.  Extremities: No amputations.  Musculoskeletal: Normal gait and station. Ambulating without difficulty.     Tongue - reddish-pink body, pointed, little/no coat.   Pulse: rapid, soft, slippery.    Reported feeling calm and relaxed at end of treatment.      Eastern Medical Diagnosis Pertinent to Treatment:  Qi Deficiency, Blood Deficiency, Qi Stagnation, Blood Stagnation, Kidney Qi Deficiency, Spleen Qi Deficiency and Liver Qi Stagnation      Treatment Principle: Nourish qi and blood and spleen qi to  support restful sleep and reduce frequency of panic attacks. Unblock channels and move qi and blood, and move liver qi and blood to reduce pain.     Points Used Today:               Initial Point Insertions at 9:10am, removed at 9:50am: GV20, 3NP each ear, Anmian  Number of needles inserted: 9                 Additional Point Insertions at 9:25am, removed at 9:50am: LR2, KD3, TB5 (right), GB41 (left), SP6, SP10, LR8, ST40  Number of needles inserted: 14     Needles stimulated,retained and removed.  Total number of needles removed and placed in sharps container: 23    Accessory Technique's:   TDP Heat Lamp: On Feet   Essential Oil(s): None    Acupuncture Treatment Recommendations and Comments: Recommend weekly acupuncture x initial round of 4-6 weeks to assess for impact and benefit on complaints of pain, insomnia and frequency of panic attacks. If response is good, consider additional treatments at weekly or every 1-4 times monthly until symptoms have been resolved for a minimum of 1 month.       Past Medical History:   has a past medical history of Migraine and Obese.      Energy: Low  Sleep: frequent awakening and nighttime panic attacks.  Emotions: Yes: Anxiety / Panic attacks  Limbs/Back: Did not discuss today.  Head/Eyes/Ears: Reports every other weekly headaches, that she manages with Tylenol.   Digestion: heartburn, abdominal bloating, gas - most of her life. Heartburn is less frequent. Bloating and gas are daily.  Stools: Normal Loose Stool - loose stool more recently, but otherwise has daily, well-formed BMs.  Palpitation: Refer to Notes  Women's Health: Dysmenorrhea Currently doing progesterone suppositories during luteal phase. Started 3 months ago.    Plan: It is my recommendation that patient seek advice from their PCP about active symptoms not addressed during this visit. Addressed all patient's questions to their satisfaction and was given consent to treat.      Patient understands that acupuncture is  not a guarantee of benefits and will verify with own insurance and/or chente specialists. The risks and benefits of acupuncture were reviewed and the patient stated understanding. Answered all patient's questions to their satisfaction. Scope of practice and the acupuncturist's qualifications were also reviewed the patient provided signed consent.       Time Spent with Patient:       I spent a total of 35 minutes face-to-face with Griselda Bell during today's office visit. After the initial intake, time with patient was spent selecting, locating, and cleaning the points, cleaning my hands, inserting and manipulating the needles.30 minutes spent performing the act of acupuncture with reinsertion of needles.  Over 50% of this time was spent counseling the patient and/or coordinating care regarding   Chief Complaint   Patient presents with     Insomnia     Panic Attack     Dysmenorrhea    .  See note for details.    Kelvin Helm ND, LAc

## 2022-07-05 NOTE — LETTER
7/5/2022       RE: Griselda Bell  [address]  [place] [facility] [place] [address]     Dear Colleague,    Thank you for referring your patient, Griselda Bell, to the P PSYCHIATRY at [facility]. Please see a copy of my visit note below.    Ms. Bell, is a 37 year old female is here today for initial acupuncture treatment for main complaint of Insomnia, Panic Attack, and Dysmenorrhea    Referred by provider to address ongoing insomnia accompanied by panic attacks.   Onset early June.   Woke up in middle of the night with racing heart, numbness in upper extremities.   To manage: Has been doing therapy, and 4 days ago started Zoloft to see if this can help her get a handle on this. Lack of sleep is starting to affect daytime ability to function and energy.   Occur at night only.    Has now developed the ability to sense when a panic attack is coming, so can head it off before it hits. Finds herself engaging these strategies a variable frequency. Once at beginning of June, once last week, once again this last weekend. Sleep is disrupted nightly.     Patient is referred by: Self / Joann Moore    Secondary Complaints: Chronic dysmenorrhea. Cycle is regular, every 30 days. Experiences pain during Days 1-2 of menses. Recent history of miscarriage at 16 weeks x 2.     Observation:   General: Well appearing, well nourished, in no distress. Oriented X3, intact recent and remote memory, judgment and insight, normal mood and affect.   Skin: Good turgor, no rash, unusual bruising or prominent lesions  Hair: Normal texture and distribution.  Nails: Normal color, no deformities  Head: Normocephalic, atraumatic, no visible or palpable masses, depressions, or scarring.  Extremities: No amputations.  Musculoskeletal: Normal gait and station. Ambulating without difficulty.     Tongue - reddish-pink body, pointed, little/no coat.   Pulse: rapid, soft, slippery.    Reported feeling calm and  relaxed at end of treatment.      Eastern Medical Diagnosis Pertinent to Treatment:  Qi Deficiency, Blood Deficiency, Qi Stagnation, Blood Stagnation, Kidney Qi Deficiency, Spleen Qi Deficiency and Liver Qi Stagnation      Treatment Principle: Nourish qi and blood and spleen qi to support restful sleep and reduce frequency of panic attacks. Unblock channels and move qi and blood, and move liver qi and blood to reduce pain.     Points Used Today:               Initial Point Insertions at 9:10am, removed at 9:50am: GV20, 3NP each ear, Anmian  Number of needles inserted: 9                 Additional Point Insertions at 9:25am, removed at 9:50am: LR2, KD3, TB5 (right), GB41 (left), SP6, SP10, LR8, ST40  Number of needles inserted: 14     Needles stimulated,retained and removed.  Total number of needles removed and placed in sharps container: 23    Accessory Technique's:   TDP Heat Lamp: On Feet   Essential Oil(s): None    Acupuncture Treatment Recommendations and Comments: Recommend weekly acupuncture x initial round of 4-6 weeks to assess for impact and benefit on complaints of pain, insomnia and frequency of panic attacks. If response is good, consider additional treatments at weekly or every 1-4 times monthly until symptoms have been resolved for a minimum of 1 month.       Past Medical History:   has a past medical history of Migraine and Obese.      Energy: Low  Sleep: frequent awakening and nighttime panic attacks.  Emotions: Yes: Anxiety / Panic attacks  Limbs/Back: Did not discuss today.  Head/Eyes/Ears: Reports every other weekly headaches, that she manages with Tylenol.   Digestion: heartburn, abdominal bloating, gas - most of her life. Heartburn is less frequent. Bloating and gas are daily.  Stools: Normal Loose Stool - loose stool more recently, but otherwise has daily, well-formed BMs.  Palpitation: Refer to Notes  Women's Health: Dysmenorrhea Currently doing progesterone suppositories during luteal phase.  Started 3 months ago.    Plan: It is my recommendation that patient seek advice from their PCP about active symptoms not addressed during this visit. Addressed all patient's questions to their satisfaction and was given consent to treat.      Patient understands that acupuncture is not a guarantee of benefits and will verify with own insurance and/or chente specialists. The risks and benefits of acupuncture were reviewed and the patient stated understanding. Answered all patient's questions to their satisfaction. Scope of practice and the acupuncturist's qualifications were also reviewed the patient provided signed consent.       Time Spent with Patient:       I spent a total of 35 minutes face-to-face with Griselda Bell during today's office visit. After the initial intake, time with patient was spent selecting, locating, and cleaning the points, cleaning my hands, inserting and manipulating the needles.30 minutes spent performing the act of acupuncture with reinsertion of needles.  Over 50% of this time was spent counseling the patient and/or coordinating care regarding   Chief Complaint   Patient presents with     Insomnia     Panic Attack     Dysmenorrhea    .  See note for details.          Again, thank you for allowing me to participate in the care of your patient.      Sincerely,    Kelvin Helm

## 2022-07-06 ENCOUNTER — MYC MEDICAL ADVICE (OUTPATIENT)
Dept: FAMILY MEDICINE | Facility: CLINIC | Age: 37
End: 2022-07-06

## 2022-07-06 ENCOUNTER — VIRTUAL VISIT (OUTPATIENT)
Dept: FAMILY MEDICINE | Facility: CLINIC | Age: 37
End: 2022-07-06
Payer: COMMERCIAL

## 2022-07-06 ENCOUNTER — TELEPHONE (OUTPATIENT)
Dept: FAMILY MEDICINE | Facility: CLINIC | Age: 37
End: 2022-07-06

## 2022-07-06 DIAGNOSIS — F41.0 ANXIETY ATTACK: Primary | ICD-10-CM

## 2022-07-06 DIAGNOSIS — F41.9 ANXIETY: ICD-10-CM

## 2022-07-06 PROCEDURE — 99213 OFFICE O/P EST LOW 20 MIN: CPT | Mod: 95 | Performed by: FAMILY MEDICINE

## 2022-07-06 RX ORDER — HYDROXYZINE HYDROCHLORIDE 25 MG/1
25 TABLET, FILM COATED ORAL
Qty: 30 TABLET | Refills: 3 | Status: SHIPPED | OUTPATIENT
Start: 2022-07-06 | End: 2023-08-24

## 2022-07-06 NOTE — PATIENT INSTRUCTIONS
Anxiety attack  Plan: use only as needed    - hydrOXYzine (ATARAX) 25 MG tablet; Take 1 tablet (25 mg) by mouth nightly as needed for anxiety  Continue with meditative excercise

## 2022-07-06 NOTE — PROGRESS NOTES
"[place] [place]?????????????????????????????????????????????????????????????????Department of Psychiatry   [phone] (Office)?????????????????????????????????????????????????????????????[address]   [phone] (Clinic)?????????????????????????????????????????????????????????????[address]   [phone] (Fax)????????????????????????????????????????????????????????????????[place], [place] ?[address]            [facility]  DIAGNOSTIC EVALUATION AND PSYCHOLOGICAL ASSESSMENT     PATIENT: Griselda \"Marisabel\" SHAN Bell                                        : 1985  Encounter Date: 22                                                      MR#: [id number]  Evaluator: Joann Moore, PhD                                               Supervisor: Hailey Young PsyD, LP     Telemedicine start time: 9:00 am   Telemedicine end time: 10:06 am    [id number], 66 minutes, with Marisabel     Type of service: Telemedicine Diagnostic Evaluation and Psychological Assessment    Reason for Telemedicine Visit: COVID-19 public health recommendations on in-person sessions   Mode of transmission: Secure real time interactive audio and visual telecommunication system via [employer]  Location of originating and distant sites:   ?               Originating site (patient location): patient home   ?               Distant site (provider site): HIPAA compliant location within provider home/remote setting   Telemedicine Visit: The patient's condition can be safely assessed and treated via synchronous audio and visual telemedicine encounter.     Patient has agreed to receiving services via telemedicine technology.      Psychiatric Diagnostic Evaluation: 1 hour spent with the patient?(75231)   Psychological Testing Evaluation Services: 2 hours for review of records, integration, interpretation and treatment planning, consulting with technician, feedback and report writing ([id number], [id number])   Test Administration and Scoring: " "30 min of scoring and interpretation of tests by?other qualified healthcare professional?([id number])     IDENTIFYING DATA:  Brianne (\"Marisabel\") Arabella is a 37-year-old white female who uses pronouns she/her/hers. She has a history of recurrent pregnancy loss, with her most recent miscarriage occurring at approximately 7 weeks gestation in May 2022. Today Marisabel presented for a diagnostic assessment over telehealth as part of the intake process for outpatient psychotherapy services [facility]. She was referred by her former therapist, Setfanie Villatoro [facility], who recently ended her role as a therapist with [facility]. Marisabel was referred for concerns related to anxiety with panic, and grief and loss. Marisabel described her primary concerns as seeking support for managing and reducing symptoms of anxiety and panic, processing thoughts and feelings of grief, and moving towards acceptance and emotional resilience. She wishes to continue to engage in regular therapy to support her emotional wellbeing while she and her  continue to try to conceive.      The following information was obtained through an interview completed by Marisabel. Limits of confidentiality and the purpose of the appointment?(diagnostic evaluation) were described at the beginning of the session.    CHIEF COMPLAINT/GOALS:     Managing and reducing symptoms of anxiety and panic     Continuing to manage and honor feelings of grief related to recurrent pregnancy loss while trying to conceive     MENTAL HEALTH HISTORY AND DIAGNOSTIC INTERVIEW:   Marisabel completed the [employer] International Neuropsychiatric Interview to aid in diagnostic assessment of current psychological functioning.    Marisabel endorsed a recent increase in symptoms of anxiety and panic, which onset in May 2022 following her most recent miscarriage at 7 weeks gestation. At that time, Marisabel had become pregnant following three previous pregnancy " "losses. She tested positive for COVID-19 viral infection in mid May. At the end of May, Marisabel learned she had miscarried. She was encouraged by her OB to keep pre-arranged plans to travel to [place] for a weekend girls trip, but advised by a nurse to monitor for symptoms of an ectopic pregnancy. While in [place] she was awoken from sleep in the middle of the night by \"a full on panic attack.\" Marisabel noted she felt like she was having a heart attack and was going to die. She experienced chest pain and racing heart, struggled to breathe, was dizzy and sweating profusely, and experienced flushing/chills. Marisabel reported that since this night, \"I haven't felt right. Some days I feel fine, some days I feel very anxious, like I've never fully calmed down since [the panic attack].\" Marisabel wondered if being primed to monitor for an ectopic miscarriage might have triggered her panic attack. Since this time, she endorsed difficulty initiating sleep due to excessive worries that are difficult to control or dismiss. Worries are primarily experienced at night and related to the fear of having another nocturnal panic attack. She also reported worries about receiving bad news, and that something bad will happen to her daughter. Marisabel endorsed feeling on edge and having difficulty relaxing. Marisabel, who regularly runs and cycles, has avoided exercise since her recent panic attack due to worries about getting her heart rate up and triggering another panic attack. Marisabel wears an Apple smart watch and has been hypervigilantly monitoring her heart rate on her watch. She endorsed difficulty concentrating in the context of experiencing increased worry, hypervigilance, feeling restless, and lack of sleep.     Marisabel endorsed worries about something bad happening to her daughter, Yady, who is 3-years-old. Since Yady's birth Marisabel endorsed experiencing recurrent, intrusive, and scary images of her daughter drowning that occur 2-3 times a week. " "These intrusive/scary images have not increased in frequency since her recent panic attack. She denied engaging in any avoidance or current compulsive behavior to cope with these intrusive/scary images. Marisabel endorsed a history of increased anxiety and compulsive/repetitive checking that her daughter was breathing following her birth (see Previous Pyschiatric History). She denied any other current concerns related to obsessive worries or compulsive behaviors, rituals, or routines.     Marisabel endorsed intermittent feelings of grief and loss, including periods of tearfulness, sadness, irritability, and anger historically experienced at night time. These intermittent feelings of grief are experienced when thinking about experiences of loss. Marisabel denied concerns related to sustained low or depressed mood, loss of interest in things she usually enjoys, thoughts of guilt or worthlessness, or disruptions to appetite. She denied any previous history or current concerns related to self-harm or suicidal ideation.      Marisabel denied symptoms consistent with Major Depressive Disorder, Bipolar Disorder, Obsessive Compulsive Disorder, Post-traumatic Stress Disorder, Social Anxiety Disorders, Eating Disorders, Substance Abuse Disorders, and Psychotic Disorders.     PREVIOUS PSYCHIATRIC AND PREGNANCY HISTORY:   Marisabel reported in childhood she remembers feeling \"nervous\" in the context of taking a test or exam, or playing an important sports game. Her father has Type I Diabetes and she remembers worrying about him at different times throughout her life. On her first day of high school she called 911 as her father required urgent medical assistance to manage low blood sugar concerns, and she remembers feeling acute anxiety during that experience. However, Marisabel denied experiencing any clinical anxiety, depression, or other mental health concerns in childhood or adolescence.     Marisabel believes she first began to experience \"some kind " "of anxiety\" in her early 20's. She noted [place] was 22[place]years[place]old[place] Lyme Disease. She experienced her first and only prior panic attack prior to her recent nocturnal panic attack during a doctor's appointment with the physician treating her for Lyme Disease. During this appointment Marisabel's physician shared the recommendation that she complete medical work-ups with a neurologist, cardiologist, and ENT specialist to rule out any associated medical concerns. Marisabel did complete these complex medical work-ups, including a cardiology work up that required her to wear a heart rate monitor for approximately a week. She believes that at this time she first began experiencing excessive worries, frequently about her health. Marisabel also reported onset of mild and intermittent insomnia concerns around this time. Specifically, she endorsed intermittent difficulty falling and staying asleep associated with rumination and worries that are difficult to \"turn off\" at bedtime. She began to manage anxiety and insomnia concerns through exercise, noting \"it helps me get anxious thoughts out\" and the associated fatigue helps her to initiate sleep more easily. Marisabel typically exercises 4-5 times a week, using a stationary bike, treadmill, or running outside.     Marisabel denied any previous history of formally diagnosed or treated psychiatric, behavioral, or mental health concerns prior to the birth of her daughter, Yady, in August 2018. Marisabel denied any mental health concerns during pregnancy. Yady was delivered via vaginal delivery following induction at 41 weeks. The birth was traumatic, and Marisabel suffered a fistula during delivery. Following Yady's birth Marisabel experienced an onset of postpartum anxiety and OCD-like symptoms. As previously noted, at this time Marisabel experienced an onset of intrusive/scary images related to Yady being injured. Marisabel also experienced an onset of " "intense, excessive worries that were difficult to control or dismiss, primarily related to Yady dying from SIDS. In association with these obsessive worries, Marisabel engaged in repetitive/compulsive checking to make sure Yady was breathing, noting, \"I checked on her a million times a night, even though I knew she was fine.\" Marisabel was prescribed Prozac by her PCP, and reported significant benefit/decrease in symptoms. She discontinued Prozac in the spring of 2020 in the context of beginning to try for a second pregnancy.     Marisabel reported she became pregnant again in the spring of 2020. She experienced her first pregnancy loss following a medical interruption of pregnancy at approximately 17 weeks due to Trisomy 13. Marisabel described experiencing medical trauma in the context of this loss, related to diagnostic error.  Specifically, Marisabel and her  initially were given normal results following a chorionic villus sampling (CVS) test at approximately 11 weeks. However, weeks later, they were told these normal test results were in error due to maternal self-contamination of the testing sample. It was recommended they complete an amniocentesis, which confirmed trisomy 13 concerns.     Marisabel reported she experienced a \"chemical pregnancy\" between 2020 and the start of 2021. She became pregnant again in the early spring of 2021. In August 2021, at approximately 17 gestational weeks, Marisabel experienced a third pregnancy loss. Marisabel was referred by her OB to psychotherapy with SULMA Gonzalez, of [facility]. Marisabel was diagnosed with Adjustment disorder with mixed anxiety and depressed mood and began Accelerated Resolution Therapy (ART) with the focus of treatment on processing feelings of grief and loss and restructuring thoughts to help manage and reduce anxiety primarily experienced in the context of pre-conception and pregnancy. Marisabel continued to work with Ms. Villatoro through " June 2022, when Ms. Villatoro ended her role as a therapist with [facility]. Marisabel reported benefiting significantly from ART therapy.     CURRENT MEDICATIONS:  For most accurate and up-to-date information on patient's medications, please see medical chart. 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 She also takes Westab Max (2.5-25-2 mg), Vitamin D (1000 UT), and a prenatal vitamin. During her luteal phase she is prescribed a progesterone supplement (200 mg) to support conception.       SOCIAL HISTORY:  Marisabel grew up in [place], [place]. She has a brother who is 5 years older than her. Marisabel described a close and loving relationship with her parents and brother. She denied any experiences of childhood adversity, developmental trauma, physical, sexual, or emotional abuse or trauma.      Marisabel met her , Zurdo, while living in [facility] while they were both participating in a kickball league. She described their relationship as close and loving. Marisabel stated that she and her  have grieved and processed pregnancy losses in different ways, but she denied feeling alone or isolated in her grief.     Marisabel and her  moved to [place] approximately 4 years ago while Marisabel was pregnant with their daughter, Yady (now 3-years-old), in order to be near Zurdo's family. Currently, Marisabel and her family live in [place]. 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 Her  works in construction.    Marisabel reported she has always envisioned having a family of four, with two children relatively close in age. She does not yet feel her family is complete and she and her  are currently trying to conceive. Marisabel noted her brother and  "sister-in-law are parents to a child who was internationally adopted in infancy. She and her  have discussed the possibility of adoption as a path to growing their family, but are not actively exploring this option at this time.     PREGNANCY AND REPRODUCTIVE HISTORY:  As noted previously, Marisabel's pregnancy history is significant for an initial pregnancy resulting in the birth of her daughter, Yady, and four pregnancy losses, including a medical interruption of pregnancy at approximately 9/10 weeks due to Trisomy 13 (2020), a loss Marisabel described as a \"chemical pregnancy\" (occurred approximately between summer 2020 and start of 2021), a pregnancy loss at 17 weeks (August 2021), and a miscarriage at approximately 7 weeks (May 2022). 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 It is possible this has contributed to her history of recurrent pregnancy loss. Marisabel noted her understanding from her OB (Homa Gonzales MD, [facility] in [place], [place]) and rheumatologist (Jessie Escalante DO, [facility] in [place], [place]) is that they believe there is every reason to expect Marisabel can conceive and carry a healthy pregnancy to full term.      As noted previously, Marisabel denied any anxiety or mood during her initial pregnancy with her daughter. She experienced an onset of postpartum anxiety and OCD-like symptoms following her daughter's traumatic delivery. Symptoms responded well to initiating Prozac.     Following her first pregnancy loss, Marisabel endorsed experiencing increased anxiety concerns at pre-pregnancy baseline which heightened during her subsequent pregnancies. Worries were related primarily to fear of possible pregnancy loss. She noted receiving \"any news from a doctor\" triggers increased anxiety. "     Marisabel denied any history of significant pattern of anxiety or mood changes in association with her menstrual cycle.     FAMILY PSYCHIATRIC HISTORY:   Marisabel's immediate family psychiatric history is positive for Generalized Anxiety Disorder (mother and brother). No known psychiatric concerns in her extended family history.      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 Since March 2022 she has been prescribed Plaquenil for treatment of anti-SSA/Ro antibodies and is currently followed by a rheumatologist. Marisabel does not exhibit any symptoms of Sjögren s syndrome. Marisabel is allergic to shellfish. No other significant medical history was reported.     PSYCHOLOGICAL TESTS ADMINISTERED:    M[employer]I.N.I. International Neuropsychiatric Interview  Patient Health Questionnaire (PHQ-9)  Generalized Anxiety Disorder 7 Scale (COLLINS-7)     Measure  Raw Score Z-Score/Interpretation   PHQ-9 1 No/minimal depressive symptoms   COLLINS-7 7 Mild anxiety symptoms       INTERPRETATION OF PSYCHOLOGICAL TESTS:   Results from the M.I.N.I. show Marisabel meets criteria for Generalized Anxiety Disorder with panic and intrusive/scary images. On the PHQ-9, Marisabel endorsed a score of 1, indicating no/minimal depressive symptoms. On the COLLINS-7 she endorsed a score of 7, indicating mild generalized anxiety symptoms. Taken together, these results are consistent with Marisabel's history of pregnancy loss, concerns related to worries that are difficult to control/dismiss, difficulty relaxing, somatic anxiety symptoms related to panic (racing heart, difficulty breathing, feeling dizzy, experiencing a sense of doom), and intrusive/scary images. Marisabel reported current intermittent feelings of sadness and periods of tearfulness experienced in association with thoughts or reminders of previous pregnancy  "loss. She denied persisting concerns related to low mood or other depressive symptoms that would meet criteria for a major depressive disorder. Marisabel endorsed a history of obsessive worries associated with compulsive checking behaviors.  but denied any current concerns. Currently, Marisabel identified her primary concern as processing thoughts and feelings of grief and loss, and managing and reducing anxiety and panic symptoms. She identified a goal of supporting her mental health and wellbeing, and moving towards acceptance and healing, as she and her  continue to try to conceive.     MENTAL STATUS EXAM:   Marisabel presented as stylishly dressed and appropriately groomed. She appeared her stated age. Eye contact was appropriate. During the interview, observed affect was tearful at times while discussing feelings of grief, loss, and distress related to fearing another panic attack. Overall, Marisabel was calm and reflective. Reported mood was described as \"restless, anxious, and just not my typical self.\" Marisabel was oriented to person, place, and time. She was cooperative, engaged, and responsive, and answered the questions asked by the examiner. Attention and concentration were in the normal range. Her speech was normal in tone, rate and volume. Her thought process was linear, logical, and goal-oriented. Insight and judgment was intact. Current thoughts of suicidal ideation were denied. She demonstrated strong insight into her symptoms and was motivated to engage in treatment.      DSM-5 DIAGNOSTIC IMPRESSION:     Generalized Anxiety Disorder with Panic and Intrusive/Scary Images (F41.1)     PLAN AND RECOMMENDATIONS:   The results of this evaluation suggest that Marisabel is likely to benefit from targeted interventions to proactively manage and reduce symptoms of anxiety and panic, manage intrusive/scary images, and prevent a worsening of symptoms in the context of her ongoing efforts to conceive and the likelihood of an " "anticipated pregnancy. Marisabel is motivated to continue to engage in psychotherapy, and we recommend she participate in Cognitive Behavioral Therapy (CBT) leveraging exposure response prevention (ER/P) with a biopsychosocial and  lens to (1) strengthen Marisabel's understanding of the symptoms and \"cycle\" of anxiety and panic; (2) develop and enhance coping strategies to target her symptoms and concerns; (3) recognize, honor, organize, and process feelings of grief, loss, and medical trauma; (4) support her during the pre-conception process.     Today with Marisabel, I discussed the importance of prioritizing sleep and self-care, and leveraging social support, to help improve her mood and functioning. Marisabel expressed interest in scheduling a psychiatric medication consultation with [facility] and we scheduled her for our next available intake on 22. Given Marisabel is currently reporting an acute level of distress, today we discussed the option of Marisabel initiating a psychiatric medication sooner through her OB, and refining a psychiatric medication treatment plan at her [facility] appointment in September.     As Marisabel expressed interest in completing a naturopath medicine evaluation to both address anxiety concerns and support pre-conception efforts, I referred her to Kelvin Helm ND, LAc, of [facility], with an initial appointment scheduled for 22.      These findings and recommendations were briefly reviewed today. An initial appointment was scheduled with this provider for 22 at 8 am to continue to further discuss results and develop formal treatment goals and plan.      Joann Moore, PhD ??????????????????????????????????????Hailey Young PsyD, LP    Postdoctoral Fellow??????????????????????????????????????? Department of Psychiatry          I supervised the completion of this evaluation and supervised the completion of this " report. I agree with the results of the assessment and plan as documented.      Hailey Young Psy.D., LP  Clinical Supervisor, Department of Psychiatry and Behavioral Sciences

## 2022-07-06 NOTE — TELEPHONE ENCOUNTER
A.S.      Please see [facility] message  Do you want to do some type of visit?    Aminta Gage RN

## 2022-07-06 NOTE — PROGRESS NOTES
Marisabel is a 37 year old who is being evaluated via a billable telephone visit.      What phone number would you like to be contacted at? [phone]  How would you like to obtain your AVS? MyChart    Assessment & Plan     Anxiety attack  Plan: use only as needed    - hydrOXYzine (ATARAX) 25 MG tablet; Take 1 tablet (25 mg) by mouth nightly as needed for anxiety  Continue with meditative excercise     Anxiety  Continue with sertraline 50 mg once daily /am     Potential medication side effects were discussed with the patient; let me know if any occur.      Ordering of each unique test      Return in about 4 weeks (around 8/3/2022) for concerns,unresolved, mood, medications recheck/review/refill.    Renetta Long MD  [facility]   Marisabel is a 37 year old, presenting for the following health issues:  No chief complaint on file.      HPI     Started sertraline only a week ago.  Having anxiety attacks at night  And unable to relax at bedtime to be able to have sound sleep.  Has not had a good night sleep more than 1-2 days a week  Last 4 nights- 3-4 hrs of sleep at night.  Today took 3 hours naps  Very anxious through the day as well but night is worse  Started acupuncture yesterday with intention of weekly session for next 6 weeks   She is in talk therapy weekly    Review of Systems   Constitutional, HEENT, cardiovascular, pulmonary, GI, , musculoskeletal, neuro, skin, endocrine and psych systems are negative, except as otherwise noted.      Objective           Vitals:  No vitals were obtained today due to virtual visit.    Physical Exam   healthy, alert and no distress  PSYCH: Alert and oriented times 3; coherent speech, normal   rate and volume, able to articulate logical thoughts, able   to abstract reason, no tangential thoughts, no hallucinations   or delusions  Her affect is normal  RESP: No cough, no audible wheezing, able to talk in full sentences  Remainder of exam unable  to be completed due to telephone visits                Phone call duration: 13 minutes    .  ..

## 2022-07-06 NOTE — TELEPHONE ENCOUNTER
----- Message from Renetta Long MD sent at 7/6/2022  4:07 PM CDT -----  Regarding: TE appontment now  I called her briefly. She is willing to do TE- can you please start the note and put her on my cortes now. Thanks

## 2022-07-08 ENCOUNTER — VIRTUAL VISIT (OUTPATIENT)
Dept: PSYCHIATRY | Facility: CLINIC | Age: 37
End: 2022-07-08
Attending: PSYCHOLOGIST
Payer: COMMERCIAL

## 2022-07-08 DIAGNOSIS — F41.0 GENERALIZED ANXIETY DISORDER WITH PANIC ATTACKS: Primary | ICD-10-CM

## 2022-07-08 DIAGNOSIS — F41.1 GENERALIZED ANXIETY DISORDER WITH PANIC ATTACKS: Primary | ICD-10-CM

## 2022-07-08 PROCEDURE — 90837 PSYTX W PT 60 MINUTES: CPT | Mod: GT

## 2022-07-13 NOTE — PROGRESS NOTES
"WOMEN'S WELLBEING PROGRAM  OUTPATIENT PSYCHOTHERAPY PROGRESS NOTE    Client Name: Griselda Bell   YOB: 1985 (37 year old)   Date of Service:  Jun 30, 2022  Time of Service: 8:07 am to 9:03 am (56 minutes)  Service Type(s): 89797 psychotherapy (53-60 min. with patient and/or family)    Type of service: Telemedicine Psychotherapy   Reason for Telemedicine Visit: COVID-19 public health recommendations on in-person sessions  Mode of transmission: Secure real time interactive audio and visual telecommunication system (videoconference via [employer])  Location of originating and distant sites:    Originating site (patient location): patient home    Distant site (provider site): HIPAA compliant location within provider home/remote setting  Telemedicine Visit: The patient's condition can be safely assessed and treated via synchronous audio and visual telemedicine encounter.    Patient has agreed to receiving services via telemedicine technology.    Diagnoses:   Encounter Diagnosis   Name Primary?     Generalized anxiety disorder with panic and intrusive/scary images Yes       Individuals Present:   Patient and provider     Treatment goal(s) being addressed:   1. Increase understanding of \"cycle\" of anxiety and panic symptoms  2. Support and strengthen ongoing use of grounding and relaxation strategies to manage and reduce anxiety and panic symptoms and promote ability to initiate sleep  3. Utilize Exposure Response Prevention (ER/P) approach to interrupt use of avoidance (e.g., avoiding raising heart rate for example during exercise) or compulsive coping strategies (e.g., checking heart rate on Apple watch) to manage and reduce anxiety or panic  4. Process feelings of grief and loss, prioritizing sleep and self-care to support wellbeing during pre-conception process     Subjective:  Marisabel reported she started her period today, and felt \"sad\" and \"really low\" this morning. She experienced sensations of " "increased anxiety and panic at a work event last Friday evening in the context of interacting with a donor who brought up the overturning of [employer] as a \"huge victory.\" She stepped away with a colleague who supported her in using the 60953 grounding strategy to reduce symptoms of panic.     Treatment:   Individual psychotherapy utilizing cognitive behavioral therapy (CBT) framework and exposure response prevention (ER/P) approach with biopsychosocial  lens. As today was our second appointment we continued to develop and strengthen rapport. Continued to review and discuss feedback of diagnostic assessment (see report dated 22) and treatment recomendations. Supported Marisabel in making space for difficult feelings related to recent news of [employer] being overturned, and getting her period this morning, within context of her previous experiences of medical interruption of pregnancy and pregnancy losses. With Marisabel, continued to explore her use of coping strategies to cope with stress, uncertainty, and complex feelings of pre-conception process; express and manage feelings of grief and loss; and manage and reduce symptoms of anxiety and panic. Provided brief initial psychoeducation on relationship between anxiety, panic, and avoidance, contribution of pattern of thoughts, behaviors, emotions, and experience and interpretation of interoceptive experiences in fueling anxiety/panic cycle. Special focus on discussion of cognitive fusion and defusion strategies. Continued to encourage use of grounding and relaxation strategies, and prioritizing sleep, self-care, leveraging social support. Provided reflective listening and validation, modeled nonjudgmental stance throughout.     Assessment and Progress:   Appearance:  Appropriately-dressed and groomed   Behavior/relationship to examiner/demeanor:  Pleasant, cooperative, warm, open, reflective   Motor activity/EPS: Within normal limits  Speech rate:  Within " "normal limits  Speech volume:  Within normal limits  Speech articulation:  Within normal limits  Speech coherence:  Within normal limits  Speech spontaneity:  Within normal limit  Mood (subjective report): \"sad\", \"I was feeling hopeful, and this morning [upon starting period] I feel low. I'm wondering, when is it time to stop.\"   Affect (objective appearance): Mood-congruent overall; moments of tearfulness throughout    Thought Process (Associations):  Goal directed  Thought process (Rate):  Within normal limits  Thought content: None  Abnormal Perception: None  Insight:  Within normal limits  Judgment:  Within normal limit     Marisabel appeared to respond well today to psychoeducation, reflective practice and parallel process of thinking and reflecting together, and support and encouragement to continue to use coping strategies to target her symptoms and support her wellbeing.     Plan:   Marisabel was asked to continue to prioritize sleep, engage in self-care, and leverage social support. She was asked to continue to use grounding, relaxation strategies, and cognitive defusion strategies to manage and reduce symptoms of anxiety and panic.     Next therapy appointment has been scheduled for 7/8 to continue work on treatment goals.      Treatment Plan review due: upcoming appointment       Joann Moore, PhD  Postdoctoral Fellow      I did not see this patient directly, but have discussed the session with the above trainee and approve this documentation.      Hailey Young Psy.D., JUSTO                                                                                        Clinical Supervisor     "

## 2022-07-15 ENCOUNTER — VIRTUAL VISIT (OUTPATIENT)
Dept: PSYCHIATRY | Facility: CLINIC | Age: 37
End: 2022-07-15
Attending: PSYCHOLOGIST
Payer: COMMERCIAL

## 2022-07-15 DIAGNOSIS — F41.1 GENERALIZED ANXIETY DISORDER WITH PANIC ATTACKS: Primary | ICD-10-CM

## 2022-07-15 DIAGNOSIS — F41.0 GENERALIZED ANXIETY DISORDER WITH PANIC ATTACKS: Primary | ICD-10-CM

## 2022-07-15 PROCEDURE — 90837 PSYTX W PT 60 MINUTES: CPT | Mod: GT

## 2022-07-18 NOTE — PROGRESS NOTES
"WOMEN'S WELLBEING PROGRAM  OUTPATIENT PSYCHOTHERAPY PROGRESS NOTE    Client Name: Griselda \"Marisabel\" J Aveki   YOB: 1985 (37 year old)   Date of Service:  Jul 8, 2022  Time of Service: 9:02 am to 10:01 am (59 minutes)  Service Type(s): 40642 psychotherapy (53-60 min. with patient and/or family)    Type of service: Telemedicine Psychotherapy   Reason for Telemedicine Visit: COVID-19 public health recommendations on in-person sessions  Mode of transmission: Secure real time interactive audio and visual telecommunication system (videoconference via [employer])  Location of originating and distant sites:    Originating site (patient location): patient home    Distant site (provider site): HIPAA compliant location within provider home/remote setting  Telemedicine Visit: The patient's condition can be safely assessed and treated via synchronous audio and visual telemedicine encounter.    Patient has agreed to receiving services via telemedicine technology.    Diagnoses:   Encounter Diagnosis   Name Primary?     Generalized anxiety disorder with panic and intrusive/scary images Yes       Individuals Present:   Patient and provider     Treatment goal(s) being addressed:   1. Increase understanding of \"cycle\" of anxiety and panic symptoms  2. Support and strengthen ongoing use of grounding and relaxation strategies to manage and reduce anxiety and panic symptoms and promote ability to initiate sleep  3. Utilize Exposure Response Prevention (ER/P) approach to interrupt use of avoidance (e.g., avoiding raising heart rate for example during exercise) or compulsive coping strategies (e.g., checking heart rate on Apple watch) to manage and reduce anxiety or panic  4. Process feelings of grief and loss, prioritizing sleep and self-care to support wellbeing during pre-conception process     Subjective:  Marisabel reported she experienced an \"almost\" nocturnal panic attack this week when the dog suddenly startled and awoke up " abruptly from sleep. She used deep breathing and the 32916 grounding exercise to reduce panic and was able to get back to sleep. On the following two nights she was not able to get to sleep until after midnight, and as a result took two days off of work because she was so tired she felt she couldn't function at work. Marisabel contacted her PCP and they partnered to start Marisabel on Zoloft (50 mg) and Hydroxezine (25 mg) at night to help support initiation of sleep. Marisabel reported she experienced another almost panic attack while running when she noticed her heart rate was 170 bpm, and it wasn't decreasing, and Marisabel observed thoughts related to being far away from home and fearing she would experience a panic attack. She stopped running, proceeded to walk home, and used deep breathing while walking to manage and reduce symptoms of panic.     Treatment:   Individual psychotherapy utilizing cognitive behavioral therapy (CBT) framework and exposure response prevention (ER/P) approach with biopsychosocial  lens. Continued to develop and strengthen rapport. Marisabel discussed difficult weekend of increased anxiety and insomnia following recent experience of more moderate nocturnal panic. Continued to discuss and support Marisabel's use of grounding and relaxation coping strategies to manage and reduce symptoms of anxiety and panic, and help promote relaxation and sleep. Continued to review psychoeducation on relationship between anxiety, panic, and avoidance, and exposure/response prevention approach. Together we began to develop her fear ladder and discuss/plan initial exposure exercise. Ongoing discussion of cognitive fusion and defusion strategies (i.e., how thoughts can impact our behaviors, feelings, and vice versa). Continued to encourage Marisabel's use of coping strategies, prioritizing sleep and self-care, leveraging social support. Provided reflective listening and validation, modeled nonjudgmental stance  "throughout.    Assessment and Progress:   Appearance:  Appropriately-dressed and groomed   Behavior/relationship to examiner/demeanor:  Pleasant, cooperative, warm, open, reflective   Motor activity/EPS: Within normal limits  Speech rate:  Within normal limits  Speech volume:  Within normal limits  Speech articulation:  Within normal limits  Speech coherence:  Within normal limits  Speech spontaneity:  Within normal limit  Mood (subjective report): \"increased anxiety,\" \"I can't turn my brain off\"   Affect (objective appearance): Mood-congruent overall; moments of tearfulness   Thought Process (Associations):  Goal directed  Thought process (Rate):  Within normal limits  Thought content: None  Abnormal Perception: None  Insight:  Within normal limits  Judgment:  Within normal limit     Marisabel appeared to continue to respond well today to psychoeducation, reflective practice and parallel process of thinking and reflecting together, and support and encouragement to continue to use coping strategies to target her symptoms and support her wellbeing.     Plan:   Marisabel was asked to continue to prioritize sleep, engage in self-care, and leverage social support. She was asked to continue to use grounding, relaxation strategies, and cognitive defusion strategies to manage and reduce symptoms of anxiety and panic.     Next therapy appointment has been scheduled for 7/15 to continue work on treatment goals.      Treatment Plan review due: upcoming appointment       Joann Moore, PhD  Postdoctoral Fellow       I did not see this patient directly, but have discussed the session with the above trainee and approve this documentation.      Hailey Young Psy.D., JUSTO                                                                                        Clinical Supervisor     "

## 2022-07-20 ENCOUNTER — OFFICE VISIT (OUTPATIENT)
Dept: PSYCHIATRY | Facility: CLINIC | Age: 37
End: 2022-07-20

## 2022-07-20 DIAGNOSIS — G47.00 INSOMNIA, UNSPECIFIED TYPE: ICD-10-CM

## 2022-07-20 DIAGNOSIS — N94.6 DYSMENORRHEA: ICD-10-CM

## 2022-07-20 DIAGNOSIS — F41.9 ANXIETY: Primary | ICD-10-CM

## 2022-07-20 DIAGNOSIS — R14.0 BLOATING: ICD-10-CM

## 2022-07-21 ENCOUNTER — VIRTUAL VISIT (OUTPATIENT)
Dept: PSYCHIATRY | Facility: CLINIC | Age: 37
End: 2022-07-21
Attending: PSYCHOLOGIST
Payer: COMMERCIAL

## 2022-07-21 DIAGNOSIS — F41.0 GENERALIZED ANXIETY DISORDER WITH PANIC ATTACKS: Primary | ICD-10-CM

## 2022-07-21 DIAGNOSIS — F41.1 GENERALIZED ANXIETY DISORDER WITH PANIC ATTACKS: Primary | ICD-10-CM

## 2022-07-21 PROCEDURE — 90837 PSYTX W PT 60 MINUTES: CPT | Mod: GT

## 2022-07-21 NOTE — PROGRESS NOTES
"WOMEN'S WELLBEING PROGRAM   OUTPATIENT PSYCHOTHERAPY PROGRESS NOTE    Client Name: Griselda Bell   YOB: 1985 (37 year old)   Date of Service:  Jul 21, 2022  Time of Service: 8:02 am to 9:00 am (58 minutes)  Service Type(s): 61198 psychotherapy (53-60 min. with patient and/or family)    Type of service: Telemedicine Psychotherapy  Reason for Telemedicine Visit: COVID-19 public health recommendations on in-person sessions  Mode of transmission: Secure real time interactive audio and visual telecommunication system (videoconference via [employer])  Location of originating and distant sites:    Originating site (patient location): patient home    Distant site (provider site): HIPAA compliant location within provider home/remote setting  Telemedicine Visit: The patient's condition can be safely assessed and treated via synchronous audio and visual telemedicine encounter.    Patient has agreed to receiving services via telemedicine technology.    Diagnoses:   Encounter Diagnosis   Name Primary?     Generalized anxiety disorder with panic and intrusive/scary images Yes       Individuals Present:   Patient and provider     Treatment goal(s) being addressed:   1. Increase understanding of \"cycle\" of anxiety and panic symptoms  2. Support and strengthen use of grounding and relaxation strategies to manage and reduce anxiety and panic symptoms and promote ability to initiate sleep  3. Utilize Exposure Response Prevention (ER/P) approach to interrupt use of avoidance (e.g., avoiding raising heart rate for example during exercise) or compulsive coping strategies (e.g., checking heart rate on Apple watch) to manage and reduce anxiety or panic  4. Process feelings of grief and loss, prioritizing sleep and self-care to support wellbeing during pre-conception process     Subjective:  Marisabel reported she is continuing to experience a reduction in anxiety and panic symptoms following initiating Zoloft (50 mg) and " "Hydroxezine (25 mg) at night to help support initiation of sleep, and with proactive use of coping strategies. She completed several exposures on her fear hierarchy, including falling asleep in her bed, which went well. Marisabel had her second accupuncture appointment this week. She will test if she is pregnant on  and has a follow-up appointment to meet with her OB next week. She is looking forward to a visit from her college friend this weekend.     Treatment:   Individual psychotherapy utilizing cognitive behavioral therapy (CBT) framework with exposure response prevention (ER/P) and biopsychosocial  lens. Supported Marisabel in holding space for anticipated complex feelings related to upcoming pregnancy test on . Completed structured imaginal exposure exercise envisioning two outcomes. With Marisabel, developed cope ahead plan to identify coping strategies she can use on . Reviewed and discussed Marisabel's exposure homework from this week, with use of grounding and relaxation coping strategies to manage and reduce symptoms of anxiety and panic, and help promote relaxation and sleep. Ongoing review and discussion of cognitive fusion and defusion strategies to manage anxiety. Ongoing psychoeducation on grief and loss and discussion to support work towards making meaning of loss and traumatic experiences. Continued to support Marisabel in leveraging dialectical lens and radical acceptance, recognizing her active coping efforts as \"good\" and \"enough\" and recognizing lack of total control. With Marisabel, reflected on and located her within care team and larger social support network designed to support her in \"battling statistics\" and walking with her on next steps. Discussed option for reproductive psychiatry bridging appointment prior to her [facility] appointment on , if needed/desired by Marisabel and provided contact information for two appointment options. Encouraged Marisabel to continue to prioritize sleep and self-care " "and leverage social support. Provided reflective listening and validation, modeled nonjudgmental stance throughout.    Assessment and Progress:   Appearance:  Appropriately-dressed and groomed   Behavior/relationship to examiner/demeanor:  Pleasant, cooperative, warm, open, reflective   Motor activity/EPS: Within normal limits  Speech rate:  Within normal limits  Speech volume:  Within normal limits  Speech articulation:  Within normal limits  Speech coherence:  Within normal limits  Speech spontaneity:  Within normal limit  Mood (subjective report): \"better\" \"I can feel the reduction in anxiety\"   Affect (objective appearance): Mood-congruent overall; moments of tearfulness when discussing feelings of grief and happiness   Thought Process (Associations):  Goal directed  Thought process (Rate):  Within normal limits  Thought content: None  Abnormal Perception: None  Insight:  Within normal limits  Judgment:  Within normal limit     Marisabel is making excellent progress towards her treatment goals. She continues to respond well to psychoeducation, support, and encouragement to strengthen her coping strategies to target her symptoms of anxiety and panic and support her wellbeing during the pre-conception process. She is responding well to use of relaxation and grounding strategies, cognitive restructuring and cognitive defusion strategies, and ER/P with in-vivo and imaginal exposures. Marisabel is an engaged and active participant in therapy, responding well to reflective practice and parallel process of thinking together to process feelings of grief and loss and work towards acceptance and resilience. Marisabel reports perceived benefit of initiating Zoloft and accupuncture to support treatment goals.     Plan:   Marisabel was asked to continue to prioritize sleep, engage in self-care, and leverage social support. She was asked to continue to use coping strategies and planned/structured exposure exercises to manage and reduce " symptoms of anxiety and panic.    Today we discussed the option for a bridging appointment with a reproductive psychiatry provider prior to Marisabel's scheduled appointment with the Women's Wellbeing Team on 9/1, if needed/desired by Marisabel. We recommend the following providers who both have immediate openings:     Asia Arnold MD, [facility], [facility], [phone]    Lora Hernandez DNP, APRN, CNP, PMHNP-BC, [facility], [phone]?     Next therapy appointment has been scheduled for 8/4/22 at 8 am to continue work on treatment goals.      Treatment Plan review due: we will formalize this at our next appointment       Joann Moore, PhD  Postdoctoral Fellow    I did not see this patient directly, but have discussed the session with the above trainee and approve this documentation.      Hailey Young Psy.D.,                                                                                         Clinical Supervisor

## 2022-07-23 DIAGNOSIS — F41.9 ANXIETY: ICD-10-CM

## 2022-07-25 NOTE — PROGRESS NOTES
"[facility]OM[facility]'S [facility]LL[facility] PROGRAM  OUTPATIENT PSYCHOTHERAPY PROGRESS NOTE    Client Name: Griselda Bell   YOB: 1985 (37 year old)   Date of Service:  Jul 15, 2022  Time of Service: 9:01 am to 10:00 am (59 minutes)  Service Type(s): 52279 psychotherapy (53-60 min. with patient and/or family)    Type of service: Telemedicine Psychotherapy   Reason for Telemedicine Visit: COVID-19 public health recommendations on in-person sessions  Mode of transmission: Secure real time interactive audio and visual telecommunication system (videoconference via [employer])  Location of originating and distant sites:    Originating site (patient location): patient home    Distant site (provider site): HIPAA compliant location within provider home/remote setting  Telemedicine Visit: The patient's condition can be safely assessed and treated via synchronous audio and visual telemedicine encounter.    Patient has agreed to receiving services via telemedicine technology.    Diagnoses:   Encounter Diagnosis   Name Primary?     Generalized anxiety disorder with panic and intrusive/scary images Yes       Individuals Present:   Patient and provider     Treatment goal(s) being addressed:   1. Increase understanding of \"cycle\" of anxiety and panic symptoms  2. Support and strengthen use of grounding and relaxation strategies to manage and reduce anxiety and panic symptoms and promote ability to initiate sleep  3. Utilize Exposure Response Prevention (ER/P) approach to interrupt use of avoidance (e.g., avoiding raising heart rate for example during exercise) or compulsive coping strategies (e.g., checking heart rate on Apple watch) to manage and reduce anxiety or panic  4. Process feelings of grief and loss, prioritizing sleep and self-care to support wellbeing during pre-conception process     Subjective:  Marisabel reported she is ill with a stomach bug and home from work today. She feels \"better\" and is experiencing \"some relief from the " "intense anxiety.\" She attributes this to initiating Zoloft, ongoing use of grounding and relaxation strategies, and effective use of planned exposure exercise as homework assignment to increased heart rate in context of brief run. She said it was \"very helpful to observe my body cooling down\" and use cognitive restructuring strategies (\"this is normal\" [to have a HR this level]) and relaxation strategies (deep breathing), to avoid avoidance and reduce anxiety and prevent panic. She reported decrease in SUDs after this exercise, stated, \"it felt really good,\" and noted \"I was less hypervigilant [related to watching her HR] the rest of the week.\" Additionally, Marisabel reported her daughter, Yady, woke up with a possible nightmare in the middle of the night, abruptly waking up Marisabel. This triggered increased anxiety and somatic symptoms of panic, but Marisabel was able to use grounding strategies and distraction to reduce anxiety and prevent panic attack. Marisabel has been limiting alcohol and caffeine intake and feels this has also been helpful. This week Marisabel ovulated and can test if she is pregnant on . She noted, \"part of me feels hopeful, and part of me expects it to be negative.\" Marisabel reported she feels \"incredibly nervous at the thought of being pregnant again.\"     Treatment:   Individual psychotherapy utilizing cognitive behavioral therapy (CBT) framework with exposure response prevention (ER/P) and biopsychosocial  lens. Continued to discuss and support Marisabel's use of grounding and relaxation coping strategies, both within structured ER/P exercises and outside of them, to manage and reduce symptoms of anxiety and panic, and help promote relaxation and sleep. Continued to review psychoeducation and enhance understanding of relationship between anxiety, panic, and avoidance, and exposure/response prevention approach to interrupt cycle and reshape associations. Together we continued to refine Marisabel's fear " "ladder and discuss/plan additional exposure exercises. Ongoing discussion of cognitive fusion and defusion strategies (i.e., how thoughts can impact our behaviors, feelings, and vice versa). Together we supported Marisabel in holding space for complex thoughts and feelings related to coping with pre-conception process. Continued to encourage Marisabel's use of coping strategies to cope with pre-conception process, prioritizing sleep and self-care, leveraging social support. Provided reflective listening and validation, modeled nonjudgmental stance throughout.    Assessment and Progress:   Appearance:  Appropriately-dressed and groomed   Behavior/relationship to examiner/demeanor:  Pleasant, cooperative, warm, open, reflective   Motor activity/EPS: Within normal limits  Speech rate:  Within normal limits  Speech volume:  Within normal limits  Speech articulation:  Within normal limits  Speech coherence:  Within normal limits  Speech spontaneity:  Within normal limit  Mood (subjective report): \"better\", \"I think I can feel the Zoloft kicking in\", reported mild but noticeable reduction in anxiety  Affect (objective appearance): Mood-congruent overall; moments of tearfulness when discussing feelings of grief and happiness   Thought Process (Associations):  Goal directed  Thought process (Rate):  Within normal limits  Thought content: None  Abnormal Perception: None  Insight:  Within normal limits  Judgment:  Within normal limit     Marisabel is making good initial progress towards her treatment goals. Today Marisabel reported perceived benefit of initiating Zoloft to target symptoms of anxiety and panic. She is actively engaged in psychotherapy, and shows proactive use of coping strategies outside of our appointments. She appears to be responding well to psychoeducation, support, and encouragement to strengthen her coping strategies to target her symptoms of anxiety and panic and support her wellbeing during the pre-conception process. " She continues to report benefit from use of relaxation and grounding strategies, cognitive restructuring and cognitive defusion strategies, and ER/P with in-vivo and imaginal exposures. Marisabel also appears to respond well to reflective practice and parallel process of thinking together to process feelings of grief and loss and work towards acceptance and resilience.    Plan:   Marisabel was asked to continue to prioritize sleep, engage in self-care, and leverage social support. She was asked to continue to use coping strategies and planned/structured exposure exercises to manage and reduce symptoms of anxiety and panic.    Next therapy appointment has been scheduled for 7/21/22 to continue work on treatment goals.      Treatment Plan review due: next appointment     Joann Moore, PhD  Postdoctoral Fellow     I did not see this patient directly, but have discussed the session with the above trainee and approve this documentation.      Hailey Young Psy.D.,                                                                                         Clinical Supervisor

## 2022-08-04 ENCOUNTER — VIRTUAL VISIT (OUTPATIENT)
Dept: PSYCHIATRY | Facility: CLINIC | Age: 37
End: 2022-08-04
Attending: PSYCHOLOGIST
Payer: COMMERCIAL

## 2022-08-04 DIAGNOSIS — F41.0 GENERALIZED ANXIETY DISORDER WITH PANIC ATTACKS: Primary | ICD-10-CM

## 2022-08-04 DIAGNOSIS — F41.1 GENERALIZED ANXIETY DISORDER WITH PANIC ATTACKS: Primary | ICD-10-CM

## 2022-08-04 PROCEDURE — 90837 PSYTX W PT 60 MINUTES: CPT | Mod: GT

## 2022-08-04 ASSESSMENT — PATIENT HEALTH QUESTIONNAIRE - PHQ9
10. IF YOU CHECKED OFF ANY PROBLEMS, HOW DIFFICULT HAVE THESE PROBLEMS MADE IT FOR YOU TO DO YOUR WORK, TAKE CARE OF THINGS AT HOME, OR GET ALONG WITH OTHER PEOPLE: NOT DIFFICULT AT ALL
SUM OF ALL RESPONSES TO PHQ QUESTIONS 1-9: 1
SUM OF ALL RESPONSES TO PHQ QUESTIONS 1-9: 1

## 2022-08-04 NOTE — PROGRESS NOTES
"  [facility]OMEN[facility]LL[facility]ING PROGRAM  OUTPATIENT PSYCHOTHERAPY PROGRESS NOTE    Client Name: Griselda Bell   YOB: 1985 (37 year old)   Date of Service:  Aug 4, 2022  Time of Service: 8:01 am to 9:00 am (59 minutes)  Service Type(s): 81341 psychotherapy (53-60 min. with patient and/or family)    Type of service: Telemedicine Psychotherapy   Reason for Telemedicine Visit: COVID-19 public health recommendations on in-person sessions  Mode of transmission: Secure real time interactive audio and visual telecommunication system (videoconference via [employer])  Location of originating and distant sites:    Originating site (patient location): patient home    Distant site (provider site): HIPAA compliant location within provider home/remote setting  Telemedicine Visit: The patient's condition can be safely assessed and treated via synchronous audio and visual telemedicine encounter.    Patient has agreed to receiving services via telemedicine technology.    Diagnoses:   Encounter Diagnosis   Name Primary?     Generalized anxiety disorder with panic and intrusive/scary images Yes       Individuals Present:   Patient and provider     Treatment goal(s) being addressed:   1. Increase understanding of \"cycle\" of anxiety and panic symptoms  2. Support and strengthen use of grounding and relaxation strategies to manage and reduce anxiety and panic symptoms and promote ability to initiate sleep  3. Utilize Exposure Response Prevention (ER/P) approach to interrupt use of avoidance (e.g., avoiding raising heart rate for example during exercise) or compulsive coping strategies (e.g., checking heart rate on Apple watch) to manage and reduce anxiety or panic  4. Process feelings of grief and loss, prioritizing sleep and self-care to support wellbeing during pre-conception process     Subjective:  Marisabel reported she learned she is newly pregnant, approximately 4 weeks gestation. Initial blood work looks very good in indicating a " "likely healthy, typically developing pregnancy in these early stages. Marisabel experienced some spotting while at an evening work event, which was triggering for worries she may be miscarrying. 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 Marisabel described her current mood as \"positive and very nervous.\" She will complete her first dating ultrasound 8/15 at 6.5 weeks. Zurdo will not be able to attend this appointment, as he will be out of town for a work trip. Marisabel reported ongoing improvement in panic and anxiety symptoms. She has not had any additional panic attacks, or acute symptoms of panic. She is continuing to use cognitive restructuring and defusion strategies to manage and reduce anxiety symptoms (worry thoughts, difficulty relaxing) which increase slightly in the evening before bed.     Treatment:   Individual psychotherapy utilizing cognitive behavioral therapy (CBT) framework with exposure response prevention (ER/P) and biopsychosocial  lens. Supported Marisabel in holding space for complex thoughts and feelings related to recent positive pregnancy test. With Marisabel, continued to develop, discuss, and support ongoing use of coping strategies and cope ahead plan to manage and reduce symptoms of anxiety and panic, and help promote relaxation and sleep. Ongoing review and discussion of Marisabel's use of cognitive fusion and defusion strategies to manage anxiety. Ongoing psychoeducation on grief and loss and discussion to recognize disruption to Marisabel's \"reproductive story\" and support work towards making meaning of traumatic experiences. Leveraged dialectical lens to make space for loss, fear, uncertainty, and hope. Discussed use of mindfulness strategies  to help cope with tolerating uncertainty (i.e., noticing time travel to past or future and continuing to bring attention back to present without " "judgement). Explored use of journaling to continue to organize and contain complex thoughts and feelings outside of therapy. Explored and supported Marisabel's ongoing use of self-care and social support. Encouraged Marisabel to continue to prioritize sleep. Provided reflective listening and validation, modeled nonjudgmental stance throughout.    Assessment and Progress:   Appearance:  Appropriately-dressed and groomed   Behavior/relationship to examiner/demeanor:  Pleasant, cooperative, warm, open, reflective   Motor activity/EPS: Within normal limits  Speech rate:  Within normal limits  Speech volume:  Within normal limits  Speech articulation:  Within normal limits  Speech coherence:  Within normal limits  Speech spontaneity:  Within normal limit  Mood (subjective report): \"positive and very nervous\"  Affect (objective appearance): Mood-congruent overall; moments of tearfulness when discussing feelings of grief and happiness   Thought Process (Associations):  Goal directed  Thought process (Rate):  Within normal limits  Thought content: None  Abnormal Perception: None  Insight:  Within normal limits  Judgment:  Within normal limit     Marisabel continues to make excellent progress towards her treatment goals. She continues to respond well to psychoeducation, support, and encouragement to strengthen her coping strategies to target her symptoms of anxiety and panic and support her wellbeing. As Marisabel enters into the early stages of a new pregnancy after experiencing recurrent pregnancy loss, we will continue to leverage use of mindfulness, relaxation, and grounding strategies, cognitive restructuring and cognitive defusion strategies, and ER/P with in-vivo and imaginal exposures. Marisabel is an engaged and active participant in therapy, responding well to reflective practice and parallel process of thinking together to process feelings of grief and loss and work towards acceptance and resilience. Marisabel reports perceived benefit " of initiating Zoloft and accupuncture to support treatment goals.     Answers for HPI/ROS submitted by the patient on 8/4/2022  If you checked off any problems, how difficult have these problems made it for you to do your work, take care of things at home, or get along with other people?: Not difficult at all  PHQ9 TOTAL SCORE: 1, indicating minimal depressive symptoms     Plan:   Marisabel was asked to continue to prioritize sleep, engage in self-care, and leverage social support. She was asked to continue to use coping strategies and planned/structured exposure exercises to manage and reduce symptoms of anxiety and panic.     At our last appointment we discussed the option for a bridging appointment with a reproductive psychiatry provider prior to Marisabel's scheduled appointment with the Women's Wellbeing Team on 9/1, if needed/desired by Marisabel. We recommend the following providers who both have immediate openings:     Asia Arnold MD, [facility], [facility], [phone]    Lora Hernandez, DNP, APRN, CNP, PMHNP-BC, [facility], [phone]?      Next therapy appointment has been scheduled for 8/12/22 at 8 am to continue work on treatment goals.      Treatment Plan review due: 11/4/22      Joann Moore, PhD  Postdoctoral Fellow        I did not see this patient directly, but have discussed the session with the above trainee and approve this documentation.      Hailey Young Psy.D., LP                                                                                        Clinical Supervisor

## 2022-08-09 NOTE — PROGRESS NOTES
Ms. Bell, is a 37 year old female is here today for initial acupuncture treatment for main complaint of Insomnia, Abnormal Bleeding Problem, Anxiety, and Bloated     Here for acupuncture to manage anxiety, painful periods, and disrupted sleep.     Anxiety: Tries to manage by working out regularly ([employer] 5 days weekly). Hoping acupuncture can also help with this. Waxes and wanes with stress.     Patient is referred by: Self    Observation:   General: Well appearing, well nourished, in no distress. Oriented X3, intact recent and remote memory, judgment and insight, normal mood and affect.   Skin: Good turgor, no rash, unusual bruising or prominent lesions  Hair: Normal texture and distribution.  Nails: Normal color, no deformities  Head: Normocephalic, atraumatic, no visible or palpable masses, depressions, or scarring.   Extremities: No amputations.  Musculoskeletal: Normal gait and station. Ambulating without difficulty.     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 Move liver qi to reduce pain with menses.     Points Used Today:               Initial Point Insertions, including selecting, locating, and cleaning the points, cleaning my hands, inserting and manipulating the needles. Inserted at 3:40pm and removed at 4:20pm: GV20, Front zone [place] x 2, 3NP to each ear.  Number of needles inserted: 9                 Additional Point Insertions, including selecting, locating, and cleaning the points, cleaning my hands, inserting and manipulating the needles  - inserted at 3:55pm and removed at 4:20pm: LR3, KD3, GB20, SP6, TB5 (right), GB41 (left)  Number of needles inserted: 10     Needles stimulated,retained and removed.  Total number of needles removed and  placed in sharps container: 19    Accessory Technique's:   TDP Heat Lamp: On Feet   Essential Oil(s): None    Acupuncture Treatment Recommendations and Comments: Recommend weekly acupuncture for 6-8 weeks, then reassess for impact on symptoms of painful menses, insomnia and anxiety. Consider additional course of treatments every 1-4 weeks pending response to initial course.        Past Medical History:   has a past medical history of Migraine and Obese.      Energy: Low  Sleep: Delayed sleep onset and frequent awakening  Emotions: Yes: Anxiety  Limbs/Back: Did not discuss today.  Head/Eyes/Ears: Did not discuss today.  Digestion: abdominal bloating  Stools: Normal Loose Stool  Tongue: No Current Symptoms  Pulse: No Current Symptoms  Palpitation: Reports irregular heart beat of rapid heart rate on occasion. Denies chest pain.   Women's Health: Dysmenorrhea - regularly experiences pain during beginning of menses. Does not impact ability to perform ADLs or work, but does affect ability to focus.  Reports hx of 2 miscarriages and 1 termination. Has one daughter.       Plan: It is my recommendation that patient seek advice from their PCP about active symptoms not addressed during this visit. Addressed all patient's questions to their satisfaction and was given consent to treat.      Patient understands that acupuncture is not a guarantee of benefits and will verify with own insurance and/or chente specialists. The risks and benefits of acupuncture were reviewed and the patient stated understanding. Answered all patient's questions to their satisfaction. Scope of practice and the acupuncturist's qualifications were also reviewed the patient provided signed consent.       Time Spent with Patient:       I spent a total of 35 minutes face-to-face with Griselda Bell during today's office visit. After the initial intake, time with patient was spent selecting, locating, and cleaning the points, cleaning my hands,  inserting and manipulating the needles. 30 minutes spent performing the act of acupuncture with reinsertion of needles.  Over 50% of this time was spent counseling the patient and/or coordinating care regarding   Chief Complaint   Patient presents with     Insomnia     Abnormal Bleeding Problem     Anxiety     Bloated    .  See note for details.    Kelvin Helm ND, LAc

## 2022-08-12 ENCOUNTER — VIRTUAL VISIT (OUTPATIENT)
Dept: PSYCHIATRY | Facility: CLINIC | Age: 37
End: 2022-08-12
Attending: PSYCHOLOGIST
Payer: COMMERCIAL

## 2022-08-12 DIAGNOSIS — F41.1 GENERALIZED ANXIETY DISORDER WITH PANIC ATTACKS: Primary | ICD-10-CM

## 2022-08-12 DIAGNOSIS — F41.0 GENERALIZED ANXIETY DISORDER WITH PANIC ATTACKS: Primary | ICD-10-CM

## 2022-08-12 PROCEDURE — 90837 PSYTX W PT 60 MINUTES: CPT | Mod: GT

## 2022-08-16 ENCOUNTER — OFFICE VISIT (OUTPATIENT)
Dept: PSYCHIATRY | Facility: CLINIC | Age: 37
End: 2022-08-16

## 2022-08-16 DIAGNOSIS — F41.1 GENERALIZED ANXIETY DISORDER WITH PANIC ATTACKS: ICD-10-CM

## 2022-08-16 DIAGNOSIS — G47.00 INSOMNIA, UNSPECIFIED TYPE: ICD-10-CM

## 2022-08-16 DIAGNOSIS — F41.0 GENERALIZED ANXIETY DISORDER WITH PANIC ATTACKS: ICD-10-CM

## 2022-08-16 DIAGNOSIS — O21.9 NAUSEA AND VOMITING IN PREGNANCY PRIOR TO 22 WEEKS GESTATION: Primary | ICD-10-CM

## 2022-08-16 NOTE — PROGRESS NOTES
Ms. Bell, is a 37 year old female is here today for a Follow Up acupuncture treatment for main complaint of Nausea   Anxiety    Patient is referred by: Self    Report of Findings from Previous Treatment: Anxiety: Tries to manage by working out regularly ([employer] 5 days weekly). Hoping acupuncture can also help with this. Waxes and wanes with stress.     Nausea - is 7.5 weeks pregnant, and experiencing significant nausea. Denies vomiting. Has history of significant nausea with past pregnancies.  Has been taking Vitamin B6 and guera chews.      Patient is referred by: Self     Observation:   General: Well appearing, well nourished, in no distress. Oriented X3, intact recent and remote memory, judgment and insight, normal mood and affect.   Skin: Good turgor, no rash, unusual bruising or prominent lesions  Hair: Normal texture and distribution.  Nails: Normal color, no deformities  Head: Normocephalic, atraumatic, no visible or palpable masses, depressions, or scarring.   Extremities: No amputations.  Musculoskeletal: Normal gait and station. Ambulating without difficulty.      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 Move liver qi to reduce pain with menses.      Points Used Today:               Initial Point Insertions, including selecting, locating, and cleaning the points, cleaning my hands, inserting and manipulating the needles. Inserted at 11:40am and removed at 12:20pm:[place], Kidney to each ear.  Number of needles inserted: 4                 Additional Point Insertions, including selecting, locating, and cleaning the points, cleaning my hands, inserting and manipulating the needles  - inserted at 11:55m and removed at 12:20pm: PC6, SP4 (right),  "LU7 (right), KD6 (left), KD3, SP9  Number of needles inserted: 9     Needles stimulated,retained and removed.  Total number of needles removed and placed in sharps container: 13     Accessory Technique's:              TDP Heat Lamp: On Feet              Essential Oil(s): None     Acupuncture Treatment Recommendations and Comments: Recommend acupuncture as needed to manage nausea in pregnancy, as well as manage anxiety and sleep throughout pregnancy.         Past Medical History:   has a past medical history of Migraine and Obese.        Energy: Low  Sleep: Delayed sleep onset and frequent awakening  Emotions: Yes: Anxiety  Limbs/Back: Did not discuss today.  Head/Eyes/Ears: Did not discuss today.  Digestion: nausea  Stools: Normal Loose Stool  Tongue: No Current Symptoms  Pulse: No Current Symptoms  Palpitation: Reports irregular heart beat of rapid heart rate on occasion. Denies chest pain.   Women's Health: Currently 7.5 weeks pregnant. Next follow-up U/S in 2 weeks. Reports Serum HCG and progesterone have been optimal thus far.         Plan:   Discussed the following as additional non-pharmacologic nausea management during pregnancy:  1) Eat small bites of protein throughout the day. Even though you may not \"feel\" like eating it, small bites can go a long way in reducing/preventing nausea.  2) Acupuncture  3) Sea bands to PC 6 on each wrist   4) Oral ginger - capsules, tea okay. 2-3 cups of ginger tea each day, 1-2 caps as needed to reduce nausea  5) Vitamin B6 - already doing this.     It is my recommendation that patient seek advice from their PCP about active symptoms not addressed during this visit. Addressed all patient's questions to their satisfaction and was given consent to treat.        Patient understands that acupuncture is not a guarantee of benefits and will verify with own insurance and/or chente specialists. The risks and benefits of acupuncture were reviewed and the patient stated " understanding. Answered all patient's questions to their satisfaction. Scope of practice and the acupuncturist's qualifications were also reviewed the patient provided signed consent.         Time Spent with Patient:       I spent a total of 35 minutes face-to-face with Griselda Bell during today's office visit. After the initial intake, time with patient was spent selecting, locating, and cleaning the points, cleaning my hands, inserting and manipulating the needles. 30 minutes spent performing the act of acupuncture with reinsertion of needles.   Over 50% of this time was spent counseling the patient and/or coordinating care regarding   Chief Complaint   Patient presents with     Nausea   .  See note for details.            Kelvin Helm ND, LAc

## 2022-08-18 NOTE — PROGRESS NOTES
"WOMEN'S WELLBEING PROGRAM  OUTPATIENT PSYCHOTHERAPY PROGRESS NOTE    Client Name: Griselda Bell   YOB: 1985 (37 year old)   Date of Service:  Aug 12, 2022  Time of Service: 8:00 am to 8:55 am (55 minutes)  Service Type(s): 35105 psychotherapy (53-60 min. with patient and/or family)    Type of service: Telemedicine Psychotherapy   Reason for Telemedicine Visit: COVID-19 public health recommendations on in-person sessions  Mode of transmission: Secure real time interactive audio and visual telecommunication system (videoconference via [employer])  Location of originating and distant sites:    Originating site (patient location): patient's work     Distant site (provider site): HIPAA compliant location within remote setting of [facility]  Telemedicine Visit: The patient's condition can be safely assessed and treated via synchronous audio and visual telemedicine encounter.    Patient has agreed to receiving services via telemedicine technology.    Diagnoses:   Encounter Diagnosis   Name Primary?     Generalized anxiety disorder with panic and intrusive/scary images Yes       Individuals Present:   Patient and provider     Treatment goal(s) being addressed:   1. Increase understanding of \"cycle\" of anxiety and panic symptoms  2. Support and strengthen use of grounding and relaxation strategies to manage and reduce anxiety and panic symptoms and promote ability to initiate sleep  3. Utilize Exposure Response Prevention (ER/P) approach to interrupt use of avoidance (e.g., avoiding raising heart rate for example during exercise) or compulsive coping strategies (e.g., checking heart rate on Apple watch) to manage and reduce anxiety or panic  4. Process feelings of grief and loss, prioritizing sleep and self-care to support wellbeing during early pregnancy    Subjective:  Marisabel reported she is feeling \"ok.\" It \"feels weird and surreal\" to be pregnant after recurrent losses, and Marisabel " "noted \"I want to be excited, but I can't be; it sucks not to be.\" She has begun to experience some first trimester symptoms, including nausea, which she feels is reassuring. Marisabel has not had any panic symptoms or attacks, and feels she is managing anxiety and worries relatively well. She has continued some homework exercises outside of work (e.g., running) and noted it has \"felt good\" to get her heart rate up during recent runs. Marisabel continues to feel more restless and on edge in the evenings, when anxiety has historically always been at a higher level for her. Taking unisom has helped both manage nausea and initiate sleep the past few nights. On Monday she will complete her initial dating ultrasound, and due to a work trip her , [place], will not be able to attend. She feels anxious to complete the ultrasound and get confirmation the pregnancy is progressing.     Treatment:   Individual psychotherapy utilizing cognitive behavioral therapy (CBT) framework with exposure response prevention (ER/P) and biopsychosocial  lens. Today we continued to support Marisabel in holding space for complex thoughts and feelings related to recent positive pregnancy test and upcoming initial dating ultrasound. Continued to support use of coping strategies and cope ahead plan to manage and reduce symptoms of anxiety and panic, promote relaxation and sleep, and support wellbeing. Ongoing focus on support of use of mindfulness strategies to observe, describe, and let go of complex thoughts and feelings, and help cope with tolerating uncertainty by staying present in the moment. Continued to leverage dialectical lens to make space for loss, fear, uncertainty, and hope. Supported Marisabel in brainstorming and developing cope ahead plan to help support her wellbeing next Monday morning in context of completing initial ultrasound. Continued to encourage efforts to prioritize sleep, with discussion of sleep hygiene and relaxation " "strategies to help induce relaxation and initiate sleep (e.g., progressive muscle relaxation). Provided reflective listening and validation, modeled nonjudgmental stance throughout.    Assessment and Progress:   Appearance:  Appropriately-dressed and groomed   Behavior/relationship to examiner/demeanor:  Pleasant, cooperative, warm, open, reflective   Motor activity/EPS: Within normal limits  Speech rate:  Within normal limits  Speech volume:  Within normal limits  Speech articulation:  Within normal limits  Speech coherence:  Within normal limits  Speech spontaneity:  Within normal limit  Mood (subjective report): \"ok\", \"feeling positive and very nervous\"  Affect (objective appearance): Mood-congruent overall  Thought Process (Associations):  Goal directed  Thought process (Rate):  Within normal limits  Thought content: None  Abnormal Perception: None  Insight:  Within normal limits  Judgment:  Within normal limit     Marisabel continues to make strong progress towards her treatment goals and is proactively and effectively using coping strategies, social support, and self-care to support her wellbeing during the early stages of pregnancy. She has been responding well to psychoeducation, reflective practice, exposure exercises, validation, and encouragement. Marisabel is an engaged and active participant in therapy. She identifies benefiting from psychotherapy to support her use of coping strategies has helped to reduce her symptoms of anxiety and panic and support her processing of feelings of grief and loss. She also reports benefit from initiating Zoloft and acupuncture. As Marisabel enters into the early stages of a new pregnancy after experiencing recurrent pregnancy loss, we will continue to leverage use of mindfulness, relaxation, and grounding strategies, cognitive restructuring and cognitive defusion strategies, and ER/P with in-vivo and imaginal exposures.     Plan:   Marisabel was asked to continue to prioritize sleep, " engage in self-care, and leverage social support. She was asked to continue to use coping strategies and planned/structured exposure exercises to manage and reduce symptoms of anxiety and panic.     At our last appointment we discussed the option for a bridging appointment with a reproductive psychiatry provider prior to Marisabel's scheduled appointment with the Women's Wellbeing Team on 9/1, if needed/desired by Marisabel. We recommend the following providers who both have immediate openings:     Asia Arnold MD, [facility], [facility], [phone]    Lora Hernandez DNP, APRN, CNP, PMHNP-BC, [facility], [phone]?       Next therapy appointment has been scheduled for 8/19/22 at 8 am to continue work on treatment goals.      Treatment Plan review due: 11/4/22      Joann Moore, PhD  Postdoctoral Fellow     I did not see this patient directly, but have discussed the session with the above trainee and approve this documentation.      Hailey Young Psy.D.,                                                                                         Clinical Supervisor

## 2022-08-23 ENCOUNTER — OFFICE VISIT (OUTPATIENT)
Dept: PSYCHIATRY | Facility: CLINIC | Age: 37
End: 2022-08-23

## 2022-08-23 DIAGNOSIS — F41.0 GENERALIZED ANXIETY DISORDER WITH PANIC ATTACKS: ICD-10-CM

## 2022-08-23 DIAGNOSIS — O21.9 NAUSEA AND VOMITING IN PREGNANCY PRIOR TO 22 WEEKS GESTATION: Primary | ICD-10-CM

## 2022-08-23 DIAGNOSIS — F41.1 GENERALIZED ANXIETY DISORDER WITH PANIC ATTACKS: ICD-10-CM

## 2022-08-23 NOTE — PROGRESS NOTES
Ms. Bell, is a 37 year old female is here today for a Follow Up acupuncture treatment for main complaint of Nausea & Vomiting and Anxiety    Patient is referred by: Self    Report of Findings from Previous Treatment: Anxiety: This has felt better since pregnancy.    Nausea - this continues to be significant. Has been trying to eat small, protein-dense snacks more frequently, and this has helped a little. Has also been doing guera chews, guera drops, etc.  Nausea comes in waves, and is vomiting at least once daily now.        Patient is referred by: Self     Observation:   General: Well appearing, well nourished, in no distress. Oriented X3, intact recent and remote memory, judgment and insight, normal mood and affect.   Skin: Good turgor, no rash, unusual bruising or prominent lesions  Hair: Normal texture and distribution.  Nails: Normal color, no deformities  Head: Normocephalic, atraumatic, no visible or palpable masses, depressions, or scarring.   Extremities: No amputations.  Musculoskeletal: Normal gait and station. Ambulating without difficulty.        [facility] Diagnosis Pertinent to Treatment:  Qi Deficiency, Blood Deficiency, Heart Qi Deficiency, Spleen Qi Deficiency and Liver Qi Stagnation        Treatment Principle: Nourish qi and blood to support restful sleep and reduce anxiety. Move liver qi to reduce pain with menses.      Points Used Today:               Initial Point Insertions, including selecting, locating, and cleaning the points, cleaning my hands, inserting and manipulating the needles. Inserted at 11:40am and removed at 12:20pm: Ear: stomach/spleen point, PC6  Number of needles inserted: 4                 Additional Point Insertions, including selecting, locating, and cleaning the points, cleaning my hands, inserting and manipulating the needles  - inserted at 11:55m and removed at 12:20pm: SP4 (left), LU7 (left), KD6 (right), KD3, SP9  Number of needles inserted: 7     Needles  "stimulated,retained and removed.  Total number of needles removed and placed in sharps container: 11     Accessory Technique's:              TDP Heat Lamp: On Feet              Essential Oil(s): None     Acupuncture Treatment Recommendations and Comments: Recommend acupuncture as needed to manage nausea in pregnancy, as well as manage anxiety and sleep throughout pregnancy.         Past Medical History:   has a past medical history of Migraine and Obese.        Energy: Low  Sleep: Delayed sleep onset and frequent awakening  Emotions: Yes: Anxiety  Limbs/Back: Did not discuss today.  Head/Eyes/Ears: Did not discuss today.  Digestion: nausea and vomiting.  Stools: Normal Loose Stool  Tongue: No Current Symptoms  Pulse: No Current Symptoms  Palpitation: Reports irregular heart beat of rapid heart rate on occasion. Denies chest pain.   Women's Health: Currently 8.5 weeks pregnant. Next follow-up U/S next week. Reports Serum HCG and progesterone have been optimal thus far.         Plan:   Discussed the following as additional non-pharmacologic nausea management during pregnancy:  1) Eat small bites of protein throughout the day. Even though you may not \"feel\" like eating it, small bites can go a long way in reducing/preventing nausea.  2) Acupuncture  3) Sea bands to PC 6 on each wrist   4) Oral ginger - capsules, tea okay. 2-3 cups of ginger tea each day, 1-2 caps as needed to reduce nausea  5) Vitamin B6 - already doing this.      It is my recommendation that patient seek advice from their PCP about active symptoms not addressed during this visit. Addressed all patient's questions to their satisfaction and was given consent to treat.        Patient understands that acupuncture is not a guarantee of benefits and will verify with own insurance and/or chente specialists. The risks and benefits of acupuncture were reviewed and the patient stated understanding. Answered all patient's questions to their satisfaction. Scope " of practice and the acupuncturist's qualifications were also reviewed the patient provided signed consent.         Time Spent with Patient:       I spent a total of 35 minutes face-to-face with Grisedla Bell during today's office visit. After the initial intake, time with patient was spent selecting, locating, and cleaning the points, cleaning my hands, inserting and manipulating the needles. 30 minutes spent performing the act of acupuncture with reinsertion of needles.   Over 50% of this time was spent counseling the patient and/or coordinating care regarding   Chief Complaint   Patient presents with     Nausea & Vomiting     Anxiety   .  See note for details.            Kelvin Helm ND, LAc

## 2022-08-26 ENCOUNTER — VIRTUAL VISIT (OUTPATIENT)
Dept: PSYCHIATRY | Facility: CLINIC | Age: 37
End: 2022-08-26
Attending: PSYCHOLOGIST
Payer: COMMERCIAL

## 2022-08-26 DIAGNOSIS — F41.0 GENERALIZED ANXIETY DISORDER WITH PANIC ATTACKS: Primary | ICD-10-CM

## 2022-08-26 DIAGNOSIS — F41.1 GENERALIZED ANXIETY DISORDER WITH PANIC ATTACKS: Primary | ICD-10-CM

## 2022-08-26 PROCEDURE — 90837 PSYTX W PT 60 MINUTES: CPT | Mod: HN

## 2022-08-29 ENCOUNTER — MEDICAL CORRESPONDENCE (OUTPATIENT)
Dept: HEALTH INFORMATION MANAGEMENT | Facility: CLINIC | Age: 37
End: 2022-08-29

## 2022-08-29 ENCOUNTER — TRANSFERRED RECORDS (OUTPATIENT)
Dept: HEALTH INFORMATION MANAGEMENT | Facility: CLINIC | Age: 37
End: 2022-08-29

## 2022-08-30 ENCOUNTER — TRANSCRIBE ORDERS (OUTPATIENT)
Dept: MATERNAL FETAL MEDICINE | Facility: CLINIC | Age: 37
End: 2022-08-30

## 2022-08-30 DIAGNOSIS — O26.90 PREGNANCY RELATED CONDITION, ANTEPARTUM: Primary | ICD-10-CM

## 2022-08-31 DIAGNOSIS — O09.90 HIGH-RISK PREGNANCY, UNSPECIFIED TRIMESTER: Primary | ICD-10-CM

## 2022-08-31 NOTE — PROGRESS NOTES
"WOMEN'S WELLBEING PROGRAM  OUTPATIENT PSYCHOTHERAPY PROGRESS NOTE    Client Name: Griselda Bell   YOB: 1985 (37 year old)   Date of Service:  Aug 26, 2022  Time of Service: 9:03 am to 9:58 am (55 minutes)  Service Type(s): 80167 psychotherapy (53-60 min. with patient and/or family)    Type of service: Telemedicine Psychotherapy   Reason for Telemedicine Visit: COVID-19 public health recommendations on in-person sessions  Mode of transmission: Secure real time interactive audio and visual telecommunication system (videoconference via [employer])  Location of originating and distant sites:    Originating site (patient location): patient home    Distant site (provider site): HIPAA compliant location within provider home/remote setting  Telemedicine Visit: The patient's condition can be safely assessed and treated via synchronous audio and visual telemedicine encounter.    Patient has agreed to receiving services via telemedicine technology.    Diagnoses:   Encounter Diagnosis   Name Primary?     Generalized anxiety disorder with panic and intrusive/scary images Yes       Individuals Present:   Patient and provider     Treatment goal(s) being addressed:   1. Increase understanding of \"cycle\" of anxiety and panic symptoms  2. Support and strengthen use of grounding and relaxation strategies to manage and reduce anxiety and panic symptoms and promote ability to initiate sleep  3. Utilize Exposure Response Prevention (ER/P) approach to interrupt use of avoidance (e.g., avoiding raising heart rate for example during exercise) or compulsive coping strategies (e.g., checking heart rate on Apple watch) to manage and reduce anxiety or panic  4. Process feelings of grief and loss, prioritizing sleep and self-care to support wellbeing during early pregnancy    Subjective:  Marisabel is approximately 8 weeks pregnant. She completed an initial dating ultrasound last week which indicated typically progressing, healthy " pregnancy. Marisabel shared an initial discussion with her OB, Dr. Gonzales of [facility], identified due date of 23 with likely planned  section at the end of March. She is experiencing increased first trimester symptoms, which have been reassuring. Reported maintained reduction in panic, with ongoing anxiety responding well to use of coping strategies. Continued elevated anxiety (worries, restlessness) in evening, but she is able to use strategies to initiate sleep easily. She has 2-3 nightmares a week related to miscarrying; she is using cognitive defusion strategies effectively to help reduce somatic anxiety symptoms upon wakeup, but it has been difficult to fall back asleep and she tends to get up for the day. Accupuncture continues to be helpful.     Treatment:   Individual psychotherapy utilizing cognitive behavioral therapy (CBT) framework with exposure response prevention (ER/P) and biopsychosocial  lens. 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 Continued to discuss and support use of coping strategies to maintain reduction in panic concerns, and manage and reduce anxiety symptoms (excessive worries, restlessness), promote relaxation and sleep, and support wellbeing. Ongoing focus on mindfulness strategies to observe thoughts and emotions without judgement, cope with uncertainty, and accept and let go of worry thoughts. Continued to leverage dialectical lens to make space for duality of relief and grief. Discussed upcoming psychiatric medication consultation with Women's Wellbeing Team. Continued to encourage use of sleep hygiene and relaxation strategies to help induce relaxation and initiate sleep at bedtime or following nightmares (e.g., progressive muscle relaxation). Provided reflective listening and validation, modeled nonjudgmental stance throughout.    Assessment  "and Progress:   Appearance:  Appropriately-dressed and groomed   Behavior/relationship to examiner/demeanor:  Pleasant, warm, open, reflective   Motor activity/EPS: Within normal limits  Speech rate:  Within normal limits  Speech volume:  Within normal limits  Speech articulation:  Within normal limits  Speech coherence:  Within normal limits  Speech spontaneity:  Within normal limit  Mood (subjective report): \"ok\", described decreased and ongoing anxiety   Affect (objective appearance): Mood-congruent overall  Thought Process (Associations):  Goal directed  Thought process (Rate):  Within normal limits  Thought content: None  Abnormal Perception: None  Insight:  Within normal limits  Judgment:  Within normal limit     Marisabel continues to make strong progress towards her treatment goals. She has been responding well to psychoeducation, reflective practice, exposure exercises, validation, and encouragement. Marisabel reports benefiting from initiating Zoloft (50 mg) and acupuncture. Currently, her anxiety is well managed with her proactive use of coping strategies, social support, and self-care to support her wellbeing. We will continue to leverage a dialectical lens to support Marisabel in holding space for complex thoughts and feelings of relief, grief, and worry as we continue treatment during the early stages of pregnancy following Marisabel's history of recurrent pregnancy loss.     Answers for HPI/ROS submitted by the patient on 8/26/2022  If you checked off any problems, how difficult have these problems made it for you to do your work, take care of things at home, or get along with other people?: Not difficult at all  PHQ9 TOTAL SCORE: 1    Plan:   Continue to prioritize sleep, engage in self-care, and leverage social support.     Continue to use coping strategies and planned/structured exposure exercises to manage and reduce symptoms of anxiety and panic.     Marisabel will complete a psychiatric consultation with the " Women's Wellbeing Team on 9/1.     Next therapy appointment has been scheduled for 9/2/22 at 9 am to continue work on treatment goals.      Treatment Plan review due: 11/4/22      Joann Moore, PhD  Postdoctoral Fellow     I did not see this patient directly, but have discussed the session with the above trainee and approve this documentation.      Hailey Young Psy.D.,                                                                                         Clinical Supervisor
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
## 2022-09-01 ENCOUNTER — VIRTUAL VISIT (OUTPATIENT)
Dept: PSYCHIATRY | Facility: CLINIC | Age: 37
End: 2022-09-01
Attending: PSYCHIATRY & NEUROLOGY
Payer: COMMERCIAL

## 2022-09-01 DIAGNOSIS — F43.22 ADJUSTMENT DISORDER WITH ANXIOUS MOOD: Primary | ICD-10-CM

## 2022-09-01 PROCEDURE — 90792 PSYCH DIAG EVAL W/MED SRVCS: CPT | Mod: GT | Performed by: STUDENT IN AN ORGANIZED HEALTH CARE EDUCATION/TRAINING PROGRAM

## 2022-09-01 NOTE — PROGRESS NOTES
Griselda DU Arabella is a 37 year old who has consented to receive services via billable video visit.      Pt will join video visit via: [employer]  If there are problems joining the visit, send backup video invite via: Send to preferred e-mail: [email or web address]      Originating Location (patient location): Patient's home  Distant Location (provider location): [facility]    Will anyone else be joining the video visit? No

## 2022-09-01 NOTE — PATIENT INSTRUCTIONS
Treatment Plan  -Continue Zoloft 50mg daily  -Continue therapy with Dr. Moore    **For crisis resources, please see the information at the end of this document**   Patient Education    Thank you for coming to the [facility].     Lab Testing:  If you had lab testing today and your results are reassuring or normal they will be mailed to you or sent through TrueInsider within 7 days. If the lab tests need quick action we will call you with the results. The phone number we will call with results is # [phone]. If this is not the best number please call our clinic and change the number.     Medication Refills:  If you need any refills please call your pharmacy and they will contact us. Our fax number for refills is [phone].   Three business days of notice are needed for general medication refill requests.   Five business days of notice are needed for controlled substance refill requests.   If you need to change to a different pharmacy, please contact the new pharmacy directly. The new pharmacy will help you get your medications transferred.     Contact Us:  Please call [phone] during business hours (8-5:00 M-F).   If you have medication related questions after clinic hours, or on the weekend, please call [phone].     Financial Assistance [phone]   Medical Records [phone]       MENTAL HEALTH CRISIS RESOURCES:  For a emergency help, please call 161 or go to the nearest Emergency Department.     Emergency Walk-In Options:   EmPATH Unit @ [facility] ([place]): [phone] - Specialized mental health emergency area designed to be calming  [facility] ([place]): [phone]  [facility] ([place]): [phone]  [facility]): [phone]    [facility] Crisis Information:   [place]: [phone]  [place]: [phone]  [place] ([place]) - Adult: [phone]     Child:  "[phone]  [place] - Adult: [phone]     Child: [phone]  [place]: [phone]  List of all [facility] resources:   https://[place].gov/dhs/people-we-serve/adults/health-care/mental-health/resources/crisis-contacts.jsp    National Crisis Information:   Crisis Text Line: Text  [place]  to 903198  Suicide & Crisis Lifeline: 988  National Suicide Prevention Lifeline: 8-298-017-TALK (1-956.434.1780)       For online chat options, visit https://suicidepreventionlifeline.org/chat/  Poison Control Center: 4-852-404-9405  Trans Lifeline: 1-967.883.9826 - Hotline for transgender people of all ages  The Tobias Project: 1-787.955.4034 - Hotline for LGBT youth     For Non-Emergency Support:   Fast Tracker: Mental Health & Substance Use Disorder Resources -   https://www.Certalia.org/        To find additional local resources, refer to Postpartum Support International (PSI). Available at: http://www.postpartum.net/get-help/locations/united-[facility]/  -[facility] Women's Mental Health at: www.womenentalhealth.org is a good resource for information about psychiatric medication in pregnancy/lactation  -Mother To Baby A service of the nonprofit [employer] of [employer] (MAULIK), provides evidence based information online and in printable handouts to provide patients and providers regarding medications and other exposures during pregnancy and lactation Available at: https://mothertobaby.org/fact-sheets-parent/.  -The 4th Trimester Project - Expert written resources and information for mothers and families. Available at: https://I Just Shared/  -Consider using \"The Pregnancy and Postpartum Anxiety Workbook,\" by Leonila Torres PhD and Roshan Telles PsyD.    Postpartum Planning Tools:  Maternal Wellbeing Plan from [facility]: https://www.health.state.[place]/people/womeninfants/pmad/pmadsfs.html    4th Trimester Postpartum plan: https://I Just Shared/mypostpartumplan    Tips for " partners:  http://www.postpartum.net/family/tips-for-postpartum-dads-and-partners/

## 2022-09-01 NOTE — Clinical Note
Yury Gonzales, I saw Marisabel today with my attending, Dr. Lopez and we talked about the risks/benefits of Zoloft.  We agree that Zoloft is compatible with the  period and to continue since it's been helpful for her. There's room to increase the dose, if needed to help with mood and anxiety. Marisabel stated that she would like you to take over prescribing her Zoloft from her PCP and will bring it up at her next appointment with you. Please reach out to us if you have any additional questions about patient.

## 2022-09-02 ENCOUNTER — VIRTUAL VISIT (OUTPATIENT)
Dept: PSYCHIATRY | Facility: CLINIC | Age: 37
End: 2022-09-02
Attending: PSYCHOLOGIST
Payer: COMMERCIAL

## 2022-09-02 DIAGNOSIS — F41.1 GENERALIZED ANXIETY DISORDER WITH PANIC ATTACKS: Primary | ICD-10-CM

## 2022-09-02 DIAGNOSIS — F41.0 GENERALIZED ANXIETY DISORDER WITH PANIC ATTACKS: Primary | ICD-10-CM

## 2022-09-02 PROCEDURE — 90837 PSYTX W PT 60 MINUTES: CPT | Mod: GT

## 2022-09-02 NOTE — PROGRESS NOTES
WOMEN'S WELLBEING PROGRAM  OUTPATIENT PSYCHOTHERAPY PROGRESS NOTE    Client Name: Griselda Bell   YOB: 1985 (37 year old)   Date of Service:  Sep 2, 2022  Time of Service: 9:02 am to 10:00 am (58 minutes)  Service Type(s): 14944 psychotherapy (53-60 min. with patient and/or family)    Type of service: Telemedicine Psychotherapy   Reason for Telemedicine Visit: COVID-19 public health recommendations on in-person sessions  Mode of transmission: Secure real time interactive audio and visual telecommunication system (videoconference via [employer]ptiveMobile)  Location of originating and distant sites:    Originating site (patient location): patient home    Distant site (provider site): HIPAA compliant location within provider home/remote setting  Telemedicine Visit: The patient's condition can be safely assessed and treated via synchronous audio and visual telemedicine encounter.    Patient has agreed to receiving services via telemedicine technology.    Diagnoses:   Encounter Diagnosis   Name Primary?     Generalized anxiety disorder with panic and intrusive/scary images Yes       Individuals Present:   Patient and provider     Treatment goal(s) being addressed:   1. Enhance use of grounding and relaxation strategies to maintain reduction in panic symptoms, manage and reduce anxiety, and promote ability to initiate sleep  3. Utilize Exposure Response Prevention (ER/P) approach to maintain ongoing progress interrupting use of avoidance (e.g., avoiding raising heart rate for example during exercise) or compulsive coping strategies (e.g., checking heart rate on Apple watch) to manage panic  4. Process feelings of grief and loss, prioritizing sleep and self-care to support wellbeing during early pregnancy    Subjective:  Marisabel reported making space for ongoing complex thoughts and feelings related to experiencing excitement for current pregnancy, worry about health of pregnancy, and moments of grief remembering  "previous pregnancy losses. She completed another ultrasound this week, with results continuing to support healthy progression of early pregnancy. Marisabel partnered with her OB team to continue appointments every two weeks, which felt \"so reassuring and comforting.\" This morning Marisabel completed bloodwork for upcoming genetic screening appointment. She endorsed increased worries about receiving results in context of traumatic memories of previous MFM appointment related to receiving erroneous results and medical termination of pregnancy. She did not experience any nightmares of miscarrying in the past week. Marisabel reported ongoing remission of panic symptoms, and notices she no longer worries about experiencing another panic attack. She feels exposure exercises in combination with Zoloft have been helpful in managing these symptoms/concerns. Marisabel noted her medication consultation this week with Ganesh Santiago and MD John, of [facility] Team, was helpful and supportive. Her OB, Dr. Gonzales, of [facility], will continue to manage this prescription.     Treatment:   Individual psychotherapy utilizing cognitive behavioral therapy (CBT) framework with exposure response prevention (ER/P) and biopsychosocial  lens. 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 Continued to discuss and support use of coping strategies to maintain partial remission of panic symptoms, manage anxiety, promote relaxation and sleep, and support wellbeing. Completed imaginal exposure work related to upcoming MFM appointment and receiving NIPT and genetic screening results. Supported Marisabel in brainstorming ways to advocate for/partner with care team and leverage social support to meet her needs. Ongoing focus on mindfulness and self-compassion strategies to observe thoughts and emotions without judgement, and accept " "and let go of worry thoughts. With Marisabel, explored and brainstormed grief practices to support her throughout early pregnancy and upcoming month of November which marks anniversary of two previous losses. Continued to leverage dialectical lens to make space for duality of relief and grief. Continued to encourage use of sleep hygiene and relaxation strategies to help induce relaxation and initiate sleep. Reflected and recognized Marisabel's proactive coping efforts, progress towards treatment goals, and resilience.     Assessment and Progress:   Appearance:  Appropriately-dressed and groomed   Behavior/relationship to examiner/demeanor:  Pleasant, warm, open, reflective   Motor activity/EPS: Within normal limits  Speech rate:  Within normal limits  Speech volume:  Within normal limits  Speech articulation:  Within normal limits  Speech coherence:  Within normal limits  Speech spontaneity:  Within normal limit  Mood (subjective report): \"ok\", described ongoing moments of excitement and anxiety  Affect (objective appearance): Mood-congruent overall  Thought Process (Associations):  Goal directed  Thought process (Rate):  Within normal limits  Thought content: None  Abnormal Perception: None  Insight:  Within normal limits  Judgment:  Within normal limit     Marisabel continues to make strong progress towards her treatment goals. She has had an excellent response to the combination of CBT with ER/P psychotherapy and medication (Zoloft 50 mg). Currently, she has achieved partial remission of panic symptoms, and her anxiety continues to be well-managed with her proactive use of coping strategies, social support, and self-care. Marisabel has also reported benefit from initiating acupuncture to support pregnancy and mental health goals (Kelvin Helm ND, [place], M [facility]). We will continue to leverage a dialectical lens to support Marisabel in holding space for complex thoughts and feelings of relief, grief, and " worry as Marisabel experiences the early stages of pregnancy following history of recurrent pregnancy loss.     Plan:   Continue to prioritize sleep, engage in self-care, and leverage social support.      Continue to use coping strategies and exposure exercises to maintain remission of panic symptoms and manage and reduce anxiety symptoms.    Continue to follow medication treatment plan (Zoloft, 50 mg) following consultation with Ganesh Santiago and MD John, of [facility] Team (see note dated 9/1).     Next therapy appointment has been scheduled for 9/8/22 at 8 am to continue work on treatment goals.      Treatment Plan review due: 11/4/22      Joann Moore, PhD  Postdoctoral Fellow     I did not see this patient directly, but have discussed the session with the above trainee and approve this documentation.      Hailey Young Psy.D.,                                                                                         Clinical Supervisor

## 2022-09-08 ENCOUNTER — VIRTUAL VISIT (OUTPATIENT)
Dept: PSYCHIATRY | Facility: CLINIC | Age: 37
End: 2022-09-08
Attending: PSYCHOLOGIST
Payer: COMMERCIAL

## 2022-09-08 DIAGNOSIS — F41.1 GENERALIZED ANXIETY DISORDER WITH PANIC ATTACKS: Primary | ICD-10-CM

## 2022-09-08 DIAGNOSIS — F41.0 GENERALIZED ANXIETY DISORDER WITH PANIC ATTACKS: Primary | ICD-10-CM

## 2022-09-08 PROCEDURE — 90834 PSYTX W PT 45 MINUTES: CPT | Mod: HN

## 2022-09-14 ENCOUNTER — TRANSFERRED RECORDS (OUTPATIENT)
Dept: HEALTH INFORMATION MANAGEMENT | Facility: CLINIC | Age: 37
End: 2022-09-14

## 2022-09-16 ENCOUNTER — VIRTUAL VISIT (OUTPATIENT)
Dept: PSYCHIATRY | Facility: CLINIC | Age: 37
End: 2022-09-16
Attending: PSYCHOLOGIST
Payer: COMMERCIAL

## 2022-09-16 DIAGNOSIS — F41.0 GENERALIZED ANXIETY DISORDER WITH PANIC ATTACKS: Primary | ICD-10-CM

## 2022-09-16 DIAGNOSIS — F41.1 GENERALIZED ANXIETY DISORDER WITH PANIC ATTACKS: Primary | ICD-10-CM

## 2022-09-16 PROCEDURE — 90837 PSYTX W PT 60 MINUTES: CPT | Mod: GT

## 2022-09-16 NOTE — PROGRESS NOTES
"WOMEN'S WELLBEING PROGRAM  OUTPATIENT PSYCHOTHERAPY PROGRESS NOTE    Client Name: Griselda Bell   YOB: 1985 (37 year old)   Date of Service:  Sep 16, 2022  Time of Service: 9:04 am to 9:59 am (55 minutes)  Service Type(s): 74722 psychotherapy (53-60 min. with patient and/or family)    Type of service: Telemedicine Psychotherapy   Reason for Telemedicine Visit: COVID-19 public health recommendations on in-person sessions  Mode of transmission: Secure real time interactive audio and visual telecommunication system (videoconference via Bankfeeinsider.com)  Location of originating and distant sites:    Originating site (patient location): patient home    Distant site (provider site): HIPAA compliant location within provider home/remote setting  Telemedicine Visit: The patient's condition can be safely assessed and treated via synchronous audio and visual telemedicine encounter.    Patient has agreed to receiving services via telemedicine technology.    Diagnoses:   Encounter Diagnosis   Name Primary?     Generalized anxiety disorder with panic and intrusive/scary images Yes       Individuals Present:   Patient and provider     Treatment goal(s) being addressed:   1. Enhance use of grounding and relaxation strategies to maintain reduction in panic symptoms, manage and reduce anxiety, and promote ability to initiate sleep  2. Utilize Exposure Response Prevention (ER/P) approach to maintain ongoing progress interrupting use of avoidance (e.g., avoiding raising heart rate for example during exercise) or compulsive coping strategies (e.g., checking heart rate on Apple watch) to manage panic  3. Process feelings of grief and loss, prioritizing sleep and self-care to support wellbeing during early pregnancy    Subjective:  Marisabel is currently approximately 11.5/12.5 weeks (dependent on possible adjustment to due date which is being considered/reviewed by OB team). She is feeling \"good\" and \"pretty even with it all\" after " "receiving genetic test results indicating \"all is healthy and normal\" and she is expecting a boy. She found herself checking results several times over the weekend for reassurance. Exercise, relaxation, and cognitive defusion strategies continue to be helpful in managing and reducing anxiety concerns. She has her first [facility] appointment with Dr. Hernandez on  to complete another level of testing/monitoring of fetal cardiac development.     Treatment:   Individual psychotherapy utilizing cognitive behavioral therapy (CBT) framework with exposure response prevention (ER/P) and biopsychosocial  lens. 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 Marisabel processed thoughts and feelings related to recent experience of receiving genetic testing. Reviewed and supported ongoing work to advocate for her needs and partner with care team to meet them. Continued to discuss and support use of cognitive, behavioral, and social strategies and ER/P approach to manage worries and physiological anxiety experienced in context of medical appointments. Reviewed psychoeducation related to role checking behaviors can play in facilitating anxiety and panic cycle, discussed function of checking and validated/made sense of why Marisabel is using this approach; explored possible alternative coping strategies. Partnered to develop and discuss metaphor to process use of dialectical lens to live in uncertainty and make space for moments of excitement, relief, fear, and grief. Reflected and recognized Marisabel's proactive coping efforts, progress towards treatment goals, and resilience. Provided psychoeducation, reflective listening, validation, positive reinforcement, and encouragement throughout.     Assessment and Progress:   Appearance:  Appropriately-dressed and groomed   Behavior/relationship to examiner/demeanor: " " Pleasant, warm, open, reflective   Motor activity/EPS: Within normal limits  Speech rate:  Within normal limits  Speech volume:  Within normal limits  Speech articulation:  Within normal limits  Speech coherence:  Within normal limits  Speech spontaneity:  Within normal limit  Mood (subjective report): \"good\", \"taking things day by day\", noted decreased anxiety after receiving genetic testing results   Affect (objective appearance): Mood-congruent, reflective   Thought Process (Associations):  Goal directed  Thought process (Rate):  Within normal limits  Thought content: None  Abnormal Perception: None  Insight:  Within normal limits  Judgment:  Within normal limit     Marisabel continues to make strong progress towards her treatment goals. Today she reported a decrease in anxiety in context of receiving genetic testing results. Overall, she has had an excellent response to the combination of CBT with ER/P psychotherapy and medication (Zoloft 50 mg). She has achieved partial remission of panic symptoms, and her anxiety is generally well-managed with her proactive use of coping strategies, social support, and self-care. We continue to use relaxation skills and an ER/P approach to manage physiological symptoms of anxiety experienced primarily in context of medical appointments. Marisabel also reports benefit from initiating acupuncture to support pregnancy and mental health goals (Kelvin Helm ND, [place], M [facility]). We will continue to leverage a dialectical lens to support Marisabel in holding space for complex thoughts and feelings of relief, grief, and worry as Marisabel experiences the early stages of pregnancy following history of recurrent pregnancy loss.    Plan:   Continue to prioritize sleep, engage in self-care, and leverage social support.      Consider Pregnancy After Loss Support Group provided by Postpartum Support International:     2nd and 4th Monday at 7PM ET/ 4PM PT   The Pregnancy After " Loss: Support for Parents support group provides connection for parents riding the emotional waves of pregnancy after a loss. Led by [facility]-trained facilitators with lived experience, this group helps parents find support, as well as provides useful information and resources to help them navigate pregnancy after loss. Graciela, fear, sadness and grieving are common during this time. In this group, you will meet others who can relate and support you during your new pregnancy. We are here to help.     [place] here: https://Xeros/users/sign_in?org=Postpartum%2520Support%2520International      Continue to use coping strategies and exposure exercises to maintain remission of panic symptoms and manage and reduce anxiety symptoms.     Continue to follow medication treatment plan (Zoloft, 50 mg) following consultation with Ganesh Santiago and MD John, of [facility] Team (see note dated 9/1).     Next therapy appointment has been scheduled for 9/23/22 to continue work on treatment goals.      Treatment Plan review due: 11/4/22      Joann Moore, PhD  Postdoctoral Fellow      I did not see this patient directly, but have discussed the session with the above trainee and approve this documentation.      Hailey Young Psy.D., LP                                                                                        Clinical Supervisor

## 2022-09-18 ENCOUNTER — HEALTH MAINTENANCE LETTER (OUTPATIENT)
Age: 37
End: 2022-09-18

## 2022-09-20 NOTE — PROGRESS NOTES
WOMEN'S WELLBEING PROGRAM  OUTPATIENT PSYCHOTHERAPY PROGRESS NOTE    Client Name: Griselda Bell   YOB: 1985 (37 year old)   Date of Service:  Sep 8, 2022  Time of Service: 8:15 am to 9:01 am (46 minutes)  Service Type(s): 05451 psychotherapy (38-52 min. with patient and/or family)    Type of service: Telemedicine Psychotherapy   Reason for Telemedicine Visit: COVID-19 public health recommendations on in-person sessions  Mode of transmission: Secure real time interactive audio and visual telecommunication system (videoconference via [employer])  Location of originating and distant sites:    Originating site (patient location): patient home    Distant site (provider site): HIPAA compliant location within provider home/remote setting  Telemedicine Visit: The patient's condition can be safely assessed and treated via synchronous audio and visual telemedicine encounter.    Patient has agreed to receiving services via telemedicine technology.    Diagnoses:   Encounter Diagnosis   Name Primary?     Generalized anxiety disorder with panic and intrusive/scary images Yes     Individuals Present:   Patient and provider     Treatment goal(s) being addressed:   1. Enhance use of grounding and relaxation strategies to maintain reduction in panic symptoms, manage and reduce anxiety, and promote ability to initiate sleep  3. Utilize Exposure Response Prevention (ER/P) approach to maintain ongoing progress interrupting use of avoidance (e.g., avoiding raising heart rate for example during exercise) or compulsive coping strategies (e.g., checking heart rate on Apple watch) to manage panic  4. Process feelings of grief and loss, prioritizing sleep and self-care to support wellbeing during early pregnancy    Subjective:  Marisabel endorsed increased anxiety that onset this morning in context of awaiting genetic testing results.     Treatment:   Individual psychotherapy utilizing cognitive behavioral therapy (CBT)  "framework with exposure response prevention (ER/P) and biopsychosocial  lens. 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 Continued to discuss and support use of cognitive, behavioral, and social strategies to manage recent increase in anxiety while awaiting genetic testing results. Explored needs, brainstormed cope ahead plan, and completed imaginal exposure work related toreceiving results. Continued to review psychoeducation on cognitive-action fusion and use of cognitive restructuring as well as cognitive defusion strategies. Ongoing use of dialectical lens to make space for moments of excitement, relief, fear, and grief. Continued to encourage use of coping strategies to support wellbeing and maintenance of partial remission of panic symptoms. Review of helpful and healthy self-talk. Reflected and recognized Marisabel's proactive coping efforts, progress towards treatment goals, and resilience. Provided psychoeducation, reflective listening, validation, positive reinforcement, and encouragement throughout.     Assessment and Progress:   Appearance:  Appropriately-dressed and groomed   Behavior/relationship to examiner/demeanor:  Pleasant, warm, open, reflective   Motor activity/EPS: Within normal limits  Speech rate:  Within normal limits  Speech volume:  Within normal limits  Speech articulation:  Within normal limits  Speech coherence:  Within normal limits  Speech spontaneity:  Within normal limit  Mood (subjective report): \"good\", \"taking things day by day\", noted increased anxiety in context of awaiting genetic testing results   Affect (objective appearance): Mood-congruent, reflective   Thought Process (Associations):  Goal directed  Thought process (Rate):  Within normal limits  Thought content: None  Abnormal Perception: None  Insight:  Within normal limits  Judgment: "  Within normal limit     Marisabel continues to make strong progress towards her treatment goals. Today she reported increased anxiety in context of awaiting genetic testing results. Overall, she has had an excellent response to the combination of CBT with ER/P psychotherapy and medication (Zoloft 50 mg). She has achieved partial remission of panic symptoms, and her anxiety is generally well-managed with her proactive use of coping strategies, social support, and self-care. Marisabel also reports benefit from initiating acupuncture to support pregnancy and mental health goals (Kelvin Helm ND, Rosaline, [facility]). We will continue to leverage a dialectical lens to support Marisabel in holding space for complex thoughts and feelings of relief, grief, and worry as Marisabel experiences the early stages of pregnancy following history of recurrent pregnancy loss.     Plan:   Continue to prioritize sleep, engage in self-care, and leverage social support.     Marisabel is encouraged to leverage Pregnancy After Loss Support Group provided by [employer]:     2nd and 4th Monday at 7PM ET/ 4PM PT   The Pregnancy After Loss: Support for Parents support group provides connection for parents riding the emotional waves of pregnancy after a loss. Led by P[employer]-trained facilitators with lived experience, this group helps parents find support, as well as provides useful information and resources to help them navigate pregnancy after loss. Graciela, fear, sadness and grieving are common during this time. In this group, you will meet others who can relate and support you during your new pregnancy. We are here to help.     Desha here: https://[email or web address]/sign_in?org=[employer]%2520[employer]%2520[employer]     Continue to use coping strategies and exposure exercises to maintain remission of panic symptoms and manage and reduce anxiety symptoms.     Continue to follow medication treatment plan (Zoloft, 50 mg)  following consultation with Ganehs Santiago and MD John, of Women's Wellbeing Team (see note dated 9/1).     Next therapy appointment has been scheduled for 9/16/22 to continue work on treatment goals.      Treatment Plan review due: 11/4/22      Joann Moore, PhD  Postdoctoral Fellow     I did not see this patient directly, but have discussed the session with the above trainee and approve this documentation.      Hailey Young Psy.D.,                                                                                         Clinical Supervisor

## 2022-09-21 ENCOUNTER — PRE VISIT (OUTPATIENT)
Dept: MATERNAL FETAL MEDICINE | Facility: CLINIC | Age: 37
End: 2022-09-21

## 2022-09-23 ENCOUNTER — VIRTUAL VISIT (OUTPATIENT)
Dept: PSYCHIATRY | Facility: CLINIC | Age: 37
End: 2022-09-23
Attending: PSYCHOLOGIST
Payer: COMMERCIAL

## 2022-09-23 DIAGNOSIS — F41.0 PANIC ATTACK: ICD-10-CM

## 2022-09-23 DIAGNOSIS — F41.0 GENERALIZED ANXIETY DISORDER WITH PANIC ATTACKS: Primary | ICD-10-CM

## 2022-09-23 DIAGNOSIS — F41.1 GENERALIZED ANXIETY DISORDER WITH PANIC ATTACKS: Primary | ICD-10-CM

## 2022-09-23 PROCEDURE — 90837 PSYTX W PT 60 MINUTES: CPT | Mod: GT

## 2022-09-23 NOTE — PROGRESS NOTES
WOMEN'S WELLBEING PROGRAM  OUTPATIENT PSYCHOTHERAPY PROGRESS NOTE    Client Name: Griselda Bell   YOB: 1985 (37 year old)   Date of Service:  Sep 23, 2022  Time of Service: 8:03 am to 8:58 am (55 minutes)  Service Type(s): 77675 psychotherapy (53-60 min. with patient and/or family)    Type of service: Telemedicine Psychotherapy   Reason for Telemedicine Visit: COVID-19 public health recommendations on in-person sessions  Mode of transmission: Secure real time interactive audio and visual telecommunication system (videoconference via [employer])  Location of originating and distant sites:    Originating site (patient location): patient home    Distant site (provider site): [facility], [facility]   Telemedicine Visit: The patient's condition can be safely assessed and treated via synchronous audio and visual telemedicine encounter.    Patient has agreed to receiving services via telemedicine technology.    Diagnoses:   Encounter Diagnoses   Name Primary?     Generalized anxiety disorder  Yes     Panic attack - partial remission        Individuals Present:   Patient and provider     Treatment goal(s) being addressed:   1. Enhance use of grounding and relaxation strategies to maintain reduction in panic symptoms, manage and reduce anxiety, and promote ability to initiate sleep  2. Utilize Exposure Response Prevention (ER/P) approach to maintain ongoing progress interrupting use of avoidance (e.g., avoiding raising heart rate for example during exercise) or compulsive coping strategies (e.g., checking heart rate on Apple watch) to manage panic  3. Process feelings of grief and loss, prioritizing sleep and self-care to support wellbeing during early pregnancy    Subjective:  Marisabel is currently 12.5/13.5 weeks pregnant (her due date is currently under revision by OB team). She is starting to feel better physically and looking forward to ongoing  "lessening of fatigue. Marisabel will meet with [facility] team next week for a \"mini-Level II\" ultrasound to assess fetal development, particularly cardiac development, and monitoring will continue through ~24 weeks gestation. She is feeling nervous about the appointment. Marisabel reported moments of increased anxiety in context of sharing news of pregnancy, noting feelings of physiological anxiety are connected to the thought \"I just jinxed [the pregnancy] by sharing the  news.\" She is feeling especially anxious about sharing the news with Yady, in case of a possible loss.     Treatment:   Individual psychotherapy utilizing cognitive behavioral therapy (CBT) framework with exposure response prevention (ER/P) and biopsychosocial  lens. 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 Provided psychoeducation and discussed and supported use of cognitive defusion strategies to manage unhelpful, inaccurate worries about jinxing herself or pregnancy. Reviewed psychoeducation on grief, explored healthy grief rituals, and brainstormed cope ahead plan to support Marisabel as she approaches period of previous due dates. Began exploring and discussing developmentally-informed ways to consider conversation about pregnancy news with 3-year-old daughter. Supported Marisabel in exploring/clarifying her goals for conversation, provided psychoeducation and shared developmentally-informed resources to talk about grief and connection in time of grief. Continued to model and encourage curiosity and nonjudgmental stance towards all thoughts/feelings. Reflected and recognized Marisabel's proactive coping efforts, progress towards treatment goals, and resilience. Provided psychoeducation, reflective listening, validation, positive reinforcement, and encouragement throughout.    Assessment and Progress:   Appearance: " " Appropriately-dressed and groomed   Behavior/relationship to examiner/demeanor:  Pleasant, warm, open, reflective   Motor activity/EPS: Within normal limits  Speech rate:  Within normal limits  Speech volume:  Within normal limits  Speech articulation:  Within normal limits  Speech coherence:  Within normal limits  Speech spontaneity:  Within normal limit  Mood (subjective report): \"ok, continuing to take things day by day\"   Affect (objective appearance): Mood-congruent, reflective   Thought Process (Associations):  Goal directed  Thought process (Rate):  Within normal limits  Thought content: None  Abnormal Perception: None  Insight:  Within normal limits  Judgment:  Within normal limit     Marisabel continues to make strong progress towards her treatment goals to manage and reduce anxiety, process feelings of grief and loss, and support her wellbeing in the context of her current early pregnancy following multiple pregnancy losses. Overall, Marisabel has had an excellent response to the combination of CBT with ER/P psychotherapy and medication (Zoloft 50 mg). She has achieved partial remission of panic symptoms, and her anxiety is generally well-managed with her proactive use of coping strategies, social support, and self-care. We continue to use relaxation skills and an ER/P approach to manage physiological symptoms of anxiety currently experienced primarily in context of medical appointments and sharing news about the pregnancy. Marisabel also reports benefit from initiating acupuncture to support pregnancy and mental health goals (Kelvin Helm ND, Rosaline, [facility]). We will continue to leverage a dialectical lens to support Marisabel in holding space for complex thoughts and feelings of relief, grief, and worry as Marisabel experiences the early stages of pregnancy following history of recurrent pregnancy loss.    Plan:   Continue to prioritize sleep, engage in self-care, and leverage social " support.      Consider Pregnancy After Loss Support Group provided by [employer]:     2nd and 4th Monday at 7PM ET/ 4PM PT   The Pregnancy After Loss: Support for Parents support group provides connection for parents riding the emotional waves of pregnancy after a loss. Led by P[employer]-trained facilitators with lived experience, this group helps parents find support, as well as provides useful information and resources to help them navigate pregnancy after loss. Graciela, fear, sadness and grieving are common during this time. In this group, you will meet others who can relate and support you during your new pregnancy. We are here to help.     [place] here: https://[employer]/users/sign_in?org=[employer]%2520[employer]%2520[employer]      Continue to use coping strategies and exposure exercises to maintain remission of panic symptoms and manage and reduce anxiety symptoms.    Consider reading books, The Invisible String, and Hello Friend, Goodbye Friend, together with Yady, to begin general conversation on our unbreakable connection, bond, and belonging to each other, even in context of goodbyes, separations, or absences.     Continue to follow medication treatment plan (Zoloft, 50 mg) following consultation with Ganesh Santiago and MD John, of Women's Wellbeing Team (see note dated 9/1).     Next therapy appointment has been scheduled for 9/30/22 at 8 am to continue work on treatment goals.      Treatment Plan review due: 11/4/22      Joann Moore, PhD  Postdoctoral Fellow        I did not see this patient directly, but have discussed the session with the above trainee and approve this documentation.      Hailey Young Psy.D.,                                                                                         Clinical Supervisor

## 2022-09-26 NOTE — PROGRESS NOTES
Maternal-Fetal Medicine Consultation    Griselda Bell  : 1985  MRN: [id number]    REFERRAL:  Griselad Bell is a 37 year old sent by Dr. Gonzales from [facility] for MFM consultation.    HPI:  Griselda Bell is a 37 year old  at 12w5d by LMP consistent with 7w2d US here for MFM consultation regarding positive antibody screen with NOB labs (SS-A, LIZ), history of pregnancy with T13 (underwent D&E in ), history of second trimester miscarriage (16w6d, managed with D&E in , pathology did not reveal etiology), and AMA. Also has a history of one first trimester pregnancy loss in  (no path/genetic testing; overall not meeting formal criteria for recurrent pregnancy loss).    She is here with her .    The SS-A antibody appears to have first been noted in  during workup for pregnancy loss, when the patient saw Rheumatology. Although she does not have any clinical symptoms associated with autoimmune disease in the setting of this positive antibody screen, she was started on Plaquenil for pregnancy given the risk fetal heart block.      Prenatal Care:  Primary OB care this pregnancy has been with  Dr. Gonzales from [facility].    Obstetrics History:  OB History    Para Term  AB Living   5 1 1 0 3 1   SAB IAB Ectopic Multiple Live Births   2 1 0 0 1      # Outcome Date GA Lbr Kailash/2nd Weight Sex Delivery Anes PTL Lv   5 Current            4 SAB 2022 6w0d    SAB      3 SAB  17w0d    SAB      2 IAB 2020     IAB         Birth Comments: T13   1 Term 19 41w1d 08:05 / 02:05 3.57 kg (7 lb 13.9 oz) F Vag-Spont EPI N YANNA      Name: KAMAR,BABY GIRL      Apgar1: 9  Apgar5: 9      Obstetric Comments   4th degree tear       Gynecologic History:  - Last Pap: reportedly normal at [facility] in   - History of LEEP in       Past Medical History:  Past Medical History:   Diagnosis Date     Migraine      Miscarriage      Obese        Past Surgical History:  Past Surgical  "History:   Procedure Laterality Date     ARTHROSCOPY SHOULDER RT/LT      labral repair     BIOPSY CERVICAL, LOCAL EXCISION, SINGLE/MULTIPLE       DILATION AND EVACUATION N/A 2020    Procedure: DILATION AND EVACUATION, UTERUS-;  Surgeon: Travis Hernandez MD;  Location: [facility] OR     DILATION AND EVACUATION N/A 2021    Procedure: pelvic exam under anesthesia, DILATION AND EVACUATION, UTERUS;  Surgeon: Johnna Felix MD;  Location: [facility] OR     EXAM UNDER ANESTHESIA RECTUM N/A 2019    Procedure: EXAM UNDER ANESTHESIA;  Surgeon: Diana Steward MD;  Location: [facility] OR     LAPAROSCOPIC APPENDECTOMY N/A 2021    Procedure: APPENDECTOMY, LAPAROSCOPIC;  Surgeon: Catalina Coronado MD;  Location: [facility] OR     LEEP TX, CERVICAL  ?       Current Medications:  Prior to Admission medications    Medication Sig Last Dose Taking? Auth Provider Long Term End Date   hydroxychloroquine (PLAQUENIL) 200 MG tablet TAKE 1 TABLET BY MOUTH TWICE A DAY   Reported, Patient     hydrOXYzine (ATARAX) 25 MG tablet Take 1 tablet (25 mg) by mouth nightly as needed for anxiety   Renetta Long MD     progesterone (PROMETRIUM) 200 MG capsule PLACE 1 CAPSULE IN THE VAGINA TWICE DAILY AS DIRECTED   Reported, Patient     sertraline (ZOLOFT) 50 MG tablet Take 1 tablet (50 mg) by mouth daily   Renetta Long MD Yes    Vitamin D, Cholecalciferol, 25 MCG (1000 UT) TABS Take 1,000 Units by mouth daily   Unknown, Entered By History     WESTAB MAX 2.5-25-2 MG TABS per tablet Take 1 tablet by mouth daily   Reported, Patient         Allergies:  Shellfish-derived products    ROS:  10-point ROS negative except as in HPI     Social History:  Works at the [employer].    PHYSICAL EXAM:  Deferred     Other Imaging:   Please see today's results under \"Imaging\" tab.    ASSESSMENT/PLAN:  Griselda Bell is a 37 year old  at 12w5d by LMP consistent with 7w2d US here for [facility] consultation regarding " positive antibody screen with NOB labs (SS-A, LIZ), history of pregnancy with T13 (underwent D&E in ), history of second trimester miscarriage (16w6d, managed with D&E in , pathology did not reveal etiology), and AMA.    # Positive Antibody Screen (SS-A, LIZ)  This patient does not appear to have any associated autoimmune/rheumatologic disease, and the SS-A and LIZ antibodies are not associated with recurrent pregnancy loss. Most importantly, SS-A antibody can be associated with fetal/ heart block, so increased surveillance for congenital heart block is recommended, as detailed below. The incidence of heart block is 2% and occurs at < 26 weeks.    Recommendations:    In the the setting of maternal SS-A antibody, increased surveillance for fetal heart block:    Detailed anatomy scan and ND interval at 16 weeks + TV US for cervical length (history of LEEP)    Fetal echo at 18 weeks    Detailed anatomy scan (MFM comp / level 2 US) at 20 weeks.    Fetal echo at 22 weeks    ND interval at 24 week    Fetal echo at 26 weeks and ND interval at 28 weeks    Continue Plaquenil in pregnancy.    Obtain  EKG after delivery.    Otherwise, routine prenatal care (repeat antibody screen with third trimester labs)    # History of Trisomy 13 in a prior pregnancy  We discussed today that recurrence risk for this genetic condition in future pregnancies is about 1%    Recommendations:  - The patient had a low risk NIPT and NT    # History of second trimester pregnancy loss  The most common causes of fetal loss in developed countries are congenital or karyotypic anomalies, autoimmune disease (APAS) and maternal medical diseases such as infection, diabetes mellitus, or hypertensive disorders.     Recommendations:    Evaluation for possible antiphospholipid antibody syndrome with anticardiolipin antibody (IgG and IgM) titer, lupus anticoagulant, and beta-2 microglobulin (IgG and IgM) - reportedly normal in  per  Dr. Hernandez's initial MFM consult note dated 10/19/2021 through [facility]    Early ultrasound to establish KARAN (competed for this patient)    Low dose aspirin started at 12-16 weeks if there are risk factors for preeclampsia - already taking    Nuchal translucency ultrasound at 11-13 weeks with aneuploidy screening, if desired - completed today, normal    Comprehensive anatomy ultrasound at 18-20 weeks with MFM    # Advanced Maternal Age  The risk of fetal aneuploidy increases with age. Her age specific risk is 1/138 for Trisomy 21 and 1/104 for any chromosomal abnormality.  We reviewed these risks at length and we discussed the options available for risk assessment for aneuploidy during pregnancy.  We also discussed how those strategies fit in with the available diagnostic tests (like CVS and amniocentesis).    Patients of advanced age are also at increased risks of pregnancy complications, including miscarriage, preeclampsia, abnormalities with placentation, stillbirth, and  delivery.  These risks increase with increasing maternal age and comorbidities.     The patient received genetic counseling about diagnostic and screening options, including:    Fetal cell free DNA from maternal serum. (completed, low risk)    First trimester risk-assessment with nuchal translucency and serum markers (between 11 and 14 weeks). (completed, normal)    Chorionic villus sampling.    Amniocentesis.    Recommendations:    Targeted anatomical ultrasound at 18-20 weeks.        Consider induction of labor at 39-40 weeks, but no later than 41 weeks.    If chronic medication is not being used and serial growth is not implemented for other comorbidities, then single growth assessment in the third trimester.    Consider low-dose aspirin (usually 81 mg daily) started in the early second trimester for preeclampsia prevention if additional risk-factors exist.        # History of LEEP  Prior to all pregnancies, in which there was  no concern for cervcial insufficiency.    Recommendations:    Obtain transvaginal cervical length at the time of detailed ultrasound assessment (plan for 16 weeks).      The patient was seen and evaluated with Dr. Jacobs.    Thank you for allowing us to participate in the care of your patient. Please do not hesitate to contact us if you have further questions regarding the management of your patient.     Callie Galloway MD, PGY-2  9:47 AM  [facility]etric[facility]ne[facility] Residency    Physician Attestation   I, Trace Jacobs MD, saw this patient and agree with the findings and plan of care as documented in the note.      Items personally reviewed/procedural attestation: History and plan of care/management. Total time spent in face to  Face counseling was 15 minutes (>50% for medical management discussion and plan of care). A copy of this consult was sent to your office.    Trace Jacobs MD

## 2022-09-27 ENCOUNTER — OFFICE VISIT (OUTPATIENT)
Dept: MATERNAL FETAL MEDICINE | Facility: CLINIC | Age: 37
End: 2022-09-27
Attending: OBSTETRICS & GYNECOLOGY
Payer: COMMERCIAL

## 2022-09-27 ENCOUNTER — MYC MEDICAL ADVICE (OUTPATIENT)
Dept: FAMILY MEDICINE | Facility: CLINIC | Age: 37
End: 2022-09-27

## 2022-09-27 ENCOUNTER — HOSPITAL ENCOUNTER (OUTPATIENT)
Dept: ULTRASOUND IMAGING | Facility: CLINIC | Age: 37
Discharge: HOME | End: 2022-09-27
Attending: OBSTETRICS & GYNECOLOGY
Payer: COMMERCIAL

## 2022-09-27 DIAGNOSIS — O09.90 HIGH-RISK PREGNANCY, UNSPECIFIED TRIMESTER: Primary | ICD-10-CM

## 2022-09-27 DIAGNOSIS — R76.8 SS-A ANTIBODY POSITIVE: ICD-10-CM

## 2022-09-27 DIAGNOSIS — O09.521 MULTIGRAVIDA OF ADVANCED MATERNAL AGE IN FIRST TRIMESTER: Primary | ICD-10-CM

## 2022-09-27 DIAGNOSIS — O09.291 PREVIOUS PREGNANCY COMPLICATED BY CHROMOSOMAL ABNORMALITY IN FIRST TRIMESTER, ANTEPARTUM: ICD-10-CM

## 2022-09-27 DIAGNOSIS — O09.90 HIGH-RISK PREGNANCY, UNSPECIFIED TRIMESTER: ICD-10-CM

## 2022-09-27 PROCEDURE — 76813 OB US NUCHAL MEAS 1 GEST: CPT | Mod: 26 | Performed by: OBSTETRICS & GYNECOLOGY

## 2022-09-27 PROCEDURE — 76813 OB US NUCHAL MEAS 1 GEST: CPT

## 2022-09-27 PROCEDURE — 96040 HC GENETIC COUNSELING, EACH 30 MINUTES: CPT | Performed by: GENETIC COUNSELOR, MS

## 2022-09-27 PROCEDURE — 99241 PR OFFICE CONSULTATION,LEVEL I: CPT | Mod: 25 | Performed by: OBSTETRICS & GYNECOLOGY

## 2022-09-27 NOTE — PROGRESS NOTES
"[facility] Fetal Medicine [place]  Genetic Counseling Consult    Patient: Griselda Bell YOB: 1985   Date of Service: 22      Griselda \"Shelli Bell was seen at [facility] for genetic consultation to discuss the options for screening and testing for fetal chromosome abnormalities.  The indication for genetic counseling is advanced maternal age, history of a pregnancy with trisomy 13, and history of unexplained IUFD. Marisabel was accompanied to today's visit by her partner, Zurdo.        Impression/Plan:   1.  Marisabel has already had a low-risk non-invasive prenatal screen during her pregnancy ([id number] through [facility]). This significantly reduces the possibility of trisomies 13, 18, 21, and the sex chromosomes conditions. The predicted fetal sex is male. We discussed the benefits and limitations of reflexing to genome-wide NIPT as previously discussed with ERNESTO Piña. Ultimately, this option was declined.    2.  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 Zurdo has not undergone carrier screening. We reviewed the carrier frequency of this condition, clinical symptoms of the condition, and current reproductive risk. The couple declined proceeding with carrier screening for Zurdo at this time.     3.  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 She has already undergone preconception counseling with Dr. Hernandez and follows with Rheumatology. She takes plaquenil and ASA. Marisabel also met with the [facility] physician team to further discuss medical management during this pregnancy.     4.  Maternal serum AFP (single marker screen) is recommended after 15 weeks to screen for open neural tube defects. A quad screen should not be performed.    5.  An 18-20 week comprehensive ultrasound is standard of care for all women 35 or older at delivery.    Pregnancy History:   /Parity:    Age at " Delivery: 38 year old  KARAN: 4/5/2023, by Last Menstrual Period  Gestational Age: 12w6d    No significant complications or exposures were reported in the current pregnancy.    Marisabel s pregnancy history is significant for:  o 2019 full-term delivery, female, alive and well  o 2020 IAB for pregnancy complicated by trisomy 13.   o 2021 loss at ~17 weeks. Low-risk NIPT during the pregnancy and normal autopsy. Karyotype/microarray not performed on fetal tissue.  o 2022 SAB at ~6 weeks, no testing    Of note, Marisabel has undergone a chromosome analysis through her OB provider that was normal (46,XX) (results available for review today). She reports that Zurdo also underwent a karyotype that was normal (results not available for review).    Medical History:   Marisabel has a relatively new history of SSA antibodies and a complex OB history as described above. Please see [facility] physician notes for further discussion.        Family History:   A three-generation pedigree was obtained at [facility] by ERNESTO Piña. This was obtained, updated, and is scanned under the  Media  tab.    The reported family history is negative for multiple miscarriages, stillbirths, birth defects, intellectual disability, known genetic conditions, and consanguinity.       Carrier Screening:     Expanded carrier screening for mutations in a large panel of genes associated with autosomal recessive conditions including cystic fibrosis, thalassemias, hearing loss, spinal muscular atrophy, and others, is now available. We also reviewed that expanded carrier screening can assess for common X-linked recessive disorders such as Fragile X syndrome.       We discussed that expanded carrier screening is designed to identify carrier status for conditions that are primarily childhood or adolescent onset. Expanded carrier screening does not evaluate for adult-onset conditions such as hereditary cancer syndromes, dementia/ Alzheimer's disease, or cardiovascular  disease risk factors. Additionally, expanded carrier screening is not comprehensive for all known genetic diseases or inherited conditions.       Marisabel underwent carrier screening (iMedX - 176 conditions) with her OB provider in [place] during her 2019 pregnancy. Marisabel screened positive for one condition: [id number]-kvptsow conditions ([id number]). Variants in this gene are associated with several different conditions that can have overlapping symptoms and different inheritance patterns. These conditions include autosomal recessive Usher syndrome type IB (USH1B), autosomal recessive nonsyndromic deafness (DFNB2), and autosomal recessive nonsyndromic retinitis pigmentosa (RP), as well as autosomal dominant nonsyndromic deafness (DFNA11). Individuals with USH1B are usually born with severe deafness and may have progressive vision loss called retinitis pigmentosa. However, severity of symptoms can vary between individuals in the same family. Individuals with nonsyndromic deafness are typically born with mild to profound deafness that does not progress over time. Individuals with RP typically have vision loss in low light that progresses over time. Most individuals with RP are legally blind by adulthood. This variant is usually associated with Usher syndrome. The carrier frequency for this condition in the general population is ~1 in 200. Thus, the current reproductive risk is ~1 in 800. If Zurdo were identified to be a carrier of this condition, the reproductive risk would be 1 in 4 (25%). The couple declined further carrier screening.     Risk Assessment for Chromosome Conditions:   We explained that the risk for fetal chromosome abnormalities increases with maternal age. We discussed specific features of common chromosome abnormalities, including Down syndrome, trisomy 13, trisomy 18, and sex chromosome conditions.      - At age 38 at midtrimester, the risk to have a baby with Down syndrome is 1 in 129.     -  At age 38 at midtrimester, the risk to have a baby with any chromosome abnormality is 1 in 65.     Marisabel's 2020 pregnancy was complicated by trisomy 13 due to nondisjunction. We reviewed that this is the sporadic type of trisomy 13. However, there is evidence to suggest that the possibility of a subsequent pregnancy with trisomy 13 is higher than the general population. Since Marisabel was over the age of 35 during this pregnancy, Prince Santoyo el al. (2009) suggests a 2.2 likelihood ratio. The pre-screening risk assessment for trisomy 13 in the current pregnancy is 1 in 550. However, Marisabel has already had maternal serum screening during the current pregnancy, signficicantly reducing the possibility of common aneuploidies:    Non-invasive Prenatal Testing (NIPT)    Maternal plasma cell-free DNA testing    Screens for fetal trisomy 21, trisomy 13, trisomy 18, and sex chromosome aneuploidy    First trimester ultrasound with nuchal translucency and nasal bone assessment was within normal limits.    Marisabel had a [id number] test earlier in pregnancy; we reviewed the results today, which are normal for chromosome 13, chromosome 18 and chromosome 21 (no aneuploidy detected)    Given the accuracy of this test, these results greatly decrease the chance for certain fetal chromosome abnormalities    We discussed the limitations of normal NIPT results       Testing Options:   We discussed the following options:  NT Ultrasound     Assessment for the thickness of the nuchal translucency and the fetus' nasal bone performed between 80m5g-69r5e gestation    ~70% aneuploidy detection     Non-invasive Prenatal Testing (NIPT)    Maternal plasma cell-free DNA testing; first trimester ultrasound with nuchal translucency and nasal bone assessment is recommended, when appropriate    Screens for fetal trisomy 21, trisomy 13, trisomy 18, and sex chromosome aneuploidy    Cannot screen for open neural tube defects; maternal serum AFP after 15 weeks is  recommended     Chorionic villus sampling (CVS)    Invasive procedure typically performed in the first trimester by which placental villi are obtained for the purpose of chromosome analysis and/or other prenatal genetic analysis    Diagnostic results; >99% sensitivity for fetal chromosome abnormalities    Cannot test for open neural tube defects; maternal serum AFP after 15 weeks is recommended    2/3 complete ultrasound: early anatomy scan performed around 15 weeks of gestation. This can help identify findings such as soft markers and may provide additional risk assessment.     Genetic Amniocentesis    Invasive procedure typically performed in the second trimester by which amniotic fluid is obtained for the purpose of chromosome analysis and/or other prenatal genetic analysis    Diagnostic results; >99% sensitivity for fetal chromosome abnormalities    AFAFP measurement tests for open neural tube defects     Comprehensive (Level II) ultrasound: Detailed ultrasound performed between 18-22 weeks gestation to screen for major birth defects and markers for aneuploidy.     Fetal echocardiogram: A detailed cardiac ultrasound performed between 20 and 24 weeks to screen for congenital heart defects. Fetal echocardiogram cannot definitively diagnose or exclude the presence of all congenital heart defects, but is more detailed than a level II ultrasound alone. In some cases, the fetal echo will be read by the pediatric cardiology team.      We reviewed the benefits and limitations of this testing.  Screening tests provide a risk assessment specific to the pregnancy for certain fetal chromosome abnormalities, but cannot definitively diagnose or exclude a fetal chromosome abnormality.  Follow-up genetic counseling and consideration of diagnostic testing is recommended with any abnormal screening result.     Diagnostic tests carry inherent risks- including risk of miscarriage- that require careful consideration.  These tests  can detect fetal chromosome abnormalities with greater than 99% certainty.  Results can be compromised by maternal cell contamination or mosaicism, and are limited by the resolution of cytogenetic G-banding technology.  There is no screening nor diagnostic test that can detect all forms of birth defects or mental disability.     It was a pleasure to be involved with Griselda vallejo care. Face-to-face time of the meeting was 40 minutes.    Valentine Baumann MS, [facility]  Licensed Genetic Counselor  [facility]  Maternal Fetal Medicine  [email or web address]  [phone]

## 2022-09-27 NOTE — PROGRESS NOTES
Pt presents to [facility] for assessment and evaluation of her pregnancy due to hx of T 13 and IUFD and +SSA. Met with GC, see separate note for today's findings. Pt had us done and reviewed by Dr. Jacobs. See epic for today's findings. Pt met with Dr. Galloway and Dr. Jacobs for consult. See note for today's consult discussion and plan of care. Pt will return for subsequent ultrasounds starting at 16 weeks for 2/3 with TV and OK interval. First echo at 18 weeks. L2 at 20 weeks with OK. 22 weeks echo and 24 weeks Rl2 with OK. Echo at 26 weeks and last ultrasound in screening 28 week Rl2 with OK interval. Pt states understanding. Questions answered. Sent to desk to schedule. Will continue to follow with her PCP. Discharged stable at this time. Yamile Soares RN

## 2022-09-30 ENCOUNTER — VIRTUAL VISIT (OUTPATIENT)
Dept: PSYCHIATRY | Facility: CLINIC | Age: 37
End: 2022-09-30
Attending: PSYCHOLOGIST
Payer: COMMERCIAL

## 2022-09-30 DIAGNOSIS — F41.0 PANIC ATTACK: ICD-10-CM

## 2022-09-30 DIAGNOSIS — F41.1 GENERALIZED ANXIETY DISORDER WITH PANIC ATTACKS: Primary | ICD-10-CM

## 2022-09-30 DIAGNOSIS — F41.0 GENERALIZED ANXIETY DISORDER WITH PANIC ATTACKS: Primary | ICD-10-CM

## 2022-09-30 PROCEDURE — 90837 PSYTX W PT 60 MINUTES: CPT | Mod: HN

## 2022-09-30 ASSESSMENT — PATIENT HEALTH QUESTIONNAIRE - PHQ9
SUM OF ALL RESPONSES TO PHQ QUESTIONS 1-9: 0
10. IF YOU CHECKED OFF ANY PROBLEMS, HOW DIFFICULT HAVE THESE PROBLEMS MADE IT FOR YOU TO DO YOUR WORK, TAKE CARE OF THINGS AT HOME, OR GET ALONG WITH OTHER PEOPLE: NOT DIFFICULT AT ALL
SUM OF ALL RESPONSES TO PHQ QUESTIONS 1-9: 0

## 2022-09-30 NOTE — PROGRESS NOTES
"[facility]  OUTPATIENT PSYCHOTHERAPY PROGRESS NOTE    Client Name: Griselda Bell   YOB: 1985 (37 year old)   Date of Service:  Sep 30, 2022  Time of Service: 8:05 am to 9:00 am (55 minutes)  Service Type(s): 02020 psychotherapy (53-60 min. with patient and/or family)    Type of service: Telemedicine Psychotherapy   Reason for Telemedicine Visit: COVID-19 public health recommendations on in-person sessions  Mode of transmission: Secure real time interactive audio and visual telecommunication system (videoconference via [employer])  Location of originating and distant sites:    Originating site (patient location): patient home    Distant site (provider site): [facility], [facility]   Telemedicine Visit: The patient's condition can be safely assessed and treated via synchronous audio and visual telemedicine encounter.    Patient has agreed to receiving services via telemedicine technology.    Diagnoses:   Encounter Diagnoses   Name Primary?     Generalized anxiety disorder  Yes     Panic attack - partial remission        Individuals Present:   Patient and provider     Treatment goal(s) being addressed:   1. Enhance use of grounding and relaxation strategies to maintain reduction in panic symptoms, manage and reduce anxiety, and promote ability to initiate sleep  2. Utilize Exposure Response Prevention (ER/P) approach to maintain ongoing progress interrupting use of avoidance (e.g., avoiding raising heart rate for example during exercise) or compulsive coping strategies (e.g., checking heart rate on Apple watch) to manage panic  3. Process feelings of grief and loss, prioritizing sleep and self-care to support wellbeing during early pregnancy    Subjective:  Marisabel is currently 13.5 weeks pregnant (OB team is retaining initial due date). She completed her first MFM appointment last week, noting it went \"great,\" with reassuring " "results of early intensive ultrasound indicating likelihood of fetal heartblock developing is \"less than 1%\". Marisabel will continue to be followed closely by MFM and her OB. She reported she is \"feeling more positive\" some moments, and \"worried\" other moments. This week Marisabel informed her daughter's  the family is expecting a new baby. Marisabel endorsed ongoing overall improvement in anxiety, noticing she is typically able to dismiss worries and manage physiological anxiety in context of medical appointments and sharing news of pregnancy. At times this week she has felt more irritable in context of supporting and managing daughter's behavior during morning routine, and she asked if we could explore some parenting strategies to use in this context.     Treatment:   Individual psychotherapy utilizing cognitive behavioral therapy (CBT) framework with exposure response prevention (ER/P) and biopsychosocial  lens. 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 Continued to review, discuss, and support use of coping strategies. Recognized Marisabel's efforts to identify and advocate for her own needs and skillfully partner with care team to meet needs. Together with Marisabel, explored and brainstormed developmentally-informed parenting strategies to respond to her daughter in moments of dysregulation. Continued to model and encourage curiosity and nonjudgmental stance towards all thoughts/feelings. Reflected and recognized Marisabel's proactive coping efforts, progress towards treatment goals, and resilience. Provided psychoeducation, reflective listening, validation, positive reinforcement, and encouragement throughout.    Assessment and Progress:   Appearance:  Appropriately-dressed and groomed   Behavior/relationship to examiner/demeanor:  Pleasant, warm, open, reflective   Motor " "activity/EPS: Within normal limits  Speech rate:  Within normal limits  Speech volume:  Within normal limits  Speech articulation:  Within normal limits  Speech coherence:  Within normal limits  Speech spontaneity:  Within normal limit  Mood (subjective report): \"feeling more positive\", \"continuing to take things day by day\"   Affect (objective appearance): Mood-congruent, reflective   Thought Process (Associations):  Goal directed  Thought process (Rate):  Within normal limits  Thought content: None  Abnormal Perception: None  Insight:  Within normal limits  Judgment:  Within normal limit     Marisabel continues to make strong progress towards her treatment goals to manage and reduce anxiety, process feelings of grief and loss, and support her wellbeing in the context of her current early pregnancy following multiple pregnancy losses. Overall, Marisabel has had an excellent response to the combination of CBT with ER/P psychotherapy and medication (Zoloft 50 mg). Marisabel also reported benefit from initiating acupuncture to support reproductive and mental health goals prior to conception and during early pregnancy (Kelvin Helm ND, [facility], [facility]). Currently, Marisabel has achieved partial remission of panic symptoms, and her anxiety is generally well-managed with her proactive use of coping strategies, social support, and self-care. We continue to use relaxation skills and an ER/P approach to manage acute physiological symptoms of anxiety experienced primarily in context of medical appointments and sharing news about the pregnancy. Marisabel has also done a beautiful job leveraging the support of her care team to identify and meet her needs, and I appreciate the thoughtful and empathic care and support Marisabel is receiving from her OB, Homa Gonzales MD, of [facility], and the [facility] team (Dr. Hernandez and colleagues). We will continue to leverage a dialectical lens to support Marisabel in holding space for complex " thoughts and feelings of relief, grief, and worry as Marisabel experiences the early stages of pregnancy following history of recurrent pregnancy loss.     Answers for HPI/ROS submitted by the patient on 9/30/2022  If you checked off any problems, how difficult have these problems made it for you to do your work, take care of things at home, or get along with other people?: Not difficult at all  PHQ9 TOTAL SCORE: 0    Plan:   Continue to prioritize sleep, engage in self-care, and leverage social support.      Consider Pregnancy After Loss Support Group provided by [employer]:     2nd and 4th Monday at 7PM ET/ 4PM PT   The Pregnancy After Loss: Support for Parents support group provides connection for parents riding the emotional waves of pregnancy after a loss. Led by [employer]-trained facilitators with lived experience, this group helps parents find support, as well as provides useful information and resources to help them navigate pregnancy after loss. Graciela, fear, sadness and grieving are common during this time. In this group, you will meet others who can relate and support you during your new pregnancy. We are here to help.     [place] here: https://[employer]/users/sign_in?org=[employer]%2520[employer]%2520[employer]      Continue to use coping strategies and exposure exercises to maintain remission of panic symptoms and manage and reduce anxiety symptoms.     Consider reading books, The Invisible String, and Hello Friend, Goodbye Friend, together with Yady, to begin general conversation on our unbreakable connection, bond, and belonging to each other, even in context of goodbyes, separations, or absences.      Continue to follow medication treatment plan (Zoloft, 50 mg) following consultation with Ganesh Santiago and MD John, of [facility] Team (see note dated 9/1).     Next therapy appointment has been scheduled for 10/7/22 at 8 am to continue work on treatment goals.      Treatment  Plan review due: 11/4/22      Joann Moore, PhD  Postdoctoral Fellow     I did not see this patient directly, but have discussed the session with the above trainee and approve this documentation.      Hailey Young Psy.D., LP                                                                                        Clinical Supervisor

## 2022-10-07 ENCOUNTER — VIRTUAL VISIT (OUTPATIENT)
Dept: PSYCHIATRY | Facility: CLINIC | Age: 37
End: 2022-10-07
Attending: PSYCHOLOGIST
Payer: COMMERCIAL

## 2022-10-07 DIAGNOSIS — F41.0 GENERALIZED ANXIETY DISORDER WITH PANIC ATTACKS: Primary | ICD-10-CM

## 2022-10-07 DIAGNOSIS — F41.0 PANIC ATTACK: ICD-10-CM

## 2022-10-07 DIAGNOSIS — F41.1 GENERALIZED ANXIETY DISORDER WITH PANIC ATTACKS: Primary | ICD-10-CM

## 2022-10-07 PROCEDURE — 90832 PSYTX W PT 30 MINUTES: CPT | Mod: HN

## 2022-10-07 NOTE — PROGRESS NOTES
"[facility]  OUTPATIENT PSYCHOTHERAPY PROGRESS NOTE    Client Name: Griselda Bell   YOB: 1985 (37 year old)   Date of Service:  Oct 7, 2022  Time of Service: 8:03 am to 8:33 am (30 minutes)  Service Type(s): 01687 psychotherapy (16-37 min. with patient and/or family)    Type of service: Telemedicine Psychotherapy  Reason for Telemedicine Visit: COVID-19 public health recommendations on in-person sessions  Mode of transmission: Secure real time interactive audio and visual telecommunication system (videoconference via [employer])  Location of originating and distant sites:    Originating site (patient location): patient home    Distant site (provider site): HIPAA compliant location within provider home/remote setting  Telemedicine Visit: The patient's condition can be safely assessed and treated via synchronous audio and visual telemedicine encounter.    Patient has agreed to receiving services via telemedicine technology.    Diagnoses:   Encounter Diagnoses   Name Primary?     Generalized anxiety disorder  Yes     Panic attack - partial remission        Individuals Present:   Patient and provider     Treatment goal(s) being addressed:   1. Enhance use of grounding and relaxation strategies to maintain reduction in panic symptoms, manage and reduce anxiety, and promote ability to initiate sleep  2. Utilize Exposure Response Prevention (ER/P) approach to maintain ongoing progress interrupting use of avoidance (e.g., avoiding raising heart rate for example during exercise) or compulsive coping strategies (e.g., checking heart rate on Apple watch) to manage panic  3. Process feelings of grief and loss, prioritizing sleep and self-care to support wellbeing during early pregnancy    Subjective:  Marisabel is 14.5 weeks pregnant. She reported this week has been \"hectic\" following a two-day migraine and 24-hours of intense food poisoning. She endorsed feeling fatigued and stressed after missing a " "few days of work; she described next week as one of the busiest weeks of work for her culminating in an important once a year event. Marisabel reported increased worries about pregnancy loss this week in context of experiencing previous losses between 14-17 weeks, and with most recent loss following an illness with COVID. She is looking forward to her parents arriving next week for a two week stay to help provide instrumental and emotional support.       Treatment:   Individual psychotherapy utilizing cognitive behavioral therapy (CBT) framework with exposure response prevention (ER/P) and biopsychosocial  lens. Today, continued to support Marisabel in holding space for complex thoughts and feelings related to grief, loss, worry, and happiness in context of progressing early pregnancy following history of recurrent pregnancy loss. Supported Marisabel in exploring reminders/triggers of grief, and honoring losses associated with these reminders. Brainstormed use of coping strategies to process grief, manage worries, and support her mood and wellbeing following more difficult week. Continued to use dialectical lens to name difficulty of holding uncertainty about \"when will I feel better\" and identify ways Marisabel is and can continue to proactively support her wellbeing. Supported aMrisabel in exploring and challenging worries/judgmental thoughts related to use of distraction; psychoeducation and discussion on healthy use of distraction and containment versus detachment or avoidance. Ongoing emphasis on sleep, self-soothing, self-care, and leveraging social support. At Marisabel's request we wrapped up early to support her ability to rest, engage in self-care, or catch up on work. Provided psychoeducation, reflective listening, validation, positive reinforcement, and encouragement throughout.      Assessment and Progress:   Appearance:  Appropriately-dressed and groomed   Behavior/relationship to examiner/demeanor:  Pleasant, warm, " "open, reflective; appeared mildly fatigued    Motor activity/EPS: Within normal limits  Speech rate:  Within normal limits  Speech volume:  Within normal limits  Speech articulation:  Within normal limits  Speech coherence:  Within normal limits  Speech spontaneity:  Within normal limit  Mood (subjective report): \"feeling more worried\"   Affect (objective appearance): Mood-congruent, reflective, euthymic overall   Thought Process (Associations):  Goal directed  Thought process (Rate):  Within normal limits  Thought content: None  Abnormal Perception: None  Insight:  Within normal limits  Judgment:  Within normal limit     Marisabel continues to make strong progress towards her treatment goals to manage and reduce anxiety, process feelings of grief and loss, and support her wellbeing in the context of her current early pregnancy following multiple pregnancy losses. Overall, Marisabel has had an excellent response to the combination of CBT with ER/P psychotherapy and medication (Zoloft 50 mg). Marisabel also reported benefit from initiating acupuncture to support reproductive and mental health goals prior to conception and during early pregnancy (Kelvin Helm ND, [place], [facility]). Currently, Marisabel has achieved partial remission of panic symptoms, and her anxiety is generally well-managed with her proactive use of coping strategies, social support, and self-care. We continue to use relaxation skills and an ER/P approach to manage acute physiological symptoms of anxiety experienced primarily in context of medical appointments and sharing news about the pregnancy. Marisabel has also done a beautiful job leveraging the support of her care team to identify and meet her needs, and I appreciate the thoughtful and empathic care and support Marisabel is receiving from her OB, Homa Gonzales [place], [place] [facility] [place] and the [facility] team (Dr. Hernandez and colleagues). We will continue to leverage a dialectical lens to support Marisabel in " holding space for complex thoughts and feelings of relief, grief, and worry as Marisabel experiences the early stages of pregnancy following history of recurrent pregnancy loss.    Plan:   Continue to prioritize sleep, engage in self-care, and leverage social support.      Consider Pregnancy After Loss Support Group provided by [employer]:     2nd and 4th Monday at 7PM ET/ 4PM PT   The Pregnancy After Loss: Support for Parents support group provides connection for parents riding the emotional waves of pregnancy after a loss. Led by [employer]-trained facilitators with lived experience, this group helps parents find support, as well as provides useful information and resources to help them navigate pregnancy after loss. Graciela, fear, sadness and grieving are common during this time. In this group, you will meet others who can relate and support you during your new pregnancy. We are here to help.     [place] here: https://[employer]/users/sign_in?org=[employer]%2520[employer]%2520[employer]      Continue to use coping strategies and exposure exercises to maintain remission of panic symptoms and manage and reduce anxiety symptoms.     Consider reading books, The Invisible String, and Hello Friend, Goodbye Friend, together with Yady, to begin general conversation on our unbreakable connection, bond, and belonging to each other, even in context of goodbyes, separations, or absences.      Continue to follow medication treatment plan (Zoloft, 50 mg) following consultation with Ganesh Santiago and MD John, of [facility] (see note dated 9/1).   Next therapy appointment has been scheduled for 8/13/22 to continue work on treatment goals.      Treatment Plan review due: 11/4/22      Joann Moore, PhD  Postdoctoral Fellow        I did not see this patient directly, but have discussed the session with the above trainee and approve this documentation.      Hailey Young Psy.D.,  LP                                                                                        Clinical Supervisor

## 2022-10-13 ENCOUNTER — VIRTUAL VISIT (OUTPATIENT)
Dept: PSYCHIATRY | Facility: CLINIC | Age: 37
End: 2022-10-13
Attending: PSYCHOLOGIST
Payer: COMMERCIAL

## 2022-10-13 DIAGNOSIS — F41.1 GENERALIZED ANXIETY DISORDER WITH PANIC ATTACKS: Primary | ICD-10-CM

## 2022-10-13 DIAGNOSIS — F41.0 PANIC ATTACK: ICD-10-CM

## 2022-10-13 DIAGNOSIS — F41.0 GENERALIZED ANXIETY DISORDER WITH PANIC ATTACKS: Primary | ICD-10-CM

## 2022-10-13 PROCEDURE — 90837 PSYTX W PT 60 MINUTES: CPT | Mod: 95

## 2022-10-19 ENCOUNTER — VIRTUAL VISIT (OUTPATIENT)
Dept: PSYCHIATRY | Facility: CLINIC | Age: 37
End: 2022-10-19
Attending: PSYCHOLOGIST
Payer: COMMERCIAL

## 2022-10-19 DIAGNOSIS — F41.0 GENERALIZED ANXIETY DISORDER WITH PANIC ATTACKS: Primary | ICD-10-CM

## 2022-10-19 DIAGNOSIS — F41.1 GENERALIZED ANXIETY DISORDER WITH PANIC ATTACKS: Primary | ICD-10-CM

## 2022-10-19 DIAGNOSIS — F41.0 PANIC ATTACK: ICD-10-CM

## 2022-10-19 PROCEDURE — 90837 PSYTX W PT 60 MINUTES: CPT | Mod: HN

## 2022-10-19 NOTE — PROGRESS NOTES
WOMEN'S WELLBEING PROGRAM  OUTPATIENT PSYCHOTHERAPY PROGRESS NOTE    Client Name: Griselda Bell   YOB: 1985 (37 year old)   Date of Service:  Oct 19, 2022  Time of Service: 8:04 am to 9:01 am (57 minutes)  Service Type(s): 10820 psychotherapy (53-60 min. with patient and/or family)    Type of service: Telemedicine Psychotherapy   Reason for Telemedicine Visit: COVID-19 public health recommendations on in-person sessions  Mode of transmission: Secure real time interactive audio and visual telecommunication system (videoconference via [employer])  Location of originating and distant sites:    Originating site (patient location): patient home    Distant site (provider site): HIPAA compliant location within provider home/remote setting  Telemedicine Visit: The patient's condition can be safely assessed and treated via synchronous audio and visual telemedicine encounter.    Patient has agreed to receiving services via telemedicine technology.    Diagnoses:   Encounter Diagnoses   Name Primary?     Generalized anxiety disorder  Yes     Panic attack - partial remission        Individuals Present:   Patient and provider     Treatment goal(s) being addressed:   1. Enhance use of grounding and relaxation strategies to maintain reduction in panic symptoms, manage and reduce anxiety, and promote ability to initiate sleep  2. Utilize Exposure Response Prevention (ER/P) approach to maintain progress interrupting use of compulsive coping strategies (e.g., checking heart rate on Apple watch) or avoidance (e.g., avoiding raising heart rate for example during exercise) to manage panic and worries   3. Process feelings of grief and loss, prioritizing sleep and enhancing self-care and self-soothing strategies to support wellbeing during pregnancy    Subjective:  Marisabel is 16 weeks pregnant. 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 The afternoon before  "the gala Marisabel learned a close friend's 6-week-old daughter  suddenly, in context of the friend's family fighting mild symptoms of respiratory illness. In response to learning of this news Marisabel's  noted \"I'm really scared,\" which is the first time Marisabel has seen him endorse any worry about loss. Since receiving this news Marisabel's nightmares about experiencing a miscarriage have returned. She noted she has been using coping strategies effectively to manage anxiety when she wakes up and help herself get back to sleep fairly easily.     The patient did not report medication changes.    Treatment:   Individual psychotherapy utilizing cognitive behavioral therapy (CBT) framework with exposure response prevention (ER/P) and biopsychosocial  lens. Today, supported Marisabel in holding space for difficult thoughts and feelings related to increased grief, loss, and worry, in context of friend's sudden loss of her 6-week-old daughter, and Marisabel's progressing early pregnancy following history of recurrent pregnancy loss. Made space for multiple emotions, and continued to partner with Marisabel in exploring frames to process devestating loss of a child. Discussed coping strategies to observe/describe \"superstitious\" worries related to loss without judgement and let them go, and use cognitive restructuring and cognitive defusion strategies to interrupt and manage rumination and unhelpful thoughts. Explored and identified ways to meet basic and emotional needs in time of grief, including grief rituals to be connected and supported in time of loss, and use of self-care and self-soothing practices. Provided psychoeducation, reflective listening, validation, positive reinforcement, and encouragement throughout.     Assessment and Progress:   Appearance:  Appropriately-dressed and groomed   Behavior/relationship to examiner/demeanor:  Pleasant, warm, open, reflective; appeared mildly fatigued    Motor activity/EPS: Within " "normal limits  Speech rate:  Within normal limits  Speech volume:  Within normal limits  Speech articulation:  Within normal limits  Speech coherence:  Within normal limits  Speech spontaneity:  Within normal limit  Mood (subjective report): \"in shock\" endorsed increased worries and nightmares about miscarriage and loss   Affect (objective appearance): Mood-congruent, reflective   Thought Process (Associations):  Goal directed  Thought process (Rate):  Within normal limits  Thought content: None  Abnormal Perception: None  Insight:  Within normal limits  Judgment:  Within normal limit     Marisabel continues to make strong progress towards her treatment goals to manage and reduce anxiety, process feelings of grief and loss, and support her wellbeing in the context of her current early pregnancy following multiple pregnancy losses. Overall, Marisabel has had an excellent response to the combination of CBT with ER/P psychotherapy and medication (Zoloft 50 mg). Marisabel also reported benefit from initiating acupuncture to support reproductive and mental health goals prior to conception and during early pregnancy (Kelvin Helm ND, [place], [facility]). Currently, Marisabel has achieved partial remission of panic symptoms, and her anxiety is generally well-managed with her proactive use of coping strategies, social support, self-care and self-soothing practices. We continue to use relaxation skills and an ER/P approach to manage acute physiological symptoms of anxiety experienced primarily in context of medical appointments and sharing news about the pregnancy. Marisabel has done excellent work to leverage the support of her care team to identify and meet her needs, and I appreciate the thoughtful and empathic care and support Marisabel is receiving from her OB, Homa Gonzales MD, of [facility] [place], and the [facility] team (Dr. Hernandez and colleagues). We will continue to leverage a dialectical lens to support Marisabel in holding space for " complex thoughts and feelings of relief, grief, and worry as Marisabel experiences the early stages of pregnancy following history of recurrent pregnancy loss.     Plan:   Continue to prioritize sleep, engage in self-care and self-soothing practices, and leverage social support.      Consider Pregnancy After Loss Support Group provided by [employer]:     2nd and 4th Monday at 7PM ET/ 4PM PT   The Pregnancy After Loss: Support for Parents support group provides connection for parents riding the emotional waves of pregnancy after a loss. Led by [employer]-trained facilitators with lived experience, this group helps parents find support, as well as provides useful information and resources to help them navigate pregnancy after loss. Graciela, fear, sadness and grieving are common during this time. In this group, you will meet others who can relate and support you during your new pregnancy. We are here to help.     [place] here: https://[employer]/users/sign_in?org=[employer]%2520[employer]%2520[employer]      Continue to use coping strategies and exposure exercises to maintain remission of panic symptoms and manage and reduce anxiety symptoms.     Consider reading books, The Invisible String, and Hello Friend, Goodbye Friend, together with Yady, to begin general conversation on our unbreakable connection, bond, and belonging to each other, even in context of goodbyes, separations, or absences.      Continue to follow medication treatment plan (Zoloft, 50 mg) following consultation with Ganesh Santiago and MD John, of [facility] (see note dated 9/1).     Next therapy appointment has been scheduled for 10/28 at 7 am to continue work on treatment goals.      Treatment Plan review due: 11/4/22      Joann Moore, PhD  Postdoctoral Fellow     I did not see this patient directly, but have discussed the session with the above trainee and approve this documentation.      Hailey Young Psy.D.,  LP                                                                                        Clinical Supervisor

## 2022-10-19 NOTE — PROGRESS NOTES
WOMEN'S WELLBEING PROGRAM  OUTPATIENT PSYCHOTHERAPY PROGRESS NOTE    Client Name: Griselda Bell   YOB: 1985 (37 year old)   Date of Service:  Oct 13, 2022  Time of Service: 8:00 am to 8:56 am (56 minutes)  Service Type(s): 77373 psychotherapy (53-60 min. with patient and/or family)    Type of service: Telemedicine Psychotherapy   Reason for Telemedicine Visit: COVID-19 public health recommendations on in-person sessions  Mode of transmission: Secure real time interactive audio and visual telecommunication system (videoconference via [employer])  Location of originating and distant sites:    Originating site (patient location): patient home    Distant site (provider site): HIPAA compliant location within provider home/remote setting  Telemedicine Visit: The patient's condition can be safely assessed and treated via synchronous audio and visual telemedicine encounter.    Patient has agreed to receiving services via telemedicine technology.    Diagnoses:   Encounter Diagnoses   Name Primary?     Generalized anxiety disorder  Yes     Panic attack - partial remission        Individuals Present:   Patient and provider     Treatment goal(s) being addressed:   1. Enhance use of grounding and relaxation strategies to maintain reduction in panic symptoms, manage and reduce anxiety, and promote ability to initiate sleep  2. Utilize Exposure Response Prevention (ER/P) approach to maintain progress interrupting use of compulsive coping strategies (e.g., checking heart rate on Apple watch) or avoidance (e.g., avoiding raising heart rate for example during exercise) to manage panic and worries   3. Process feelings of grief and loss, prioritizing sleep and enhancing self-care and self-soothing strategies to support wellbeing during pregnancy    Subjective:  Marisabel is approximately 15 weeks pregnant and coming up on milestone of a previous loss at 16/17 weeks. She notices she feels more anxious in context of this  "upcoming milestone, and has had increased flashbacks to loss. Marisabel continues to use relaxation and grounding strategies effectively to manage flashbacks and physiological signs of anxiety. She is also using helpful and soothing thoughts when she notices herself ruminating. She frequently returns to the thought \"when will I feel comfortable\".     The patient did not report medication changes.    Treatment:   Individual psychotherapy utilizing cognitive behavioral therapy (CBT) framework with exposure response prevention (ER/P) and biopsychosocial  lens. Today, continued to support Marisabel in holding space for complex thoughts and feelings related to grief, loss, worry, and happiness in context of progressing early pregnancy following history of recurrent pregnancy loss. Continued to discuss and use dialectical lens to hold space for uncertainty, worry, and reassurance, and make meaning and honor multiple thoughts and feelings within context of Marisabel's disrupted reproductive narrative. Partnered to develop approach of giving herself \"permission to experience worry (i.e., validate and make meaning of worries given history), and permission to reassure\" herself given when is known of current healthy progressing pregnancy. Continued to review and discuss strategies for managing rumination, including use of mindfulness strategies (observing, describing, and letting thoughts go without judgement), mindful distraction, containment (writing worries in journal), cognitive defusion strategies (saying worry thoughts out loud, singing them or saying them in accent to defuse them). Supported ongoing use of relaxation and grounding strategies. Discussed Pregnancy after Loss PSI support group as possible resource; at this time Marisabel is not interested in this resource but will consider it as an option as her pregnancy progresses. Ongoing emphasis on sleep, self-soothing, self-care, and leveraging social support. Provided " psychoeducation, reflective listening, validation, positive reinforcement, and encouragement throughout.    Assessment and Progress:   Appearance:  Appropriately-dressed and groomed   Behavior/relationship to examiner/demeanor:  Pleasant, warm, open, reflective; appeared mildly fatigued    Motor activity/EPS: Within normal limits  Speech rate:  Within normal limits  Speech volume:  Within normal limits  Speech articulation:  Within normal limits  Speech coherence:  Within normal limits  Speech spontaneity:  Within normal limit  Mood (subjective report): increased worries related to miscarriage    Affect (objective appearance): Mood-congruent, reflective, tearful at moments when discussing loss, euthymic overall   Thought Process (Associations):  Goal directed  Thought process (Rate):  Within normal limits  Thought content: None  Abnormal Perception: None  Insight:  Within normal limits  Judgment:  Within normal limit     Marisabel continues to make strong progress towards her treatment goals to manage and reduce anxiety, process feelings of grief and loss, and support her wellbeing in the context of her current early pregnancy following multiple pregnancy losses. Overall, Marisabel has had an excellent response to the combination of CBT with ER/P psychotherapy and medication (Zoloft 50 mg). Marisabel also reported benefit from initiating acupuncture to support reproductive and mental health goals prior to conception and during early pregnancy (Kelivn Helm ND, [place], [facility]). Currently, Marisabel has achieved partial remission of panic symptoms, and her anxiety is generally well-managed with her proactive use of coping strategies, social support, self-care and self-soothing practices. We continue to use relaxation skills and an ER/P approach to manage acute physiological symptoms of anxiety experienced primarily in context of medical appointments and sharing news about the pregnancy. Marisabel has done  excellent work to leverage the support of her care team to identify and meet her needs, and I appreciate the thoughtful and empathic care and support Marisabel is receiving from her OB, Homa Gonzales MD, of [facility] [place], and the [facility] team (Dr. Hernandez and colleagues). We will continue to leverage a dialectical lens to support Marisabel in holding space for complex thoughts and feelings of relief, grief, and worry as Marisabel experiences the early stages of pregnancy following history of recurrent pregnancy loss.    Plan:   Continue to prioritize sleep, engage in self-care and self-soothing practices, and leverage social support.      A possible resource is the Pregnancy After Loss Support Group provided by [employer]:     2nd and 4th Monday at 7PM ET/ 4PM PT   The Pregnancy After Loss: Support for Parents support group provides connection for parents riding the emotional waves of pregnancy after a loss. Led by PSI-trained facilitators with lived experience, this group helps parents find support, as well as provides useful information and resources to help them navigate pregnancy after loss. Graciela, fear, sadness and grieving are common during this time. In this group, you will meet others who can relate and support you during your new pregnancy. We are here to help.     [place] here: [email or web address]/users/sign_[email or web address]?org=[employer]%2520[employer]%2520[employer]      Continue to use coping strategies and exposure exercises to maintain remission of panic symptoms and manage and reduce anxiety symptoms.     Consider reading books, The Invisible String, and Hello Friend, Goodbye Friend, together with Yady, to begin general conversation on our unbreakable connection, bond, and belonging to each other, even in context of goodbyes, separations, or absences.      Continue to follow medication treatment plan (Zoloft, 50 mg) following consultation with Ganesh Santiago and MD John, of [facility] (see note  dated 9/1).     Next therapy appointment has been scheduled for 10/21/22 at 8 am to continue work on treatment goals.      Treatment Plan review due: 11/4/22      Joann Moore, PhD  Postdoctoral Fellow     I did not see this patient directly, but have discussed the session with the above trainee and approve this documentation.      Hailey Young Psy.D.,                                                                                         Clinical Supervisor

## 2022-10-24 ENCOUNTER — HOSPITAL ENCOUNTER (OUTPATIENT)
Dept: ULTRASOUND IMAGING | Facility: CLINIC | Age: 37
Discharge: HOME | End: 2022-10-24
Attending: OBSTETRICS & GYNECOLOGY
Payer: COMMERCIAL

## 2022-10-24 ENCOUNTER — OFFICE VISIT (OUTPATIENT)
Dept: MATERNAL FETAL MEDICINE | Facility: CLINIC | Age: 37
End: 2022-10-24
Attending: OBSTETRICS & GYNECOLOGY
Payer: COMMERCIAL

## 2022-10-24 DIAGNOSIS — Z82.79 FAMILY HISTORY OF TRISOMY 13: ICD-10-CM

## 2022-10-24 DIAGNOSIS — R76.8 SS-A ANTIBODY POSITIVE: ICD-10-CM

## 2022-10-24 DIAGNOSIS — O09.521 MULTIGRAVIDA OF ADVANCED MATERNAL AGE IN FIRST TRIMESTER: Primary | ICD-10-CM

## 2022-10-24 DIAGNOSIS — O09.90 HIGH-RISK PREGNANCY, UNSPECIFIED TRIMESTER: ICD-10-CM

## 2022-10-24 PROCEDURE — 76817 TRANSVAGINAL US OBSTETRIC: CPT | Mod: 26 | Performed by: OBSTETRICS & GYNECOLOGY

## 2022-10-24 PROCEDURE — 76805 OB US >/= 14 WKS SNGL FETUS: CPT | Mod: 26 | Performed by: OBSTETRICS & GYNECOLOGY

## 2022-10-24 PROCEDURE — 76828 ECHO EXAM OF FETAL HEART: CPT

## 2022-10-24 PROCEDURE — 76805 OB US >/= 14 WKS SNGL FETUS: CPT

## 2022-11-02 ENCOUNTER — HOSPITAL ENCOUNTER (OUTPATIENT)
Dept: CARDIOLOGY | Facility: CLINIC | Age: 37
Discharge: HOME | End: 2022-11-02
Attending: OBSTETRICS & GYNECOLOGY | Admitting: OBSTETRICS & GYNECOLOGY
Payer: COMMERCIAL

## 2022-11-02 DIAGNOSIS — R76.8 SS-A ANTIBODY POSITIVE: ICD-10-CM

## 2022-11-02 DIAGNOSIS — O09.90 HIGH-RISK PREGNANCY, UNSPECIFIED TRIMESTER: ICD-10-CM

## 2022-11-02 PROCEDURE — 76827 ECHO EXAM OF FETAL HEART: CPT | Mod: 26 | Performed by: PEDIATRICS

## 2022-11-02 PROCEDURE — 93325 DOPPLER ECHO COLOR FLOW MAPG: CPT | Mod: 26 | Performed by: PEDIATRICS

## 2022-11-02 PROCEDURE — 93325 DOPPLER ECHO COLOR FLOW MAPG: CPT

## 2022-11-02 PROCEDURE — 76825 ECHO EXAM OF FETAL HEART: CPT | Mod: 26 | Performed by: PEDIATRICS

## 2022-11-04 ENCOUNTER — VIRTUAL VISIT (OUTPATIENT)
Dept: PSYCHIATRY | Facility: CLINIC | Age: 37
End: 2022-11-04
Attending: PSYCHOLOGIST
Payer: COMMERCIAL

## 2022-11-04 DIAGNOSIS — F41.0 PANIC ATTACK: ICD-10-CM

## 2022-11-04 DIAGNOSIS — F41.1 GENERALIZED ANXIETY DISORDER WITH PANIC ATTACKS: Primary | ICD-10-CM

## 2022-11-04 DIAGNOSIS — F41.0 GENERALIZED ANXIETY DISORDER WITH PANIC ATTACKS: Primary | ICD-10-CM

## 2022-11-04 PROCEDURE — 90837 PSYTX W PT 60 MINUTES: CPT | Mod: 95

## 2022-11-04 NOTE — PROGRESS NOTES
"WOMEN'S WELLBEING PROGRAM  OUTPATIENT PSYCHOTHERAPY PROGRESS NOTE    Client Name: Griselda Bell   YOB: 1985 (37 year old)   Date of Service:  Nov 4, 2022  Time of Service: 7:08 am to 8:01 am (53 minutes)  Service Type(s): 26078 psychotherapy (53-60 min. with patient and/or family)    Type of service: Telemedicine Psychotherapy   Reason for Telemedicine Visit: COVID-19 public health recommendations on in-person sessions  Mode of transmission: Secure real time interactive audio and visual telecommunication system (videoconference via [employer])  Location of originating and distant sites:    Originating site (patient location): patient home    Distant site (provider site): HIPAA compliant location within provider home/remote setting  Telemedicine Visit: The patient's condition can be safely assessed and treated via synchronous audio and visual telemedicine encounter.    Patient has agreed to receiving services via telemedicine technology.    Diagnoses:   Encounter Diagnoses   Name Primary?     Generalized anxiety disorder  Yes     Panic attack - partial remission        Individuals Present:   Patient and provider     Treatment goal(s) being addressed:   1. Enhance use of grounding and relaxation strategies to maintain reduction in panic symptoms, manage and reduce anxiety, and promote ability to initiate sleep  2. Utilize Exposure Response Prevention (ER/P) approach to maintain progress interrupting use of compulsive coping strategies (e.g., checking heart rate on Apple watch) or avoidance (e.g., avoiding raising heart rate for example during exercise) to manage panic and worries   3. Process feelings of grief and loss, prioritizing sleep and enhancing self-care and self-soothing strategies to support wellbeing during pregnancy    Subjective:  Marisabel is currently approximately 18 weeks pregnant. Today is her 5th wedding anniversary and [place]. She reported \"good\" " "mood, and feeling \"ready to take a breath\" after getting through a busy period involving hosting her parents for an extended visit and several work/professional events. While her parents were visiting her father experienced a diabetic episode, fell, and had significant difficulty walking. Marisabel noted it was \"shocking\" to see his rapid decline in functioning during this episode, and she has been thinking about difficulty of \"accepting a broader decline in his health.\" Marisabel completed first fetal echocardiogram, with results indicating ongoing normal/healthy fetal development. She and her OB are considering scheduling her  date for end  at 39 weeks gestation. Marisabel and her  chose a name for their son, Juan Ramon Palma, and she connected this act of naming to perceiving a \"turning point\" in her thoughts, feelings, and worries about this pregnancy. Marisabel endorsed mild difficulty initiating sleep the past two nights in context of struggling to \"turn off thoughts.\" She described experiencing \"stream of thoughts\" with neutral affect and denied anxiety/worries or rumination/perseverative thoughts.       The patient did not report medication changes.    Treatment:   Individual psychotherapy utilizing cognitive behavioral therapy (CBT) framework with exposure response prevention (ER/P) and biopsychosocial  lens. Today, supported Marisabel in processing difficult thoughts and feelings related to father's health decline in context of chronic illness, holding space for loss, and exploring and reflecting on possible connections or implications of father's illness, family dynamics related to coping and responding to illness, and Marisabel's own parenting and caregiving practices. Marisabel reflected on recent naming of son, meaning of name and act of naming to her; we continued to make space for growing attachment to this pregnancy and this person within history of multiple losses. Ongoing use of radical " "acceptance and dialectial lens to welcome complex,multiple emotions without judgment. With Marisabel, discussed thoughts and worries about sharing news of pregnancy with daughter. Provided developmentally-informed psychoeducation and resources, and brainstormed strategies and language Marisabel can consider leveraging. Together we discussed and supported ongoing use of cognitive and behavioral coping strategies to manage worries, and brainstormed strategies Marisabel can continue to use in context of initiating sleep. Provided psychoeducation, reflective listening, validation, positive reinforcement, and encouragement throughout.    Assessment and Progress:   Appearance:  Appropriately-dressed and groomed   Behavior/relationship to examiner/demeanor:  Pleasant, warm, open, reflective, cheerful    Motor activity/EPS: Within normal limits  Speech rate:  Within normal limits  Speech volume:  Within normal limits  Speech articulation:  Within normal limits  Speech coherence:  Within normal limits  Speech spontaneity:  Within normal limit  Mood (subjective report): \"good\"   Affect (objective appearance): Within normal limits, euthymic overall    Thought Process (Associations):  Goal directed  Thought process (Rate):  Within normal limits  Thought content: None  Abnormal Perception: None  Insight:  Within normal limits  Judgment:  Within normal limit     Overall, I believe Marisabel continues to have an excellent response to the combination of CBT with ER/P psychotherapy and medication (Zoloft 50 mg). She continues to make strong progress towards her treatment goals to manage and reduce anxiety, process feelings of grief and loss, and support her wellbeing in the context of her current pregnancy following multiple pregnancy losses. Currently, Marisabel has achieved partial remission of panic symptoms, and her anxiety is generally well-managed with her proactive use of coping strategies, social support, self-care and self-soothing " practices. Marisabel is able to use cognitive techniques (cognitive restructuring, cognitive defusion, mindfulness approaches) and behavioral strategies (relaxation exercises often in combination with an ER/P approach) to manage and reduce worries and physiological symptoms of anxiety. Marisabel has done excellent work to leverage the support of her care team to identify and meet her needs, and I appreciate the thoughtful and empathic care and support Marisabel is receiving from her OB, Homa Gonzales MD, of [facility], and the [facility] team (Dr. Hernandez and colleagues). We will continue to leverage a dialectical lens to support Marisabel in holding space for complex thoughts and feelings of relief, grief, and worry as Marisabel experiences the early stages of pregnancy following history of recurrent pregnancy loss.    Answers for HPI/ROS submitted by the patient on 11/4/2022  If you checked off any problems, how difficult have these problems made it for you to do your work, take care of things at home, or get along with other people?: Not difficult at all  PHQ9 TOTAL SCORE: 1, indicating minimal depressive symptoms    Plan:   Continue to prioritize sleep, engage in self-care and self-soothing practices, and leverage social support. Continue to use cognitive and behavioral strategies to manage worries and physiological symptoms of anxiety, and maintain remission of panic symptoms.      Consider Pregnancy After Loss Support Group provided by [employer]:     2nd and 4th Monday at 7PM ET/ 4PM PT   The Pregnancy After Loss: Support for Parents support group provides connection for parents riding the emotional waves of pregnancy after a loss. Led by PSI-trained facilitators with lived experience, this group helps parents find support, as well as provides useful information and resources to help them navigate pregnancy after loss. Graciela, fear, sadness and grieving are common during this time. In this group, you will meet others who  can relate and support you during your new pregnancy. We are here to help.     [place] here: https://[employer]/users/sign_in?org=[employer]%2520[employer]%2520[employer]      Continue to follow medication treatment plan (Zoloft, 50 mg) following consultation with Ganesh Santiago and MD John, of [facility] (see note dated 9/1).     Next therapy appointment has been scheduled for 11/16/22 at 7 am to continue work on treatment goals.      Treatment Plan review due: upcoming appointment      Joann Moore, PhD  Postdoctoral Fellow     I did not see this patient directly, but have discussed the session with the above trainee and approve this documentation.      Hailey Young Psy.D.,                                                                                         Clinical Supervisor

## 2022-11-16 ENCOUNTER — VIRTUAL VISIT (OUTPATIENT)
Dept: PSYCHIATRY | Facility: CLINIC | Age: 37
End: 2022-11-16
Attending: PSYCHOLOGIST
Payer: COMMERCIAL

## 2022-11-16 ENCOUNTER — TELEPHONE (OUTPATIENT)
Dept: PSYCHIATRY | Facility: CLINIC | Age: 37
End: 2022-11-16

## 2022-11-16 DIAGNOSIS — F41.0 GENERALIZED ANXIETY DISORDER WITH PANIC ATTACKS: Primary | ICD-10-CM

## 2022-11-16 DIAGNOSIS — F41.1 GENERALIZED ANXIETY DISORDER WITH PANIC ATTACKS: Primary | ICD-10-CM

## 2022-11-16 DIAGNOSIS — F41.0 PANIC ATTACK: ICD-10-CM

## 2022-11-16 PROCEDURE — 90834 PSYTX W PT 45 MINUTES: CPT | Mod: GT

## 2022-11-16 NOTE — PROGRESS NOTES
[facility]  OUTPATIENT PSYCHOTHERAPY PROGRESS NOTE    Client Name: Griselda Bell   YOB: 1985 (37 year old)   Date of Service:  Nov 16, 2022  Time of Service: 7:19 am to 7:59 am (40 minutes)  Service Type(s): 56904 psychotherapy (38-52 min. with patient and/or family)    Type of service: Telemedicine Psychotherapy   Reason for Telemedicine Visit: COVID-19 public health recommendations on in-person sessions  Mode of transmission: Secure real time interactive audio and visual telecommunication system (videoconference via Greengage Mobile)  Location of originating and distant sites:    Originating site (patient location): patient home    Distant site (provider site): HIPAA compliant location within provider home/remote setting  Telemedicine Visit: The patient's condition can be safely assessed and treated via synchronous audio and visual telemedicine encounter.    Patient has agreed to receiving services via telemedicine technology.    Diagnoses:   Encounter Diagnoses   Name Primary?     Generalized anxiety disorder  Yes     Panic attack - partial remission        Individuals Present:   Patient and provider     Treatment goal(s) being addressed:   1. Enhance use of grounding and relaxation strategies to maintain reduction in panic symptoms, manage and reduce anxiety, and promote ability to initiate sleep  2. Utilize Exposure Response Prevention (ER/P) approach to maintain progress interrupting use of compulsive coping strategies (e.g., checking heart rate on Apple watch) or avoidance (e.g., avoiding raising heart rate for example during exercise) to manage panic and worries   3. Process feelings of grief and loss, prioritizing sleep and enhancing self-care and self-soothing strategies to support wellbeing during pregnancy following loss    Subjective:  Marisabel is currently approximately 20 weeks pregnant. She enjoyed time off last week, and [place]. The  "couple shared news of expecting a sibling with their daughter, and Marisabel reported she \"feels so happy, it went really well.\" Marisabel continues to be followed by MFM (Dr. Hernandez, [facility]) with recent Level II ultrasound results continuing to indicate ongoing normal/healthy fetal development. She and her OB (Dr. Gonzales, [facility] [place]) have scheduled her  date for . Marisabel endorsed feeling periods of increased worry at the start of medical visits; she reported overall she feels she has entered \"a different season\" of pregnancy and notices she feels \"more and more safety\" associated with ongoing gestation and increased sense of effectiveness using coping strategies. She denied any recent concerns related to sleep disruptions; she continues to use Unisom to support initiation of sleep.        The patient did not report medication changes.    Treatment:   Individual psychotherapy utilizing cognitive behavioral therapy (CBT) framework with exposure response prevention (ER/P) and biopsychosocial  lens. Today, supported Marisabel in making space for identifying and processing thoughts and feelings related to sharing news of pregnancy with daughter, and noticing a growing sense of security and attachment to this pregnancy and her son within history of multiple losses. Marisabel reflected on growing sense of effectiveness and mastery in using coping strategies to manage and reduce worries. Continued to review, discuss, and support ongoing use of cognitive, behavioral, and social coping strategies. Ongoing emphasis on importance of sleep, and supporting strategies to help prioritize and promote initiation of sleep. Provided psychoeducation, reflective listening, validation, positive reinforcement, and encouragement throughout.    Assessment and Progress:   Appearance:  Appropriately-dressed and groomed   Behavior/relationship to examiner/demeanor:  Pleasant, warm, open, reflective, cheerful    Motor activity/EPS: Within " "normal limits  Speech rate:  Within normal limits  Speech volume:  Within normal limits  Speech articulation:  Within normal limits  Speech coherence:  Within normal limits  Speech spontaneity:  Within normal limit  Mood (subjective report): \"good\"   Affect (objective appearance): Within normal limits, euthymic overall    Thought Process (Associations):  Goal directed  Thought process (Rate):  Within normal limits  Thought content: None  Abnormal Perception: None  Insight:  Within normal limits  Judgment:  Within normal limit     Overall, I believe Marisabel continues to have an excellent response to the combination of CBT with ER/P psychotherapy and medication (Zoloft 50 mg). She continues to make strong progress towards her treatment goals to manage and reduce anxiety, process feelings of grief and loss, and support her wellbeing in the context of her current pregnancy following multiple pregnancy losses. Marisabel achieved partial remission of panic symptoms in September 2022, and her anxiety is currently well-managed with her proactive use of coping strategies, social support, self-care and self-soothing practices. Today she continued to report increased sense of efficacy using cognitive techniques (cognitive restructuring, cognitive defusion, mindfulness approaches) and behavioral strategies (relaxation exercises often in combination with an ER/P approach) to manage and reduce worries and physiological symptoms of anxiety. Marisabel has done excellent work to leverage the support of her care team to identify and meet her needs, and I appreciate the thoughtful and empathic care and support Marisabel is receiving from her OB, Homa Gonzales MD, of [facility] and the [facility] team (Dr. Hernandez and colleagues). We will continue to leverage a dialectical lens to support Marisabel in holding space for complex thoughts and feelings of relief, grief, and worry as Marisabel experiences the early stages of pregnancy following history of recurrent " pregnancy loss.    Plan:   Continue to prioritize sleep, engage in self-care and self-soothing practices, and leverage social support. Continue to use cognitive and behavioral strategies to manage worries and physiological symptoms of anxiety, and maintain remission of panic symptoms.      Consider Pregnancy After Loss Support Group provided by [employer]:     2nd and 4th Monday at 7PM ET/ 4PM PT   The Pregnancy After Loss: Support for Parents support group provides connection for parents riding the emotional waves of pregnancy after a loss. Led by PSI-trained facilitators with lived experience, this group helps parents find support, as well as provides useful information and resources to help them navigate pregnancy after loss. Graciela, fear, sadness and grieving are common during this time. In this group, you will meet others who can relate and support you during your new pregnancy. We are here to help.     Horatio here: https://[email or web address]/users/sign_in?org=[employer]%2520[employer]%2520[employer]      Continue to follow medication treatment plan (Zoloft, 50 mg) following consultation with Ganesh Santiago and MD John, of [facility] (see note dated 9/1).     Next therapy appointment has been scheduled for 12/2/22 to continue work on treatment goals.      Treatment Plan review due: upcoming appointment       Joann Moore, PhD  Postdoctoral Fellow     I did not see this patient directly, but have discussed the session with the above trainee and approve this documentation.      Hailey Young Psy.D., JUSTO                                                                                        Clinical Supervisor

## 2022-11-16 NOTE — TELEPHONE ENCOUNTER
This writer left a message for Marisabel to check if our appointment today at 7 still works.     Joann Moore, PhD  Postdoctoral Fellow

## 2022-11-17 ENCOUNTER — HOSPITAL ENCOUNTER (OUTPATIENT)
Dept: ULTRASOUND IMAGING | Facility: CLINIC | Age: 37
Discharge: HOME | End: 2022-11-17
Attending: OBSTETRICS & GYNECOLOGY
Payer: COMMERCIAL

## 2022-11-17 ENCOUNTER — OFFICE VISIT (OUTPATIENT)
Dept: MATERNAL FETAL MEDICINE | Facility: CLINIC | Age: 37
End: 2022-11-17
Attending: OBSTETRICS & GYNECOLOGY
Payer: COMMERCIAL

## 2022-11-17 DIAGNOSIS — O09.292 PREGNANCY WITH PRIOR ADVERSE OUTCOME, SECOND TRIMESTER: Primary | ICD-10-CM

## 2022-11-17 DIAGNOSIS — O09.90 HIGH-RISK PREGNANCY, UNSPECIFIED TRIMESTER: ICD-10-CM

## 2022-11-17 DIAGNOSIS — O09.521 MULTIGRAVIDA OF ADVANCED MATERNAL AGE IN FIRST TRIMESTER: ICD-10-CM

## 2022-11-17 DIAGNOSIS — R76.8 SS-A ANTIBODY POSITIVE: ICD-10-CM

## 2022-11-17 PROCEDURE — 76811 OB US DETAILED SNGL FETUS: CPT | Mod: 26 | Performed by: OBSTETRICS & GYNECOLOGY

## 2022-11-17 PROCEDURE — 76828 ECHO EXAM OF FETAL HEART: CPT

## 2022-11-17 PROCEDURE — 76811 OB US DETAILED SNGL FETUS: CPT

## 2022-11-30 ENCOUNTER — HOSPITAL ENCOUNTER (OUTPATIENT)
Dept: CARDIOLOGY | Facility: CLINIC | Age: 37
Discharge: HOME | End: 2022-11-30
Attending: OBSTETRICS & GYNECOLOGY | Admitting: OBSTETRICS & GYNECOLOGY
Payer: COMMERCIAL

## 2022-11-30 DIAGNOSIS — O09.90 HIGH-RISK PREGNANCY, UNSPECIFIED TRIMESTER: ICD-10-CM

## 2022-11-30 DIAGNOSIS — R76.8 SS-A ANTIBODY POSITIVE: ICD-10-CM

## 2022-11-30 PROCEDURE — 76827 ECHO EXAM OF FETAL HEART: CPT | Mod: 26 | Performed by: PEDIATRICS

## 2022-11-30 PROCEDURE — 93325 DOPPLER ECHO COLOR FLOW MAPG: CPT | Mod: 26 | Performed by: PEDIATRICS

## 2022-11-30 PROCEDURE — 93325 DOPPLER ECHO COLOR FLOW MAPG: CPT

## 2022-11-30 PROCEDURE — 76825 ECHO EXAM OF FETAL HEART: CPT | Mod: 26 | Performed by: PEDIATRICS

## 2022-12-02 ENCOUNTER — VIRTUAL VISIT (OUTPATIENT)
Dept: PSYCHIATRY | Facility: CLINIC | Age: 37
End: 2022-12-02
Attending: PSYCHOLOGIST
Payer: COMMERCIAL

## 2022-12-02 DIAGNOSIS — F41.0 GENERALIZED ANXIETY DISORDER WITH PANIC ATTACKS: Primary | ICD-10-CM

## 2022-12-02 DIAGNOSIS — F41.0 PANIC ATTACK: ICD-10-CM

## 2022-12-02 DIAGNOSIS — F41.1 GENERALIZED ANXIETY DISORDER WITH PANIC ATTACKS: Primary | ICD-10-CM

## 2022-12-02 PROCEDURE — 90834 PSYTX W PT 45 MINUTES: CPT | Mod: HN

## 2022-12-09 ENCOUNTER — VIRTUAL VISIT (OUTPATIENT)
Dept: PSYCHIATRY | Facility: CLINIC | Age: 37
End: 2022-12-09
Attending: PSYCHOLOGIST
Payer: COMMERCIAL

## 2022-12-09 DIAGNOSIS — F41.0 GENERALIZED ANXIETY DISORDER WITH PANIC ATTACKS: Primary | ICD-10-CM

## 2022-12-09 DIAGNOSIS — F41.0 PANIC ATTACK: ICD-10-CM

## 2022-12-09 DIAGNOSIS — F41.1 GENERALIZED ANXIETY DISORDER WITH PANIC ATTACKS: Primary | ICD-10-CM

## 2022-12-09 PROCEDURE — 90837 PSYTX W PT 60 MINUTES: CPT | Mod: GT

## 2022-12-09 NOTE — PROGRESS NOTES
"  WOMEN'S WELL[facility]ING PROGRAM  OUTPATIENT PSYCHOTHERAPY PROGRESS NOTE    Client Name: Griselda Bell   YOB: 1985 (37 year old)   Date of Service:  Dec 9, 2022  Time of Service: 8:01 am to 9:00 am (59 minutes)  Service Type(s): 18035 psychotherapy (53-60 min. with patient and/or family)    Type of service: Telemedicine Psychotherapy  Reason for Telemedicine Visit: COVID-19 public health recommendations on in-person sessions  Mode of transmission: Secure real time interactive audio and visual telecommunication system (videoconference via Flythegap)  Location of originating and distant sites:    Originating site (patient location): patient home    Distant site (provider site): HIPAA compliant location within provider home/remote setting  Telemedicine Visit: The patient's condition can be safely assessed and treated via synchronous audio and visual telemedicine encounter.    Patient has agreed to receiving services via telemedicine technology.    Diagnoses:   Encounter Diagnoses   Name Primary?     Generalized anxiety disorder  Yes     Panic attack - partial remission        Individuals Present:   Patient and provider     Treatment goal(s) being addressed:   1. Enhance use of grounding and relaxation strategies to maintain reduction in panic symptoms, manage and reduce anxiety, and promote ability to initiate sleep  2. Utilize Exposure Response Prevention (ER/P) approach to maintain progress interrupting use of avoidance (e.g., avoiding raising heart rate for example during exercise) or compulsive coping strategies (e.g., checking heart rate on Apple watch) to manage panic  3. Process feelings of grief and loss, prioritizing sleep and self-care to support wellbeing during pregnancy in context of history of multiple pregnancy loss     Subjective:  Marisabel is currently 23 weeks pregnant. She reported ongoing \"good\" mood and effective management of intermittent worries with coping strategies. It has been a busy " "week solo-parenting while her  is out of town for a work trip/holiday party.      The patient did not report medication changes.    Treatment:   Individual psychotherapy utilizing cognitive behavioral therapy (CBT) framework with exposure response prevention (ER/P) and biopsychosocial  lens. Today, supported Marisabel in holding space for thoughts and feelings related to progressing the experience of current advancing pregnancy following history of recurrent pregnancy loss. With Marisabel, continued discuss and support Marisabel's use of cognitive restructuring, cognitive defusion, and mindfulness strategies to manage, reshape, or let go of worry thoughts. Marisabel shared parts of her experience of delivery with daughter, Yady, in context of beginning to develop cope ahead plan to support Marisabel in upcoming delivery. Continued to model and encourage curiosity and nonjudgmental stance towards all thoughts/feelings. Reflected and recognized Marisabel's proactive coping efforts, progress towards treatment goals, and resilience. Provided psychoeducation, reflective listening, validation, positive reinforcement, and encouragement throughout.    Assessment and Progress:   Appearance:  Appropriately-dressed and groomed   Behavior/relationship to examiner/demeanor:  Pleasant, warm, open, reflective   Motor activity/EPS: Within normal limits  Speech rate:  Within normal limits  Speech volume:  Within normal limits  Speech articulation:  Within normal limits  Speech coherence:  Within normal limits  Speech spontaneity:  Within normal limit  Mood (subjective report): \"good\"   Affect (objective appearance): Mood-congruent, reflective, euthymic   Thought Process (Associations):  Goal directed  Thought process (Rate):  Within normal limits  Thought content: None  Abnormal Perception: None  Insight:  Within normal limits  Judgment:  Within normal limit     Marisabel continues to make strong progress on her treatment goals to manage and " reduce anxiety, process feelings of grief and loss, and support her wellbeing in the context of her current advancing pregnancy following multiple pregnancy losses. Overall, she has had an excellent response to the combination of CBT with ER/P psychotherapy and medication (Zoloft 50 mg). She has achieved partial remission of panic symptoms, and her anxiety is currently well-managed with her proactive use of cognitive and behavioral coping strategies, social support, and self-care. We continue to use relaxation skills and an ER/P approach to manage physiological symptoms of anxiety currently experienced primarily in context of medical appointments focused on fetal monitoring and interpretation of interoceptive experiences related to physical symptoms of pregnancy (heartburn, cramps, etc.). We will continue to leverage a dialectical lens to support Marisabel in holding space for complex thoughts and feelings of anticipation, excitement, worry, relief, and grief. Marisabel does not report or exhibit any mood concerns, as indicated by her PHQ-9 score today of 0, indicating no depressive symptoms. Marisabel is an incredibly resilient individual with many strengths and sources of support. I admire her skillful self-advocacy and proactive engagement of her care team, and appreciate these providers' thoughtful and supportive care of Marisabel, including her OB, Dr. Christian MD, of [facility]; Dr. Mary MD, and M team of [facility]; Dr. Volodymyr ND, [facility], of [facility]; and psychiatric med consultation with Ganesh Santiago and MD John, of [facility] in [facility]).     Answers for HPI/ROS submitted by the patient on 12/9/2022  If you checked off any problems, how difficult have these problems made it for you to do your work, take care of things at home, or get along with other people?: Not difficult at all  PHQ9 TOTAL SCORE: 0, indicating no depressive symptoms    Plan:   Continue  to practice coping strategies, prioritize sleep, engage in self-care, and leverage social support.      As desired by Marisabel, we have discussed leveraging Pregnancy After Loss Support Group provided by [employer]:     2nd and 4th Monday at 7PM ET/ 4PM PT   The Pregnancy After Loss: Support for Parents support group provides connection for parents riding the emotional waves of pregnancy after a loss. Led by P[employer]-trained facilitators with lived experience, this group helps parents find support, as well as provides useful information and resources to help them navigate pregnancy after loss. Graciela, fear, sadness and grieving are common during this time. In this group, you will meet others who can relate and support you during your new pregnancy. We are here to help.   [place] here: [email or web address]/users/sign_in?org=[employer]%2520[employer]%2520[employer]      As needed, continue to use coping strategies within exposure exercise framework to maintain remission of panic symptoms and manage and reduce anxiety symptoms.      Continue to follow medication treatment plan (Zoloft, 50 mg) following consultation with Ganesh Santiago and MD John, of [facility] (see note dated 9/1).     Next therapy appointment has been scheduled for 12/15 at 8 am to continue work on treatment goals.      Treatment Plan review due: upcoming appointment      Joann Moore, PhD  Postdoctoral Fellow     I did not see this patient directly, but have discussed the session with the above trainee and approve this documentation.      Hailey Young Psy.D., JUSTO                                                                                        Clinical Supervisor

## 2022-12-14 ENCOUNTER — OFFICE VISIT (OUTPATIENT)
Dept: MATERNAL FETAL MEDICINE | Facility: CLINIC | Age: 37
End: 2022-12-14
Attending: OBSTETRICS & GYNECOLOGY
Payer: COMMERCIAL

## 2022-12-14 ENCOUNTER — HOSPITAL ENCOUNTER (OUTPATIENT)
Dept: ULTRASOUND IMAGING | Facility: CLINIC | Age: 37
Discharge: HOME | End: 2022-12-14
Attending: OBSTETRICS & GYNECOLOGY
Payer: COMMERCIAL

## 2022-12-14 DIAGNOSIS — O09.292 PREGNANCY WITH PRIOR ADVERSE OUTCOME, SECOND TRIMESTER: Primary | ICD-10-CM

## 2022-12-14 DIAGNOSIS — O09.90 HIGH-RISK PREGNANCY, UNSPECIFIED TRIMESTER: ICD-10-CM

## 2022-12-14 DIAGNOSIS — R76.8 SS-A ANTIBODY POSITIVE: ICD-10-CM

## 2022-12-14 PROCEDURE — 76816 OB US FOLLOW-UP PER FETUS: CPT | Mod: 26 | Performed by: OBSTETRICS & GYNECOLOGY

## 2022-12-14 PROCEDURE — 76828 ECHO EXAM OF FETAL HEART: CPT | Mod: 26 | Performed by: OBSTETRICS & GYNECOLOGY

## 2022-12-14 PROCEDURE — 76816 OB US FOLLOW-UP PER FETUS: CPT

## 2022-12-14 PROCEDURE — 76826 ECHO EXAM OF FETAL HEART: CPT | Mod: 26 | Performed by: OBSTETRICS & GYNECOLOGY

## 2022-12-14 NOTE — PROGRESS NOTES
"Please see \"Imaging\" tab under \"Chart Review\" for details of today's US.    Brianne Maki, DO    "

## 2022-12-15 ENCOUNTER — VIRTUAL VISIT (OUTPATIENT)
Dept: PSYCHIATRY | Facility: CLINIC | Age: 37
End: 2022-12-15
Attending: PSYCHOLOGIST
Payer: COMMERCIAL

## 2022-12-15 DIAGNOSIS — F41.0 PANIC ATTACK: ICD-10-CM

## 2022-12-15 DIAGNOSIS — F41.1 GENERALIZED ANXIETY DISORDER WITH PANIC ATTACKS: Primary | ICD-10-CM

## 2022-12-15 DIAGNOSIS — F41.0 GENERALIZED ANXIETY DISORDER WITH PANIC ATTACKS: Primary | ICD-10-CM

## 2022-12-15 PROCEDURE — 90837 PSYTX W PT 60 MINUTES: CPT | Mod: GT

## 2022-12-15 ASSESSMENT — PATIENT HEALTH QUESTIONNAIRE - PHQ9
10. IF YOU CHECKED OFF ANY PROBLEMS, HOW DIFFICULT HAVE THESE PROBLEMS MADE IT FOR YOU TO DO YOUR WORK, TAKE CARE OF THINGS AT HOME, OR GET ALONG WITH OTHER PEOPLE: NOT DIFFICULT AT ALL
SUM OF ALL RESPONSES TO PHQ QUESTIONS 1-9: 0
SUM OF ALL RESPONSES TO PHQ QUESTIONS 1-9: 0

## 2022-12-15 NOTE — PROGRESS NOTES
"  WOMEN'S WELLBEING PROGRAM  OUTPATIENT PSYCHOTHERAPY PROGRESS NOTE    Client Name: Griselda Bell   YOB: 1985 (37 year old)   Date of Service:  Dec 15, 2022  Time of Service: 8:00 am to 8:57 am (57 minutes)  Service Type(s): 08739 psychotherapy (53-60 min. with patient and/or family)    Type of service: Telemedicine Psychotherapy   Reason for Telemedicine Visit: COVID-19 public health recommendations on in-person sessions  Mode of transmission: Secure real time interactive audio and visual telecommunication system (videoconference via Shmoop)  Location of originating and distant sites:    Originating site (patient location): patient home    Distant site (provider site): HIPAA compliant location within provider home/remote setting  Telemedicine Visit: The patient's condition can be safely assessed and treated via synchronous audio and visual telemedicine encounter.    Patient has agreed to receiving services via telemedicine technology.    Diagnoses:   Encounter Diagnoses   Name Primary?     Generalized anxiety disorder  Yes     Panic attack - partial remission      Individuals Present:   Patient and provider     Treatment goal(s) being addressed:   1. Enhance use of grounding and relaxation strategies to maintain reduction in panic symptoms, manage and reduce anxiety, and promote ability to initiate sleep  2. Utilize Exposure Response Prevention (ER/P) approach to maintain progress interrupting use of avoidance (e.g., avoiding raising heart rate for example during exercise) or compulsive coping strategies (e.g., checking heart rate on Apple watch) to manage panic  3. Process feelings of grief and loss, prioritizing sleep and self-care to support wellbeing during pregnancy in context of history of multiple pregnancy loss     Subjective:  Marisabel is currently 24 weeks pregnant. She reported ongoing \"good\" mood and effective management of infrequent worries or intermittent panic symptoms (racing heart) " "with coping strategies. She is looking forward to the holidays.       The patient did not report medication changes.    Treatment:   Individual psychotherapy utilizing cognitive behavioral therapy (CBT) framework with exposure response prevention (ER/P) and biopsychosocial  lens. Today, continued to support Marisabel in holding space for thoughts and feelings related to experience of current advancing pregnancy following history of recurrent pregnancy loss. Ongoing discussion and support of Marisabel's use of cognitive restructuring, cognitive defusion, and mindfulness strategies to manage, reshape, or let go of worry thoughts. Together we continued to brainstorm and discuss needs for delivery and transition to postpartum period, and refine cope ahead plan to support Marisabel's adjustment and wellbeing. Emphasis on strategies to support and prioritize sleep, including psychoeducation on benefit of sleep prescription dose of 4-5 consecutive nocturnal hours of sleep a night, and discussion of strategies to support this \"prescription.\" Together we renewed BEH treatment plan, reflecting on Marisabel's work to support strong progress towards treatment goals. Continued to model and encourage curiosity and nonjudgmental stance towards all thoughts/feelings. Reflected and recognized Marisabel's proactive coping efforts, progress towards treatment goals, and resilience. Provided psychoeducation, reflective listening, validation, positive reinforcement, and encouragement throughout.     Assessment and Progress:   Appearance:  Appropriately-dressed and groomed   Behavior/relationship to examiner/demeanor:  Pleasant, warm, open, reflective   Motor activity/EPS: Within normal limits  Speech rate:  Within normal limits  Speech volume:  Within normal limits  Speech articulation:  Within normal limits  Speech coherence:  Within normal limits  Speech spontaneity:  Within normal limit  Mood (subjective report): \"good\"   Affect (objective " appearance): Mood-congruent, euthymic, reflective   Thought Process (Associations):  Goal directed  Thought process (Rate):  Within normal limits  Thought content: None  Abnormal Perception: None  Insight:  Within normal limits  Judgment:  Within normal limit     Marisabel continues to make strong progress on her treatment goals to manage and reduce anxiety, process feelings of grief and loss, and support her wellbeing in the context of her current advancing pregnancy following multiple pregnancy losses. She has had an excellent response to the combination of CBT with ER/P psychotherapy and medication (Zoloft 50 mg). She has achieved partial remission of panic symptoms, and her anxiety is currently well-managed with her proactive use of cognitive and behavioral coping strategies, social support, and self-care, as indicated by her COLLINS-7 total score today of 1, indicating minimal anxiety symptoms. We continue to use relaxation skills and an ER/P approach to manage infrequent physiological symptoms of anxiety. Marisabel has a history of intrusive thoughts/images with onset in previous postpartum period, and as she approaches term we continue to monitor for any concerns in this area. We will continue to leverage a dialectical lens to support Marisabel in holding space for complex thoughts and feelings of anticipation, excitement, worry, relief, and grief. Marisabel does not report or exhibit any mood concerns, as indicated by her PHQ-9 total score today of 0, indicating no depressive symptoms. Marisabel is an incredibly resilient individual with many areas of personal strength and sources of support. I admire her skillful self-advocacy and proactive engagement of her care team, and appreciate these providers' thoughtful and supportive care of Marisabel, including her OB, Dr. Christian MD, of [facility] [place]; Dr. Mary MD, and Dr. Shanthi MD, and M team of [facility]; Dr. Volodymyr ND, [place], of [facility] Psychiatry; and psychiatric med  consultation with Ganesh Santiago and MD John, of [facility] in [facility]).     Answers for HPI/ROS submitted by the patient on 12/15/2022  If you checked off any problems, how difficult have these problems made it for you to do your work, take care of things at home, or get along with other people?: Not difficult at all  PHQ9 TOTAL SCORE: 0, indicating no/minimal depressive symptoms  GAD7 TOTAL SCORE: 1, indicating minimal anxiety symptoms    Plan:   Continue to practice coping strategies, prioritize sleep, engage in self-care, and leverage social support.      As needed, continue to use coping strategies within exposure exercise framework to maintain remission of panic symptoms and manage and reduce anxiety symptoms.      Continue to follow medication treatment plan (Zoloft, 50 mg) following consultation with Ganesh Santiago and MD John, of [facility] (see note dated 9/1).    As desired by Marisabel, we have discussed leveraging Pregnancy After Loss Support Group provided by [employer]:     2nd and 4th Monday at 7PM ET/ 4PM PT   The Pregnancy After Loss: Support for Parents support group provides connection for parents riding the emotional waves of pregnancy after a loss. Led by PSI-trained facilitators with lived experience, this group helps parents find support, as well as provides useful information and resources to help them navigate pregnancy after loss. Graciela, fear, sadness and grieving are common during this time. In this group, you will meet others who can relate and support you during your new pregnancy. We are here to help.   [place] here: https://[email or web address]/users/sign_in?org=[employer]%2520[employer]%2520[employer]      Next therapy appointment has been scheduled for 1/5/22 at 8 a.m. to continue work on treatment goals.      Treatment Plan review due: 3/15/23      Joann Moore, PhD  Postdoctoral Fellow       I did not see this  patient directly, but have discussed the session with the above trainee and approve this documentation.      Hailey Young Psy.D., LP                                                                                        Clinical Supervisor

## 2022-12-19 ASSESSMENT — PATIENT HEALTH QUESTIONNAIRE - PHQ9: 5. POOR APPETITE OR OVEREATING: NOT AT ALL

## 2022-12-19 ASSESSMENT — ANXIETY QUESTIONNAIRES
7. FEELING AFRAID AS IF SOMETHING AWFUL MIGHT HAPPEN: NOT AT ALL
GAD7 TOTAL SCORE: 1
1. FEELING NERVOUS, ANXIOUS, OR ON EDGE: SEVERAL DAYS
5. BEING SO RESTLESS THAT IT IS HARD TO SIT STILL: NOT AT ALL
2. NOT BEING ABLE TO STOP OR CONTROL WORRYING: NOT AT ALL
6. BECOMING EASILY ANNOYED OR IRRITABLE: NOT AT ALL
GAD7 TOTAL SCORE: 1
3. WORRYING TOO MUCH ABOUT DIFFERENT THINGS: NOT AT ALL
IF YOU CHECKED OFF ANY PROBLEMS ON THIS QUESTIONNAIRE, HOW DIFFICULT HAVE THESE PROBLEMS MADE IT FOR YOU TO DO YOUR WORK, TAKE CARE OF THINGS AT HOME, OR GET ALONG WITH OTHER PEOPLE: NOT DIFFICULT AT ALL

## 2022-12-28 ENCOUNTER — HOSPITAL ENCOUNTER (OUTPATIENT)
Dept: CARDIOLOGY | Facility: CLINIC | Age: 37
Discharge: HOME | End: 2022-12-28
Attending: OBSTETRICS & GYNECOLOGY | Admitting: OBSTETRICS & GYNECOLOGY
Payer: COMMERCIAL

## 2022-12-28 DIAGNOSIS — O09.90 HIGH-RISK PREGNANCY, UNSPECIFIED TRIMESTER: ICD-10-CM

## 2022-12-28 DIAGNOSIS — R76.8 SS-A ANTIBODY POSITIVE: ICD-10-CM

## 2022-12-28 PROCEDURE — 93325 DOPPLER ECHO COLOR FLOW MAPG: CPT

## 2022-12-28 PROCEDURE — 76825 ECHO EXAM OF FETAL HEART: CPT | Mod: 26 | Performed by: PEDIATRICS

## 2022-12-28 PROCEDURE — 76827 ECHO EXAM OF FETAL HEART: CPT | Mod: 26 | Performed by: PEDIATRICS

## 2022-12-28 PROCEDURE — 93325 DOPPLER ECHO COLOR FLOW MAPG: CPT | Mod: 26 | Performed by: PEDIATRICS

## 2023-01-05 ENCOUNTER — VIRTUAL VISIT (OUTPATIENT)
Dept: PSYCHIATRY | Facility: CLINIC | Age: 38
End: 2023-01-05
Attending: PSYCHOLOGIST
Payer: COMMERCIAL

## 2023-01-05 DIAGNOSIS — F41.1 GENERALIZED ANXIETY DISORDER WITH PANIC ATTACKS: Primary | ICD-10-CM

## 2023-01-05 DIAGNOSIS — F41.0 PANIC ATTACK: ICD-10-CM

## 2023-01-05 DIAGNOSIS — F41.0 GENERALIZED ANXIETY DISORDER WITH PANIC ATTACKS: Primary | ICD-10-CM

## 2023-01-05 PROCEDURE — 90837 PSYTX W PT 60 MINUTES: CPT | Mod: HN

## 2023-01-05 NOTE — PROGRESS NOTES
"[facility]  OUTPATIENT PSYCHOTHERAPY PROGRESS NOTE    Client Name: Griselda Bell   YOB: 1985 (37 year old)   Date of Service:  Jan 5, 2023  Time of Service: 8:06 am to 9:03 am (57 minutes)  Service Type(s): 60024 psychotherapy (53-60 min. with patient and/or family)    Type of service: Telemedicine Psychotherapy   Reason for Telemedicine Visit: COVID-19 public health recommendations on in-person sessions  Mode of transmission: Secure real time interactive audio and visual telecommunication system (videoconference via [employer])  Location of originating and distant sites:    Originating site (patient location): patient home    Distant site (provider site): [facility], [facility]   Telemedicine Visit: The patient's condition can be safely assessed and treated via synchronous audio and visual telemedicine encounter.    Patient has agreed to receiving services via telemedicine technology.    Diagnoses:   Encounter Diagnoses   Name Primary?     Generalized anxiety disorder  Yes     Panic attack - partial remission        Individuals Present:   Patient and provider     Treatment goal(s) being addressed:   1. Enhance use of grounding and relaxation strategies to maintain reduction in panic symptoms, manage and reduce anxiety, and promote ability to initiate sleep  2. Utilize Exposure Response Prevention (ER/P) approach to maintain progress interrupting use of avoidance (e.g., avoiding raising heart rate for example during exercise) or compulsive coping strategies (e.g., checking heart rate on Apple watch) to manage panic  3. Process feelings of grief and loss, prioritizing sleep and self-care to support wellbeing during pregnancy in context of history of multiple pregnancy loss     Subjective:  Marisabel is currently 27 weeks pregnant. She reported ongoing management of infrequent worries and panic symptoms, and \"excited\" mood. The " "holidays were \"calm and peaceful.\" Marisabel will wrap up appointments with Maternal Fetal Medicine next week, and transition to being closely [place] [facility] [place]. Currently, planned  section for 4/3/23. She is beginning to make plans at work to support upcoming transition to parental leave.       The patient did not report medication changes.    Treatment:   Individual psychotherapy utilizing cognitive behavioral therapy (CBT) framework with exposure response prevention (ER/P) and biopsychosocial  lens. Today we continued to brainstorm and discuss Marisabel's needs related to upcoming delivery and transition to postpartum period, identify resources and sources of support, and develop a cope ahead plan to support her adjustment and wellbeing. As part of cope ahead planning, discussed option to schedule psychiatric medication consultation prior to delivery to support any adjustments in postpartum period if needed/desired; Marisabel was open and receptive to this recommendation, and plans to discuss option for OB, Dr. Gonzales to adjust medication independently or in consultation with [facility] team (Ganesh Santiago and MD John). Discussed psychoeducation on contribution of sleep deficit to  mood and anxiety disorders (PMADs) and sleep as protective factor; ongoing support of strategies to help prioritize and initiate sleep. Provided psychoeducation and developmentally-informed strategies and resources to support older daughter's adjustment to new sibling. Continued to discuss and support Marisabel's use of cognitive restructuring, cognitive defusion, and mindfulness strategies to manage, reshape, or let go of worry thoughts, manage physiological symptoms of panic, and manage/dismiss intrusive thoughts/images (currently occurring ~1x/week). Continued to model and encourage curiosity and nonjudgmental stance towards all thoughts/feelings. Reflected and recognized " "Marisabel's proactive coping efforts, progress towards treatment goals, and resilience. Provided psychoeducation, reflective listening, validation, positive reinforcement, and encouragement throughout.     Assessment and Progress:   Appearance:  Appropriately-dressed and groomed   Behavior/relationship to examiner/demeanor:  Pleasant, warm, open, reflective   Motor activity/EPS: Within normal limits  Speech rate:  Within normal limits  Speech volume:  Within normal limits  Speech articulation:  Within normal limits  Speech coherence:  Within normal limits  Speech spontaneity:  Within normal limit  Mood (subjective report): \"good, feeling excited\"   Affect (objective appearance): Mood-congruent, euthymic, reflective   Thought Process (Associations):  Goal directed  Thought process (Rate):  Within normal limits  Thought content: None  Abnormal Perception: None  Insight:  Within normal limits  Judgment:  Within normal limit     Marisabel continues to meet her treatment goals to maintain reduction of symptoms of anxiety and panic, and process feelings of grief and loss in the context of her current advancing pregnancy following multiple pregnancy losses. She has had an excellent response to the combination of CBT with ER/P psychotherapy and medication (Zoloft 50 mg). She has achieved partial remission of panic symptoms, and her anxiety is currently well-managed with her proactive use of cognitive and behavioral coping strategies, social support, and self-care. We continue to use relaxation skills, cognitive strategies, and an ER/P approach to manage infrequent physiological symptoms of panic. Marisabel has a history of intrusive thoughts/images with onset in previous postpartum period. Currently these intrusive images are experienced ~1x/week; as she approaches term we continue to monitor for worsening concerns in this area. Marisabel does not report or exhibit any mood concerns; no history of postpartum mood concerns. She is an " incredibly resilient individual with many areas of personal strength and sources of support. I admire her skillful self-advocacy and proactive engagement of her care team, and appreciate these providers' thoughtful and supportive care of Marisabel, including her OB, Dr. Christian MD, of [facility] [place]; Dr. Mary MD, and Dr. Shanthi MD, and M team of [facility]; Dr. Volodymyr ND, [facility], of [facility]; and psychiatric med consultation with Ganesh Santiago and MD John, of [facility] in [facility]).     Plan:   Continue to practice coping strategies, prioritize sleep, engage in self-care, and leverage social support.      As needed, continue to use coping strategies within exposure exercise framework to maintain remission of panic symptoms and manage and reduce anxiety symptoms.      Continue to follow medication treatment plan (Zoloft, 50 mg) following consultation with Ganesh Santiago and MD John, of [facility] (see note dated 9/1).     Today we discussed developmentally-informed strategies and resources to support adjustment to new sibling, including the following books:     You Were the First, by Adriana Fairchild's Chair by Patel Krishnamurthy    As desired by Marisabel, we have discussed leveraging Pregnancy After Loss Support Group provided by [employer]:     2nd and 4th Monday at 7PM ET/ 4PM PT   The Pregnancy After Loss: Support for Parents support group provides connection for parents riding the emotional waves of pregnancy after a loss. Led by [employer]-trained facilitators with lived experience, this group helps parents find support, as well as provides useful information and resources to help them navigate pregnancy after loss. Graciela, fear, sadness and grieving are common during this time. In this group, you will meet others who can relate and support you during your new pregnancy. We are here to help.   [place]  here: [email or web address]/[email or web address]20[employer]30[employer]      Next therapy appointment has been scheduled for 1/12/23 to continue work on treatment goals.      Treatment Plan review due: 3/15/23    Joann Moore, PhD  Postdoctoral Fellow     I did not see this patient directly, but have discussed the session with the above trainee and approve this documentation.      Hailey Young Psy.D.,                                                                                         Clinical Supervisor

## 2023-01-09 ENCOUNTER — TRANSFERRED RECORDS (OUTPATIENT)
Dept: HEALTH INFORMATION MANAGEMENT | Facility: CLINIC | Age: 38
End: 2023-01-09

## 2023-01-11 ENCOUNTER — HOSPITAL ENCOUNTER (OUTPATIENT)
Dept: ULTRASOUND IMAGING | Facility: CLINIC | Age: 38
Discharge: HOME | End: 2023-01-11
Attending: OBSTETRICS & GYNECOLOGY
Payer: COMMERCIAL

## 2023-01-11 ENCOUNTER — OFFICE VISIT (OUTPATIENT)
Dept: MATERNAL FETAL MEDICINE | Facility: CLINIC | Age: 38
End: 2023-01-11
Attending: OBSTETRICS & GYNECOLOGY
Payer: COMMERCIAL

## 2023-01-11 DIAGNOSIS — O09.90 HIGH-RISK PREGNANCY, UNSPECIFIED TRIMESTER: ICD-10-CM

## 2023-01-11 DIAGNOSIS — R76.8 SS-A ANTIBODY POSITIVE: ICD-10-CM

## 2023-01-11 DIAGNOSIS — O09.90 HIGH-RISK PREGNANCY, UNSPECIFIED TRIMESTER: Primary | ICD-10-CM

## 2023-01-11 PROCEDURE — 76828 ECHO EXAM OF FETAL HEART: CPT

## 2023-01-11 PROCEDURE — 76816 OB US FOLLOW-UP PER FETUS: CPT | Mod: 26 | Performed by: OBSTETRICS & GYNECOLOGY

## 2023-01-11 NOTE — PROGRESS NOTES
Griselda Bell is a  at 28w0d who presents to [facility] for +SSA. Pt reports positive fetal movement. Pt denies bldg/lof/change in discharge. Dr. Jacobs met with pt and discussed plan of care.   Kymberly Lopez RN

## 2023-01-11 NOTE — PROGRESS NOTES
"Please see \"Imaging\" tab under \"Chart Review\" for details of today's US at the [facility].    Trace Jacobs MD  Maternal-Fetal Medicine      " CARDIAC OBSERVATION UNIT (COU) DISCHARGE SUMMARY  Patient ID:  Can Davenport  [id number]  66 year old  1953    Admit Date: 2/19/2020    Discharge Date and Time:  2/20/2020 12:33 PM     Admitting Physician:  Raulito Kunz MD     Discharge Physician:  Raulito Kunz MD    Admission Diagnoses:  Chest pain [R07.9]  Chest pain, unspecified type [R07.9]  Chest pain [R07.9]    Discharge Diagnoses: Noncardiac chest pain    Admission Condition:  good    Discharged Condition:  stable    Indication for Admission:  Chest pain evaluation     Hospital Course:  Pt was admitted to observation bed with telemetry monitoring in the COU for evaluation of chest, underarm and upper back pain, in addition to gas and belching. Troponin I x 3 negative.  CXR with no acute disease. Nuclear stress test negative for significant ischemia.  EKG Normal Sinus Rhythm.  Telemetry without alerts, VSS.     Stress Test 2/20/2020  IMPRESSION:     1.  No evidence of significant ischemia during exercise     2.  Normal post-exercise left ventricular systolic function.     3. Cardiac Risk Assessment: Low.      4. No previous study for comparison.      Telemetry: Normal sinus rhythm      Discharge Exam:  Visit Vitals  /69 (BP Location: LUE - Left upper extremity, Patient Position: Supine)   Pulse 60   Temp 97.7 °F (36.5 °C) (Oral)   Resp 16   Ht 5' 8\" (1.727 m)   Wt 89.4 kg   SpO2 95%   BMI 29.97 kg/m²      See H&P dated today.      Recent Labs   Lab 02/20/20  0119 02/19/20  2207 02/19/20  1639   WBC  --   --  9.7   HCT  --   --  45.4   HGB  --   --  15.4   PLT  --   --  220   SODIUM  --   --  139   POTASSIUM  --   --  4.0   CHLORIDE  --   --  108*   CO2  --   --  25   GLUCOSE  --   --  98   BUN  --   --  15   CREATININE  --   --  0.80   RAPDTR <0.02 <0.02  --          Disposition:  Home    Patient Instructions:   Activity:  activity as tolerated  Diet:  Increase fruit and vegetable intake.  Avoid processed food and drinks.  Dietary/lifestyle  counseling completed.    Purchase over the counter Maalox to try for heartburn     Outpatient follow up with Dr. Mascorro in 2-3 weeks for hospital follow up.  Outpatient follow up with PCP recommended in 1-2 weeks.  Outpatient follow up with LATESHA Pena / Dr. Nguyen (GI) as soon as possible          Summary of your Discharge Medications      Take these Medications      Details   acetaminophen 500 MG tablet  Commonly known as:  TYLENOL   Take 500 mg by mouth every 4 hours as needed for Fever or Pain.     albuterol 108 (90 Base) MCG/ACT inhaler  Commonly known as:  PROAIR HFA   Inhale 2 puffs into the lungs every 4 hours as needed for Shortness of Breath or Wheezing.     atorvastatin 80 MG tablet  Commonly known as:  LIPITOR   TAKE 1 TABLET BY MOUTH EVERY NIGHT AT BEDTIME     fluticasone 50 MCG/ACT nasal spray  Commonly known as:  FLONASE   2 sprays in each nostril once daily.     metoPROLOL tartrate 25 MG tablet  Commonly known as:  LOPRESSOR   Take 0.5 tablets by mouth every 12 hours.     NON FORMULARY   Apply 1 drop to eye daily as needed (BID to Rt. eye;). Silophtho drops;Keep false right Eye moist;     omeprazole 40 MG capsule  Commonly known as:  PriLOSEC   TAKE 1 CAPSULE BY MOUTH TWICE DAILY     ROBITUSSIN COUGH+CHEST MANOLO DM 5-100 MG/5ML Liquid   Generic drug:  Dextromethorphan-guaiFENesin  Take by mouth as needed.

## 2023-01-12 ENCOUNTER — VIRTUAL VISIT (OUTPATIENT)
Dept: PSYCHIATRY | Facility: CLINIC | Age: 38
End: 2023-01-12
Attending: PSYCHOLOGIST
Payer: COMMERCIAL

## 2023-01-12 DIAGNOSIS — F41.0 PANIC ATTACK: ICD-10-CM

## 2023-01-12 DIAGNOSIS — F41.0 GENERALIZED ANXIETY DISORDER WITH PANIC ATTACKS: Primary | ICD-10-CM

## 2023-01-12 DIAGNOSIS — F41.1 GENERALIZED ANXIETY DISORDER WITH PANIC ATTACKS: Primary | ICD-10-CM

## 2023-01-12 PROCEDURE — 90834 PSYTX W PT 45 MINUTES: CPT | Mod: HN

## 2023-01-12 NOTE — PROGRESS NOTES
"[facility]  OUTPATIENT PSYCHOTHERAPY PROGRESS NOTE    Client Name: Griselda Bell   YOB: 1985 (37 year old)   Date of Service:  Jan 12, 2023  Time of Service: 8:09 am to 9:00 am (51 minutes)  Service Type(s): 02394 psychotherapy (38-52 min. with patient and/or family)    Type of service: Telemedicine Psychotherapy  Reason for Telemedicine Visit: COVID-19 public health recommendations on in-person sessions  Mode of transmission: Secure real time interactive audio and visual telecommunication system (videoconference via [employer] System)  Location of originating and distant sites:    Originating site (patient location): patient home    Distant site (provider site): HIPAA compliant location within provider home/remote setting  Telemedicine Visit: The patient's condition can be safely assessed and treated via synchronous audio and visual telemedicine encounter.    Patient has agreed to receiving services via telemedicine technology.    Diagnoses:   Encounter Diagnoses   Name Primary?     Generalized anxiety disorder  Yes     Panic attack - partial remission        Individuals Present:   Patient and provider     Treatment goal(s) being addressed:   1. Enhance use of grounding and relaxation strategies to maintain reduction in panic symptoms, manage and reduce anxiety, and promote ability to initiate sleep  2. Utilize Exposure Response Prevention (ER/P) approach to maintain progress interrupting use of avoidance (e.g., avoiding raising heart rate for example during exercise) or compulsive coping strategies (e.g., checking heart rate on Apple watch) to manage panic  3. Process feelings of grief and loss, prioritizing sleep and self-care to support wellbeing during pregnancy in context of history of multiple pregnancy loss     Subjective:  Marisabel is currently 28 weeks pregnant. She \"graduated\" from being monitored by Maternal Fetal Medicine as period of enhanced risk for congenital defects has closed; " Progress Note (short form)





- Note


Progress Note: 


 Neurology 





- Admission


Chief Complaint: Chills, Weakness


History of Present Illness: 


This is a 81 y/o man from home with a PMhx of HTN, HLD, s/p Prosthetic Aortic 

Valve (Bovine) who presented to the ED with his family for ataxia, chills, 

rigors, and weakness. Patient reported feeling well earlier in the day of 

admission but the patient's wife reported they were at the grandkid's house 

earlier where the patient had difficulty getting out of the car and was walking 

like "he lost all his energy." the patient's wife reports she later found the 

patient under a blanket and was shaking. The patient reported he had a rough 

day and felt "terrible." Denied any pain. The patient reports associated 

symptoms of shortness of breath. They called Dr. Samuel (cardiologist), who 

told them to follow up at the ER. Denies worsening shortness of breath, chest 

pain, abdominal pain, nausea, vomiting. Denies cough or congestion. The patient 

has been in the hospital for further medical evaluation and management and I 

was consulted 11/11 for evaluation and management of altered mental status. I 

had an extensive conversation with the hospitalist nurse practitioner, Gricel

, who provided background information regarding the patient who has been 

demonstrating confusion with spiking fevers to 102. Work up for underlying 

infection has been otherwise negative thus far with fever of unknown origin.  

Lumbar puncture was been completed and I reviewed CSF results which 

demonstrated 2Wbc and increased protein, normal glucose. MRI of the brain was 

completed  and patient seen at bedside with nurse practitioner  with whom I 

discussed the results and did not show any acute structural abnormalities.  

There is practitioner noted the patient was having hypoxia and change CPAP to 

BiPAP overnight and patient this morning significantly improved in mental 

status.  He is more awake and alert than  he had been during my previous 

encounters with him and does know that he is in the hospital along with month 

and year.  Reviewed notes and family requested second opinion for infectious 

disease specialist and Dr. Hall/Aubrey monet evaluate further.





Active Medications





Acetaminophen (Ofirmev Injection -)  750 mg IVPB Q12H PRN


   PRN Reason: FEVER


   Last Admin: 11/13/19 09:18 Dose:  750 mg


Amlodipine Besylate (Norvasc -)  5 mg PO DAILY [facility]


   Last Admin: 11/13/19 09:18 Dose:  5 mg


Cyanocobalamin (Vitamin B12 -)  1,000 mcg PO DAILY [facility]


   Last Admin: 11/13/19 09:18 Dose:  1,000 mcg


Furosemide (Lasix -)  40 mg PO BID@0600,1400 [facility]


   Last Admin: 11/13/19 05:52 Dose:  40 mg


Potassium Chloride (Potassium Chloride 10 Meq Premix Ivpb -)  10 meq in 100 mls 

@ 100 mls/hr IVPB Q60M [facility]


   Stop: 11/13/19 10:59


   Last Admin: 11/13/19 08:04 Dose:  100 mls/hr


Lactulose (Cephulac (Oral Use))  20 gm PO DAILY [facility]


   Last Admin: 11/13/19 09:17 Dose:  20 gm


Lidocaine (Lidoderm Patch -)  1 patch TP DAILY [facility]


   Last Admin: 11/13/19 09:17 Dose:  1 patch


Melatonin (Melatonin)  3 mg PO HS [facility]


   Last Admin: 11/12/19 22:40 Dose:  Not Given


Methyl Salicylate (Cm-Jarrell -)  1 applic TP BID [facility]


   Last Admin: 11/13/19 09:19 Dose:  1 applic


Metoprolol Succinate (Toprol Xl -)  25 mg PO DAILY [facility]


   Last Admin: 11/13/19 09:17 Dose:  25 mg


Miscellaneous (Lidoderm Patch Removal)  1 each MC DAILY@2200 [facility]


   Last Admin: 11/12/19 21:51 Dose:  1 each


Ondansetron HCl (Zofran Injection)  4 mg IVPUSH Q6H PRN


   PRN Reason: NAUSEA AND/OR VOMITING


   Last Admin: 11/10/19 09:50 Dose:  4 mg


Pantoprazole Sodium (Protonix -)  40 mg PO BID [facility]


   Last Admin: 11/13/19 09:17 Dose:  40 mg


Rosuvastatin Calcium (Crestor -)  5 mg PO HS [facility]


   Last Admin: 11/12/19 21:50 Dose:  5 mg


Valsartan (Diovan -)  160 mg PO DAILY [facility]


   Last Admin: 11/13/19 09:18 Dose:  160 mg





Physical Examination


Vital Signs: 


  


 Vital Signs











 Period  Temp  Pulse  Resp  BP Sys/Rosales  Pulse Ox


 


 Last 24 Hr  97.6 F-102.0 F  65-80  18-20  137-174/53-90  96-98











Constitutional: Yes: Well Nourished, No Distress, Calm


Eyes: Yes: WNL, Conjunctiva Clear, EOM Intact, PERRL


HENT: Yes: WNL, Atraumatic, Normocephalic


Neck: Yes: WNL, Supple, Trachea Midline


Cardiovascular: Yes: Other (click)


Respiratory: Yes: WNL, Regular, CTA Bilaterally


Gastrointestinal: Yes: WNL, Normal Bowel Sounds, Soft, Abdomen, Obese


...Rectal Exam: Yes: WNL


Renal/: Yes: WNL


Breast(s): Yes: WNL


Musculoskeletal: Yes: Muscle Weakness


Extremities: Yes: WNL


Edema: No


Peripheral Pulses WNL: Yes


Neurological: somnolent but arousable,  moves extremities grossly, able to tell 

me name of hospital as well as year but not name of the president, sensory 

intact to tactile stimulation


 


 CBCD











WBC  7.1 K/mm3 (4.0-10.0)   11/13/19  06:05    


 


RBC  4.48 M/mm3 (4.00-5.60)   11/13/19  06:05    


 


Hgb  13.5 GM/dL (11.7-16.9)   11/13/19  06:05    


 


Hct  38.8 % (35.4-49)   11/13/19  06:05    


 


MCV  86.7 fl (80-96)   11/13/19  06:05    


 


MCHC  34.8 g/dl (32.0-35.9)   11/13/19  06:05    


 


RDW  14.1 % (11.9-15.9)   11/13/19  06:05    


 


Plt Count  234 K/MM3 (134-434)  D 11/13/19  06:05    


 


MPV  8.0 fl (7.5-11.1)   11/13/19  06:05    








 CMP











Sodium  134 mmol/L (136-145)  L  11/13/19  06:05    


 


Potassium  2.9 mmol/L (3.5-5.1)  L*  11/13/19  06:05    


 


Chloride  96 mmol/L ()  L  11/13/19  06:05    


 


Carbon Dioxide  32 mmol/L (21-32)   11/13/19  06:05    


 


Anion Gap  5 MMOL/L (8-16)  L  11/13/19  06:05    


 


BUN  21.3 mg/dL (7-18)  H  11/13/19  06:05    


 


Creatinine  1.1 mg/dL (0.55-1.3)   11/13/19  06:05    


 


Glucose  146 mg/dL (65-99)  H  11/09/19  06:00    


 


Random Glucose  100 mg/dL ()   11/13/19  06:05    


 


Calcium  9.1 mg/dL (8.5-10.1)   11/13/19  06:05    


 


Total Bilirubin  2.8 mg/dL (0.2-1)  H  11/13/19  06:05    


 


AST  26 U/L (15-37)   11/13/19  06:05    


 


ALT  57 U/L (13-61)   11/13/19  06:05    


 


Alkaline Phosphatase  375 U/L ()  H  11/13/19  06:05    


 


Total Protein  6.8 g/dl (6.4-8.2)   11/13/19  06:05    


 


Albumin  3.0 g/dl (3.4-5.0)  L  11/13/19  06:05    








 CARDIAC ENZYMES











Creatine Kinase  278 U/L ()   11/02/19  05:51    


 


Troponin I  0.05 ng/ml (0.00-0.05)   11/01/19  13:30    











Plan:


81 y/o man from home with a PMhx of HTN, HLD, s/p Prosthetic Aortic Valve (

Bovine) who presented to the ED with his family for ataxia, chills, rigors, and 

weakness. Patient reported feeling well earlier in the day of admission but the 

patient's wife reported they were at the grandkid's house earlier where the 

patient had difficulty getting out of the car and was walking like "he lost all 

his energy." the patient's wife reports she later found the patient under a 

blanket and was shaking. The patient reported he had a rough day and felt 

"terrible." Denied any pain. The patient reports associated symptoms of 

shortness of breath. They called Dr. Samuel (cardiologist), who told them to 

follow up at the ER. Denies worsening shortness of breath, chest pain, 

abdominal pain, nausea, vomiting. Denies cough or congestion. The patient has 

been in the hospital for further medical evaluation and management and I was 

consulted 11/11 for evaluation and management of altered mental status. I had 

an extensive conversation with the hospitalist nurse practitioner, Gricel, 

who provided background information regarding the patient who has been 

demonstrating confusion with spiking fevers to 102.  He was again somnolent 

her my encounter but arousable. CT head has been completed and advised to have 

MRI of the brain for further evaluation. Work up for underlying infection has 

been otherwise negative thus far.  Therefore, fever of unknown origin.  Lumbar 

puncture was been completed and I reviewed CSF results which demonstrated 2Wbc 

and increased protein, normal glucose. MRI of the brain was completed  and 

patient seen at bedside with nurse practitioner  with whom I discussed the 

results and did not show any acute structural abnormalities.  There is 

practitioner noted the patient was having hypoxia and change CPAP to BiPAP 

overnight and patient this morning significantly improved in mental status.  He 

is more awake and alert than  he had been during my previous encounters with 

him and does know that he is in the hospital along with month and year.  

Reviewed notes and family requested second opinion for infectious disease 

specialist and Dr. Hall/Aubrey monet evaluate further..  Maintain adequate 

hydration, monitor blood pressure  and maintain normotensive range. Mental 

status has improved, ppossibly due to treatment of underlying respiratory 

disturbance.  However, fevers remain and infectious disease specialist to  

further evaluate. "going forward she will be closely followed by her OB, Dr. Gonzales. Marisabel reported she is \"feeling pretty good\" in terms of her mood, and she feels she is managing infrequent worries effectively. No intrusive thoughts over the past week; she feels they responded well to cognitive defusion strategies. Currently, planned  section for 4/3/23. She is continuing to make plans at work to support transition to parental leave.       The patient did not report medication changes.    Treatment:   Individual psychotherapy utilizing cognitive behavioral therapy (CBT) framework with exposure response prevention (ER/P) and biopsychosocial  lens. Today, supported Marisabel in holding complex thoughts and feelings related to upcoming delivery following multiple losses. Provided validation and normalization of feelings of grief and loss, as well as feelings of excitement. Reviewed and supported Marisabel's ongoing use of coping strategies to manage intermittent anxiety concerns; special focus on cognitive restructuring strategies to identify, challenge, and reshape catastrophic thoughts. Discussed response of intrusive thoughts/images to cognitive defusion strategies. Continued to brainstorm and discuss Marisabel's needs related to upcoming delivery and transition to postpartum period, identify resources and sources of support, and develop a cope ahead plan to support her adjustment and wellbeing. Marisabel shared she plans to discuss option to increase Zoloft prescription to 75 mg with her OB, Dr. Gonzales, at upcoming appointment, to support transition to postpartum period. Continued to discuss/review psychoeducation on contribution of sleep deficit to  mood and anxiety disorders (PMADs) and sleep as protective factor; ongoing support of strategies to help prioritize and initiate sleep. Continued to reflect and recognize Marisabel's proactive coping efforts, progress towards treatment goals, and resilience. Provided " "psychoeducation, reflective listening, validation, positive reinforcement, and encouragement throughout.     Assessment and Progress:   Appearance:  Appropriately-dressed and groomed   Behavior/relationship to examiner/demeanor:  Pleasant, warm, open, reflective   Motor activity/EPS: Within normal limits  Speech rate:  Within normal limits  Speech volume:  Within normal limits  Speech articulation:  Within normal limits  Speech coherence:  Within normal limits  Speech spontaneity:  Within normal limit  Mood (subjective report): \"feeling pretty good\"   Affect (objective appearance): Mood-congruent, euthymic, reflective   Thought Process (Associations):  Goal directed  Thought process (Rate):  Within normal limits  Thought content: None  Abnormal Perception: None  Insight:  Within normal limits  Judgment:  Within normal limit     Marisabel continues to benefit from psychotherapy. She is making strong progress towards her treatment goals to maintain reduction of symptoms of anxiety and panic, and process feelings of grief and loss in the context of her upcoming delivery following multiple pregnancy losses. She has had an excellent response to the combination of CBT with ER/P psychotherapy and medication (Zoloft 50 mg). She has achieved partial remission of panic symptoms, and her anxiety is currently well-managed with her proactive use of cognitive and behavioral coping strategies, social support, and self-care. We continue to use relaxation skills, cognitive strategies, and an ER/P approach to manage infrequent physiological symptoms of panic. Marisabel has a history of intrusive thoughts/images with onset in previous postpartum period. Recently, intrusive images have been experienced ~1x/week and have responded well to use of cognitive defusion strategies. Marisabel does not report or exhibit any mood concerns; no history of postpartum mood concerns. Marisabel is an incredibly resilient individual with many areas of " personal strength and sources of support. I admire her skillful self-advocacy and proactive engagement of her care team, and appreciate these providers' thoughtful and supportive care of Marisabel, including her OB, Dr. Christian [place], of [facility] [place]; Dr. Mary MD, and Dr. Shanthi MD, and MFM team of [facility]; Dr. Volodymyr ND, [facility], of [facility]; and psychiatric med consultation with Ganesh Santiago and MD John, of [facility] in [facility]).     Plan:   Continue to practice coping strategies, prioritize sleep, engage in self-care, and leverage social support.      As needed, continue to use coping strategies within exposure exercise framework to maintain remission of panic symptoms and manage and reduce anxiety symptoms.      Continue to follow medication treatment plan (Zoloft, 50 mg) following consultation with Ganesh Santiago and MD John, of [facility] (see note dated ); consult with Dr. Gonzales on option to increase dose 6-8 weeks prior to planned  section in order to support transition to postpartum period.      Today we discussed developmentally-informed strategies and resources to support adjustment to new sibling, including the following books:     You Were the First, by Adriana Fairchild's Chair by Patel Krishnamurthy     As desired by Marisabel, we have discussed leveraging Pregnancy After Loss Support Group provided by [employer]:      and  at 7PM ET/ 4PM PT   The Pregnancy After Loss: Support for Parents support group provides connection for parents riding the emotional waves of pregnancy after a loss. Led by [employer]-trained facilitators with lived experience, this group helps parents find support, as well as provides useful information and resources to help them navigate pregnancy after loss. Graciela, fear, sadness and grieving are common during this time. In this group, you will meet others who  can relate and support you during your new pregnancy. We are here to help.   [place] here: https://[email or web address]/users/sign_in?org=[employer]20[employer]20[employer]      Next therapy appointment has been scheduled for 1/26/23 to continue work on treatment goals.      Treatment Plan review due: 3/15/23      Joann Moore, PhD  Postdoctoral Fellow        I did not see this patient directly, but have discussed the session with the above trainee and approve this documentation.      Hailey Young Psy.D.,                                                                                         Clinical Supervisor

## 2023-01-26 ENCOUNTER — VIRTUAL VISIT (OUTPATIENT)
Dept: PSYCHIATRY | Facility: CLINIC | Age: 38
End: 2023-01-26
Attending: PSYCHOLOGIST
Payer: COMMERCIAL

## 2023-01-26 DIAGNOSIS — F41.0 PANIC ATTACK: ICD-10-CM

## 2023-01-26 DIAGNOSIS — F41.0 GENERALIZED ANXIETY DISORDER WITH PANIC ATTACKS: Primary | ICD-10-CM

## 2023-01-26 DIAGNOSIS — F41.1 GENERALIZED ANXIETY DISORDER WITH PANIC ATTACKS: Primary | ICD-10-CM

## 2023-01-26 PROCEDURE — 90837 PSYTX W PT 60 MINUTES: CPT | Mod: HN

## 2023-01-26 NOTE — PROGRESS NOTES
"  [facility]  OUTPATIENT PSYCHOTHERAPY PROGRESS NOTE    Client Name: Griselda Bell   YOB: 1985 (37 year old)   Date of Service:  Jan 26, 2023  Time of Service: 8:08 am to 9:01 am (53 minutes)  Service Type(s): 28105 psychotherapy (53-60 min. with patient and/or family)    Type of service: Telemedicine Psychotherapy   Reason for Telemedicine Visit: COVID-19 public health recommendations on in-person sessions  Mode of transmission: Secure real time interactive audio and visual telecommunication system (videoconference via [employer])  Location of originating and distant sites:    Originating site (patient location): patient home    Distant site (provider site): HIPAA compliant location within provider home/remote setting  Telemedicine Visit: The patient's condition can be safely assessed and treated via synchronous audio and visual telemedicine encounter.    Patient has agreed to receiving services via telemedicine technology.    Diagnoses:   Encounter Diagnoses   Name Primary?     Generalized anxiety disorder  Yes     Panic attack - partial remission       Individuals Present:   Patient and provider    Treatment goal(s) being addressed:   1. Enhance use of grounding and relaxation strategies to maintain reduction in panic symptoms, manage and reduce anxiety, and promote ability to initiate sleep  2. Utilize Exposure Response Prevention (ER/P) approach to maintain progress interrupting use of avoidance (e.g., avoiding raising heart rate for example during exercise) or compulsive coping strategies (e.g., checking heart rate on Apple watch) to manage panic  3. Process feelings of grief and loss, prioritizing sleep and self-care to support wellbeing during pregnancy in context of history of multiple pregnancy loss     Subjective:  Marisabel is currently approximately 30 weeks pregnant. She described her mood as \"good,\" and continues to feel she is managing infrequent anxiety symptoms " "effectively, primarily brief heart palpitations. Some infrequent but increasing insomnia she perceives as being related to increasing pregnancy discomfort; no concerns about worries, racing thoughts, restlessness/akathisia, or low mood. No intrusive thought since early January; she feels they responded well to cognitive defusion strategies. Marisabel noted her friend experienced a ruptured ectopic pregnancy over the past weekend during a girls weekend, which triggered memories of her panic attack in [place] following her last pregnancy loss. Continues to plan for  section for 4/3/23, with ongoing preparation for transition to parental leave. Her parents will come for an extended visit approximately 2 weeks after delivery to provide instrumental and emotional support.      The patient did not report medication changes.    Treatment:   Individual psychotherapy utilizing cognitive behavioral therapy (CBT) framework with exposure response prevention (ER/P) and biopsychosocial  lens. Today, continued to support Marisabel in holding thoughts and feelings related to upcoming delivery following multiple losses, focus today on holding thoughts and feelings related to remembering panic attack following most recent loss in context of fearing for ruptured ectopic pregnancy during girls weekend in [place]. Marisabel denied any concerns related to flashbacks or intrusive memories or nightmares. Continued to review and supported Marisabel's use of coping strategies to manage intermittent anxiety concerns, including cognitive interpretation of physiological symptoms of anxiety, like heart palpitations (e.g., \"you're pregnant, your heart is pumping a lot of blood, this is normal and ok\"), and use of relaxation strategies (e.g., deep breathing). Continued to brainstorm and discuss Marisabel's needs related to upcoming delivery and transition to postpartum period, identify resources and sources of support, and develop/refine a cope ahead " "plan to support her adjustment and wellbeing. Continued to reflect and recognize Marisabel's proactive coping efforts, progress towards treatment goals, and resilience. Provided psychoeducation, reflective listening, validation, positive reinforcement, and encouragement throughout.     Assessment and Progress:   Appearance:  Appropriately-dressed and groomed   Behavior/relationship to examiner/demeanor:  Pleasant, warm, open, reflective, relaxed  Motor activity/EPS: Within normal limits  Speech rate:  Within normal limits  Speech volume:  Within normal limits  Speech articulation:  Within normal limits  Speech coherence:  Within normal limits  Speech spontaneity:  Within normal limit  Mood (subjective report): \"good\"   Affect (objective appearance): Mood-congruent, euthymic  Thought Process (Associations):  Goal directed  Thought process (Rate):  Within normal limits  Thought content: None  Abnormal Perception: None  Insight:  Within normal limits  Judgment:  Within normal limit     Marisabel continues to benefit from psychotherapy. She is making strong progress towards her treatment goals to maintain reduction of symptoms of anxiety and panic, and process feelings of grief and loss in the context of her upcoming delivery following multiple pregnancy losses. She has had an excellent response to the combination of CBT with ER/P psychotherapy and medication (Zoloft 50 mg). She has achieved partial remission of panic symptoms, and her anxiety is currently well-managed with her proactive use of cognitive and behavioral coping strategies, social support, and self-care. We continue to use relaxation skills, cognitive strategies, and an ER/P approach to manage infrequent physiological symptoms of panic. Marisabel has a history of intrusive thoughts/images with onset in previous postpartum period. Recently, intrusive images have been experienced ~1x/week and have responded well to use of cognitive defusion strategies. Marisabel does not " report or exhibit any mood concerns; no history of postpartum mood concerns. Marisabel is an incredibly resilient individual with many areas of personal strength and sources of support. I admire her skillful self-advocacy and proactive engagement of her care team, and appreciate these providers' thoughtful and supportive care of Marisabel, including her OB, Dr. Christian MD, of [facility] [place]; Dr. Mary MD, and Dr. Shanthi MD, and [facility] team of [facility]; Dr. Volodymyr ND, [facility], of [facility]; and psychiatric med consultation with Ganesh Santiago and MD John, of [facility] in [facility]).     Answers for HPI/ROS submitted by the patient on 1/26/2023  If you checked off any problems, how difficult have these problems made it for you to do your work, take care of things at home, or get along with other people?: Not difficult at all  PHQ9 TOTAL SCORE: 0, indicating no depressive symptoms  GAD7 TOTAL SCORE: 2, indicating minimal anxiety    Question 1/26/2023 12:48 PM CST - Filed by Patient   1. Feeling nervous, anxious, or on edge Several days   2. Not being able to stop or control worrying Not at all   3. Worrying too much about different things Not at all   4. Trouble relaxing Not at all   5. Being so restless that it is hard to sit still Not at all   6. Becoming easily annoyed or irritable Several days   7. Feeling afraid, as if something awful might happen Not at all   If you checked off any problems on this questionnaire, how difficult have these problems made it for you to do your work, take care of things at home, or get along with other people? Not difficult at all   COLLINS 7 TOTAL SCORE (range: 0 - 21) 2 (minimal anxiety)     Plan:   Continue to practice coping strategies, prioritize sleep, engage in self-care, and leverage social support.      As needed, continue to use coping strategies within exposure exercise framework to maintain remission of panic symptoms and  manage and reduce anxiety symptoms.      Continue to follow medication treatment plan (Zoloft, 50 mg) following consultation with Ganesh Santiago and MD John, of [facility] (see note dated ); consult with Dr. Gonzales on option to increase dose 6-8 weeks prior to planned  section in order to support transition to postpartum period.      Continue to use developmentally-informed strategies and resources to support adjustment to new sibling, including the following books:     You Were the First, by Adriana Fairchild's Chair by Patel Krishnamurthy     As desired by Marisabel, we have discussed leveraging Pregnancy After Loss Support Group provided by [employer]:      and  at 7PM ET/ 4PM PT   The Pregnancy After Loss: Support for Parents support group provides connection for parents riding the emotional waves of pregnancy after a loss. Led by P[employer]-trained facilitators with lived experience, this group helps parents find support, as well as provides useful information and resources to help them navigate pregnancy after loss. Graciela, fear, sadness and grieving are common during this time. In this group, you will meet others who can relate and support you during your new pregnancy. We are here to help.   [place] here: [email or web address]      Next therapy appointment has been scheduled for 23 at 8 am to continue work on treatment goals.      Treatment Plan review due: 3/15/23      Joann Moore, PhD  Postdoctoral Fellow        I did not see this patient directly, but have discussed the session with the above trainee and approve this documentation.      Hailey Young Psy.D.,                                                                                         Clinical Supervisor

## 2023-02-09 ENCOUNTER — VIRTUAL VISIT (OUTPATIENT)
Dept: PSYCHIATRY | Facility: CLINIC | Age: 38
End: 2023-02-09
Attending: PSYCHOLOGIST
Payer: COMMERCIAL

## 2023-02-09 DIAGNOSIS — F41.0 PANIC ATTACK: ICD-10-CM

## 2023-02-09 DIAGNOSIS — F41.0 GENERALIZED ANXIETY DISORDER WITH PANIC ATTACKS: Primary | ICD-10-CM

## 2023-02-09 DIAGNOSIS — F41.1 GENERALIZED ANXIETY DISORDER WITH PANIC ATTACKS: Primary | ICD-10-CM

## 2023-02-09 PROCEDURE — 90837 PSYTX W PT 60 MINUTES: CPT | Mod: HN

## 2023-02-14 NOTE — PROGRESS NOTES
"[facility]OMEN'[facility]LL[facility]ING [facility]GRAM  OUTPATIENT PSYCHOTHERAPY PROGRESS NOTE    Client Name: Griselda Bell   YOB: 1985 (37 year old)   Date of Service:  Feb 9, 2023  Time of Service: 10:00 am to 10:59 am (59 minutes)  Service Type(s): 82176 psychotherapy (53-60 min. with patient and/or family)    Type of service: Telemedicine Psychotherapy   Reason for Telemedicine Visit: COVID-19 public health recommendations on in-person sessions  Mode of transmission: Secure real time interactive audio and visual telecommunication system (videoconference via [employer])  Location of originating and distant sites:    Originating site (patient location): patient home    Distant site (provider site): HIPAA compliant location within provider home/remote setting  Telemedicine Visit: The patient's condition can be safely assessed and treated via synchronous audio and visual telemedicine encounter.    Patient has agreed to receiving services via telemedicine technology.    Diagnoses:   Encounter Diagnoses   Name Primary?     Generalized anxiety disorder  Yes     Panic attack - partial remission        Individuals Present:   Patient and provider     Treatment goal(s) being addressed:   1. Enhance use of grounding and relaxation strategies to maintain reduction in panic symptoms, manage and reduce anxiety, and promote ability to initiate sleep  2. Utilize Exposure Response Prevention (ER/P) approach to maintain progress interrupting use of avoidance (e.g., avoiding raising heart rate for example during exercise) or compulsive coping strategies (e.g., checking heart rate on Apple watch) to manage panic  3. Process feelings of grief and loss, prioritizing sleep and self-care to support wellbeing during pregnancy in context of history of multiple pregnancy loss     Subjective:  Marisabel is currently approximately 32 weeks pregnant. She described her mood as \"good\" and \"optimistic.\" Marisabel continues to feel she is effectively managing " "infrequent anxiety symptoms. No concerns about worries, racing thoughts, restlessness/akathisia, or low mood. She denied any insomnia concerns due to anxiety or mood; some increasing difficulty initiating sleep she perceives as being related to pregnancy discomfort. No intrusive thought concerns since early January; she feels they responded well to cognitive defusion strategies. Marisabel continues to plan for  section for 4/3/23, with ongoing preparation for transition to parental leave. Her parents will come for an extended visit approximately 2 weeks after delivery to provide instrumental and emotional support.      Marisabel reported she and her OB, [place]. Gonzales, of [facility] discussed option to increase her Zoloft prior to delivery in anticipation of protecting against postpartum period concerns. For the time being, Marisabel and Dr. Gonzales have decided not to make any medication changes. Marisabel is open to reconsidering this as her pregnancy advances.     Treatment:   Individual psychotherapy utilizing cognitive behavioral therapy (CBT) framework with exposure response prevention (ER/P) and biopsychosocial  lens. Today, continued to support Marisabel in holding range of thoughts and feelings related to upcoming delivery following multiple losses, focus today on making space for optimism, hope, and excitement for impending arrival of sonJuan Ramon, while also recognizing \"I will never forget those girls\" [pregnancy losses].  Continued support of Marisabel's use of coping strategies to manage intermittent, infrequent anxiety concerns, including cognitive interpretation of physiological symptoms of anxiety, like heart palpitations (e.g., \"you're pregnant, your heart is pumping a lot of blood, this is normal and ok\"), and use of relaxation strategies to support reduction in anxiety and initiation of sleep (e.g., deep breathing). With Marisabel, continued to explore and consider needs as part of developing cope ahead plan to support " "transition to postpartum period. Supported Marisabel in brainstorming language to communicate needs and set boundaries with parents when they visit. Continued to reflect and recognize Marisabel's proactive coping efforts, progress towards treatment goals, and resilience. Provided psychoeducation, reflective listening, validation, positive reinforcement, and encouragement throughout.      Assessment and Progress:   Appearance:  Appropriately-dressed and groomed   Behavior/relationship to examiner/demeanor:  Pleasant, warm, open, reflective, relaxed  Motor activity/EPS: Within normal limits  Speech rate:  Within normal limits  Speech volume:  Within normal limits  Speech articulation:  Within normal limits  Speech coherence:  Within normal limits  Speech spontaneity:  Within normal limit  Mood (subjective report): \"good\"   Affect (objective appearance): Mood-congruent, euthymic  Thought Process (Associations):  Goal directed  Thought process (Rate):  Within normal limits  Thought content: None  Abnormal Perception: None  Insight:  Within normal limits  Judgment:  Within normal limit     Marisabel continues to benefit from psychotherapy, and has had an excellent response to the combination of CBT with ER/P psychotherapy and medication (Zoloft 50 mg). She has made strong progress towards her treatment goals to maintain reduction of symptoms of anxiety and panic, and process feelings of grief and loss in the context of her upcoming delivery following multiple pregnancy losses. Marisabel has achieved partial remission of panic symptoms, and her anxiety is currently well-managed with her proactive use of cognitive and behavioral coping strategies, social support, and self-care. Given her progress towards treatment goals we currently meet every other week. We are focusing on maintaining reduction of symptoms and supporting Marisabel's wellbeing through upcoming transition to postpartum period. Marisabel has a history of postpartum anxiety and " intrusive thoughts/images with onset in previous postpartum period. Recent, infrequent intrusive images(experienced ~1x/week approximately 4 weeks ago) have responded well to use of cognitive defusion strategies. Marisabel does not report or exhibit any mood concerns; no history of postpartum mood concerns. Marisabel is an incredibly resilient individual with many areas of personal strength and sources of support. I admire her skillful self-advocacy and proactive engagement of her care team, and appreciate these providers' thoughtful and supportive care of Marisabel, including her OB, Dr. Christian MD, of [facility]; Dr. Mary MD, and Dr. Shanthi MD, and MFM team of [facility]; Dr. Volodymyr ND, [facility], of [facility] Psychiat[facility]; and psychiatric med consultation with Ganesh Santiago and MD John, of [facility] in [facility])    Answers for HPI/ROS submitted by the patient on 2023  If you checked off any problems, how difficult have these problems made it for you to do your work, take care of things at home, or get along with other people?: Not difficult at all  PHQ9 TOTAL SCORE: 0, indicating minimal depressive symptoms    Plan:   Continue to practice coping strategies, prioritize sleep, engage in self-care, and leverage social support.      As needed, continue to use coping strategies within exposure exercise framework to maintain remission of panic symptoms and manage and reduce anxiety symptoms.      Continue to follow medication treatment plan (Zoloft, 50 mg) following consultation with Ganesh Santiago and MD John, of [facility] (see note dated ); consult with Dr. Gonzales on option to increase dose 6-8 weeks prior to planned  section in order to support transition to postpartum period.      Continue to use developmentally-informed strategies and resources to support adjustment to new sibling, including the following books:     You Were the First, by  Adriana Fairchild's Chair by Patel Krishnamurthy     As desired by Marisabel, we have discussed leveraging Pregnancy After Loss Support Group provided by [employer]:     2nd and 4th Monday at 7PM ET/ 4PM PT   The Pregnancy After Loss: Support for Parents support group provides connection for parents riding the emotional waves of pregnancy after a loss. Led by [employer]-trained facilitators with lived experience, this group helps parents find support, as well as provides useful information and resources to help them navigate pregnancy after loss. Graciela, fear, sadness and grieving are common during this time. In this group, you will meet others who can relate and support you during your new pregnancy. We are here to help.   [place] here: [email or web address]      Next therapy appointment has been scheduled for 2/23/23 to continue work on treatment goals.      Treatment Plan review due: 3/15/23    Joann Moore, PhD  Postdoctoral Fellow     I did not see this patient directly, but have discussed the session with the above trainee and approve this documentation.      Hailey Young Psy.D., LP                                                                                        Clinical Supervisor

## 2023-02-23 ENCOUNTER — VIRTUAL VISIT (OUTPATIENT)
Dept: PSYCHIATRY | Facility: CLINIC | Age: 38
End: 2023-02-23
Attending: PSYCHOLOGIST
Payer: COMMERCIAL

## 2023-02-23 DIAGNOSIS — F41.0 GENERALIZED ANXIETY DISORDER WITH PANIC ATTACKS: Primary | ICD-10-CM

## 2023-02-23 DIAGNOSIS — F41.0 PANIC ATTACK: ICD-10-CM

## 2023-02-23 DIAGNOSIS — F41.1 GENERALIZED ANXIETY DISORDER WITH PANIC ATTACKS: Primary | ICD-10-CM

## 2023-02-23 PROCEDURE — 90837 PSYTX W PT 60 MINUTES: CPT | Mod: VID

## 2023-02-23 NOTE — PROGRESS NOTES
"[facility]  OUTPATIENT PSYCHOTHERAPY PROGRESS NOTE    Client Name: Griselda Bell   YOB: 1985 (38 year old)   Date of Service:  Feb 23, 2023  Time of Service: 8:03 am to 9:00 am (57 minutes)  Service Type(s): 63043 psychotherapy (53-60 min. with patient and/or family)    Type of service: Telemedicine Psychotherapy   Reason for Telemedicine Visit: patient preference, health precautions  Mode of transmission: Secure real time interactive audio and visual telecommunication system (videoconference via [employer])  Location of originating and distant sites:    Originating site (patient location): patient home    Distant site (provider site): HIPAA compliant location within provider home/remote setting  Telemedicine Visit: The patient's condition can be safely assessed and treated via synchronous audio and visual telemedicine encounter.    Patient has agreed to receiving services via telemedicine technology.    Diagnoses:   Encounter Diagnoses   Name Primary?     Generalized anxiety disorder  Yes     Panic attack - full remission       Individuals Present:   Patient and provider     Treatment goal(s) being addressed:   1. Enhance use of grounding and relaxation strategies to maintain remission of panic symptoms, maintain reduction in anxiety, and promote ability to initiate sleep  2. Process feelings of grief and loss, prioritizing sleep and self-care to support wellbeing during pregnancy in context of history of multiple pregnancy loss   3. Support wellbeing and adjustment through upcoming transition to post-partum period    Subjective:  Marisabel is currently approximately 34 weeks pregnant. She described her mood as \"good.\" No concerns about panic, anxiety, intrusive thoughts/images, or low mood. Marisabel continued to report using coping strategies to effectiely manage infrequent worries, primarily related to her father's health. Marisabel denied any insomnia concerns due to anxiety or mood; mild " difficulty initiating sleep and staying asleep she perceives as being related to pregnancy discomfort. She continues to plan for  section for 4/3/23, with ongoing preparation for transition to parental leave. Her parents will come for an extended visit approximately 2 weeks after delivery to provide instrumental and emotional support.      The patient did not report any changes in medication.      Treatment:   Individual psychotherapy with CBT framework leveraging evidence-based psychoeducation and therapeutic strategies drawn from Practical Resources for Effective Postpartum Parenting (PREPP). Today we continued to discuss and develop cope ahead plan to support Marisabel's transition to the postpartum period and take a preventative approach to managing risk for return/worsening of postpartum anxiety symptoms. Special focus today on reviewing psychoeducation related to understanding and identifying symptoms of Baby Blues, and  anxiety and mood disorders (PMADs). Discussed contribution of hormonal changes, sleep deprivation, and infant dysregulation on mood, anxiety, and mental health. Provided psychoeducation resources drawn from PREPP related to Baby Blues, PMADs, Purple Period, and infant regulation and soothing strategies. Focus on important protective role of sleep and provided prescription for 4-5 hours of consolidated nocturnal sleep. Supported Marisabel in brainstorming strategies to support sleep, leveraging support from  and parents. Discussed Marisabel's goal to breastfeed after delivery, and worries about oversupply, which occurred following the birth of her daughter and interrupted sleep due to frequent need to pump. Provided contact information for pre-delivery lactation consultation with Diana Wheatley CSM. Supported Marisabel in continuing to brainstorm strategies to manage worries about father's health, set healthy boundaries with parents, and ask  and parents for help. Continued to  "emphasize Marisabel's strengths at individual, social, and care team levels. Provided reflective listening and positive reinforcement throughout.      Assessment and Progress:   Appearance:  Appropriately-dressed and groomed   Behavior/relationship to examiner/demeanor:  Pleasant, warm, open, reflective, relaxed  Motor activity/EPS: Within normal limits  Speech rate:  Within normal limits  Speech volume:  Within normal limits  Speech articulation:  Within normal limits  Speech coherence:  Within normal limits  Speech spontaneity:  Within normal limit  Mood (subjective report): \"good\"   Affect (objective appearance): Mood-congruent, euthymic  Thought Process (Associations):  Goal directed  Thought process (Rate):  Within normal limits  Thought content: None  Abnormal Perception: None  Insight:  Within normal limits  Judgment:  Within normal limit     Marisabel continues to benefit from psychotherapy. She has had an excellent response to the combination of CBT with ER/P psychotherapy and medication (Zoloft 50 mg), and has made strong progress towards her treatment goals to maintain reduction of symptoms of anxiety and panic, and process feelings of grief and loss in the context of her upcoming delivery following multiple pregnancy losses. Today Marisabel has achieved full remission of panic symptoms, and her anxiety is currently well-managed with her proactive use of cognitive and behavioral coping strategies, social support, and self-care. Given her progress towards treatment goals we currently meet every other week. We are focusing on maintaining reduction of symptoms and supporting Marisabel's wellbeing through upcoming transition to postpartum period. Marisabel has a history of postpartum anxiety and intrusive thoughts/images with onset in previous postpartum period. Recent, infrequent intrusive images(experienced ~1x/week approximately 4 weeks ago) have responded well to use of cognitive defusion strategies. Marisabel does not report or " "exhibit any current mood concerns; no history of previous postpartum mood concerns. Marisabel is an incredibly resilient individual with many areas of personal strength and sources of support. I admire her skillful self-advocacy and proactive engagement of her care team, and appreciate these providers' thoughtful and supportive care of Marisabel, including her OB, Dr. Christian MD, of [facility] [place]; Dr. Mary MD, and Dr. Shanthi MD, and M team of [facility]; Dr. Volodymyr ND, [facility], of [facility]; and psychiatric med consultation with Ganesh Santiago and MD John, of [facility] in [facility])    Plan:   Continue to practice coping strategies, prioritize sleep, engage in self-care, and leverage social support.     Discuss postpartum cope ahead plan with  to prioritize sleep and obtain \"sleep prescription\" dose of 4-5 hours of consolidated nocturnal sleep.      As needed, continue to use coping strategies within exposure response prevention framework to maintain remission of panic symptoms and manage and reduce anxiety symptoms.      Continue to follow medication treatment plan (Zoloft, 50 mg) following consultation with Ganesh Santiago and MD John, of [facility] (see note dated ); we have previously discussed option to consult with Dr. Gonzales on option to increase dose 6-8 weeks prior to planned  section in order to support transition to postpartum period.      Today we discussed option to complete pre-delivery lactation consultation with Diana Wheatley CNM, of [facility]: [phone].     Next therapy appointment has been scheduled for 3/9/23 to continue work on treatment goals.    Treatment Plan review due: 3/15/23    Joann Moore, PhD   Postdoctoral Fellow       I did not see this patient directly, but have discussed the session with the above trainee and approve this documentation.      Hailey Young Psy.D., " LP                                                                                        Clinical Supervisor

## 2023-03-09 ENCOUNTER — VIRTUAL VISIT (OUTPATIENT)
Dept: PSYCHIATRY | Facility: CLINIC | Age: 38
End: 2023-03-09
Attending: PSYCHOLOGIST
Payer: COMMERCIAL

## 2023-03-09 DIAGNOSIS — F41.0 PANIC ATTACK: ICD-10-CM

## 2023-03-09 DIAGNOSIS — F41.0 GENERALIZED ANXIETY DISORDER WITH PANIC ATTACKS: Primary | ICD-10-CM

## 2023-03-09 DIAGNOSIS — F41.1 GENERALIZED ANXIETY DISORDER WITH PANIC ATTACKS: Primary | ICD-10-CM

## 2023-03-09 PROCEDURE — 90837 PSYTX W PT 60 MINUTES: CPT | Mod: VID

## 2023-03-09 NOTE — PROGRESS NOTES
"[facility]  OUTPATIENT PSYCHOTHERAPY PROGRESS NOTE    Client Name: Griselda Bell   YOB: 1985 (37 year old)   Date of Service:  Mar 9, 2023  Time of Service: 8:07 am to 9:00 am (53 minutes)  Service Type(s): 29521 psychotherapy (53-60 min. with patient and/or family)    Type of service: Telemedicine Psychotherapy   Reason for Telemedicine Visit: patient preference, health precautions   Mode of transmission: Secure real time interactive audio and visual telecommunication system (videoconference via [employer])  Location of originating and distant sites:    Originating site (patient location): patient home    Distant site (provider site): HIPAA compliant location within provider home/remote setting  Telemedicine Visit: The patient's condition can be safely assessed and treated via synchronous audio and visual telemedicine encounter.    Patient has agreed to receiving services via telemedicine technology.    Diagnoses:   Encounter Diagnoses   Name Primary?     Generalized anxiety disorder  Yes     Panic attack - full remission          Individuals Present:   Patient and provider     Treatment goal(s) being addressed:   1. Enhance use of grounding and relaxation strategies to maintain remission of panic symptoms, maintain reduction in anxiety, and promote ability to initiate sleep  2. Process feelings of grief and loss, prioritizing sleep and self-care to support wellbeing during pregnancy in context of history of multiple pregnancy loss   3. Support wellbeing and adjustment through upcoming transition to post-partum period    Subjective:  Marisabel is currently approximately 36 weeks pregnant. She and her family were sick with a stomach bug last week. Her appetite has returned and she is beginning to feel like herself. Marisabel continued to report her mood is \"good\" and noted she is feeling \"nervous excitement about everything to come.\" She continued to report using coping strategies to effectiely " "manage infrequent worries, primarily related to her father's health, and denied any concerns about panic, anxiety, intrusive thoughts/images, or low mood. Marisabel reported intermittent mild difficulty initiating sleep and staying asleep she perceives as being related to pregnancy discomfort; over the past week she has slept well. Planned  section scheduled for 4/3/23.      The patient did not report any changes in medication.      Treatment:   Individual psychotherapy with cognitive behavioral therapy (CBT) framework leveraging psychoeducation and therapeutic strategies drawn from Practical Resources for Effective Postpartum Parenting (PREPP). Today we began by taking time to reflect on and recognize Marisabel's progress towards treatment goals, focusing on achievement of full remission of panic symptoms. With Marisabel, continued to discuss and develop cope ahead plan to support her transition and adjustment to the postpartum period and take a preventative approach to managing risk for return/worsening of postpartum anxiety symptoms. Supported Marisabel in brainstorming resources to support  section recovery. Discussed strategies Marisabel is using to prepare for parental leave and \"let go\" of work concerns. Supported Marisabel in continuing to brainstorm strategies to manage worries about father's health, set healthy boundaries with parents, and ask  and parents for help. Continued to emphasize Marisabel's strengths at individual, social, and care team levels. Provided reflective listening and positive reinforcement throughout.      Assessment and Progress:   Appearance:  Appropriately-dressed and groomed   Behavior/relationship to examiner/demeanor:  Pleasant, warm, open, reflective, relaxed  Motor activity/EPS: Within normal limits  Speech rate:  Within normal limits  Speech volume:  Within normal limits  Speech articulation:  Within normal limits  Speech coherence:  Within normal limits  Speech spontaneity: " " Within normal limit  Mood (subjective report): \"good\", \"nervous excitement\"   Affect (objective appearance): Mood-congruent, euthymic  Thought Process (Associations):  Goal directed  Thought process (Rate):  Within normal limits  Thought content: None  Abnormal Perception: None  Insight:  Within normal limits  Judgment:  Within normal limit     Marisabel continues to benefit from psychotherapy (combination of CBT with ER/P psychotherapy) and medication (Zoloft 50 mg). She has made strong progress towards her treatment goals to maintain reduction of symptoms of anxiety and panic, and process feelings of grief and loss in the context of her upcoming delivery following multiple pregnancy losses. Marisabel has achieved full remission of panic symptoms, and her anxiety is currently well-managed with her proactive use of cognitive and behavioral coping strategies, social support, and self-care. Today she endorsed a total score on the COLLINS-7 of 0, indicating minimal anxiety symptoms. Given Marisabel's progress towards treatment goals we currently meet every other week. We are focusing on maintaining reduction of symptoms and supporting her wellbeing through upcoming transition to postpartum period. Marisabel has a history of postpartum anxiety and intrusive thoughts/images with onset in previous postpartum period. Recent, infrequent intrusive images(experienced ~1x/week approximately 4 weeks ago) have responded well to use of cognitive defusion strategies. Marisabel does not report or exhibit any current mood concerns; no history of previous postpartum mood concerns. Marisabel is an incredibly resilient individual with many areas of personal strength and sources of support. I admire her skillful self-advocacy and proactive engagement of her care team, and appreciate these providers' thoughtful and supportive care of Marisabel, including her OB, Dr. Christian MD, of [facility]; Dr. Mary MD, and Dr. Shanthi MD, and MFM team of [facility]; " "Dr. Volodymyr ND, LAc, of [facility]; and psychiatric med consultation with Ganesh Santiago and MD John, of [facility] in [facility]).    Emmanuel[employer]; Used With Permission)    Question 3/8/2023 10:11 AM CST - Filed by Patient   1. Feeling nervous, anxious, or on edge Not at all   2. Not being able to stop or control worrying Not at all   3. Worrying too much about different things Not at all   4. Trouble relaxing Not at all   5. Being so restless that it is hard to sit still Not at all   6. Becoming easily annoyed or irritable Not at all   7. Feeling afraid, as if something awful might happen Not at all   If you checked off any problems on this questionnaire, how difficult have these problems made it for you to do your work, take care of things at home, or get along with other people? Not difficult at all   EMMANUEL 7 TOTAL SCORE (range: 0 - 21) 0 (minimal anxiety)       Plan:   Continue to practice coping strategies, prioritize sleep, engage in self-care, and leverage social support.      Discuss postpartum cope ahead plan with  to prioritize sleep and obtain \"sleep prescription\" dose of 4-5 hours of consolidated nocturnal sleep.      As needed, continue to use coping strategies within exposure response prevention framework to maintain remission of panic symptoms and manage and reduce anxiety symptoms.      Continue to follow medication treatment plan (Zoloft, 50 mg) following consultation with Ganesh Satniago and MD John, of [facility] (see note dated ); we have previously discussed option to consult with Dr. Gonzales on option to increase dose 6-8 weeks prior to planned  section in order to support transition to postpartum period.      At our last appointment we discussed option to complete pre-delivery lactation consultation with Diana Wheatley CNM, of [facility]: [phone].     Next therapy appointment has been " scheduled for 3/23/23 at 8 am to continue work on treatment goals.    Treatment Plan review due: 3/15/23 (upcoming appointment)    Joann Moore, PhD  Postdoctoral Fellow     I did not see this patient directly, but have discussed the session with the above trainee and approve this documentation.      Hailey Young Psy.D., LP                                                                                        Clinical Supervisor

## 2023-03-23 ENCOUNTER — VIRTUAL VISIT (OUTPATIENT)
Dept: PSYCHIATRY | Facility: CLINIC | Age: 38
End: 2023-03-23
Attending: PSYCHOLOGIST
Payer: COMMERCIAL

## 2023-03-23 DIAGNOSIS — F43.22 ADJUSTMENT DISORDER WITH ANXIOUS MOOD: Primary | ICD-10-CM

## 2023-03-23 DIAGNOSIS — F41.0 PANIC ATTACK: ICD-10-CM

## 2023-03-23 PROCEDURE — 90837 PSYTX W PT 60 MINUTES: CPT | Mod: VID

## 2023-03-23 ASSESSMENT — PATIENT HEALTH QUESTIONNAIRE - PHQ9
SUM OF ALL RESPONSES TO PHQ QUESTIONS 1-9: 0
SUM OF ALL RESPONSES TO PHQ QUESTIONS 1-9: 0
10. IF YOU CHECKED OFF ANY PROBLEMS, HOW DIFFICULT HAVE THESE PROBLEMS MADE IT FOR YOU TO DO YOUR WORK, TAKE CARE OF THINGS AT HOME, OR GET ALONG WITH OTHER PEOPLE: NOT DIFFICULT AT ALL

## 2023-03-23 NOTE — PROGRESS NOTES
[facility]  OUTPATIENT PSYCHOTHERAPY PROGRESS NOTE    Client Name: Griselda Bell   YOB: 1985 (38 year old)   Date of Service:  Mar 23, 2023  Time of Service: 8:01 am to 8:54 am (53 minutes)  Service Type(s): 47267 psychotherapy (53-60 min. with patient and/or family)    Type of service: Telemedicine Psychotherapy   Reason for Telemedicine Visit: patient preference, health precautions   Mode of transmission: Secure real time interactive audio and visual telecommunication system (videoconference via [employer])  Location of originating and distant sites:    Originating site (patient location): patient home    Distant site (provider site): HIPAA compliant location within provider home/remote setting  Telemedicine Visit: The patient's condition can be safely assessed and treated via synchronous audio and visual telemedicine encounter.    Patient has agreed to receiving services via telemedicine technology.    Diagnoses:     Adjustment disorder with anxious mood (F43.22)    Panic attack - full remission    Individuals Present:   Patient and provider     Treatment goal(s) being addressed:   1. Enhance use of grounding and relaxation strategies to maintain remission of panic and anxiety symptoms, and promote ability to initiate sleep  2. Maintain progress related to processing feelings of grief and loss, prioritizing sleep and self-care to support wellbeing during pregnancy and upcoming delivery of son in context of history of multiple pregnancy loss   3. Support wellbeing and adjustment through upcoming transition to post-partum period    Subjective:  Marisabel is currently approximately 38 weeks pregnant. She is preparing to move up her scheduled , as her son is measuring 9 lbs 3 oz, flipped to breech position, and she is extremely uncomfortable. Marisabel met with her OB this morning to put plan to move up date into motion; her OB is going on vacation and will not be able to deliver  "Marisabel's son; Marisabel had feared this situation in the past but is feeling ok, \"comfortable,\" and \"at peace\" about another provider delivering her.     Marisabel continued to report her mood is \"good\" and noted she is feeling \"nervous, excitement, and ready for everything to come.\" She continued to report using coping strategies to effectiely manage infrequent worries, primarily related to her father's health, and denied any concerns about panic, anxiety, intrusive thoughts/images, or low mood.      Marisabel did not report any changes in medication.      Treatment:   Individual psychotherapy with cognitive behavioral therapy (CBT) framework leveraging psychoeducation and therapeutic strategies drawn from Practical Resources for Effective Postpartum Parenting (PREPP). Today, began by supporting Marisabel in holding space for thoughts and feelings related to accelerated delivery timeline.  With Marisabel, continued to discuss and refine cope ahead plan to support her transition and adjustment to the postpartum period and take a preventative approach to managing risk for return/worsening of postpartum anxiety symptoms. Planned to meet more frequently in immediate postpartum period. Continued to reflect on Marisabel's progress towards treatment goals and renewed treatment plan. Continued to emphasize Marisabel's strengths at individual, social, and care team levels. Provided reflective listening and positive reinforcement throughout.       Assessment and Progress:   Appearance:  Appropriately-dressed and groomed   Behavior/relationship to examiner/demeanor:  Pleasant, warm, open, reflective, relaxed  Motor activity/EPS: Within normal limits  Speech rate:  Within normal limits  Speech volume:  Within normal limits  Speech articulation:  Within normal limits  Speech coherence:  Within normal limits  Speech spontaneity:  Within normal limit  Mood (subjective report): \"good\", \"nervous excitement\", \"ready\"  Affect (objective " appearance): Mood-congruent, euthymic  Thought Process (Associations):  Goal directed  Thought process (Rate):  Within normal limits  Thought content: None  Abnormal Perception: None  Insight:  Within normal limits  Judgment:  Within normal limit     Marisabel continues to benefit from psychotherapy (combination of CBT with ER/P psychotherapy) and medication (Zoloft 50 mg). She has made strong progress towards her treatment goals to maintain reduction of symptoms of anxiety and panic, and process feelings of grief and loss in the context of her upcoming delivery following multiple pregnancy losses. Marisabel has achieved full remission of anxiety and panic symptoms via proactive use of cognitive and behavioral coping strategies, social support, and self-care; today we updated her diagnosis to Adjustment Disorder with anxious mood to signify treatment gains and remission of symptoms. Given Marisabel's progress towards treatment goals we currently meet every other week, but plan to meet more regularly after delivery in immediate postpartum period to reassess needs and provide increased support. Current treatment goals relate to maintaining remission of symptoms and supporting Marisabel's wellbeing through upcoming transition to postpartum period. Marisabel has a history of postpartum anxiety and intrusive thoughts/images with onset in previous postpartum period. Recent, infrequent intrusive images(experienced ~1x/week approximately 4 weeks ago) have responded well to use of cognitive defusion strategies. Marisabel does not report or exhibit any current mood concerns; no history of previous postpartum mood concerns. Marisabel is an incredibly resilient individual with many areas of personal strength and sources of support. I admire her skillful self-advocacy and proactive engagement of her care team, and appreciate these providers' thoughtful and supportive care of Marisabel, including her OB, Dr. Christian MD, of [facility]; Dr. Mary MD, and Dr. Maki,  "MD, and M team of [facility]; Dr. Volodymyr ND, [facility], of [facility]; and psychiatric med consultation with Ganesh Santiago and MD John, of [facility] in [facility]).    Answers for HPI/ROS submitted by the patient on 3/23/2023  If you checked off any problems, how difficult have these problems made it for you to do your work, take care of things at home, or get along with other people?: Not difficult at all  PHQ9 TOTAL SCORE: 0, indicating no/minimal depressive symptoms      Plan:   Continue to practice coping strategies, prioritize sleep, engage in self-care, and leverage social support.      Discuss postpartum cope ahead plan with  to prioritize sleep and obtain \"sleep prescription\" dose of 4-5 hours of consolidated nocturnal sleep.      As needed, continue to use coping strategies within exposure response prevention framework to maintain remission of panic symptoms and manage and reduce anxiety symptoms.      Continue to follow medication treatment plan (Zoloft, 50 mg) following consultation with Ganesh Santiago and MD John, of [facility] (see note dated ); we have previously discussed option to consult with Dr. Gonzales on option to increase dose 6-8 weeks prior to planned  section in order to support transition to postpartum period.      At our last appointment we discussed option to complete pre-delivery lactation consultation with Diana Wheatley CNM, of [facility]: [phone].        Next therapy appointment has been scheduled for 23 at 10 am to continue work on treatment goals.      Treatment Plan review due: 23    Joann Moore, PhD  Postdoctoral Fellow     I did not see this patient directly, but have discussed the session with the above trainee and approve this documentation.      Hailey Young Psy.D., " LP                                                                                        Clinical Supervisor

## 2023-04-04 ENCOUNTER — VIRTUAL VISIT (OUTPATIENT)
Dept: PSYCHIATRY | Facility: CLINIC | Age: 38
End: 2023-04-04
Attending: PSYCHOLOGIST
Payer: COMMERCIAL

## 2023-04-04 DIAGNOSIS — F41.0 PANIC ATTACK: ICD-10-CM

## 2023-04-04 DIAGNOSIS — F43.22 ADJUSTMENT DISORDER WITH ANXIOUS MOOD: Primary | ICD-10-CM

## 2023-04-04 PROCEDURE — 90837 PSYTX W PT 60 MINUTES: CPT | Mod: VID

## 2023-04-11 ENCOUNTER — VIRTUAL VISIT (OUTPATIENT)
Dept: PSYCHIATRY | Facility: CLINIC | Age: 38
End: 2023-04-11
Attending: PSYCHOLOGIST
Payer: COMMERCIAL

## 2023-04-11 DIAGNOSIS — F43.22 ADJUSTMENT DISORDER WITH ANXIOUS MOOD: Primary | ICD-10-CM

## 2023-04-11 DIAGNOSIS — F41.0 PANIC ATTACK: ICD-10-CM

## 2023-04-11 PROCEDURE — 90837 PSYTX W PT 60 MINUTES: CPT | Mod: HN

## 2023-04-11 NOTE — PROGRESS NOTES
[facility]  OUTPATIENT PSYCHOTHERAPY PROGRESS NOTE     Client Name: Griselda Bell          YOB: 1985 (38 year old)            Date of Service:  2023  Time of Service: 10:00 am to 10:58 am (58 minutes)  Service Type(s): 52546 psychotherapy (53-60 min. with patient and/or family)     Type of service: Telemedicine Psychotherapy   Reason for Telemedicine Visit: patient preference, health precautions   Mode of transmission: Secure real time interactive audio and visual telecommunication system (videoconference via [employer]  Location of originating and distant sites:  ? Originating site (patient location): patient home  ? Distant site (provider site): [facility] Psych[facility]ry [facility], [facility]   Telemedicine Visit: The patient's condition can be safely assessed and treated via synchronous audio and visual telemedicine encounter.    Patient has agreed to receiving services via telemedicine technology.     Diagnoses:     Adjustment disorder with anxious mood (F43.22)    Panic attack - full remission     Individuals Present:   Patient and provider      Treatment goal(s) being addressed:   1. Enhance use of grounding and relaxation strategies to maintain remission of panic and anxiety symptoms, and promote ability to initiate sleep  2. Maintain progress related to processing feelings of grief and loss, prioritizing sleep and self-care to support wellbeing following delivery of son in context of history of multiple pregnancy loss   3. Support wellbeing and adjustment through upcoming transition to post-partum period     Subjective:  Marisabel is currently approximately 1 week postpartum. Her son, Juan Ramon, was delivered via scheduled  section on 3/29/23 at 39 weeks gestation. Delivery was uncomplicated, and Marisabel reported a positive delivery and recovery experience so far. She described breastfeeding challenges related to Juan Ramon's high palate. In  "context of stress related to [place] losing weight, Marisabel reported acute increase in worries and onset of intrusive images. She made decision to transition to exclusive pumping and reported significant decrease in worries and no intrusive images since making this decision; Marisabel reported current pumping/feeding system feels \"good\" and \"sustainable.\" , pediatrician, family have been very supportive. NANCY is gaining weight and feeding is going well. A few weeks after discharge to home Marisabel's older daughter developed norovirus symptoms. Today Marisabel described her mood as \"feeling pretty good.\" She is enjoying bonding and spending time with [place]. Marisabel reported ongoing efforts to prioritize sleep and self-care. Marisabel is pumping approximately every 3 hours. Current longest stretch of consolidated nocturnal sleep is between 2-3 hours a night.      Marisabel did not report any changes in medication.       Treatment:   Individual psychotherapy with cognitive behavioral therapy (CBT) framework leveraging psychoeducation and therapeutic strategies drawn from Practical Resources for Effective Postpartum Parenting (PREPP). Today, our focus was on supporting Marisabel's transition and adjustment to the postpartum period. Supported Marisabel in holding space for thoughts and feelings related to debriefing on delivery experience, initial adjustment,  recovery, and feelings related to decision to exclusively pump. With Marisabel, reviewed/discussed psychoeducation on Baby Blues. Assessed current concerns, evaluating for any mood, anxiety, or  OCD symptoms or concerns. Reviewed coping strategies for managing worries, panic, and intrusive thoughts/images. Together we discussed coping plan to support Marisabel's mood and wellbeing. Ongoing focus on importance of \"sleep prescription\" of 4-5 hours of consolidated nocturnal sleep, continuing to hydrate and regularly eat nourishing foods, morning sunshine/outdoor time, and leveraging " "emotional and instrumental support to allow her to engage in self-care. Continued to emphasize Marisabel's strengths at individual, social, and care team levels. Provided reflective listening and positive reinforcement throughout.       Assessment and Progress:   Appearance:  Appropriately-dressed and groomed   Behavior/relationship to examiner/demeanor:  Pleasant, open, warm, upbeat   Motor activity/EPS: Within normal limits  Speech rate:  Within normal limits  Speech volume:  Within normal limits  Speech articulation:  Within normal limits  Speech coherence:  Within normal limits  Speech spontaneity:  Within normal limit  Mood (subjective report): \"feeling pretty good\"  Affect (objective appearance): Mood-congruent; euthymic overall   Thought Process (Associations):  Goal directed  Thought process (Rate):  Within normal limits  Thought content: None  Abnormal Perception: None  Insight:  Within normal limits  Judgment:  Within normal limit     Marisabel continues to benefit from psychotherapy and medication (Zoloft 50 mg). She has achieved full remission of anxiety and panic symptoms, and her diagnosis was recently updated to Adjustment Disorder with anxious mood to signify treatment gains and remission of symptoms. Marisabel has made strong progress towards her treatment goal to process feelings of grief and loss following multiple pregnancy losses. Prior to delivery we had been meeting every other week, given Marisabel's progress and needs. In context of immediate postpartum period we have agreed to meet more regularly to reassess needs and provide increased support.      Current treatment goals relate to maintaining remission of symptoms and supporting Marisabel's wellbeing through transition to postpartum period. Marisabel has a history of postpartum anxiety and intrusive thoughts/images with onset in previous postpartum period. No history of postpartum mood concerns. Infrequent intrusive images experienced during pregnancy responded " "well to use of cognitive defusion strategies.      Currently, Marisabel described an initial positive adjustment to the postpartum period. She denied and did not exhibit any current mood, anxiety, panic, or  OCD concerns. Marisabel is an incredibly resilient individual with many areas of personal strength and sources of support. I admire her skillful self-advocacy and proactive engagement of her care team, and appreciate these providers' thoughtful and supportive care of Marisabel, including her OB, Dr. Christian MD, of [facility]; Dr. Mary MD, and Dr. Shanthi MD, and M team of [facility]; Dr. Volodymyr ND, [facility], of [facility]iat[facility]; and psychiatric med consultation with Ganesh Santiago and MD John, of [facility] in [facility]).    11 EPDS Total Score: 3; a score of 10 or above is likely to indicate a postpartum mood and anxiety disorder     Plan:   Continue to prioritize sleep; get \"sleep prescription\" of 4-5 hours of consolidated nocturnal sleep. Hydrate and eat nourishing foods regularly.      Continue to practice coping strategies to manage stress and feelings of overwhelm. Engage in self-care, and leverage social support.       As needed, continue to use coping strategies within exposure response prevention framework to maintain remission of panic symptoms and manage and reduce anxiety symptoms.      Continue to follow medication treatment plan (Zoloft, 50 mg) following consultation with Ganesh Santiago and MD John, of [facility] (see note dated ).      As needed/desired, Marisabel can consider a lactation consultation with Diana Wheatley CNM, of [facility]: [phone].      Next therapy appointment has been scheduled for 23 to continue work on treatment goals.        Treatment Plan review due: 23     Joann Moore, PhD  Postdoctoral Fellow     I did not see this patient directly, but have discussed the session with the above " trainee and approve this documentation.      Hailey Young Psy.D., LP                                                                                        Clinical Supervisor

## 2023-04-11 NOTE — PROGRESS NOTES
"[facility]  OUTPATIENT PSYCHOTHERAPY PROGRESS NOTE     Client Name: Griselda Bell          YOB: 1985 (38 year old)            Date of Service:  Apr 11, 2023  Time of Service: 11:04 am to 11:59 am (55 minutes)  Service Type(s): 33143 psychotherapy (53-60 min. with patient and/or family)     Type of service: Telemedicine Psychotherapy   Reason for Telemedicine Visit: patient preference, health precautions   Mode of transmission: Secure real time interactive audio and visual telecommunication system (videoconference via [employer])  Location of originating and distant sites:  ? Originating site (patient location): patient home  ? Distant site (provider site): Dept of Psychiatry and Behavioral Sciences, [facility]   Telemedicine Visit: The patient's condition can be safely assessed and treated via synchronous audio and visual telemedicine encounter.    Patient has agreed to receiving services via telemedicine technology.     Diagnoses:     Adjustment disorder with anxious mood (F43.22)    Panic attack - full remission     Individuals Present:   Patient and provider      Treatment goal(s) being addressed:   1. Enhance use of grounding and relaxation strategies to maintain remission of panic and anxiety symptoms, and promote ability to initiate sleep  2. Maintain progress related to processing feelings of grief and loss, prioritizing sleep and self-care to support wellbeing following delivery of son in context of history of multiple pregnancy loss   3. Support wellbeing and adjustment through upcoming transition to post-partum period     Subjective:  Marisabel is currently approximately 2 weeks postpartum. She reported having a \"rough week\" and having \"moments I've broken down\" in context of stress related to managing a clogged duct and the family experiencing significant food poisoning. Marisabel was unable to keep her antibiotic or psychiatric medication down for 2-3 days " "and believes she experienced side effects of selective serotonin reuptake inhibitor-withdrawal, which she identified as feeling a \"tingling\" sensation in her arms and legs; this tingling went away once she was able to keep her medications down. Marisabel reported ongoing efforts to prioritize sleep and self-care. She has been using coping strategies to manage stress and feelings of overwhelm. Marisabel reported \"good\" mood in between moments of overwhelm; she is enjoying gardening, watching TV, and outdoor walks. She is enjoying bonding and spending time with [place]. Marisabel denied concerns related to low mood, worries, anxiety or panic, or intrusive thoughts/images.  has returned to work, mother-in-law is providing instrumental/emotional support, and Marisabel's parents arrive on Friday to provide additional support. Older daughter's adjustment to family of four is going \"really well overall,\" Marisabel is planning her birthday celebrations for later this week. NANCY is gaining weight and feeding is going well. NANCY continues to adjust to day/night cues; Marisabel and  are sharing nighttime caregiving activities. Marisabel is pumping approximately every 3 hours. Current longest stretch of consolidated nocturnal sleep is between 3-4 hours a night.     Marisabel did not report any changes in medication.       Treatment:   Individual psychotherapy with cognitive behavioral therapy (CBT) framework leveraging psychoeducation and therapeutic strategies drawn from Practical Resources for Effective Postpartum Parenting (PREPP). Today, our focus was on ongoing support of Marisabel's transition and adjustment to the postpartum period. Supported Marisabel in holding space for thoughts and feelings related to overall adjustment,  recovery, and feelings of grief related to transition to exclusively pumping/inability to breastfeed. Assessed current concerns, evaluating for any mood or anxiety symptoms or concerns. Reviewed/discussed psychoeducation on " "Baby Blues. Together we discussed coping plan to support Marisabel's mood and wellbeing, involving 4-5 hours of consolidated nocturnal sleep, continuing to hydrate and regularly eat nourishing foods, sunshine/outdoor time or gentle movement/walks as able, leveraging emotional and instrumental support to allow her to engage in self-care. Continued to emphasize Marisabel's strengths at individual, social, and care team levels. Provided reflective listening and positive reinforcement throughout.       Assessment and Progress:   Appearance:  Appropriately-dressed and groomed   Behavior/relationship to examiner/demeanor:  Pleasant, open, fatigued   Motor activity/EPS: Within normal limits  Speech rate:  Within normal limits  Speech volume:  Within normal limits  Speech articulation:  Within normal limits  Speech coherence:  Within normal limits  Speech spontaneity:  Within normal limit  Mood (subjective report): \"it's been a rough week\"  Affect (objective appearance): Mood-congruent; teary at times when discussing feelings of overwhelm and grief     Thought Process (Associations):  Goal directed  Thought process (Rate):  Within normal limits  Thought content: None  Abnormal Perception: None  Insight:  Within normal limits  Judgment:  Within normal limit     Marisabel continues to benefit from psychotherapy and medication (Zoloft 50 mg). She has achieved full remission of anxiety and panic symptoms, and her diagnosis was recently updated to Adjustment Disorder with anxious mood to signify treatment gains and remission of symptoms. Marisabel has made strong progress towards her treatment goal to process feelings of grief and loss following multiple pregnancy losses. Prior to delivery we had been meeting every other week, given Marisabel's progress and needs. In context of immediate postpartum period we have agreed to meet more regularly to reassess needs and provide increased support.     Current treatment goals relate to maintaining " "remission of symptoms and supporting Marisabel's wellbeing through transition to postpartum period. Marisabel has a history of postpartum anxiety and intrusive thoughts/images with onset in previous postpartum period. No history of postpartum mood concerns. Infrequent intrusive images experienced during pregnancy responded well to use of cognitive defusion strategies.     Currently, Marisabel's adjustment to the postpartum period appears to be going well overall. She does not report or exhibit any current mood, anxiety, or  OCD concerns. Marisabel is an incredibly resilient individual with many areas of personal strength and sources of support. I admire her skillful self-advocacy and proactive engagement of her care team, and appreciate these providers' thoughtful and supportive care of Marisabel, including her OB, Dr. Christian MD, of [facility]; Dr. Mary MD, and Dr. Shanthi MD, and M team of [facility]; Dr. Volodymyr ND, [facility], of [facility] Psychiatry; and psychiatric med consultation with Ganesh Santiago and MD John, of [facility] in [facility]).     Plan:   Continue to prioritize sleep; get \"sleep prescription\" of 4-5 hours of consolidated nocturnal sleep. Hydrate and eat nourishing foods regularly.     Continue to practice coping strategies to manage stress and feelings of overwhelm. Engage in self-care, and leverage social support.       As needed, continue to use coping strategies within exposure response prevention framework to maintain remission of panic symptoms and manage and reduce anxiety symptoms.      Continue to follow medication treatment plan (Zoloft, 50 mg) following consultation with Ganesh Santiago and MD John, of [facility] (see note dated ).      As needed/desired, Marisabel can consider a lactation consultation with Diana Wheatley CNM, of [facility]: [phone].      Next therapy appointment has been scheduled for 23 to continue " work on treatment goals.        Treatment Plan review due: 6/23/23     Joann Moore, PhD  Postdoctoral Fellow     I did not see this patient directly, but have discussed the session with the above trainee and approve this documentation.      Hailey Young Psy.D.,                                                                                         Clinical Supervisor

## 2023-04-18 ENCOUNTER — VIRTUAL VISIT (OUTPATIENT)
Dept: PSYCHIATRY | Facility: CLINIC | Age: 38
End: 2023-04-18
Attending: PSYCHOLOGIST
Payer: COMMERCIAL

## 2023-04-18 DIAGNOSIS — F43.22 ADJUSTMENT DISORDER WITH ANXIOUS MOOD: Primary | ICD-10-CM

## 2023-04-18 DIAGNOSIS — F41.0 PANIC ATTACK: ICD-10-CM

## 2023-04-18 PROCEDURE — 90834 PSYTX W PT 45 MINUTES: CPT | Mod: HN

## 2023-04-18 NOTE — PROGRESS NOTES
[facility]  OUTPATIENT PSYCHOTHERAPY PROGRESS NOTE     Client Name: Griselda Bell          YOB: 1985 (38 year old)            Date of Service:  Apr 18, 2023  Time of Service: 11:03 am to 11:49 am (46 minutes)  Service Type(s): 89410 psychotherapy (38-52 min. with patient and/or family)     Type of service: Telemedicine Psychotherapy   Reason for Telemedicine Visit: patient preference, health precautions   Mode of transmission: Secure real time interactive audio and visual telecommunication system (videoconference via [employer]  Location of originating and distant sites:  ? Originating site (patient location): patient home  ? Distant site (provider site): [facility] Dept of Psychiatry and Behavioral Sciences, [facility]   Telemedicine Visit: The patient's condition can be safely assessed and treated via synchronous audio and visual telemedicine encounter.    Patient has agreed to receiving services via telemedicine technology.     Diagnoses:     Adjustment disorder with anxious mood (F43.22)    Panic attack - full remission     Individuals Present:   Patient and provider      Treatment goal(s) being addressed:   1. Enhance use of grounding and relaxation strategies to maintain remission of panic and anxiety symptoms, and promote ability to initiate sleep  2. Maintain progress related to processing feelings of grief and loss, prioritizing sleep and self-care to support wellbeing following delivery of son in context of history of multiple pregnancy loss   3. Support wellbeing and adjustment through upcoming transition to post-partum period     Subjective:  Marisabel is currently 3 weeks postpartum. She reported managing stress over the past week related to her parents arriving and falling sick with GI illness. She endorsed feelings of worry, grief, loss, and anger in context of observing impact of stomach illness on her father's health in context of longstanding  "vulnerabilities related to diabetes. Marisabel denied concerns related to panic or significant anxiety symptoms, intrusive thoughts/images, low mood, or depression. She is continuing to prioritize sleep and getting 4 hours of consolidated sleep, plus two other stretches of ~3 hours + ~2 hours. Marisabel has been engaging in self-care and activities that she enjoys (gardening, light movement/exercise has been helpful to mood and managing stress). Mother and  are providing instrumental and emotional support. Marisabel noted she is continuing to enjoy bonding and spending time with [place]. She feels exclusive pumping/feeding is going well; recent concerns related to mastitis have resolved over the past week. Marisabel described  recovery as \"really good\"; no concerns related to pain/discomfort. Older daughter's adjustment is going well and family celebrated her 4th birthday today and this week.      Marisabel did not report any changes in medication.       Treatment:   Individual psychotherapy with cognitive behavioral therapy (CBT) framework leveraging psychoeducation and therapeutic strategies drawn from Practical Resources for Effective Postpartum Parenting (PREPP). Today, ongoing focus on supporting Marisabel's transition and adjustment to the postpartum period. Ongoing support of Marisabel in holding space for thoughts and feelings related to overall adjustment and feelings of worry, grief, and anger related to father's illness. Discussion of use of relaxation strategies, and cognitive/behavioral strategies to support healthy emotional boundaries. Together we continued to assess/discuss current concerns, and identifying coping strategies to support Marisabel's mood, wellbeing, and adjustment. Provided positive reinforcement/coaching to support Marisabel's efforts to prioritize sleep, self-care, gentle movement, enjoyable activities, social support. Continued to emphasize Marisabel's strengths at individual, family, social network, and care " "team levels. Provided reflective listening and positive reinforcement throughout.       Assessment and Progress:   Appearance:  Appropriately-dressed and groomed   Behavior/relationship to examiner/demeanor:  Pleasant, open, fatigued   Motor activity/EPS: Within normal limits  Speech rate:  Within normal limits  Speech volume:  Within normal limits  Speech articulation:  Within normal limits  Speech coherence:  Within normal limits  Speech spontaneity:  Within normal limit  Mood (subjective report): \"ok\"  Affect (objective appearance): Mood-congruent, reflective, brighter than our previous visit     Thought Process (Associations):  Goal directed  Thought process (Rate):  Within normal limits  Thought content: None  Abnormal Perception: None  Insight:  Within normal limits  Judgment:  Within normal limit     Marisabel continues to benefit from psychotherapy and medication (Zoloft 50 mg). Currently, she has made steady progress in adjusting positively to the postpartum period in the context of managing additional stress related to family illness. Currently, Marisabel does not report or exhibit any anxiety,  OCD, or mood concerns. Marisabel is an incredibly resilient individual with many areas of personal strength and sources of support. Prior to delivery we had been meeting every other week, given Marisabel's significant progress and needs at that time. In context of immediate postpartum period we have agreed to meet more regularly to reassess needs and provide increased support. Current treatment goals relate to maintaining remission of anxiety and panic symptoms, and continuing to support Marisabel's wellbeing through transition to postpartum period. Given Marisabel's recent achievement of full remission of anxiety and panic symptoms, her diagnosis was recently updated to Adjustment Disorder with anxious mood. She has a history of postpartum anxiety and intrusive thoughts/images with onset in previous postpartum period. No history " "of postpartum mood concerns. Infrequent intrusive images experienced during pregnancy responded well to use of cognitive defusion strategies.       Plan:   Continue to prioritize sleep; get \"sleep prescription\" of 4-5 hours of consolidated nocturnal sleep. Hydrate and eat nourishing foods regularly.      Continue to practice coping strategies to manage stress and feelings of overwhelm. Engage in self-care, leverage social support, practice cognitive and behavioral strategies to support healthy emotional boundaries.       As needed, continue to use coping strategies within exposure response prevention framework to maintain remission of panic and anxiety symptoms.      Continue to follow medication treatment plan (Zoloft, 50 mg) following consultation with Ganesh Santiago and MD John, of [facility] (see note dated 9/1).      As needed/desired, Marisabel can consider a lactation consultation with Diana Wheatley CNM, of [facility]: [phone].      Next therapy appointment has been scheduled for 4/25/23 to continue work on treatment goals.        Treatment Plan review due: 6/23/23     Joann Moore, PhD  Postdoctoral Fellow     I did not see this patient directly, but have discussed the session with the above trainee and approve this documentation.      Hailey Young Psy.D.,                                                                                         Clinical Supervisor         "

## 2023-04-25 ENCOUNTER — VIRTUAL VISIT (OUTPATIENT)
Dept: PSYCHIATRY | Facility: CLINIC | Age: 38
End: 2023-04-25
Attending: PSYCHOLOGIST
Payer: COMMERCIAL

## 2023-04-25 DIAGNOSIS — F43.22 ADJUSTMENT DISORDER WITH ANXIOUS MOOD: Primary | ICD-10-CM

## 2023-04-25 PROCEDURE — 90837 PSYTX W PT 60 MINUTES: CPT | Mod: VID

## 2023-04-25 NOTE — PROGRESS NOTES
[facility]LL[facility]  OUTPATIENT PSYCHOTHERAPY PROGRESS NOTE     Client Name: Griselda Bell          YOB: 1985 (38 year old)            Date of Service:  Apr 25, 2023  Time of Service: 11:06 am to 12:05 pm (59 minutes)  Service Type(s): 79254 psychotherapy (38-52 min. with patient and/or family)     Type of service: Telemedicine Psychotherapy   Reason for Telemedicine Visit: patient preference, health precautions   Mode of transmission: Secure real time interactive audio and visual telecommunication system (videoconference via [employer])  Location of originating and distant sites:  ? Originating site (patient location): patient home  ? Distant site (provider site): [facility], [facility]   Telemedicine Visit: The patient's condition can be safely assessed and treated via synchronous audio and visual telemedicine encounter.    Patient has agreed to receiving services via telemedicine technology.     Diagnoses:     Adjustment disorder with anxious mood (F43.22)    Panic attack - full remission     Individuals Present:   Patient and provider      Treatment goal(s) being addressed:   1. Enhance use of grounding and relaxation strategies to maintain remission of panic and anxiety symptoms, and promote ability to initiate sleep  2. Maintain progress related to processing feelings of grief and loss, prioritizing sleep and self-care to support wellbeing following delivery of son in context of history of multiple pregnancy loss   3. Support wellbeing and adjustment through upcoming transition to post-partum period     Subjective:  Marisabel is currently 4 weeks postpartum. She reported managing acute stress over the past few days in context of work conflict and sleep deprivation while  is traveling and she has been handling night take care giving on her own. Marisabel's parents leave at the end of the week and have been providing significant  "emotional and instrumental support. Marisabel reported increased worries and feeling \"wound up\" about handling solo-parenting next week without support of her parents while her  will be traveling again for several days. Marisabel is continuing to engage/benefit from self-care and activities that she enjoys (gardening, light movement/exercise has been helpful to mood and managing stress). She reported ongoing positive bonding experience with Juan Ramon. Marisabel denied any significant concerns related to low mood, intrusive thoughts, or panic symptoms. She continued to describe  recovery as going well without any concerns related to pain/discomfort.     Marisabel reported a desire to increase her Zoloft prescription to 75 mg, as she had previously considered with Dr. Gonzales, and noted her prescription will run out in approximately 1 week. Marisabel would like prescription to be managed by Dr. Gonzales, but is not scheduled to see Dr. Gonzales again for a few months. Marisabel wondered if she needs to be seen by Ganesh Santiago/John again to increase and transfer prescription to Dr. Gonzales.      Treatment:   Individual psychotherapy with cognitive behavioral therapy (CBT) framework leveraging psychoeducation and therapeutic strategies drawn from Practical Resources for Effective Postpartum Parenting (PREPP). Today, ongoing focus on supporting Marisabel's transition and adjustment to the postpartum period. Supported Marisabel in reflecting on and holding space for difficult thoughts and feelings related to work conflict. Discussed interpersonal effectiveness framework and strategies, explored interpersonal effectiveness goals via magic wand questions, explored benefits of empty chair exercise. Discussed concerns/experience of worries related to solo-parenting, clarified worries/fears, played out to \"worst case scenario\" to take decatastrophizing approach. Together we brainstormed/identified resources and develop cope ahead plan for next " "week, including leveraging mother-in-law for instrumental/emotional support. Consulted with Dr. Lopez regarding prescription questions and developed plan for Marisabel to call Dr. Gonzales's office to transfer/increase prescription, with back-up option to reach out to Dr. Santiago and her nursing partner if bridging is needed for prescription transfer. Provided coaching on use of coping strategies to manage worries, and support Marisabel's mood, wellbeing, and adjustment. Provided positive reinforcement/coaching to support Marisabel's efforts to prioritize sleep, self-care, gentle movement, enjoyable activities, social support. Continued to emphasize Marisabel's strengths at individual, family, social network, and care team levels. Provided reflective listening and positive reinforcement throughout.       Assessment and Progress:   Appearance:  Appropriately-dressed and groomed   Behavior/relationship to examiner/demeanor:  Pleasant, open, fatigued   Motor activity/EPS: Within normal limits  Speech rate:  Within normal limits  Speech volume:  Within normal limits  Speech articulation:  Within normal limits  Speech coherence:  Within normal limits  Speech spontaneity:  Within normal limit  Mood (subjective report): \"really tired and frustrated\", endorsed \"a little anxiety\" this week  Affect (objective appearance): Mood-congruent, reflective, tearful at times    Thought Process (Associations):  Goal directed  Thought process (Rate):  Within normal limits  Thought content: None  Abnormal Perception: None  Insight:  Within normal limits  Judgment:  Within normal limit     Marisabel continues to benefit from psychotherapy and medication (Zoloft 50 mg). She is an incredibly resilient individual with many areas of personal strength and sources of support. Currently, Marisabel has made steady progress in adjusting positively to the postpartum period in the context of managing stress related to multiple acute stressors (food poisoning, family " "illness). Currently, she does not report or exhibit any panic,  OCD, or mood concerns. Today Marisabel endorsed mild worries over the past week, and reported her desire to proactively manage symptoms by increasing her Zoloft dose to 75 mg and continuing to engage in weekly psychotherapy. Prior to delivery we had been meeting every other week, given Marisabel's significant progress and needs over pregnancy. In the context of immediate postpartum period we have agreed to meet more regularly to reassess needs and provide increased support. Current treatment goals relate to maintaining remission of anxiety and panic symptoms, and continuing to support Marisabel's wellbeing through transition to postpartum period. Given Marisabel's recent achievement of full remission of anxiety and panic symptoms, her diagnosis was recently updated to Adjustment Disorder with anxious mood. She has a history of postpartum anxiety and intrusive thoughts/images with onset in previous postpartum period. No history of postpartum mood concerns. Infrequent intrusive images experienced during pregnancy responded well to use of cognitive defusion strategies.     Answers for HPI/ROS submitted by the patient on 2023  If you checked off any problems, how difficult have these problems made it for you to do your work, take care of things at home, or get along with other people?: Not difficult at all  PHQ9 TOTAL SCORE: 0, indicating minimal depressive symptoms    Plan:   Reach out to office/nurse partner of Dr. Christian MD (OBGYN) to transfer Zoloft prescription and partner with her to increase dose. Marisabel completed a consultation with Ganesh Santiago and MD John, of [facility] (see note dated ). Dr. Lopez/Jack are available to consult and support bridging of prescription as needed.     Continue to prioritize sleep; get \"sleep prescription\" of 4-5 hours of consolidated nocturnal sleep. Hydrate and eat nourishing foods " regularly.      Continue to practice coping strategies to manage worries, stress, and feelings of overwhelm. Engage in self-care, leverage social support, practice cognitive and behavioral strategies to support healthy emotional boundaries.       As needed, continue to use coping strategies within exposure response prevention framework to maintain remission of panic symptoms.        Next therapy appointment has been scheduled for 5/1/23 to continue work on treatment goals.        Treatment Plan review due: 6/23/23     Joann Moore, PhD  Postdoctoral Fellow        I did not see this patient directly, but have discussed the session with the above trainee and approve this documentation.      Hailey Young Psy.D.,                                                                                         Clinical Supervisor

## 2023-05-04 ENCOUNTER — VIRTUAL VISIT (OUTPATIENT)
Dept: PSYCHIATRY | Facility: CLINIC | Age: 38
End: 2023-05-04
Attending: PSYCHOLOGIST
Payer: COMMERCIAL

## 2023-05-04 DIAGNOSIS — F43.22 ADJUSTMENT DISORDER WITH ANXIOUS MOOD: Primary | ICD-10-CM

## 2023-05-04 PROCEDURE — 90837 PSYTX W PT 60 MINUTES: CPT | Mod: HN

## 2023-05-04 NOTE — PROGRESS NOTES
[facility]OMEN[facility]S [facility]LLBEING PROGRAM  OUTPATIENT PSYCHOTHERAPY PROGRESS NOTE     Client Name: Griselda Bell          YOB: 1985 (38 year old)            Date of Service:  May 4, 2023  Time of Service: 9:02 am to 9:55 pm (53 minutes)  Service Type(s): 08598 psychotherapy (53-60 min. with patient and/or family)     Type of service: Telemedicine Psychotherapy   Reason for Telemedicine Visit: patient preference, health precautions   Mode of transmission: Secure real time interactive audio and visual telecommunication system (videoconference via [employer])  Location of originating and distant sites:  ? Originating site (patient location): patient home  ? Distant site (provider site): [facility] of Psychiatry [facility] Behavioral [facility], [facility]   Telemedicine Visit: The patient's condition can be safely assessed and treated via synchronous audio and visual telemedicine encounter.    Patient has agreed to receiving services via telemedicine technology.     Diagnoses:     Adjustment disorder with anxious mood (F43.22)    Panic attack - full remission     Individuals Present:   Patient and provider      Treatment goal(s) being addressed:   1. Enhance use of grounding and relaxation strategies to maintain remission of panic and anxiety symptoms, and promote ability to initiate sleep  2. Maintain progress related to processing feelings of grief and loss, prioritizing sleep and self-care to support wellbeing following delivery of son in context of history of multiple pregnancy loss   3. Support wellbeing and adjustment through upcoming transition to post-partum period     Subjective:  Marisabel is currently 5 weeks postpartum. Marisabel reported an improvement in mood, worries, and managing parenting stress over the past few days while her daughter was ill, her  was traveling for work, and her parents had just left to return home after an extended visit to support the family's adjustment to the  "postpartum period. Today she noted she is feeling \"hopeful and excited about the upcoming weeks\" and denied any significant concerns related to low mood, intrusive thoughts, anxiety, or panic symptoms. Marisabel continued to report benefit from prioritizing sleep, self-care, and activities that she enjoys (gardening, light movement/exercise, getting out of the house). She reported ongoing positive bonding with Juan Ramon and positive adjustment of her daughter to family of four. Marisabel continued to describe  recovery as going well without any concerns related to pain/discomfort.      Marisabel reported she and her OB, Dr. Christian MD, of [facility], partnered to increase her Zoloft prescription to 75 mg. She will see Dr. Gonzales next in three weeks for a follow-up appointment.        Treatment:   Individual psychotherapy with cognitive behavioral therapy (CBT) framework leveraging psychoeducation and therapeutic strategies drawn from Practical Resources for Effective Postpartum Parenting (PREPP). Today, ongoing focus on supporting Marisabel's wellbeing and adjustment to the postpartum period. Provided coaching and positive reinforcement of Marisabel's use of cognitive, behavioral, and social coping strategies to manage stress, reduce worries, and support mood. Supported Marisabel in continuing to leverage radical acceptance to \"let go\" of work related stressors. Continued to discuss and support Marisaebl's efforts to prioritize sleep, self-care, gentle movement, and enjoyable activities. With Marisabel, continued to explore and support ways to help her attain consolidated nocturnal sleep of 4-5 hours or more. Discussed benefits of Marisabel's use of instrumental and emotional support from mother-in-law. Continued to emphasize Marisabel's strengths at individual, family, social network, and care team levels. Provided reflective listening and positive reinforcement throughout.       Assessment and Progress:   Appearance:  Appropriately-dressed and " "groomed   Behavior/relationship to examiner/demeanor:  Pleasant, open, appeared more energized than recent appointments  Motor activity/EPS: Within normal limits  Speech rate:  Within normal limits  Speech volume:  Within normal limits  Speech articulation:  Within normal limits  Speech coherence:  Within normal limits  Speech spontaneity:  Within normal limit  Mood (subjective report): \"hopeful and excited\"    Affect (objective appearance): Mood-congruent, appeared more emotionally resilient than recent appointments    Thought Process (Associations):  Goal directed  Thought process (Rate):  Within normal limits  Thought content: None  Abnormal Perception: None  Insight:  Within normal limits  Judgment:  Within normal limit     Marisabel continues to benefit from psychotherapy and medication (Zoloft 75 mg). She is an incredibly resilient individual with many areas of personal strength and sources of support. Currently, Marisabel has made steady progress in adjusting positively to the postpartum period in the context of managing stress related to multiple acute stressors (food poisoning, family illness). In the context of these stressors, Marisabel partnered with her OB, Dr. Gonzales, to proactively manage symptoms by increasing her Zoloft dose to 75 mg and continuing to engage in weekly psychotherapy. Today Marisabel reported improvement in managing stress. Currently, she does not report or exhibit any panic,  OCD, or mood concerns. Prior to delivery we had been meeting every other week, given Marisabel's significant progress and needs over pregnancy. In the context of immediate postpartum period we have been meeting more regularly to assess needs and provide increased support. Current treatment goals relate to maintaining remission of anxiety and panic symptoms, and continuing to support Marisabel's wellbeing through transition to postpartum period. Given Marisabel's recent achievement of full remission of anxiety and panic symptoms, " "her diagnosis was recently updated to Adjustment Disorder with anxious mood. She has a history of postpartum anxiety and intrusive thoughts/images with onset in previous postpartum period. No history of postpartum mood concerns. Infrequent intrusive images experienced during pregnancy responded well to use of cognitive defusion strategies.      Plan:   Continue to prioritize sleep; get \"sleep prescription\" of 4-5 hours of consolidated nocturnal sleep. Hydrate and eat nourishing foods regularly.      Continue to practice coping strategies to manage worries, stress, and feelings of overwhelm. Engage in self-care, leverage social support, practice cognitive and behavioral strategies to support healthy emotional boundaries.       As needed, continue to use coping strategies within exposure response prevention framework to maintain remission of panic symptoms.        Next therapy appointment has been scheduled for 5/12/23 at 10 am to continue work on treatment goals.        Treatment Plan review due: 6/23/23     Joann Moore, PhD  Postdoctoral Fellow     I did not see this patient directly, but have discussed the session with the above trainee and approve this documentation.      Hailey Young Psy.D.,                                                                                         Clinical Supervisor   "

## 2023-05-12 ENCOUNTER — TELEPHONE (OUTPATIENT)
Dept: PSYCHIATRY | Facility: CLINIC | Age: 38
End: 2023-05-12
Payer: COMMERCIAL

## 2023-05-12 NOTE — TELEPHONE ENCOUNTER
This writer left a message for Marisabel to inquire if she was still planning to attend ourin-person appointment today at 10 am. Encouraged Marisabel to contact me directly if she wished to transition to meeting virtually or needed to reschedule. Reminded her I am out of the office next week, and will message her via [employer] to offer openings for the following week.    Joann Moore, PhD  Postdoctoral Fellow   [facility]

## 2023-05-22 ENCOUNTER — VIRTUAL VISIT (OUTPATIENT)
Dept: PSYCHIATRY | Facility: CLINIC | Age: 38
End: 2023-05-22
Attending: PSYCHOLOGIST
Payer: COMMERCIAL

## 2023-05-22 DIAGNOSIS — F43.22 ADJUSTMENT DISORDER WITH ANXIOUS MOOD: Primary | ICD-10-CM

## 2023-05-22 DIAGNOSIS — F41.0 PANIC ATTACK: ICD-10-CM

## 2023-05-22 PROCEDURE — 90837 PSYTX W PT 60 MINUTES: CPT | Mod: HN

## 2023-05-22 NOTE — PROGRESS NOTES
WOMEN'S WELLBEING PROGRAM  OUTPATIENT PSYCHOTHERAPY PROGRESS NOTE     Client Name: Griselda Bell          YOB: 1985 (38 year old)            Date of Service:  May 22, 2023  Time of Service: 10:04 am to 10:57 pm (53 minutes)  Service Type(s): 23760 psychotherapy (53-60 min. with patient and/or family)     Type of service: Telemedicine Psychotherapy   Reason for Telemedicine Visit: patient preference, health precautions   Mode of transmission: Secure real time interactive audio and visual telecommunication system (videoconference via ttwick)  Location of originating and distant sites:  ? Originating site (patient location): patient home  ? Distant site (provider site): HIPAA compliant location within provider home/remote setting  Telemedicine Visit: The patient's condition can be safely assessed and treated via synchronous audio and visual telemedicine encounter.    Patient has agreed to receiving services via telemedicine technology.     Diagnoses:     Adjustment disorder with anxious mood (F43.22)    Panic attack - full remission     Individuals Present:   Patient and provider      Treatment goal(s) being addressed:   1. Enhance use of grounding and relaxation strategies to maintain remission of panic and anxiety symptoms, and promote ability to initiate sleep  2. Maintain progress related to processing feelings of grief and loss, prioritizing sleep and self-care to support wellbeing following delivery of son in context of history of multiple pregnancy loss   3. Support wellbeing and adjustment through upcoming transition to post-partum period     Subjective:  Marisabel is currently 8 weeks postpartum. Marisabel reported increased stress two weeks ago while infant son, Juan Ramon, was ill with croup. She noted a positive experience returning to work for a one-off donor visit, and a supportive, meaningful conversation with her Director afterwards recognizing Marisabel's contributions to her team. Marisabel reported a  "sustained improvement in mood, worries, and managing parenting stress over the past week. Today she denied any current concerns related to low mood, intrusive thoughts, anxiety, or panic symptoms. She noted she is feeling \"proud\" of all the coping she has done in the past eight weeks, optimistic about ongoing adjustment to being a family of four, and \"excited\" for upcoming weeks.      Marisabel denied any changes in medication.      Treatment:   Individual psychotherapy with cognitive behavioral therapy (CBT) framework leveraging psychoeducation and therapeutic strategies drawn from Practical Resources for Effective Postpartum Parenting (PREPP). Today, ongoing focus on supporting Marisabel's wellbeing and adjustment to the postpartum period. Provided coaching and positive reinforcement of Marisabel's use of cognitive, behavioral, and social coping strategies to manage stress, reduce worries, and support mood. Focus on recognizing and reinforcing Marisabel's recent use of cognitive strategies to restructure catastrophic thinking. Supported Marisabel in processing recent positive work experience/conversation with Director. With Marisabel, explored the importance of Marisabel's professional skills/role in her own sense of self/identity. Continued to emphasize Marisabel's progress, coping abilities, talents, and strengths at individual, family, professional, social network, and care team levels. Supported Marisabel in holding space for feelings of grief, loss, and acceptance related to \"saying goodbye\" to Dr. Gonzales after seeing her regularly for the past 4 years as Marisabel in now transitioning to seeing her once a year. Given Marisabel's feelings of positive adjustment and effective coping with stressors, discussed option to continue to meet weekly or transition to biweekly as she feels comfortable; together we agreed Marisabel is ready to transition to every-other-week appointments. Provided reflective listening and positive " "reinforcement throughout.       Assessment and Progress:   Appearance:  Appropriately-dressed and groomed   Behavior/relationship to examiner/demeanor:  Pleasant, open, appeared brighter and more energized than recent appointments  Motor activity/EPS: Within normal limits  Speech rate:  Within normal limits  Speech volume:  Within normal limits  Speech articulation:  Within normal limits  Speech coherence:  Within normal limits  Speech spontaneity:  Within normal limit  Mood (subjective report): \"proud and excited\"    Affect (objective appearance): Within normal limits, euthymic     Thought Process (Associations):  Goal directed  Thought process (Rate):  Within normal limits  Thought content: None  Abnormal Perception: None  Insight:  Within normal limits  Judgment:  Within normal limit     Marisabel continues to benefit from psychotherapy and medication (Zoloft 75 mg). She is an incredibly resilient individual with many areas of personal strengths and sources of support. Marisabel continues to make steady progress in adjusting positively to the postpartum period in the context of managing stress related to multiple acute stressors (food poisoning, family illnesses). In the context of these stressors, Marisabel partnered with her OB, Dr. Gonzales, to proactively manage symptoms by increasing her Zoloft dose to 75 mg and continuing to engage in weekly psychotherapy. Today Marisabel reported improvement in managing stress. Currently, she does not report or exhibit any panic,  OCD, or mood concerns. Prior to delivery we had been meeting every other week, given Marisabel's significant progress and needs over pregnancy. In the context of immediate postpartum period we met weekly to assess needs and provide increased support. Today, we agreed Marisabel is ready to transition to meeting every-other-week given feelings of positive adjustments and effective coping with stressors over the past 8 weeks. Current treatment goals relate " "to maintaining remission of anxiety and panic symptoms, and continuing to support Marisabel's wellbeing through transition to postpartum period. Given Marisabel's recent achievement of full remission of anxiety and panic symptoms, her diagnosis was recently updated to Adjustment Disorder with anxious mood. She has a history of postpartum anxiety and intrusive thoughts/images with onset in previous postpartum period. No history of postpartum mood concerns. Infrequent intrusive images experienced during pregnancy responded well to use of cognitive defusion strategies.      Plan:   Continue to prioritize sleep; get \"sleep prescription\" of 4-5 hours of consolidated nocturnal sleep. Hydrate and eat nourishing foods regularly.      Continue to practice coping strategies to manage worries, stress, and feelings of overwhelm. Engage in self-care, leverage social support, practice cognitive and behavioral strategies to support healthy emotional boundaries.       As needed, continue to use coping strategies within exposure response prevention framework to maintain remission of panic symptoms.        Next therapy appointment has been scheduled for 6/9/23 at 10 am to continue work on treatment goals.        Treatment Plan review due: 6/23/23     Joann Moore, PhD  Postdoctoral Fellow    I did not see this patient directly, but have discussed the session with the above trainee and approve this documentation.      Hailey Young Psy.D., LP                                                                                        Clinical Supervisor      "

## 2023-06-09 ENCOUNTER — VIRTUAL VISIT (OUTPATIENT)
Dept: PSYCHIATRY | Facility: CLINIC | Age: 38
End: 2023-06-09
Attending: PSYCHOLOGIST
Payer: COMMERCIAL

## 2023-06-09 DIAGNOSIS — F43.22 ADJUSTMENT DISORDER WITH ANXIOUS MOOD: Primary | ICD-10-CM

## 2023-06-09 DIAGNOSIS — F41.0 PANIC ATTACK: ICD-10-CM

## 2023-06-09 PROCEDURE — 90837 PSYTX W PT 60 MINUTES: CPT | Mod: HN

## 2023-06-09 NOTE — PROGRESS NOTES
"[facility]LL[facility]  OUTPATIENT PSYCHOTHERAPY PROGRESS NOTE     Client Name: Griselda Bell          YOB: 1985 (38 year old)            Date of Service:  June 9, 2023  Time of Service: 10:01 am to 10:54 am (53 minutes)  Service Type(s): 45181 psychotherapy (53-60 min. with patient and/or family)     Type of service: Telemedicine Psychotherapy   Reason for Telemedicine Visit: patient preference, health precautions   Mode of transmission: Secure real time interactive audio and visual telecommunication system (videoconference via [employer])  Location of originating and distant sites:  ? Originating site (patient location): patient home  ? [facility]   Telemedicine Visit: The patient's condition can be safely assessed and treated via synchronous audio and visual telemedicine encounter.    Patient has agreed to receiving services via telemedicine technology.     Diagnoses:     Adjustment disorder with anxious mood (F43.22)    Panic attack - full remission     Individuals Present:   Patient and provider      Treatment goal(s) being addressed:   1. Enhance use of grounding and relaxation strategies to maintain remission of panic and anxiety symptoms, and promote ability to initiate sleep  2. Maintain progress related to processing feelings of grief and loss, prioritizing sleep and self-care to support wellbeing following delivery of son in context of history of multiple pregnancy loss   3. Support wellbeing and adjustment through upcoming transition to post-partum period     Subjective:  Marisabel is currently 10 weeks postpartum. Today she reported her mood has been \"really good.\" Since our last appointment she has not experienced any concerns related to low mood, intrusive thoughts, anxiety, or panic symptoms. She feels increase to Zoloft 75 mg has been beneficial, and continued to report positive response to use " "of cognitive and behavioral coping strategies. Marisabel will return to work on 7/10, and noted she was hoping to talk about the upcoming transition, how to \"manage everything and find time for myself\" following this transition. She recently completed a consultation with colorectal doctor to discuss possibility of surgery to target fistula related to childbirth injury from daughter's delivery.      Marisabel denied any changes in medication.      Treatment:   Individual psychotherapy with cognitive behavioral therapy (CBT) framework leveraging psychoeducation and therapeutic strategies drawn from Practical Resources for Effective Postpartum Parenting (PREPP). Today, our primary focus continued to be on supporting Marisabel's wellbeing and adjustment to the postpartum period. Supported Marisabel in holding space for thoughts and feelings related to upcoming transition back to work. With Marisabel, identified priorities, goals, and wishes for transition (e.g., continuing to find time for myself, prioritizing exercise), and solution-oriented approach used to help identify resources, communicate needs and leverage social support, and support ongoing use of coping strategies. Provided ongoing coaching and positive reinforcement of Marisabel's use of cognitive, behavioral, and social coping strategies to manage stress, reduce worries, and support mood.Marisabel spent time reflecting on recent surgical consultation, desire to \"close the chapter on everything I dealt with.\" Supported Marisabel in processing thoughts/feelings. Provided referral option for second opinion if desired. Supported Marisabel in continuing to hold dialectic of space for feelings of grief, loss, happiness, and acceptance in context of last 4 years of difficult experiences. Checked in on Marisabel's needs and how she is feeling about recent transition to meeting every other week. She continues to feel comfortable with this frequency of visits, and expressed desire to continue to meet " "biweekly through August, then transition to quarterly \"booster\" appointments. Provided reflective listening and positive reinforcement throughout.       Assessment and Progress:   Appearance:  Appropriately-dressed and groomed   Behavior/relationship to examiner/demeanor:  Pleasant, open, appeared bright and energized   Motor activity/EPS: Within normal limits  Speech rate:  Within normal limits  Speech volume:  Within normal limits  Speech articulation:  Within normal limits  Speech coherence:  Within normal limits  Speech spontaneity:  Within normal limit  Mood (subjective report): \"really good\"   Affect (objective appearance): Within normal limits, euthymic     Thought Process (Associations):  Goal directed  Thought process (Rate):  Within normal limits  Thought content: None  Abnormal Perception: None  Insight:  Within normal limits  Judgment:  Within normal limit     Marisabel continues to benefit from psychotherapy and medication (Zoloft 75 mg). She is an incredibly resilient individual with many areas of personal strengths and sources of support. Marisabel continues to make steady progress in adjusting positively to the postpartum period. In the context of managing several stressors in the early postpartum weeks, Marisabel partnered with her OB, Dr. Gonzales, of [facility], to proactively manage symptoms by increasing her Zoloft dose to 75 mg and continued to engage in weekly psychotherapy. Today Marisabel reported improvement in mood and stress levels, with no anxiety, mood, or stress/distress concerns since our last appointment. She continues to deny any panic or  OCD concerns. Prior to delivery we had been meeting every other week, given Marisabel's significant progress and needs over pregnancy. In the context of immediate postpartum period we met weekly to assess needs and provide increased support. At our last appointment we agreed Marisabel is ready to transition back to meeting every-other-week given feelings of positive " "adjustments and effective coping with stressors. Current treatment goals relate to maintaining remission of anxiety and panic symptoms, and continuing to support Marisabel's wellbeing through transition to postpartum period. Given Marisabel's recent achievement of full remission of anxiety and panic symptoms, her diagnosis was recently updated to Adjustment Disorder with anxious mood. She has a history of postpartum anxiety and intrusive thoughts/images with onset in previous postpartum period. No history of postpartum mood concerns. Infrequent intrusive images experienced during pregnancy responded well to use of cognitive defusion strategies.      Answers for HPI/ROS submitted by the patient on 6/9/2023  If you checked off any problems, how difficult have these problems made it for you to do your work, take care of things at home, or get along with other people?: Not difficult at all  PHQ9 TOTAL SCORE: 0, indicating minimal depressive symptoms     Plan:   Continue to prioritize sleep; get \"sleep prescription\" of 4-5 hours of consolidated nocturnal sleep. Hydrate and eat nourishing foods regularly. Prioritize exercise/movement as preferred self-care practice.      Continue to practice coping strategies to manage worries and stress. Engage in self-care, leverage social support, practice cognitive and behavioral strategies to support healthy emotional boundaries.       As needed, continue to use coping strategies within exposure response prevention framework to maintain remission of panic symptoms.        Next therapy appointment has been scheduled for 6/9/23 at 10 am to continue work on treatment goals.        Treatment Plan review due: 6/23/23     Joann Moore, PhD  Postdoctoral Fellow    I did not see this patient directly, but have discussed the session with the above trainee and approve this documentation.      Hailey Young, Mariano., " LP                                                                                        Clinical Supervisor

## 2023-06-27 ENCOUNTER — VIRTUAL VISIT (OUTPATIENT)
Dept: PSYCHIATRY | Facility: CLINIC | Age: 38
End: 2023-06-27
Attending: PSYCHOLOGIST
Payer: COMMERCIAL

## 2023-06-27 DIAGNOSIS — F41.0 PANIC ATTACK: ICD-10-CM

## 2023-06-27 DIAGNOSIS — F43.22 ADJUSTMENT DISORDER WITH ANXIOUS MOOD: Primary | ICD-10-CM

## 2023-06-27 PROCEDURE — 90837 PSYTX W PT 60 MINUTES: CPT | Mod: VID

## 2023-06-27 NOTE — PROGRESS NOTES
[facility]LL[facility] PROGRAM  OUTPATIENT PSYCHOTHERAPY PROGRESS NOTE     Client Name: Griselda Bell          YOB: 1985 (38 year old)            Date of Service:  June 27, 2023  Time of Service: 10:02 am to 10:58 am (56 minutes)  Service Type(s): 24290 psychotherapy (53-60 min. with patient and/or family)     Type of service: Telemedicine Psychotherapy   Reason for Telemedicine Visit: patient preference, health precautions   Mode of transmission: Secure real time interactive audio and visual telecommunication system (videoconference via [employer])  Location of originating and distant sites:  ? Originating site (patient location): patient home  ? Distant site (provider site): [facility]   Telemedicine Visit: The patient's condition can be safely assessed and treated via synchronous audio and visual telemedicine encounter.    Patient has agreed to receiving services via telemedicine technology.     Diagnoses:     Adjustment disorder with anxious mood (F43.22)    Panic attack - full remission     Individuals Present:   Patient presented with infant son       Treatment goal(s) being addressed:   1. Enhance use of grounding and relaxation strategies to maintain remission of panic and anxiety symptoms, and promote ability to initiate sleep  2. Maintain progress related to processing feelings of grief and loss, prioritizing sleep and self-care to support wellbeing following delivery of son in context of history of multiple pregnancy loss   3. Support wellbeing and adjustment through upcoming transition to post-partum period     Subjective:  Marisabel is currently 12 weeks postpartum. Today she denied any concerns related to low mood, intrusive thoughts, anxiety, or panic symptoms, and reported maintained remission of panic and anxiety symptoms, and sustained good mood since our last appointment. She feels increase to Zoloft 75 mg has been  "beneficial as well as ongoing use of coping strategies to prioritize sleep, self-care, and exercise/movement. Marisabel will return to work on 7/10. She noted she is both \"feeling ready to be back,\" and feeling some sadness to be ending her last parental leave. She applied to a new job this week that would be \"a career advancing role,\" and is excited for the upcoming interview.      Marisabel denied any changes in medication.      Treatment:   Individual psychotherapy with cognitive behavioral therapy (CBT) framework leveraging psychoeducation and therapeutic strategies drawn from Practical Resources for Effective Postpartum Parenting (PREPP). Today, we continued to work on supporting Marisabel's wellbeing and positive adjustment to the postpartum period. Continued to provide coaching and positive reinforcement of Marisabel's use of cognitive, behavioral, and social coping strategies to manage stress, maintain remission of panic and anxiety symptoms, and sustain positive mood. Ongoing focus on recognizing and reinforcing Marisabel's progress, coping abilities, talents, and strengths at individual, family, professional, social network, and care team levels. Supported Marisabel in coping ahead for transition back to work, identifying concerns and reviewing strategies she is using to continue to prioritize self-care, time to herself, exercise, and sleep following her return to work. Marisabel spent time reflecting on upcoming job interview, identifying priorities and boundaries she wishes to prioritize in context of possible job transition. Checked in on our plan to continue to meet every other week through August, then transition to meeting quarterly for \"booster\" appointments. Marisabel continues to feel this plan is meeting her needs. Provided reflective listening and positive reinforcement throughout.       Assessment and Progress:   Appearance:  Appropriately-dressed and groomed   Behavior/relationship to examiner/demeanor:  Pleasant, open, " "continued to appear bright, upbeat, and relaxed    Motor activity/EPS: Within normal limits  Speech rate:  Within normal limits  Speech volume:  Within normal limits  Speech articulation:  Within normal limits  Speech coherence:  Within normal limits  Speech spontaneity:  Within normal limit  Mood (subjective report): \"really good\"  Affect (objective appearance): Within normal limits, euthymic     Thought Process (Associations):  Goal directed  Thought process (Rate):  Within normal limits  Thought content: None  Abnormal Perception: None  Insight:  Within normal limits  Judgment:  Within normal limit     Marisabel continues to benefit from psychotherapy and medication (Zoloft 75 mg). She is an incredibly resilient individual with many areas of personal strengths and sources of support. Marisabel continues to make steady progress in adjusting positively to the postpartum period. Today she reflected that she feels positively about partnering with her OB, Dr. Gonzales, to proactively increase her Zoloft dose to 75 mg and plans to maintain this dose for foreseeable future. Mraisabel has not experienced any worries or mood concerns for the past 4-6 weeks. Prior to delivery we had been meeting every other week, given Marisabel's significant progress and needs at that time. In the context of immediate postpartum period we met weekly to assess needs more closely and provide increased support. At 8 weeks postpartum, given feelings of positive adjustments and effective coping with stressors over the past two months, we agreed Marisabel felt ready to transition back to meeting every-other-week. Current treatment goals relate to maintaining remission of anxiety and panic symptoms, and continuing to support Marisabel's wellbeing through transition back to work. Given Marisabel's recent achievement of full remission of anxiety and panic symptoms, her diagnosis was recently updated to Adjustment Disorder with anxious mood. She has a history of postpartum " "anxiety and intrusive thoughts/images with onset in previous postpartum period. No history of postpartum mood concerns. Infrequent intrusive images experienced during pregnancy responded well to use of cognitive defusion strategies.      Plan:   Continue to prioritize sleep; get \"sleep prescription\" of 4-5 hours of consolidated nocturnal sleep. Hydrate and eat nourishing foods regularly. Prioritize exercise/movement as preferred self-care practices.      Continue to practice coping strategies to manage any worries or stress. Engage in self-care, leverage social support, practice cognitive and behavioral strategies to support healthy emotional boundaries.       As needed, continue to use coping strategies within exposure response prevention framework to maintain remission of panic symptoms.        Next therapy appointment has been scheduled for 7/14/23 at 12 pm to continue work on treatment goals.        Treatment Plan review due: 6/23/23/upcoming appointment      Joann Moore, PhD  Postdoctoral Fellow    I did not see this patient directly, but have discussed the session with the above trainee and approve this documentation.      Hailey Young Psy.D.,                                                                                         Clinical Supervisor   "

## 2023-07-14 ENCOUNTER — OFFICE VISIT (OUTPATIENT)
Dept: PSYCHIATRY | Facility: CLINIC | Age: 38
End: 2023-07-14
Attending: PSYCHOLOGIST
Payer: COMMERCIAL

## 2023-07-14 DIAGNOSIS — F43.22 ADJUSTMENT DISORDER WITH ANXIOUS MOOD: Primary | ICD-10-CM

## 2023-07-14 PROCEDURE — 90837 PSYTX W PT 60 MINUTES: CPT | Mod: HN

## 2023-07-18 NOTE — PROGRESS NOTES
WOMEN'S WELLBEING PROGRAM  OUTPATIENT PSYCHOTHERAPY PROGRESS NOTE     Client Name: Griselda Bell          YOB: 1985 (38 year old)            Date of Service:  July 14, 2023  Time of Service: 12:05 pm to 1:05 pm (60 minutes)  Service Type(s): 08817 psychotherapy (53-60 min. with patient and/or family)  Type of service: In-patient clinic appointment    Diagnoses:     Adjustment disorder with anxious mood (F43.22)    Panic attack - full remission     Individuals Present:   Patient and provider      Treatment goal(s) being addressed:   1. Enhance use of grounding and relaxation strategies to maintain remission of panic and anxiety symptoms, and promote ability to initiate sleep  2. Maintain progress related to processing feelings of grief and loss, prioritizing sleep and self-care to support wellbeing following delivery of son in context of history of multiple pregnancy loss   3. Support wellbeing and adjustment through upcoming transition to post-partum period     Subjective:  Marisabel is currently 15 weeks postpartum. Today she continued to deny any concerns related to low mood, intrusive thoughts, or panic symptoms, and reported ongoing remission of panic symptoms, and sustained good mood since our last appointment. She noted a brief experience of worry, stress, and irritability in context of coping with unexpected loss of power during family cabin trip, primarily related to concerns around not being able to pump/feed Juan Ramon. Marisabel returned to work last week and reported a positive transition so far. She continued to report benefit from use of coping strategies to prioritize sleep, self-care, and exercise/movement.       Marisabel denied any changes in medication.      Treatment:   Individual psychotherapy with cognitive behavioral therapy (CBT) framework leveraging psychoeducation and therapeutic strategies drawn from Practical Resources for Effective Postpartum Parenting (PREPP). Today, we continued to  "focus on supporting Marisabel's wellbeing and positive adjustment to the postpartum period and return to work. Provided ongoing coaching and positive reinforcement of Marisabel's use of cognitive, behavioral, and social coping strategies to manage stress, worries, and maintain remission of panic symptoms. Continued to plan and prepare for upcoming discharge; we will continue to meet every other week through August, then transition to meeting quarterly for \"booster\" appointments. Marisabel continues to feel this plan is meeting her needs. Provided reflective listening and positive reinforcement throughout.       Assessment and Progress:   Appearance:  Appropriately-dressed and groomed   Behavior/relationship to examiner/demeanor:  Pleasant, open, continued to appear bright, upbeat, and relaxed    Motor activity/EPS: Within normal limits  Speech rate:  Within normal limits  Speech volume:  Within normal limits  Speech articulation:  Within normal limits  Speech coherence:  Within normal limits  Speech spontaneity:  Within normal limit  Mood (subjective report): \"really good\"  Affect (objective appearance): Within normal limits, euthymic     Thought Process (Associations):  Goal directed  Thought process (Rate):  Within normal limits  Thought content: None  Abnormal Perception: None  Insight:  Within normal limits  Judgment:  Within normal limit     Marisabel has significantly benefited from psychotherapy and medication (Zoloft 75 mg), and at this time we are preparing for her discharge in the upcoming 4-6 weeks. Marisabel is an incredibly resilient individual with many areas of personal strengths and sources of support. She has made steady progress in adjusting positively to the postpartum period. Today she continued to report benefit from increasing her Zoloft dose to 75 mg; she plans to maintain this dose for the foreseeable future. Current treatment goals relate to maintaining remission of anxiety and panic symptoms, and continuing " "to support Marisabel's wellbeing through her recent transition back to work. Given Marisabel's achievement of full remission of anxiety and panic symptoms, her diagnosis was updated to Adjustment Disorder with anxious mood. She has a history of postpartum anxiety and intrusive thoughts/images with onset in previous postpartum period. No history of postpartum mood concerns. Infrequent intrusive images experienced during pregnancy responded well to use of cognitive defusion strategies.      Plan:   Continue to prioritize sleep; get \"sleep prescription\" of 4-5 hours of consolidated nocturnal sleep. Prioritize exercise/movement as preferred self-care practices.      Continue to practice coping strategies to manage any worries or stress. Engage in self-care, leverage social support, practice cognitive and behavioral strategies to support healthy emotional boundaries.       As needed, continue to use coping strategies within exposure response prevention framework to maintain remission of panic symptoms.        Next therapy appointment has been scheduled for 7/28/23 at 12 pm to continue work on treatment goals.        Treatment Plan review due: 6/23/23/upcoming appointment      Joann Moore, PhD  Postdoctoral Fellow    I did not see this patient directly, but have discussed the session with the above trainee and approve this documentation.      Hailey Young Psy.D., LP                                                                                        Clinical Supervisor          "

## 2023-07-27 ENCOUNTER — VIRTUAL VISIT (OUTPATIENT)
Dept: PSYCHIATRY | Facility: CLINIC | Age: 38
End: 2023-07-27
Attending: PSYCHOLOGIST
Payer: COMMERCIAL

## 2023-07-27 DIAGNOSIS — F43.22 ADJUSTMENT DISORDER WITH ANXIOUS MOOD: Primary | ICD-10-CM

## 2023-07-27 PROCEDURE — 90837 PSYTX W PT 60 MINUTES: CPT | Mod: VID

## 2023-07-27 NOTE — PROGRESS NOTES
WOMEN'S WELLBEING PROGRAM  OUTPATIENT PSYCHOTHERAPY PROGRESS NOTE     Client Name: Griselda Bell          YOB: 1985 (38 year old)            Date of Service:  July 27, 2023  Time of Service: 12:03 pm to 12:58 am (55 minutes)  Service Type(s): 22158 psychotherapy (53-60 min. with patient and/or family)     Type of service: Telemedicine Psychotherapy   Reason for Telemedicine Visit: patient preference, health precautions   Mode of transmission: Secure real time interactive audio and visual telecommunication system (videoconference via [employer])  Location of originating and distant sites:  ? Originating site (patient location): patient home  ? Distant site (provider site): HIPAA compliant location within provider home/remote setting   Telemedicine Visit: The patient's condition can be safely assessed and treated via synchronous audio and visual telemedicine encounter.    Patient has agreed to receiving services via telemedicine technology.     Diagnoses:     Adjustment disorder with anxious mood (F43.22)    Panic attack - full remission     Individuals Present:   Patient and provider        Treatment goal(s) being addressed:   1. Maintain gains towards treatment goals and prepare for upcoming anticipated discharge   2. Enhance use of grounding and relaxation strategies to maintain remission of panic and anxiety symptoms, and promote ability to initiate sleep  2. Maintain progress related to processing feelings of grief and loss, prioritizing sleep and self-care to support wellbeing following delivery of son in context of history of multiple pregnancy loss   3. Support wellbeing and adjustment through recent transition back to work following parental leave      Subjective:  Marisabel is currently 4 months postpartum. Today she reported maintained remission of panic and anxiety symptoms, and sustained good mood since our last appointment. She continued to note benefit from use of coping strategies and  "ongoing Zoloft 75 mg. She has been back at work the past several weeks and noted \"it's been crazy - I'm happy to be back but finding the rhythm has been hard.\" She has eliminated a daytime pump and is now pumping once in AM and PM, which has freed up some time/mental real estate. She is continuing to manage DMERS symptoms with coping strategies, and is considering wrapping up pumping by 6 months postpartum. Training for 10k in the fall has been beneficial and challenging. Marisabel endorsed feelings of stress related to cousin who is experiencing untreated manic episode that onset in January and is currently unemployed, homeless, and frequently initiates conflict via family text chain. She is concerned he might show up at her house to request support or assistance, and does not want him in their home while he is unpredictable/disorganized. She asked if we could discuss strategies to manage stress and possibility he presents in person.      Marisabel denied any changes in medication.      Treatment:   Individual psychotherapy with cognitive behavioral therapy (CBT) framework leveraging psychoeducation and therapeutic strategies drawn from Practical Resources for Effective Postpartum Parenting (PREPP). Today, continued to support Marisabel's wellbeing and positive adjustment to the postpartum period and 5th trimester/adjustment following return to work. Provided ongoing coaching and positive reinforcement of Marisabel's use of cognitive, behavioral, and social coping strategies to manage stress, maintain remission of panic and anxiety symptoms, and sustain positive mood, with continued focus on supporting Marisabel's use of cognitive and behavioral coping strategies to manage stress and worries, and use of social support to help prioritize self-care, time to herself, exercise, and sleep. Together we continued to recognize and reinforce Marisabel's progress. Supported Marisabel in exploring worries/stress related to cousin. Provided " "psychoeducation and resources/support for family members of those with persistent mental illness; emphasis on supporting Marisabel in holding space for thoughts and feelings related to accepting what she can and cannot control, and honoring how difficult can be to watch a loved one struggle and not have insight/motivation to engage in treatment. With Marisabel, brainstormed ways to leverage social support from family, and set clear boundaries with cousin. Checked in on our plan to continue to meet every other week through August, with anticipated transition to meeting quarterly for \"booster\" appointments. Marisabel continues to feel this plan is meeting her needs. Provided reflective listening and positive reinforcement throughout.      Assessment and Progress:   Appearance:  Appropriately-dressed and groomed   Behavior/relationship to examiner/demeanor:  Pleasant, open, continued to appear bright and upbeat overall   Motor activity/EPS: Within normal limits  Speech rate:  Within normal limits  Speech volume:  Within normal limits  Speech articulation:  Within normal limits  Speech coherence:  Within normal limits  Speech spontaneity:  Within normal limit  Mood (subjective report): \"good\"  Affect (objective appearance): Within normal limits  Thought Process (Associations):  Goal directed  Thought process (Rate):  Within normal limits  Thought content: None  Abnormal Perception: None  Insight:  Within normal limits  Judgment:  Within normal limit     Marisabel has significantly benefited from psychotherapy and medication (Zoloft 75 mg), and at this time we are preparing to transition to quarterly \"booster\" appointments. Marisabel is an incredibly resilient individual with many areas of personal strengths and sources of support. She has made steady progress in adjusting positively to the postpartum period and 5th trimester/return to work. Today she continued to report benefit from increasing her Zoloft dose to 75 mg; she plans to " "maintain this dose for the foreseeable future. Current treatment goals relate to maintaining remission of anxiety and panic symptoms, and continuing to support Marisabel's wellbeing through her recent transition back to work. Given Marisabel's achievement of full remission of anxiety and panic symptoms, her diagnosis was updated to Adjustment Disorder with anxious mood. She has a history of postpartum anxiety and intrusive thoughts/images with onset in previous postpartum period. No history of postpartum mood concerns. Infrequent intrusive images experienced during pregnancy responded well to use of cognitive defusion strategies.     Answers for HPI/ROS submitted by the patient on 7/27/2023  If you checked off any problems, how difficult have these problems made it for you to do your work, take care of things at home, or get along with other people?: Not difficult at all  PHQ9 TOTAL SCORE: 0, indicating minimal/no depressive symptoms     Plan:   Continue to prioritize sleep; get \"sleep prescription\" of 4-5 hours of consolidated nocturnal sleep. Prioritize exercise/movement as preferred self-care practices.      Continue to practice coping strategies to manage any worries or stress. Engage in self-care, leverage social support, practice cognitive and behavioral strategies to support healthy emotional boundaries.       As needed, continue to use coping strategies within exposure response prevention framework to maintain remission of panic symptoms.      Today we discussed several resources to support family members of those struggling with persistent, significant mental illness:     [facility] Crisis Response Team: call [phone] 24/7 or for more information visit: [email or web address]    [facility] support groups: [email or web address]      Next therapy appointment has been scheduled for 8/10/23 at 12 pm to continue work " on treatment goals.      Treatment Plan review due: upcoming appointment      Joann Moore, PhD  Postdoctoral Fellow    I did not see this patient directly, but have discussed the session with the above trainee and approve this documentation.      Hailey Young Psy.D.,                                                                                         Clinical Supervisor

## 2023-07-30 ENCOUNTER — HEALTH MAINTENANCE LETTER (OUTPATIENT)
Age: 38
End: 2023-07-30

## 2023-08-24 ENCOUNTER — ANCILLARY PROCEDURE (OUTPATIENT)
Dept: GENERAL RADIOLOGY | Facility: CLINIC | Age: 38
End: 2023-08-24
Attending: FAMILY MEDICINE
Payer: COMMERCIAL

## 2023-08-24 ENCOUNTER — OFFICE VISIT (OUTPATIENT)
Dept: FAMILY MEDICINE | Facility: CLINIC | Age: 38
End: 2023-08-24
Payer: COMMERCIAL

## 2023-08-24 VITALS
WEIGHT: 188.3 LBS | SYSTOLIC BLOOD PRESSURE: 128 MMHG | TEMPERATURE: 97.5 F | OXYGEN SATURATION: 98 % | DIASTOLIC BLOOD PRESSURE: 83 MMHG | HEIGHT: 67 IN | HEART RATE: 73 BPM | BODY MASS INDEX: 29.55 KG/M2 | RESPIRATION RATE: 16 BRPM

## 2023-08-24 DIAGNOSIS — F41.9 ANXIETY: ICD-10-CM

## 2023-08-24 DIAGNOSIS — Z13.220 SCREENING FOR LIPOID DISORDERS: ICD-10-CM

## 2023-08-24 DIAGNOSIS — Z13.1 SCREENING FOR DIABETES MELLITUS: ICD-10-CM

## 2023-08-24 DIAGNOSIS — M25.551 PAIN OF RIGHT HIP: ICD-10-CM

## 2023-08-24 DIAGNOSIS — Z00.00 ROUTINE GENERAL MEDICAL EXAMINATION AT A HEALTH CARE FACILITY: Primary | ICD-10-CM

## 2023-08-24 LAB
CHOLEST SERPL-MCNC: 179 MG/DL
FASTING STATUS PATIENT QL REPORTED: NORMAL
GLUCOSE SERPL-MCNC: 85 MG/DL (ref 70–99)
HDLC SERPL-MCNC: 68 MG/DL
LDLC SERPL CALC-MCNC: 102 MG/DL
NONHDLC SERPL-MCNC: 111 MG/DL
TRIGL SERPL-MCNC: 43 MG/DL

## 2023-08-24 PROCEDURE — 73502 X-RAY EXAM HIP UNI 2-3 VIEWS: CPT | Mod: RT | Performed by: RADIOLOGY

## 2023-08-24 PROCEDURE — 82947 ASSAY GLUCOSE BLOOD QUANT: CPT | Performed by: FAMILY MEDICINE

## 2023-08-24 PROCEDURE — 36415 COLL VENOUS BLD VENIPUNCTURE: CPT | Performed by: FAMILY MEDICINE

## 2023-08-24 PROCEDURE — 99395 PREV VISIT EST AGE 18-39: CPT | Performed by: FAMILY MEDICINE

## 2023-08-24 PROCEDURE — 96127 BRIEF EMOTIONAL/BEHAV ASSMT: CPT | Performed by: FAMILY MEDICINE

## 2023-08-24 PROCEDURE — 80061 LIPID PANEL: CPT | Performed by: FAMILY MEDICINE

## 2023-08-24 ASSESSMENT — ANXIETY QUESTIONNAIRES
3. WORRYING TOO MUCH ABOUT DIFFERENT THINGS: NOT AT ALL
IF YOU CHECKED OFF ANY PROBLEMS ON THIS QUESTIONNAIRE, HOW DIFFICULT HAVE THESE PROBLEMS MADE IT FOR YOU TO DO YOUR WORK, TAKE CARE OF THINGS AT HOME, OR GET ALONG WITH OTHER PEOPLE: NOT DIFFICULT AT ALL
7. FEELING AFRAID AS IF SOMETHING AWFUL MIGHT HAPPEN: NOT AT ALL
5. BEING SO RESTLESS THAT IT IS HARD TO SIT STILL: NOT AT ALL
GAD7 TOTAL SCORE: 0
6. BECOMING EASILY ANNOYED OR IRRITABLE: NOT AT ALL
GAD7 TOTAL SCORE: 0
2. NOT BEING ABLE TO STOP OR CONTROL WORRYING: NOT AT ALL
1. FEELING NERVOUS, ANXIOUS, OR ON EDGE: NOT AT ALL

## 2023-08-24 ASSESSMENT — ENCOUNTER SYMPTOMS
EYE PAIN: 0
CONSTIPATION: 0
WEAKNESS: 0
ABDOMINAL PAIN: 0
PALPITATIONS: 0
DIZZINESS: 0
JOINT SWELLING: 0
BREAST MASS: 0
NAUSEA: 0
COUGH: 1
FEVER: 0
HEADACHES: 0
FREQUENCY: 0
NERVOUS/ANXIOUS: 0
SHORTNESS OF BREATH: 0
CHILLS: 0
MYALGIAS: 1
HEMATOCHEZIA: 0
DYSURIA: 0
HEMATURIA: 0
SORE THROAT: 0
DIARRHEA: 0
PARESTHESIAS: 0
ARTHRALGIAS: 0
HEARTBURN: 0

## 2023-08-24 ASSESSMENT — PAIN SCALES - GENERAL: PAINLEVEL: NO PAIN (0)

## 2023-08-24 ASSESSMENT — PATIENT HEALTH QUESTIONNAIRE - PHQ9: 5. POOR APPETITE OR OVEREATING: NOT AT ALL

## 2023-08-24 NOTE — PROGRESS NOTES
SUBJECTIVE:   CC: Marisabel is an 38 year old who presents for preventive health visit.       2023     7:17 AM   Additional Questions   Roomed by Nancy       Healthy Habits:     Getting at least 3 servings of Calcium per day:  Yes    Bi-annual eye exam:  Yes    Dental care twice a year:  Yes    Sleep apnea or symptoms of sleep apnea:  None    Diet:  Regular (no restrictions)    Frequency of exercise:  4-5 days/week    Duration of exercise:  45-60 minutes    Taking medications regularly:  Yes    Medication side effects:  None    Additional concerns today:  No  Training for 10 mile [employer] run-10/01.  Has noted rt hip pain after the run.  Mild to moderate  Ibuprofen helps    Anxiety stable on sertraline 75 mg once daily and medications refilled by OBG/GYN  She is fasting for labs      Today's PHQ-2 Score:       2023     7:14 AM   PHQ-2 (  [employer])   Q1: Little interest or pleasure in doing things 0   Q2: Feeling down, depressed or hopeless 0   PHQ-2 Score 0   Q1: Little interest or pleasure in doing things Not at all   Q2: Feeling down, depressed or hopeless Not at all   PHQ-2 Score 0           Social History     Tobacco Use    Smoking status: Never    Smokeless tobacco: Never   Substance Use Topics    Alcohol use: Yes     Comment: 1 drink when does have etoh         2023     7:14 AM   Alcohol Use   Prescreen: >3 drinks/day or >7 drinks/week? No       Reviewed orders with patient.  Reviewed health maintenance and updated orders accordingly - Yes  BP Readings from Last 3 Encounters:   23 128/83   22 117/83   21 (!) 131/92    Wt Readings from Last 3 Encounters:   23 85.4 kg (188 lb 4.8 oz)   22 82.1 kg (181 lb)   21 82.2 kg (181 lb 3.5 oz)                    Breast Cancer Screenin/23/2022     8:57 AM   Breast CA Risk Assessment (FHS-7)   Do you have a family history of breast, colon, or ovarian cancer? No / Unknown           Pertinent mammograms are  "reviewed under the imaging tab.    History of abnormal Pap smear: NO - age 30- 65 PAP every 3 years recommended     Reviewed and updated as needed this visit by clinical staff   Tobacco  Allergies  Meds  Problems  Med Hx  Surg Hx  Fam Hx          Reviewed and updated as needed this visit by Provider   Tobacco  Allergies  Meds  Problems  Med Hx  Surg Hx  Fam Hx             Review of Systems   Constitutional:  Negative for chills and fever.   HENT:  Negative for congestion, ear pain, hearing loss and sore throat.    Eyes:  Negative for pain and visual disturbance.   Respiratory:  Positive for cough. Negative for shortness of breath.    Cardiovascular:  Negative for chest pain, palpitations and peripheral edema.   Gastrointestinal:  Negative for abdominal pain, constipation, diarrhea, heartburn, hematochezia and nausea.   Breasts:  Negative for tenderness, breast mass and discharge.   Genitourinary:  Negative for dysuria, frequency, genital sores, hematuria, pelvic pain, urgency, vaginal bleeding and vaginal discharge.   Musculoskeletal:  Positive for myalgias. Negative for arthralgias and joint swelling.   Skin:  Negative for rash.   Neurological:  Negative for dizziness, weakness, headaches and paresthesias.   Psychiatric/Behavioral:  Negative for mood changes. The patient is not nervous/anxious.         OBJECTIVE:   /83   Pulse 73   Temp 97.5  F (36.4  C) (Temporal)   Resp 16   Ht 1.702 m (5' 7\")   Wt 85.4 kg (188 lb 4.8 oz)   LMP  (LMP Unknown)   SpO2 98%   Breastfeeding Yes   BMI 29.49 kg/m    Physical Exam  GENERAL: healthy, alert and no distress  EYES: Eyes grossly normal to inspection, PERRL and conjunctivae and sclerae normal  HENT: ear canals and TM's normal, nose and mouth without ulcers or lesions  NECK: no adenopathy, no asymmetry, masses, or scars and thyroid normal to palpation  RESP: lungs clear to auscultation - no rales, rhonchi or wheezes  BREAST: normal without masses, " tenderness or nipple discharge and no palpable axillary masses or adenopathy  CV: regular rate and rhythm, normal S1 S2, no S3 or S4, no murmur, click or rub, no peripheral edema and peripheral pulses strong  ABDOMEN: soft, nontender, no hepatosplenomegaly, no masses and bowel sounds normal  MS: no gross musculoskeletal defects noted, no edema  SKIN: no suspicious lesions or rashes  NEURO: Normal strength and tone, mentation intact and speech normal  PSYCH: mentation appears normal, affect normal/bright    Diagnostic Test Results:  Labs reviewed in Epic  No results found for this or any previous visit (from the past 24 hour(s)).    ASSESSMENT/PLAN:   1. Routine general medical examination at a health care facility  Pap NILM 7/21- repeat 7/26 q 5yrs    2. Anxiety  - sertraline (ZOLOFT) 50 MG tablet; Take 1.5 tablets (75 mg) by mouth daily (refill given by OBG/GYN)  Also in therapyand excercise   - EMOTIONAL / BEHAVIORAL ASSESSMENT      1/26/2023    12:48 PM 3/8/2023    10:11 AM 8/24/2023     7:14 AM   COLLINS-7 SCORE   Total Score 2 (minimal anxiety) 0 (minimal anxiety)    Total Score 2 0 0        3. Rt hip strain  Xray- hip, within normal limits  Advised PHYSICAL THERAPY  If pain worsens- follow up as needed        4.Screening for diabetes mellitus  - Glucose; Future    5. Screening for lipoid disorders  - Lipid panel reflex to direct LDL Fasting; Future      Patient has been advised of split billing requirements and indicates understanding: Yes      COUNSELING:  Reviewed preventive health counseling, as reflected in patient instructions       Regular exercise       Healthy diet/nutrition       Vision screening       Hearing screening       Contraception       Family planning       Folic Acid        She reports that she has never smoked. She has never used smokeless tobacco.          Renetta Long MD  [facility]

## 2023-08-30 ENCOUNTER — VIRTUAL VISIT (OUTPATIENT)
Dept: PSYCHIATRY | Facility: CLINIC | Age: 38
End: 2023-08-30
Attending: PSYCHOLOGIST
Payer: COMMERCIAL

## 2023-08-30 DIAGNOSIS — F41.0 PANIC ATTACK: ICD-10-CM

## 2023-08-30 DIAGNOSIS — F43.22 ADJUSTMENT DISORDER WITH ANXIOUS MOOD: Primary | ICD-10-CM

## 2023-08-30 PROCEDURE — 90837 PSYTX W PT 60 MINUTES: CPT | Mod: HN

## 2023-08-30 NOTE — PROGRESS NOTES
WOMEN'S WELLBEING PROGRAM  OUTPATIENT PSYCHOTHERAPY PROGRESS NOTE    Client Name: Griselda Bell   YOB: 1985 (38 year old)   Date of Service:  Aug 30, 2023  Time of Service: 8:00 am to 9:00 am (60 minutes)  Service Type(s): 23335 psychotherapy (53-60 min. with patient and/or family)  Type of service: Telemedicine Psychotherapy   Reason for Telemedicine Visit: patient preference   Mode of transmission: Secure real time interactive audio and visual telecommunication system ([employer])  Location of originating and distant sites:    Originating site (patient location): patient home    Distant site (provider site): HIPAA compliant location within provider home/remote setting  Telemedicine Visit: The patient's condition can be safely assessed and treated via synchronous audio and visual telemedicine encounter. Patient/family has agreed to receive services via telemedicine technology.    Diagnoses:     Adjustment disorder with anxious mood (F43.22)    Panic attack - full remission    Individuals Present:   Patient and provider     Treatment goal(s) being addressed:   1. Maintain gains towards treatment goals and prepare for upcoming anticipated discharge   2. Enhance use of grounding and relaxation strategies to maintain remission of panic and anxiety symptoms, and promote ability to initiate sleep  2. Maintain progress related to processing feelings of grief and loss, prioritizing sleep and self-care to support wellbeing following delivery of son in context of history of multiple pregnancy loss   3. Support wellbeing and adjustment through recent transition back to work following parental leave     Subjective:  Marisabel is currently 5 months postpartum. Today she reported a recent episode of waking up with panic symptoms that she was able to effectively manage and reduce with the use of cognitive, social, and relaxation coping strategies. Symptoms onset in context of cues/stimuli similar to original  "nocturnal panic attack in [place] (context of social event involving drinking/dehydration, away from home/in an unfamiliar bed). Marisabel also split a THC seltzer with a friend before bedtime and wondered if this could be a contributing factor. Marisabel noted feeling effective in use of skills to manage panic symptoms. Since our last appointment she reported maintained remission of anxiety symptoms, and panic disorder (no full/gregorio panic attacks), and sustained good mood. She also continued to note benefit from use of coping strategies and ongoing Zoloft 75 mg. Marisabel asked if we could discuss whether she may benefit from a medication consultation to explore if there are any additional pharmacological strategies she could utilize to prevent nocturnal panic symptoms, specifically when traveling.       Marisabel denied any changes in medication.     Treatment:   Individual psychotherapy with cognitive behavioral therapy (CBT) framework leveraging psychoeducation and therapeutic strategies drawn from Practical Resources for Effective Postpartum Parenting (PREPP). Today, we began by reviewing recent experience of panic symptoms, utilizing behavioral chain analysis to explore/identify potential vulnerability factors, and recognizing together Marisabel's effective use of coping strategies to manage panic and prevent panic attack. Discussion/exploration together around contribution of substance use. Together we collaborated to brainstorm cope ahead plan Marisabel can use when traveling to proactively utilize cognitive, social, behavioral, and relaxation strategies to \"innoculate\" against nocturnal panic while traveling, particularly during time of wind-down and bedtime, to signal safety to nervous system prior to sleep. Discussed/encouraged option to consider medication consultation to partner with reproductive psychiatrist to explore additional possible tools (specifically, Marisabel wondered about something she might take to promote deeper, " "restorative sleep). Provided ongoing coaching and positive reinforcement of Marisabel's use of cognitive, behavioral, and social coping strategies to manage stress, maintain remission of panic and anxiety symptoms, and sustain positive mood. Continued to recognize and reinforce Marisabel's progress. Checked in on our plan to fade appointments as we approach anticipated transition to meeting quarterly for \"booster\" appointments. Marisabel requested our next appointment for one month from now and continues to feel this care plan is meeting her needs. Provided reflective listening and positive reinforcement throughout.     Assessment and Progress:   Appearance:  Appropriately-dressed and groomed   Behavior/relationship to examiner/demeanor:  Pleasant, open, continued to appear bright and upbeat overall   Motor activity/EPS: Within normal limits  Speech rate:  Within normal limits  Speech volume:  Within normal limits  Speech articulation:  Within normal limits  Speech coherence:  Within normal limits  Speech spontaneity:  Within normal limit  Mood (subjective report): \"good\"  Affect (objective appearance): Within normal limits  Thought Process (Associations):  Goal directed  Thought process (Rate):  Within normal limits  Thought content: None  Abnormal Perception: None  Insight:  Within normal limits  Judgment:  Within normal limit     Marisabel has significantly benefited from psychotherapy and medication (Zoloft 75 mg). At this time we are preparing to transition to quarterly \"booster\" appointments. Marisabel is an incredibly resilient individual with many areas of personal strengths and sources of support. She has made steady progress in adjusting positively to the postpartum period and 5th trimester/return to work. Today she continued to report benefit from increasing her Zoloft dose to 75 mg; she plans to maintain this dose for the foreseeable future. Current treatment goals relate to maintaining remission of anxiety and " "panic symptoms, and continuing to support Marisabel's wellbeing through her recent transition back to work. Given Marisabel's achievement of full remission of anxiety and panic symptoms, her diagnosis was updated to Adjustment Disorder with anxious mood. She has a history of postpartum anxiety and intrusive thoughts/images with onset in previous postpartum period. No history of postpartum mood concerns. Infrequent intrusive images experienced during pregnancy responded well to use of cognitive defusion strategies.     Answers for HPI/ROS submitted by the patient on 8/30/2023  If you checked off any problems, how difficult have these problems made it for you to do your work, take care of things at home, or get along with other people?: Not difficult at all  PHQ9 TOTAL SCORE: 0, indicating no depressive symptoms     Plan:   Continue to prioritize sleep; get \"sleep prescription\" of 4-5 hours of consolidated nocturnal sleep. Prioritize exercise/movement as preferred self-care practices.      Continue to practice coping strategies to manage any worries or stress. Engage in self-care, leverage social support, practice cognitive and behavioral strategies to support healthy emotional boundaries.       As needed, continue to use coping strategies within exposure response prevention framework to maintain remission of panic symptoms.       Today we discussed several resources to support family members of those struggling with persistent, significant mental illness:     [facility] Crisis Response Team: call [phone] 24/7 or for more information visit: [email or web address] ([email or web address], [email or web address]    [facility] support groups: [email or web address]    Next therapy appointment has been scheduled for 9/28/23 at 8 am to continue work on treatment goals.    Treatment Plan review due: upcoming appointment    Joann Moore, PhD  Postdoctoral Clinical Psychology " Fellow       I did not see this patient directly, but have discussed the session with the above trainee and approve this documentation.      Hailey Young Psy.D., LP                                                                                        Clinical Supervisor

## 2023-09-28 ENCOUNTER — VIRTUAL VISIT (OUTPATIENT)
Dept: PSYCHIATRY | Facility: CLINIC | Age: 38
End: 2023-09-28
Attending: PSYCHOLOGIST
Payer: COMMERCIAL

## 2023-09-28 DIAGNOSIS — F41.0 PANIC ATTACK: ICD-10-CM

## 2023-09-28 DIAGNOSIS — F43.22 ADJUSTMENT DISORDER WITH ANXIOUS MOOD: Primary | ICD-10-CM

## 2023-09-28 PROCEDURE — 90837 PSYTX W PT 60 MINUTES: CPT | Mod: VID

## 2023-09-28 NOTE — PROGRESS NOTES
"WOMEN'S WELLBEING PROGRAM  OUTPATIENT PSYCHOTHERAPY PROGRESS NOTE    Client Name: Griselda Bell   YOB: 1985 (38 year old)   Date of Service:  Sep 28, 2023  Time of Service: 8:02 am to 8:55 am (53 minutes)  Service Type(s): 41060 psychotherapy (53-60 min. with patient and/or family)  Type of service: Telemedicine Psychotherapy   Reason for Telemedicine Visit: patient preference   Mode of transmission: Secure real time interactive audio and visual telecommunication system ([employer])  Location of originating and distant sites:    Originating site (patient location): patient home    Distant site (provider site): HIPAA compliant location within provider home/remote setting  Telemedicine Visit: The patient's condition can be safely assessed and treated via synchronous audio and visual telemedicine encounter. Patient/family has agreed to receive services via telemedicine technology.    Diagnoses:     Adjustment disorder with anxious mood (F43.22)    Panic attack - full remission    Individuals Present:   Patient and provider     Treatment goal(s) being addressed:   1. Maintain gains towards treatment goals and prepare for upcoming anticipated discharge   2. Enhance use of grounding and relaxation strategies to maintain remission of panic and anxiety symptoms  2. Maintain progress related to processing feelings of grief and loss, prioritizing sleep and self-care to support wellbeing following delivery of son in context of history of multiple pregnancy loss   3. Support wellbeing and adjustment through \"5th trimester\" transition back to work following parental leave     Subjective:  Marisabel is currently 6 months postpartum. Today she reported \"good\" mood with ongoing remission of panic and anxiety symptoms. She also continued to note benefit from use of coping strategies and ongoing Zoloft 75 mg. Marisabel has a medication consultation with KELSEA Segal, scheduled for 10/20 with the goal of exploring if " "there are any additional pharmacological strategies she could utilize to prevent nocturnal panic symptoms, specifically when traveling.       Marisabel denied any changes in medication.     Treatment:   Individual psychotherapy with cognitive behavioral therapy (CBT) framework leveraging  lens. Today, we began by supporting Marisabel in reflecting on recent experiences terminating employee and cousin's visit. Together we developed cope ahead plan to support Marisabel in managing worries during parents' upcoming visit. We continued to plan and prepare for discharge, with ongoing review and recognition of Marisabel's effective use of coping strategies to manage panic, prevent panic attack, and manage and reduce worries. Continued to recognize and reinforce Marisabel's progress and refine discharge plan to fade appointments as we approach anticipated transition to meeting quarterly for \"booster\" appointments. Discussed current plan to meet again after Marisabel's upcoming medication consultation and assess then if Marisabel continues to feel ready at that time to transition to quarterly booster appointments. Provided reflective listening and positive reinforcement throughout.     Assessment and Progress:   Appearance:  Appropriately-dressed and groomed   Behavior/relationship to examiner/demeanor:  Pleasant, open, relaxed; continued to appear bright and upbeat  Motor activity/EPS: Within normal limits  Speech rate:  Within normal limits  Speech volume:  Within normal limits  Speech articulation:  Within normal limits  Speech coherence:  Within normal limits  Speech spontaneity:  Within normal limit  Mood (subjective report): \"good\"  Affect (objective appearance): Within normal limits  Thought Process (Associations):  Goal directed  Thought process (Rate):  Within normal limits  Thought content: None  Abnormal Perception: None  Insight:  Within normal limits  Judgment:  Within normal limit     Marisabel has significantly benefited from " "psychotherapy and medication (Zoloft 75 mg). At this time we are preparing to transition to quarterly \"booster\" appointments. Marisabel is an incredibly resilient individual with many areas of personal strengths and sources of support. She has made steady progress in adjusting positively to the postpartum period and 5th trimester/return to work. Today Marisabel continued to report benefit from increasing her Zoloft dose to 75 mg. Currently she plans to maintain this dose for at least a year and then consider at that time if she may wish to drop to 50 mg; she has articulated her wish to maintain some dose for foreseeable future/potentially indefinitely. Current treatment goals relate to maintaining remission of anxiety and panic symptoms, and continuing to support Marisabel's wellbeing through her recent transition back to work. Given Marisabel's achievement of full remission of anxiety and panic symptoms, her diagnosis was updated to Adjustment Disorder with anxious mood. She has a history of postpartum anxiety and intrusive thoughts/images with onset in previous postpartum period. No history of postpartum mood concerns. Infrequent intrusive images experienced during pregnancy responded well to use of cognitive defusion strategies.     Plan:   Continue therapy, currently meeting once a month and fading towards discharge with planned quarterly boosters to support  maintenance of gains.     Next therapy appointment has been scheduled for 10/26/23 at 8 am to continue work on treatment goals.    Treatment Plan review due: As we are moving towards discharge in the next 1-2 appointments we have not yet renewed Marisabel's treatment plan at this time, but will renew it if discharge plans are significantly adjusted     Joann Moore, PhD, LP  Postdoctoral Clinical Psychology Fellow    I did not see this patient directly, but have discussed the session with the above trainee and approve this documentation.      Hailey Young, Mariano., " LP                                                                                        Clinical Supervisor

## 2023-10-18 NOTE — PROGRESS NOTES
Psychiatry Clinic Consult Follow up:  Women's Wellbeing Program      Griselda Bell is a  38 year old female, 7 months postpartum who was referred by Dr Joann Moore for a consult follow up.  Partner/Support:  , Zurdo  Feeding Method: Currently weaning  Therapist: Joann Moore, PhD; PCP: Renetta Long; Integrative Care: Kelvin Helm; OB-GYN: Homa Gonzales MD  Rheumatologist: Jessie Escalante DO    Diagnostic Impressions                                                                              Panic attacks (in unfamiliar settings)  History Adjustment disorder with anxiety (in setting of multiple pregnancy losses)    Assessment                                                                      Griselda Bell is a  38 year old brave and caring female with past psych hx significant for postpartum anxiety involving repetitive checking behaviors and intrusive thoughts related to daughter getting harmed and past obstetric & medical hx significant for 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 Patient presents today for a one -time consultation follow up.    Today, Marisabel endorses stable mood with no concerns with her current medication regimen. She has noticed panic attacks occurring in unfamiliar settings and has been able to utilize her coping skills from therapy during these instances. In partnership with her therapist, Dr. Moore, they would also like to include a PRN medication as an additional item in Marisabel's tool box. So we discussed potential options like Hydroxyzine, Gabapentin, Seroquel, and the adrenergic agents like clonidine. Marisabel preferred to have Gabapentin on board which was prescribed today. If medication is unhelpful, Marisabel has been encouraged to reach out to me. No suicide, infanticide, or homicidal ideations.      Plan                                            "                                                                    m2, h3      1) MEDICATIONS:    - Continue Zoloft 75mg daily (prescribed by her OB)  -Start Gabapentin 100mg daily PRN for anxiety/panic attacks      2) THERAPY: Continue with Dr. Moore    3) RTC: This is a one-time consult follow up. Can see in the future, if needed.     Pertinent Background      Patient reports frequent feelings of \"nervousness\" in childhood during stress related events like a test or exam, a sports game and worry related to her father's health while growing up. She reports feelings of extreme anxiety and insomnia in her 20's in the context of medical illness and navigating multiple doctor appointments. Patient wasn't formerly diagnosed with a mental health condition until the birth of her daughter, Yady.  Daughter's delivery was vaginal birth s/p induction at 41 weeks. Delivery was traumatic for patient after which she suffered a fistula. After her daughter's birth patient began to experience post partum anxiety and OCD-like symptoms of repetitive checking and intrusive images of her daughter getting hurt. She was started on Prozac by her PCP for these symptoms. Patient has experienced four pregnancy losses, including a medical interruption of pregnancy at approximately 17 weeks due to Trisomy 13 (2020), a loss Marisabel described as a \"chemical pregnancy\" (occurred approximately between summer 2020 and start of 2021), a pregnancy loss at 17 weeks (August 2021), and a miscarriage at approximately 7 weeks (May 2022).     Please see DA completed by Hailey Young PsyD, JUSTO dated 6/24/2022 for additional details of the HPI.      Psych pertinent history includes trauma hx(multiple pregnancy losses)  and major medical problem (Sjogren's syndrome).        Interim History     Since the last visit:Trna reports that she has been doing really well in the postpartum period. She reports that she generally felt more prepared this time around. " In August, she noticed that when she went out of town to visit friends, she experienced a panic attack similar to how she felt last year  when she had her first panic attack. Thinks it might have been a combination of being in an unfamiliar environment, some alcohol, and triggering because her first panic attack happened un a similar scenario of being in a strange/new environment and after drinking some wine.    -Starting to slowly wean her son, Torey. She spent most of postpartum to exclusively pump which led to an oversupply. She's finding the process very relieving for her and going well.    Sleep: Sleeps well for the most part but experienced some interruptions this week because her father experienced a diabetic shock and was unconscious. They needed to call [id number] and the paramedics. This brought back memories of her childhood and how hard it's been to watch her parents age. Her father is doing better now and sleep is back to baseline.      Recent Substance Use  Alcohol- Wine in social situations    Important Summary Points & Treatment Events   10/20/2023: Prescribed Gabapentin PRN to help with anxiety and panic attacks     Mood & Anxiety Disorder (PMAD) History   Hx of  mood or anxiety disorder: Hx of postpartum anxiety  Hx of  psychosis: None  Hx premenstrual mood/anxiety problems: None  Hx mood symptoms while taking hormonal birth control: None  Hx of infertility: None  Hx of traumatic birth: Yes, her daughter's birth  Family Hx of PMADs: None    Psychiatric Medication Trials       Drug /  Start Date Dose (mg) Helpful Taken during a  period? Adverse Effects   DC Reason / Date   Lexapro/  50 Y Taken for postpartum anxiety Weight gain(~30lbs) Discontinued in  because she wanted to get preganant   Prozac/ 2020 50 Y Taken post medical termination for trisomy 13 None Discontinued after 6months b/c she was trying to get preganant   Zoloft/ 2022 50   None     Hydroxyzine PRN 25 N  Found it activating and not relaxing Discontinued after 2 doses due to S/E   Gabapentin/10/2023 100PRN         Social/ Family History               [per patient report]                                      1ea,1ea     Per Chart (from 6/24/2022 DA):  Marisabel grew up in [place], [place]. She has a brother who is 5 years older than her. Marisabel described a close and loving relationship with her parents and brother. She denied any experiences of childhood adversity, developmental trauma, physical, sexual, or emotional abuse or trauma.      Marisabel met her , Zurdo, while living in [place] while they were both participating in a kickball league. She described their relationship as close and loving. Marisabel stated that she and her  have grieved and processed pregnancy losses in different ways, but she denied feeling alone or isolated in her grief.  Marisabel and her  moved to [place] approximately 4 years ago while Marisabel was pregnant with their daughter, Yady (now 3-years-old), in order to be near Zurdo's family. Currently, Marisabel and her family live in [place]. 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 Her  works in construction.     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 Started hydroxychloroquine 03/2022    Patient Active Problem List   Diagnosis    S/P LEEP    Anxiety    Pain of right hip       Past Surgical History:   Procedure Laterality Date    ARTHROSCOPY SHOULDER RT/LT  2006    labral repair    BIOPSY CERVICAL, LOCAL EXCISION, SINGLE/MULTIPLE      DILATION AND EVACUATION N/A 08/05/2020  "   Procedure: DILATION AND EVACUATION, UTERUS-;  Surgeon: Travis Hernandez MD;  Location: [facility]    DILATION AND EVACUATION N/A 07/28/2021    Procedure: pelvic exam under anesthesia, DILATION AND EVACUATION, UTERUS;  Surgeon: Johnna Felix MD;  Location: [facility] OR    EXAM UNDER ANESTHESIA RECTUM N/A 11/14/2019    Procedure: EXAM UNDER ANESTHESIA;  Surgeon: Diana Steward MD;  Location: [facility] OR    LAPAROSCOPIC APPENDECTOMY N/A 04/19/2021    Procedure: APPENDECTOMY, LAPAROSCOPIC;  Surgeon: Catalina Coronado MD;  Location: [facility] OR    LEEP TX, CERVICAL  2015?        PREGNANCY/ OBGYN HISTORY:    Date Event # Weeks Medical Complications Psychiatric Complications Psychotropic Meds or other Fetal Exposures   4/18/2019 Vaginal birth 41 weeks Fistula Postpartum anxiety + OCD-like symptoms  None   Summer 2020 Medical termination 17 weeks  Medical trauma due to initial diagnostic errror None   12/2020-01/2021 Miscarriage ~5 weeks (chemical pregnancy)  Feelings of Grief and loss    None   08/2021 Miscarriage 17 weeks  Feelings of Grief and loss None   05/2022 Miscarriage 7 weeks  \" None       Allergy                                Shellfish-derived products  Current Medications                                                                                                         Current Outpatient Medications   Medication Sig Dispense Refill    albuterol (PROAIR HFA/PROVENTIL HFA/VENTOLIN HFA) 108 (90 Base) MCG/ACT inhaler       amoxicillin-clavulanate (AUGMENTIN) 875-125 MG tablet       fluconazole (DIFLUCAN) 150 MG tablet       gabapentin (NEURONTIN) 100 MG capsule Take 1 capsule (100 mg) by mouth daily as needed for other (anxiety/panic attacks) Can take up to 200 mg daily if 100 mg isn't adequate for anxiety/panic symptoms. 30 capsule 0    sertraline (ZOLOFT) 50 MG tablet Take 1.5 tablets (75 mg) by mouth daily      Vitamin D, Cholecalciferol, 25 MCG (1000 UT) TABS Take 1,000 Units by mouth daily       Vitals      " "                                                                                       LMP  (LMP Unknown)    Mental Status Exam                                                                            9, 14 cog gs   Alertness: alert  and oriented  Appearance: well groomed  Behavior/Demeanor: cooperative and pleasant, with good  eye contact   Speech: regular rate and rhythm  Language: intact  Psychomotor: normal or unremarkable  Mood:  \"good\"  Affect: full range; was congruent to mood; was congruent to content  Thought Process/Associations:  linear and logical  Thought Content:  Reports none;  Denies suicidal ideation, violent ideation, preoccupations and obsessions   Perception:  Reports none;  Denies auditory hallucinations and visual hallucinations  Insight: excellent  Judgment: excellent  Cognition: (6) does  appear grossly intact; formal cognitive testing was not done  Gait and Station:  N/A Telehealth    Labs and Data                                                                                                                   PHQ9 Today:  0      2023    12:00 PM 2023     8:07 AM 10/20/2023     7:43 AM   PHQ   PHQ-9 Total Score 0 0 0   Q9: Thoughts of better off dead/self-harm past 2 weeks Not at all Not at all Not at all         Recent Labs   Lab Test 22  0946 21  0922 18  0000   CR 0.72 0.60 0.57   GFRESTIMATED >90 >90 122     Recent Labs   Lab Test 21  0922 18  0000   AST 12 15   ALT 21 13   ALKPHOS 54  --      ECG N/A QTc = N/Ams    The r/b/a of Gabapentin in lactation/pregnancy were discussed with Griselda Bell today. Reviewed that there are no absolutes as far as medication safety in pregnancy/lactation. General r/b/a of treatment and medications were also discussed. We also reviewed the risks of untreated  mood and anxiety disorders to both mother and baby/families. We reviewed SE of Gabapentin as well as general signs/symptoms in her " breastfeeding child to monitor for like lethargy, decreased suck, change in behavior(not particularly applicable to Marisabel as she's currently weaning). She understands she is to arrange assistance with childcare while taking medications that may cause sedation. Griselda Bell has had her questions answered, expresses her understanding of the information provided, and appears to have the capacity to give informed consent regarding treatment options.  resources were provided to the patient as about and as outlined in AVS.      PROVIDER:  Tolulope Oluwadamilola Odebunmi, MD    Psychiatry Individual Psychotherapy Note   Psychotherapy start time - 8:08 AM  Psychotherapy end time - 8:22 AM  Date treatment plan last reviewed with patient - 10/20/23  Subjective: This supportive psychotherapy session addressed issues related to goals of therapy and current psychosocial stressors. Patient's reaction: Maintenance in the context of mental status appropriate for ambulatory setting.    Interactive complexity indicated? No  Plan: RTC in timeframe noted above  Psychotherapy services during this visit included myself and the patient.   Treatment Plan      SYMPTOMS; PROBLEMS   MEASURABLE GOALS;    FUNCTIONAL IMPROVEMENT / GAINS INTERVENTIONS DISCHARGE CRITERIA   Panic Attacks: dizziness and palpitations   Interrupt panic attacks Supportive / psychodynamic symptom resolution

## 2023-10-20 ENCOUNTER — VIRTUAL VISIT (OUTPATIENT)
Dept: PSYCHIATRY | Facility: CLINIC | Age: 38
End: 2023-10-20
Attending: STUDENT IN AN ORGANIZED HEALTH CARE EDUCATION/TRAINING PROGRAM
Payer: COMMERCIAL

## 2023-10-20 DIAGNOSIS — F43.22 ADJUSTMENT DISORDER WITH ANXIOUS MOOD: Primary | ICD-10-CM

## 2023-10-20 DIAGNOSIS — F41.0 PANIC ATTACK: ICD-10-CM

## 2023-10-20 PROCEDURE — 99214 OFFICE O/P EST MOD 30 MIN: CPT | Mod: VID | Performed by: STUDENT IN AN ORGANIZED HEALTH CARE EDUCATION/TRAINING PROGRAM

## 2023-10-20 PROCEDURE — 90833 PSYTX W PT W E/M 30 MIN: CPT | Mod: VID | Performed by: STUDENT IN AN ORGANIZED HEALTH CARE EDUCATION/TRAINING PROGRAM

## 2023-10-20 RX ORDER — FLUCONAZOLE 150 MG/1
TABLET ORAL
COMMUNITY
Start: 2023-10-18 | End: 2024-07-25

## 2023-10-20 RX ORDER — GABAPENTIN 100 MG/1
100 CAPSULE ORAL DAILY PRN
Qty: 30 CAPSULE | Refills: 0 | Status: SHIPPED | OUTPATIENT
Start: 2023-10-20

## 2023-10-20 RX ORDER — ALBUTEROL SULFATE 90 UG/1
AEROSOL, METERED RESPIRATORY (INHALATION)
COMMUNITY
Start: 2023-10-18 | End: 2023-12-12

## 2023-10-20 ASSESSMENT — PAIN SCALES - GENERAL: PAINLEVEL: NO PAIN (0)

## 2023-10-20 NOTE — PATIENT INSTRUCTIONS
Thank you for coming to the [facility] Women's Wellbeing Program.     Treatment Plan    Medications:  - Continue Zoloft 75mg daily (prescribed by her OB)  -Start Gabapentin 100mg daily PRN for anxiety/panic attacks    Therapy:  chiqui with Dr. Moore    [facility]:  This is a one-time consult follow up. Can see in the future, if needed.    Lab Testing:  If you had lab testing today and your results are reassuring or normal they will be mailed to you or sent through [employer] within 7 days. If the lab tests need quick action we will call you with the results. The phone number we will call with results is # [phone]. If this is not the best number please call our clinic and change the number.     Medication Refills:  If you need any refills please call your pharmacy and they will contact us. Our fax number for refills is [phone].   Three business days of notice are needed for general medication refill requests.   Five business days of notice are needed for controlled substance refill requests.   If you need to change to a different pharmacy, please contact the new pharmacy directly. The new pharmacy will help you get your medications transferred.     Contact Us:  Please call [phone] during business hours (8-5:00 M-F).   If you have medication related questions after clinic hours, or on the weekend, please call [phone].     Financial Assistance [phone]   Medical Records [phone]       **For crisis resources, please see the information at the end of this document**     To find additional local resources, refer to Postpartum Support International (PSI). Available at: http://www.postpartum.net/get-help/locations/united-\[facility]\""/  -[facility] for Women's Mental Health at: www.womensmentalhealth.org is a good resource for information about psychiatric medication in pregnancy/lactation  -Mother To Baby A service of the nonprofit Organization of [employer]at[employer] (MAULIK), provides  "evidence based information online and in printable handouts to provide patients and providers regarding medications and other exposures during pregnancy and lactation Available at: https://mothertobaby.org/fact-sheets-parent/.  -The 4th Trimester Project - Expert written resources and information for mothers and families. Available at: https://Pharnext/  -Consider using \"The Pregnancy and Postpartum Anxiety Workbook,\" by Leonila Torres PhD and Roshan Telles PsyD.    Postpartum Planning Tools:  Maternal Wellbeing Plan from [facility]: https://www.health.[facility].[place]/people/womeninfants/pmad/pmadsfs.html    4th Trimester Postpartum plan: https://Pharnext/mypostpartumplan    Tips for partners:  http://www.postpartum.net/family/tips-for-postpartum-dads-and-partners/        MENTAL HEALTH CRISIS RESOURCES:  For a emergency help, please call 911 or go to the nearest Emergency Department.     Emergency Walk-In Options:   EmPATH Unit @ [facility]): [phone] - Specialized mental health emergency area designed to be [facility]  [facility] ([place]): [phone]  [facility] ([place]): [phone]  [facility]): [phone]    [facility] Crisis Information:   [place]: [phone]  [place]: [phone]  [place] ([place]ICEL[place]) - Adult: [phone]     Child: [phone]  [place] - Adult: [phone]     Child: [phone]  [place]: [phone]  List of all [facility] resources:   https://[place].gov/dhs/people-we-serve/adults/health-care/mental-health/resources/crisis-contacts.jsp    National Crisis Information:   Crisis Text Line: Text  [place]  to 200511  Suicide & Crisis Lifeline: 988  National Suicide Prevention Lifeline: 1-834-542-TALK (1-522.877.2657)       For online chat options, visit https://suicidepreventionlifeline.org/chat/  Poison Control Center: 8-435-606-1750  Trans Lifeline: 1-704.101.7787 - Hotline for transgender people of all " ages  The Tobias Project: 4-927-946-4751 - Hotline for LGBT youth     For Non-Emergency Support:   Fast Tracker: Mental Health & Substance Use Disorder Resources -   https://www.[employer] [facility].org/

## 2023-10-20 NOTE — NURSING NOTE
Is the patient currently in the state of [place]? YES    Visit mode:VIDEO    If the visit is dropped, the patient can be reconnected by: VIDEO VISIT: Send to e-mail at: [email or web address]    Will anyone else be joining the visit? NO  (If patient encounters technical issues they should call [phone]    How would you like to obtain your AVS? MyChart    Are changes needed to the allergy or medication list? No    Reason for visit: Consult    Faustina COOK

## 2023-10-26 ENCOUNTER — VIRTUAL VISIT (OUTPATIENT)
Dept: PSYCHIATRY | Facility: CLINIC | Age: 38
End: 2023-10-26
Attending: PSYCHOLOGIST
Payer: COMMERCIAL

## 2023-10-26 DIAGNOSIS — F43.22 ADJUSTMENT DISORDER WITH ANXIOUS MOOD: Primary | ICD-10-CM

## 2023-10-26 PROCEDURE — 90834 PSYTX W PT 45 MINUTES: CPT | Mod: VID

## 2023-11-10 ENCOUNTER — VIRTUAL VISIT (OUTPATIENT)
Dept: PSYCHIATRY | Facility: CLINIC | Age: 38
End: 2023-11-10
Attending: PSYCHOLOGIST
Payer: COMMERCIAL

## 2023-11-10 DIAGNOSIS — F43.22 ADJUSTMENT DISORDER WITH ANXIOUS MOOD: Primary | ICD-10-CM

## 2023-11-10 PROCEDURE — 90834 PSYTX W PT 45 MINUTES: CPT | Mod: VID

## 2023-11-14 NOTE — PROGRESS NOTES
"[facility]  OUTPATIENT PSYCHOTHERAPY PROGRESS NOTE     Client Name: Griselda Bell          YOB: 1985 (38 year old)            Date of Service:  Nov 10, 2023  Time of Service: 8:10 am to 8:51 am (41 minutes)  Service Type(s): 64099 psychotherapy (38-52 min. with patient and/or family)  Type of service: Telemedicine Psychotherapy   Reason for Telemedicine Visit: patient preference   Mode of transmission: Secure real time interactive audio and visual telecommunication system ([employer])  Location of originating and distant sites:  ? Originating site (patient location): patient's office  ? Distant site (provider site): HIPAA compliant location within provider home/remote setting  Telemedicine Visit: The patient's condition can be safely assessed and treated via synchronous audio and visual telemedicine encounter. Patient/family has agreed to receive services via telemedicine technology.     Diagnoses:     Adjustment disorder with anxious mood (F43.22)    Panic attack - full remission     Individuals Present:   Patient and provider      Treatment goal(s) being addressed:   1. Maintain gains towards treatment goals and prepare for upcoming anticipated discharge/transfer to \"maintenance\" with quarterly booster sessions  2. Enhance use of grounding and relaxation strategies to maintain remission of panic and anxiety symptoms  2. Maintain progress related to processing feelings of grief and loss, prioritizing sleep and self-care to support wellbeing following delivery of son in context of history of multiple pregnancy loss   3. Support wellbeing and adjustment through 5th trimester transition back to work following parental leave      Subjective:  Marisabel is currently 8 months postpartum. Today she continued to endorse ongoing remission of panic and anxiety symptoms, with good mood. She reported ongoing effective use of coping strategies. Following medication consultation 10/20 with Demarco " "KELSEA Santiago, Marisabel used gabapentin (100mg) during a work trip to [place] as a pharmacological strategy to help prevent nocturnal panic symptoms, specifically while traveling. She reported the medication helped give her confidence while traveling, and she did not experience any panic concerns, or medication side effects.      Marisabel denied any changes in medication.      Treatment:   Individual psychotherapy with cognitive behavioral therapy (CBT) framework leveraging  lens. Today, supported Marisabel in reflecting on recent trip to [place], with ongoing review and recognition of Marisabel's effective use of coping strategies to maintain gains in remission of panic and anxiety. Continued to recognize Marisabel's strengths, and reinforce Marisabel's progress. Together we continued to refine discharge plan to fade appointments. We planned to meet again in a month to discuss transition to quarterly booster appointments to provide support and maintain gains, with opportunity to meet more frequently as desired/needed by Marisabel. At Marisabel's request we ended early to allow her time to transition to upcoming work meeting. Provided reflective listening and positive reinforcement throughout.      Assessment and Progress:   Appearance:  Appropriately-dressed and groomed   Behavior/relationship to examiner/demeanor:  Pleasant, open, relaxed, bright and upbeat  Motor activity/EPS: Within normal limits  Speech rate:  Within normal limits  Speech volume:  Within normal limits  Speech articulation:  Within normal limits  Speech coherence:  Within normal limits  Speech spontaneity:  Within normal limit  Mood (subjective report): \"good\"  Affect (objective appearance): Within normal limits  Thought Process (Associations):  Goal directed  Thought process (Rate):  Within normal limits  Thought content: None  Abnormal Perception: None  Insight:  Within normal limits  Judgment:  Within normal limit     Marisabel has significantly benefited from " "psychotherapy and medication (Zoloft 75 mg, use of gabapentin 100 mg as needed while traveling to prevent nocturnal panic attacks). She has made steady progress in adjusting positively to the postpartum period and 5th trimester/return to work. Given Marisabel's achievement of full remission of anxiety and panic symptoms, her diagnosis was updated to Adjustment Disorder with anxious mood. Currently we are preparing to transition to quarterly \"booster\" appointments to help maintain remission of anxiety and panic symptoms.     Marisabel is an incredibly resilient individual with many areas of personal strengths and sources of support. Marisabel continued to report benefit from Zoloft and she plans to maintain this dose for at least a year and then consider at that time if she may wish to drop to 50 mg; she has articulated her wish to maintain some dose for foreseeable future/potentially indefinitely.She has a history of postpartum anxiety and intrusive thoughts/images with onset in previous postpartum period. No history of postpartum mood concerns. Infrequent intrusive images experienced during pregnancy responded well to use of cognitive defusion strategies.      Plan:   Continue therapy, currently meeting once a month. We have been fading towards discharge with planned quarterly boosters to support maintenance of symptom remission.       Next therapy appointment has been scheduled for 12/8/23 at 2 pm to continue work on treatment goals.     Treatment Plan review due: As we are moving towards discharge in the next 1-2 appointments we have not yet renewed Marisabel's treatment plan at this time, but will renew it if discharge plans are significantly adjusted      Joann Moore, PhD, LP  Postdoctoral Clinical Psychology Fellow    I did not see this patient directly, but have discussed the session with the above trainee and approve this documentation.      Hailey Young, Mariano., " LP                                                                                        Clinical Supervisor

## 2023-11-14 NOTE — PROGRESS NOTES
"[facility]  OUTPATIENT PSYCHOTHERAPY PROGRESS NOTE     Client Name: Griselda Bell          YOB: 1985 (38 year old)            Date of Service:  Oct 26, 2023  Time of Service: 8:23 am to 9:03 am (38 minutes)  Service Type(s): 25587 psychotherapy (38-52 min. with patient and/or family)  Type of service: Telemedicine Psychotherapy   Reason for Telemedicine Visit: patient preference   Mode of transmission: Secure real time interactive audio and visual telecommunication system ([employer])  Location of originating and distant sites:  ? Originating site (patient location): patient's office  ? Distant site (provider site): [facility], [facility]  Telemedicine Visit: The patient's condition can be safely assessed and treated via synchronous audio and visual telemedicine encounter. Patient/family has agreed to receive services via telemedicine technology.     Diagnoses:     Adjustment disorder with anxious mood (F43.22)    Panic attack - full remission     Individuals Present:   Patient and provider      Treatment goal(s) being addressed:   1. Maintain gains towards treatment goals and prepare for upcoming anticipated discharge/transfer to \"maintenance\" with quarterly booster sessions  2. Enhance use of grounding and relaxation strategies to maintain remission of panic and anxiety symptoms  2. Maintain progress related to processing feelings of grief and loss, prioritizing sleep and self-care to support wellbeing following delivery of son in context of history of multiple pregnancy loss   3. Support wellbeing and adjustment through 5th trimester transition back to work following parental leave      Subjective:  Marisabel is currently 7 months postpartum. Today she reported ongoing remission of panic symptoms. She experienced increased worry since our last appointment when her father experienced a " diabetic episode and loss of consciousness while visiting her, requiring 911/EMT assistance. Marisabel noted she has been able to use cognitive and behavioral coping strategies to manage worries effectively. Leveraging support from her brother has been beneficial. She is continuing to effectively use coping strategies to support remission of symptoms.       Marisabel completed a medication consultation 10/20 with KELSEA Segal, of [facility] (this writer reviewed notes prior to our appointment). They partnered to initiate gabapentin (100mg) as a pharmacological tool Marisabel can use as needed to help prevent nocturnal panic symptoms. Marisabel plans to use it specifically while traveling. No other changes in medication treatment plan.       Treatment:   Individual psychotherapy with cognitive behavioral therapy (CBT) framework leveraging  lens. Today, supported Marisabel in holding thoughts and feelings related to her father's recent diabetic episode, and managing worries and uncertainty about his health, and feelings of grief and loss related to his declining health and functioning. Discussed strategies for setting healthy emotional boundaries. Ongoing review and recognition of Marisabel's effective use of coping strategies to maintain gains in remission of panic and anxiety. Continued to recognize Marisabel's strengths, and reinforce Marisabel's progress. Ongoing discussion to refine discharge plan to fade appointments and transition to quarterly booster appointments to provide support and maintain gains, with opportunity to meet more frequently as desired/needed by Marisabel. Provided reflective listening and positive reinforcement throughout.      Assessment and Progress:   Appearance:  Appropriately-dressed and groomed   Behavior/relationship to examiner/demeanor:  Pleasant, open, relaxed, bright and upbeat  Motor activity/EPS: Within normal limits  Speech rate:  Within normal limits  Speech volume:  Within normal  "limits  Speech articulation:  Within normal limits  Speech coherence:  Within normal limits  Speech spontaneity:  Within normal limit  Mood (subjective report): \"good\"  Affect (objective appearance): teary during moments of reflecting on father's health; within normal limits  Thought Process (Associations):  Goal directed  Thought process (Rate):  Within normal limits  Thought content: None  Abnormal Perception: None  Insight:  Within normal limits  Judgment:  Within normal limit     Marisabel has significantly benefited from psychotherapy and medication (Zoloft 75 mg, Gabapentin 100 mg as needed while traveling to help prevent nocturnal panic attacks). She has made steady progress in adjusting positively to the postpartum period and 5th trimester/return to work. Given Marisabel's achievement of full remission of anxiety and panic symptoms, her diagnosis was updated to Adjustment Disorder with anxious mood. Currently we are preparing to transition to quarterly \"booster\" appointments to help maintain remission of anxiety and panic symptoms.      Marisabel is an incredibly resilient individual with many areas of personal strengths and sources of support. Marisabel continued to report benefit from Zoloft and she plans to maintain this dose for at least a year and then consider at that time if she may wish to drop to 50 mg; she has articulated her wish to maintain some dose for foreseeable future/potentially indefinitely.She has a history of postpartum anxiety and intrusive thoughts/images with onset in previous postpartum period. No history of postpartum mood concerns. Infrequent intrusive images experienced during pregnancy responded well to use of cognitive defusion strategies.      Plan:   Continue therapy, currently meeting once a month. We have been fading towards discharge with planned quarterly boosters to support maintenance of symptom remission.       Next therapy appointment has been scheduled for 11/10/23 at 8 am to " continue work on treatment goals.     Treatment Plan review due: As we are moving towards discharge in the next 1-2 appointments we have not yet renewed Marisabel's treatment plan at this time, but will renew it if discharge plans are significantly adjusted      Joann Moore, PhD, LP  Postdoctoral Clinical Psychology Fellow    I did not see this patient directly, but have discussed the session with the above trainee and approve this documentation.      Hailey Young Psy.D., LP                                                                                        Clinical Supervisor

## 2023-11-21 ENCOUNTER — NURSE TRIAGE (OUTPATIENT)
Dept: FAMILY MEDICINE | Facility: CLINIC | Age: 38
End: 2023-11-21
Payer: COMMERCIAL

## 2023-11-21 ENCOUNTER — MYC MEDICAL ADVICE (OUTPATIENT)
Dept: FAMILY MEDICINE | Facility: CLINIC | Age: 38
End: 2023-11-21
Payer: COMMERCIAL

## 2023-11-21 DIAGNOSIS — U07.1 INFECTION DUE TO COVID-19 VIRUS VARIANT OF CONCERN: Primary | ICD-10-CM

## 2023-11-21 NOTE — TELEPHONE ENCOUNTER
RN COVID TREATMENT VISIT  11/21/23      The patient has been triaged and does not require a higher level of care.    Griselda Bell  38 year old  Current weight? 188lb    Has the patient been seen by a primary care provider at an [facility] or [facility] within the past two years? Yes.   Have you been in close proximity to/do you have a known exposure to a person with a confirmed case of influenza? No.     General treatment eligibility:  Date of positive COVID test (PCR or at home)?  11/20/2023    Are you or have you been hospitalized for this COVID-19 infection? No.   Have you received monoclonal antibodies or antiviral treatment for COVID-19 since this positive test? No.   Do you have any of the following conditions that place you at risk of being very sick from COVID-19?   - Mental health disorders including mood disorders, depression, schizophrenia spectrum disorders   Yes, patient has at least one high risk condition as noted above.     Current COVID symptoms:   - cough  - muscle or body aches  - headache  - congestion or runny nose  Yes. Patient has at least one symptom as selected.     How many days since symptoms started? 5 days or less. Established patient, 12 years or older weighing at least 88.2 lbs, who has symptoms that started in the past 5 days, has not been hospitalized nor received treatment already, and is at risk for being very sick from COVID-19.     Treatment eligibility by RN:  Are you currently pregnant or nursing? No  Do you have a clinically significant hypersensitivity to nirmatrelvir or ritonavir, or toxic epidermal necrolysis (TEN) or Lawrence-Ever[place] Syndrome? No  Do you have a history of hepatitis, any hepatic impairment on the Problem List (such as Child-Byers Class C, cirrhosis, fatty liver disease, alcoholic liver disease), or was the last liver lab (hepatic panel, ALT, AST, ALK Phos, bilirubin) elevated in the past 6 months? No  Do you have any history  of severe renal impairment (eGFR < 30mL/min)? No    Is patient eligible to continue? Yes, patient meets all eligibility requirements for the RN COVID treatment (as denoted by all no responses above).     Current Outpatient Medications   Medication Sig Dispense Refill    albuterol (PROAIR HFA/PROVENTIL HFA/VENTOLIN HFA) 108 (90 Base) MCG/ACT inhaler       amoxicillin-clavulanate (AUGMENTIN) 875-125 MG tablet       fluconazole (DIFLUCAN) 150 MG tablet       gabapentin (NEURONTIN) 100 MG capsule Take 1 capsule (100 mg) by mouth daily as needed for other (anxiety/panic attacks) Can take up to 200 mg daily if 100 mg isn't adequate for anxiety/panic symptoms. 30 capsule 0    sertraline (ZOLOFT) 50 MG tablet Take 1.5 tablets (75 mg) by mouth daily      Vitamin D, Cholecalciferol, 25 MCG (1000 UT) TABS Take 1,000 Units by mouth daily         Medications from List 1 of the standing order (on medications that exclude the use of Paxlovid) that patient is taking: NONE. Is patient taking [place]'s Wort? No  Is patient taking Vance's Wort or any meds from List 1? No.   Medications from List 2 of the standing order (on meds that provider needs to adjust) that patient is taking: NONE. Is patient on any of the meds from List 2? No.   Medications from List 3 of standing order (on meds that a RN needs to adjust) that patient is taking: NONE. Is patient on any meds from List 3? No.     Paxlovid has an approximate 90% reduction in hospitalization. Paxlovid can possibly cause altered sense of taste, diarrhea (loose, watery stools), high blood pressure, muscle aches.     Would patient like a Paxlovid prescription?   Yes.   Lab Results   Component Value Date    GFRESTIMATED >90 06/23/2022       Was last eGFR reduced? No, eGFR 60 or greater/ No Result on record. Patient can receive the normal renal function dose. Paxlovid Rx sent to [place] pharmacy   [facility]    Temporary change to home medications: None    All medication adjustments  (holds, etc) were discussed with the patient and patient was asked to repeat back (teachback) their med adjustment.  Did patient understand med adjustment? No medication adjustments needed.         Reviewed the following instructions with the patient:    Paxlovid (nimatrelvir and ritonavir)    How it works  Two medicines (nirmatrelvir and ritonavir) are taken together. They stop the virus from growing. Less amount of virus is easier for your body to fight.    How to take  Medicine comes in a daily container with both medicine tablets. Take by mouth twice daily (once in the morning, once at night) for 5 days.  The number of tablets to take varies by patient.  Don't chew or break capsules. Swallow whole.    When to take  Take as soon as possible after positive COVID-19 test result, and within 5 days of your first symptoms.    Possible side effects  Can cause altered sense of taste, diarrhea (loose, watery stools), high blood pressure, muscle aches.    Logan Lai RN         Reason for Disposition   COVID-19 diagnosed by positive lab test (e.g., PCR, rapid self-test kit) and mild symptoms (e.g., cough, fever, others) and no complications or SOB    Additional Information   Negative: SEVERE difficulty breathing (e.g., struggling for each breath, speaks in single words)   Negative: Difficult to awaken or acting confused (e.g., disoriented, slurred speech)   Negative: Bluish (or gray) lips or face now   Negative: Shock suspected (e.g., cold/pale/clammy skin, too weak to stand, low BP, rapid pulse)   Negative: Sounds like a life-threatening emergency to the triager   Negative: SEVERE or constant chest pain or pressure  (Exception: Mild central chest pain, present only when coughing.)   Negative: MODERATE difficulty breathing (e.g., speaks in phrases, SOB even at rest, pulse 100-120)   Negative: Headache and stiff neck (can't touch chin to chest)   Negative: Oxygen level (e.g., pulse oximetry) 90% or lower   Negative:  Diagnosed or suspected COVID-19 and symptoms lasting 3 or more weeks   Negative: COVID-19 exposure and no symptoms   Negative: COVID-19 vaccine reaction suspected (e.g., fever, headache, muscle aches) occurring 1 to 3 days after getting vaccine   Negative: COVID-19 vaccine, questions about   Negative: Lives with someone known to have influenza (flu test positive) and flu-like symptoms (e.g., cough, runny nose, sore throat, SOB; with or without fever)   Negative: Possible COVID-19 symptoms and triager concerned about severity of symptoms or other causes   Negative: COVID-19 and breastfeeding, questions about   Negative: Chest pain or pressure  (Exception: MILD central chest pain, present only when coughing.)   Negative: Drinking very little and dehydration suspected (e.g., no urine > 12 hours, very dry mouth, very lightheaded)   Negative: Patient sounds very sick or weak to the triager   Negative: MILD difficulty breathing (e.g., minimal/no SOB at rest, SOB with walking, pulse <100)   Negative: Fever > 103 F (39.4 C)   Negative: Fever > 101 F (38.3 C) and over 60 years of age   Negative: Fever > 100.0 F (37.8 C) and bedridden (e.g., CVA, chronic illness, recovering from surgery)   Negative: HIGH RISK patient (e.g., weak immune system, age > 64 years, obesity with BMI of 30 or higher, pregnant, chronic lung disease or other chronic medical condition) and COVID symptoms (e.g., cough, fever)  (Exceptions: Already seen by doctor or NP/PA and no new or worsening symptoms.)   Negative: HIGH RISK patient and influenza is widespread in the community and ONE OR MORE respiratory symptoms: cough, sore throat, runny or stuffy nose   Negative: HIGH RISK patient and influenza exposure within the last 7 days and ONE OR MORE respiratory symptoms: cough, sore throat, runny or stuffy nose   Negative: Oxygen level (e.g., pulse oximetry) 91 to 94%   Negative: COVID-19 infection suspected by caller or triager and mild symptoms (cough,  fever, or others) and negative COVID-19 rapid test   Negative: Fever present > 3 days (72 hours)   Negative: Fever returns after gone for over 24 hours and symptoms worse or not improved   Negative: Continuous (nonstop) coughing interferes with work or school and no improvement using cough treatment per Care Advice   Negative: Cough present > 3 weeks    Protocols used: Coronavirus (COVID-19) Diagnosed or [id number]

## 2023-11-27 NOTE — TELEPHONE ENCOUNTER
Dear mihai   Please give her TE appointment with me-for tomorrow  and let her know I will call her- either at noon- or 1230-130 pm- to review her symptoms of concerns and appropriate management. . Thanks

## 2023-11-27 NOTE — TELEPHONE ENCOUNTER
Patient is scheduled at noon for a Telephone visit  [facility] Unit Coordinator

## 2023-11-28 ENCOUNTER — VIRTUAL VISIT (OUTPATIENT)
Dept: FAMILY MEDICINE | Facility: CLINIC | Age: 38
End: 2023-11-28
Payer: COMMERCIAL

## 2023-11-28 DIAGNOSIS — J31.0 CHRONIC RHINITIS: ICD-10-CM

## 2023-11-28 DIAGNOSIS — U07.1 INFECTION DUE TO 2019 NOVEL CORONAVIRUS: ICD-10-CM

## 2023-11-28 DIAGNOSIS — J01.90 ACUTE SINUSITIS WITH SYMPTOMS GREATER THAN 10 DAYS: Primary | ICD-10-CM

## 2023-11-28 PROCEDURE — 99441 PR PHYSICIAN TELEPHONE EVALUATION 5-10 MIN: CPT | Mod: 93 | Performed by: FAMILY MEDICINE

## 2023-11-28 RX ORDER — FLUTICASONE PROPIONATE 50 MCG
1 SPRAY, SUSPENSION (ML) NASAL DAILY
Qty: 11.1 ML | Refills: 1 | Status: SHIPPED | OUTPATIENT
Start: 2023-11-28 | End: 2024-07-25

## 2023-11-28 RX ORDER — MONTELUKAST SODIUM 10 MG/1
10 TABLET ORAL AT BEDTIME
Qty: 30 TABLET | Refills: 3 | Status: SHIPPED | OUTPATIENT
Start: 2023-11-28 | End: 2024-03-21

## 2023-11-28 NOTE — PROGRESS NOTES
Marisabel is a 38 year old who is being evaluated via a billable telephone visit.      What phone number would you like to be contacted at? [phone]   How would you like to obtain your AVS? Tahirahart    Distant Location (provider location):  On-site    Assessment & Plan     Acute sinusitis with symptoms greater than 10 days  Plan: advised to start antibiotic given the duration of symptoma for weeks  - amoxicillin-clavulanate (AUGMENTIN) 875-125 MG tablet; Take 1 tablet by mouth 2 times daily  Potential medication side effects were discussed with the patient; let me know if any occur.      Chronic rhinitis  Plan: patient reports chronic post nasal dripping & subsequent sinus congestion on and off  Advised antihistamine use for now for 2 weeks and then as needed    - fluticasone (FLONASE) 50 MCG/ACT nasal spray; Spray 1 spray into both nostrils daily  - montelukast (SINGULAIR) 10 MG tablet; Take 1 tablet (10 mg) by mouth at bedtime  Potential medication side effects were discussed with the patient; let me know if any occur.    Follow up in 2-3 weeks for unresolved concerns       Infection due to 2019 novel coronavirus  Tested positive at home- and completed 5 days of paxlovid- improved symptoms.         Renetta Long MD  [facility]   Marisabel is a 38 year old, presenting for the following health issues:  No chief complaint on file.      HPI patient reports chronic post nasal dripping & subsequent sinus congestion on and off.  More sinus pressure and congestion now for > 2 weeks  Completed paxlovid, for 5 days contiues to have yellow nasal mucous, sinus pressure and pain.    History of recurrent sinusitis.  Her 8 month  old also tested positive for covid and has recovered after mild symptoms of cough.  She is not nursing      Review of Systems   Constitutional, HEENT, cardiovascular, pulmonary, GI, , musculoskeletal, neuro, skin, endocrine and psych systems are negative, except as  otherwise noted.      Objective           Vitals:  No vitals were obtained today due to virtual visit.    Physical Exam   healthy, alert, and no distress  PSYCH: Alert and oriented times 3; coherent speech, normal   rate and volume, able to articulate logical thoughts, able   to abstract reason, no tangential thoughts, no hallucinations   or delusions  Her affect is normal  RESP: No cough, no audible wheezing, able to talk in full sentences  Remainder of exam unable to be completed due to telephone visits                Phone call duration: 8 minutes

## 2023-12-11 ENCOUNTER — MYC MEDICAL ADVICE (OUTPATIENT)
Dept: FAMILY MEDICINE | Facility: CLINIC | Age: 38
End: 2023-12-11
Payer: COMMERCIAL

## 2023-12-12 ENCOUNTER — OFFICE VISIT (OUTPATIENT)
Dept: FAMILY MEDICINE | Facility: CLINIC | Age: 38
End: 2023-12-12
Payer: COMMERCIAL

## 2023-12-12 ENCOUNTER — TELEPHONE (OUTPATIENT)
Dept: ALLERGY | Facility: CLINIC | Age: 38
End: 2023-12-12

## 2023-12-12 VITALS
OXYGEN SATURATION: 100 % | HEART RATE: 90 BPM | HEIGHT: 67 IN | RESPIRATION RATE: 18 BRPM | WEIGHT: 186.7 LBS | SYSTOLIC BLOOD PRESSURE: 124 MMHG | DIASTOLIC BLOOD PRESSURE: 84 MMHG | BODY MASS INDEX: 29.3 KG/M2 | TEMPERATURE: 99 F

## 2023-12-12 DIAGNOSIS — T50.905A ADVERSE EFFECT OF DRUG, INITIAL ENCOUNTER: ICD-10-CM

## 2023-12-12 DIAGNOSIS — Z91.018 FOOD ALLERGY: Primary | ICD-10-CM

## 2023-12-12 DIAGNOSIS — L50.9 URTICARIA: ICD-10-CM

## 2023-12-12 PROCEDURE — 86003 ALLG SPEC IGE CRUDE XTRC EA: CPT | Performed by: FAMILY MEDICINE

## 2023-12-12 PROCEDURE — 99214 OFFICE O/P EST MOD 30 MIN: CPT | Performed by: FAMILY MEDICINE

## 2023-12-12 PROCEDURE — 36415 COLL VENOUS BLD VENIPUNCTURE: CPT | Performed by: FAMILY MEDICINE

## 2023-12-12 RX ORDER — PREDNISONE 20 MG/1
TABLET ORAL
Qty: 20 TABLET | Refills: 0 | Status: SHIPPED | OUTPATIENT
Start: 2023-12-12 | End: 2024-07-25

## 2023-12-12 RX ORDER — CETIRIZINE HYDROCHLORIDE 5 MG/1
5 TABLET ORAL DAILY
COMMUNITY
End: 2024-07-25

## 2023-12-12 ASSESSMENT — PAIN SCALES - GENERAL: PAINLEVEL: NO PAIN (0)

## 2023-12-12 NOTE — PROGRESS NOTES
Assessment & Plan   Urticaria  Plan: urticarial rash due to PCN VS fish/shell fish  Consumption of possible cross contaminated cuisine Sunday and urticarial rash started on Monday. I do recommend for patient to call the resturant & inquire if fish saw or cross contamination of the food is possible as she consumed vegetarian dish.  My suspicious of the rash being drug reaction to PCN is high as well- as her last dose of Augmentin was 2 days prior to rash.  Advised to avoid PCN & proceed with allergy referral for skin test- if indicated to clarify that rash from  PCN. She never had anaphylactic reaction    For symptoms releif over the counter antihistamine has not helped  - predniSONE (DELTASONE) 20 MG tablet; Take 3 tabs by mouth daily x 3 days, then 2 tabs daily x 3 days, then 1 tab daily x 3 days, then 1/2 tab daily x 3 days.      Adverse effect of drug, initial encounter  Plan:- Adult Allergy/Asthma  Referral; Future  - predniSONE (DELTASONE) 20 MG tablet; Take 3 tabs by mouth daily x 3 days, then 2 tabs daily x 3 days, then 1 tab daily x 3 days, then 1/2 tab daily x 3 days.    Food allergy/ shelf fish  Plan:  - Allergen clam IgE; Future  - Allergen crab IgE; Future  - Allergen lobster IgE; Future  - Allergen oyster IgE; Future  - Allergen scallop IgE; Future  - Allergen shrimp IgE; Future  - Allergen codfish IgE; Future  - Allergen flounder IgE; Future  - Allergen Hany IgE; Future  - Allergen Halibut IgE; Future  - Allergen salmon IgE; Future  - Allergen Trout IgE; Future  - Allergen tuna IgE; Future  - Adult Allergy/Asthma  Referral; Future    - Allergen clam IgE  - Allergen crab IgE  - Allergen lobster IgE  - Allergen oyster IgE  - Allergen scallop IgE  - Allergen shrimp IgE  - Allergen codfish IgE  - Allergen flounder IgE  - Allergen Hany IgE  - Allergen Halibut IgE  - Allergen salmon IgE  - Allergen Trout IgE  - Allergen tuna IgE          Ordering of each unique test  Prescription drug  management  I spent a total of 33 minutes on the day of the visit.   Time spent by me doing chart review, history and exam, documentation and further activities per the note            Renetta Long MD  [facility]      Martina Petit is a 38 year old, presenting for the following health issues:  Derm Problem      History of Present Illness       Reason for visit:  Rash  Symptom onset:  1-3 days ago    She eats 4 or more servings of fruits and vegetables daily.She consumes 0 sweetened beverage(s) daily.She exercises with enough effort to increase her heart rate 30 to 60 minutes per day.  She exercises with enough effort to increase her heart rate 5 days per week.   She is taking medications regularly.         Rash  Onset/Duration: started Donato 12/10/23  Description  Location: Abdomen, arms and neck; this morning spread to legs.   Character: blotchy, raised, red, linear  Itching: moderate  Intensity:  moderate  Progression of Symptoms:  worsening  Accompanying signs and symptoms:   Fever: No  Body aches or joint pain: recent COVID/sinus infection  Sore throat symptoms: No  Recent cold symptoms: YES  History:           Previous episodes of similar rash: None  New exposures:  medication - augmentin  Recent travel: No  Exposure to similar rash: No  Precipitating or alleviating factors:   Therapies tried and outcome: Zyrtec/cetirizine -  little helpful (arm improved). Hydrocortizone cream - little helpful.    At at friends place a vegetarian take out from [employer] restaurant-  Broke out in rash- 12/10/23  Finished Augmentin  on 12/08/23   Has taken Augmentin twice before in past few year.  Sinusitis did respond to Augmentin.    History of scallop, shrimp & carb allergy with symptoms and also tested positive in blood 2015  Grew up in [place]- eating all sea food without concerns  Wants blood test to confirm allergies to fish vs shelfish  Wants allergies referral as well      Review of  Systems   Constitutional, HEENT, cardiovascular, pulmonary, GI, , musculoskeletal, neuro, skin, endocrine and psych systems are negative, except as otherwise noted.      Objective    LMP 11/25/2023 (Exact Date)   Breastfeeding No   There is no height or weight on file to calculate BMI.  Physical Exam   GENERAL: healthy, alert and no distress  Urticarial rash  - all over the torso, limbs.  NECK: no adenopathy, no asymmetry, masses, or scars and thyroid normal to palpation  RESP: lungs clear to auscultation - no rales, rhonchi or wheezes  CV: regular rate and rhythm, normal S1 S2, no S3 or S4, no murmur, click or rub, no peripheral edema and peripheral pulses strong  ABDOMEN: soft, nontender, no hepatosplenomegaly, no masses and bowel sounds normal

## 2023-12-12 NOTE — TELEPHONE ENCOUNTER
Spoke w/ patient this morning.   Would prefer to come into clinic.   Scheduled today w/ RAMESH BOATENG

## 2023-12-12 NOTE — TELEPHONE ENCOUNTER
This encounter is being sent to inform the clinic that this patient has a referral from Dr. Renetta Long for the diagnoses of Drug Allergy and has requested that this patient be seen within 1-2 weeks.  Based on the availability of our provider(s), we are unable to accommodate this request.    Were all sites offered this patient?  Yes Patient's preference is [facility] or [facility]    Does scheduling algorithm request to schedule next available?  Patient has been scheduled for the first available opening with Dr. Paul Fitzpatrick on 4/23/2024.  We have informed the patient that the clinic will review their referral and reach out if a sooner appointment is medically necessary.

## 2023-12-13 LAB
CLAM IGE QN: <0.1 KU(A)/L
CODFISH IGE QN: <0.1 KU(A)/L
CRAB IGE QN: <0.1 KU(A)/L
FLOUNDER IGE QN: <0.1 KU(A)/L
HADDOCK IGE QN: <0.1 KU(A)/L
HALIBUT IGE QN: <0.1 KU(A)/L
LOBSTER IGE QN: <0.1 KU(A)/L
OYSTER IGE QN: <0.1 KU(A)/L
SALMON IGE QN: <0.1 KU(A)/L
SCALLOP IGE QN: <0.1 KU(A)/L
SHRIMP IGE QN: 0.18 KU(A)/L
TROUT IGE QN: <0.1 KU(A)/L
TUNA IGE QN: <0.1 KU(A)/L

## 2023-12-14 ENCOUNTER — VIRTUAL VISIT (OUTPATIENT)
Dept: PSYCHIATRY | Facility: CLINIC | Age: 38
End: 2023-12-14
Attending: PSYCHOLOGIST
Payer: COMMERCIAL

## 2023-12-14 ENCOUNTER — OFFICE VISIT (OUTPATIENT)
Dept: URGENT CARE | Facility: URGENT CARE | Age: 38
End: 2023-12-14
Payer: COMMERCIAL

## 2023-12-14 VITALS
SYSTOLIC BLOOD PRESSURE: 153 MMHG | TEMPERATURE: 99.7 F | HEART RATE: 86 BPM | OXYGEN SATURATION: 99 % | BODY MASS INDEX: 29.13 KG/M2 | DIASTOLIC BLOOD PRESSURE: 94 MMHG | WEIGHT: 186 LBS

## 2023-12-14 DIAGNOSIS — R21 RASH: Primary | ICD-10-CM

## 2023-12-14 DIAGNOSIS — F43.22 ADJUSTMENT DISORDER WITH ANXIOUS MOOD: Primary | ICD-10-CM

## 2023-12-14 PROCEDURE — 90832 PSYTX W PT 30 MINUTES: CPT | Mod: VID

## 2023-12-14 PROCEDURE — 99213 OFFICE O/P EST LOW 20 MIN: CPT | Performed by: PHYSICIAN ASSISTANT

## 2023-12-14 RX ORDER — EPINEPHRINE 0.3 MG/.3ML
0.3 INJECTION SUBCUTANEOUS PRN
Qty: 2 EACH | Refills: 0 | Status: SHIPPED | OUTPATIENT
Start: 2023-12-14 | End: 2024-07-25

## 2023-12-14 NOTE — TELEPHONE ENCOUNTER
Called and spoke with patient to see if she would be available for NP cancellation next week. Patient accepted the 7:30am appt on 12/21. Patient is currently using Zyrtec and on a prednisone taper as she is still experiencing a rash/hives. Was seen in urgent care today and was prescribed the prednisone. Patient was educated on skin testing and blood testing options and what most likely would happen at her appointment.     Patient had been on Augmentin and developed a rash/ hives two days after her last dose.     Patient does have a known fish/shellfish allergy and did possibly have a cross contaminated meal prior to the episode of hives that started on Monday.     Radha Martinez, AURELIAN, RN

## 2023-12-14 NOTE — PROGRESS NOTES
SUBJECTIVE:   Griselda Bell is a 38 year old female presenting with a chief complaint of   Chief Complaint   Patient presents with    Urgent Care     Rash        She is a new patient of [facility].  Patient presents with ongoing hives.  Patient seen on 12/12.  Placed on prednisone starting at 60 mg.  She denies any SOB, lip, tongue problems.  She has an appointment with allergist in April.  She does not have an epipen and is not taking zyrtec.  States hives are not better.          Review of Systems    No past medical history on file.  Family History   Problem Relation Age of Onset    Diabetes Father     Arrhythmia Maternal Grandfather         pacemaker    Colon Polyps Brother         age 35    Colon Cancer No family hx of     Breast Cancer No family hx of      Current Outpatient Medications   Medication Sig Dispense Refill    cetirizine (ZYRTEC) 5 MG tablet Take 5 mg by mouth daily      fluconazole (DIFLUCAN) 150 MG tablet       fluticasone (FLONASE) 50 MCG/ACT nasal spray Spray 1 spray into both nostrils daily 11.1 mL 1    gabapentin (NEURONTIN) 100 MG capsule Take 1 capsule (100 mg) by mouth daily as needed for other (anxiety/panic attacks) Can take up to 200 mg daily if 100 mg isn't adequate for anxiety/panic symptoms. 30 capsule 0    montelukast (SINGULAIR) 10 MG tablet Take 1 tablet (10 mg) by mouth at bedtime 30 tablet 3    predniSONE (DELTASONE) 20 MG tablet Take 3 tabs by mouth daily x 3 days, then 2 tabs daily x 3 days, then 1 tab daily x 3 days, then 1/2 tab daily x 3 days. 20 tablet 0    sertraline (ZOLOFT) 50 MG tablet Take 1.5 tablets (75 mg) by mouth daily      Vitamin D, Cholecalciferol, 25 MCG (1000 UT) TABS Take 1,000 Units by mouth daily       Social History     Tobacco Use    Smoking status: Never    Smokeless tobacco: Never   Substance Use Topics    Alcohol use: Yes     Comment: 1 drink when does have etoh       OBJECTIVE  LMP 11/25/2023 (Exact Date)     Physical Exam  Vitals and nursing note  reviewed.   Constitutional:       Appearance: Normal appearance. She is obese.   Eyes:      Extraocular Movements: Extraocular movements intact.      Conjunctiva/sclera: Conjunctivae normal.   Cardiovascular:      Rate and Rhythm: Normal rate and regular rhythm.      Pulses: Normal pulses.      Heart sounds: Normal heart sounds.   Pulmonary:      Effort: Pulmonary effort is normal.      Breath sounds: Normal breath sounds.   Skin:     Comments: Differing size hives to arms, legs and anterior thorax.     Neurological:      Mental Status: She is alert.         Labs:  No results found for this or any previous visit (from the past 24 hour(s)).    ASSESSMENT:    No diagnosis found.     Medical Decision Making:    Differential Diagnosis:  Allergic reaction    Serious Comorbid Conditions:  Adult:   reviewed    PLAN:    Rx for epipen.  Discussed how to use.  Asked patient to take zyrtec daily or BID for a week or until improvement.  Call allergist to be placed on waiting list.      Followup:    If not improving or if condition worsens, follow up with your Primary Care Provider, If not improving or if conditions worsens over the next 12-24 hours, go to the Emergency Department    There are no Patient Instructions on file for this visit.

## 2023-12-14 NOTE — TELEPHONE ENCOUNTER
Patient calling re she just went to a clinic and was told she should get in to an allergist as soon as possible. Writer adv team is working on finding sooner - please call back [phone]

## 2023-12-14 NOTE — PROGRESS NOTES
"[facility]   OUTPATIENT PSYCHOTHERAPY PROGRESS NOTE    Client Name: Griselda Bell   YOB: 1985 (38 year old)   Date of Service:  Dec 14, 2023  Time of Service: 8:12 am to 8:45 am (33 minutes)  Service Type(s): 98788 psychotherapy (16-37 min. with patient and/or family)  Type of service: Telemedicine Psychotherapy   Reason for Telemedicine Visit: patient preference   Mode of transmission: Secure real time interactive audio and visual telecommunication system (LIFX)  Location of originating and distant sites:    Originating site (patient location): patient's office     Distant site (provider site): HIPAA compliant location within remote setting  Telemedicine Visit: The patient's condition can be safely assessed and treated via synchronous audio and visual telemedicine encounter. Patient/family has agreed to receive services via telemedicine technology.    Diagnoses:     Adjustment disorder with anxious mood (F43.22)    Panic attack - full remission    Individuals Present:   Patient and provider     Treatment goal(s) being addressed:   1. Maintain gains towards treatment goals and prepare for upcoming anticipated discharge/transfer to \"maintenance\" with quarterly booster sessions  2. Enhance use of grounding and relaxation strategies to maintain remission of panic and anxiety symptoms  2. Maintain progress related to processing feelings of grief and loss, prioritizing sleep and self-care to support wellbeing following delivery of son in context of history of multiple pregnancy loss   3. Support wellbeing and adjustment through 5th trimester transition back to work following parental leave    Subjective:  Marisabel reported managing significant distress and physical discomfort over the past week since developing a rash (red, raised itchy bumps) last Sunday. She completed two independent rounds of penicillin in the past 6 weeks following sinus infection and COVID viral infection. Onset of " "rash occurred last  evening after completing second round of penicillin. Marisabel is allergic to shrimp and wonders if she had an unknown shrimp exposure when eating a noodle dish last  afternoon at a social event. Workup with PCP suggests rash is due to possible penicillin allergy; consultation with allergy/immunology (Paul Fitzpatrick MD) scheduled for earliest opening in April. Despite discontinuing penicillin prior to onset of rash and starting steroid on Monday, rash has persisted throughout entire body with significant intermittent facial swelling (she shared photo of unilateral eyelid swelling) and exacerbated irritation around thigh/groin area. She endorsed severe itch and discomfort of rash has significantly interrupted sleep. Last night she woke up at 2 am and needed to take a bath with apple cider vinegar to manage discomfort. Marisabel reported co-occurring digestion difficulties throughout the week (diarrhea), and hoarse/sore throat in past 24 hours. Denies any difficulty breathing, temperature, or blistering of rash in mouth/eyes/genitals. No previous history of penicillin allergy.    Marisabel reported feeling \"unsettled\" and \"worried\" the rash has persisted multiple days after potential exposure(s) despite initiating steroids. She has been using coping strategies to manage anxiety and worries effectively, but noted an increase in stress and worries over the past few days. She feels this increase is related to acute sleep difficulties/fatigue over the past few days as well as cumulative sleep difficulties/fatigue over past few weeks in context of previous illnesses. Marisabel denied any panic symptoms or concerns.     Marisabel denied any changes in psychiatric medication (ongoing use of Zoloft 75 mg).     Treatment:   Individual psychotherapy with cognitive behavioral therapy (CBT) framework leveraging  lens. Today, supported Marisabel in processing difficult thoughts and feelings related to recent " "illnesses. Discussed/reviewed Marisabel's recent use of cognitive and behavioral strategies to support management of stress and worries. Provided reflective listening and positive reinforcement. Named and validated struggle with uncertainty; Marisabel is uncertain of penicillin allergy diagnosis given timeline of exposure and ongoing rash despite discontinuing penicillin before onset of rash and initiating steroids. Explored further resources and strategies, including possible benefit of leveraging medical care team to support coping with uncertainty and distress by pursuing further consultation/clarification with PCP or urgent care to receive psychoeducation/gain clarity/confidence on expected timeline of rash/steroid treatment, and/or if recommended by team, explore further rule/outs or possible treatment options to help relieve itchiness/swelling. Discussed mindfulness and relaxation strategies to continue to manage and reduce worries, and self-soothing strategies to continue to manage and reduce distress. Emphasis on prioritizing rest, sleep, and self-care to support health. Provided recognition and positive reinforcement of Marisabel's use of coping strategies to maintain remission of anxiety and panic symptoms. Marisabel planned to go to urgent care after our appointment and at her request we ended early to allow her time to arrive for 10 am opening.     Assessment and Progress:   Appearance:  Appropriately-dressed and groomed   Behavior/relationship to examiner/demeanor:  Open, fatigued, stressed   Motor activity/EPS: Within normal limits  Speech rate:  Within normal limits  Speech volume:  Within normal limits  Speech articulation:  Within normal limits  Speech coherence:  Within normal limits  Speech spontaneity:  Within normal limit  Mood (subjective report): \"stressed\"  Affect (objective appearance): Tearful while describing fatigue and distress related to severity of itchiness/swelling; within normal limits  Thought " "Process (Associations):  Goal directed  Thought process (Rate):  Within normal limits  Thought content: None  Abnormal Perception: None  Insight:  Within normal limits  Judgment:  Within normal limit     Marisabel has significantly benefited from psychotherapy and medication (Zoloft 75 mg, use of gabapentin 100 mg as needed while traveling to prevent nocturnal panic attacks). She has made steady progress in adjusting positively to the postpartum period and 5th trimester/return to work. Given Marisabel's achievement of full remission of anxiety and panic symptoms, her diagnosis was updated to Adjustment Disorder with anxious mood. Currently we are preparing to transition to quarterly \"booster\" appointments to help maintain remission of anxiety and panic symptoms.      Marisabel is an incredibly resilient individual with many areas of personal strengths and sources of support. Marisabel continues to report benefit from Zoloft and she plans to maintain this dose for at least a year and then consider at that time if she may wish to drop to 50 mg; she has articulated her wish to maintain some dose for foreseeable future/potentially indefinitely.She has a history of postpartum anxiety and intrusive thoughts/images with onset in previous postpartum period. No history of postpartum mood concerns. Infrequent intrusive images experienced during pregnancy responded well to use of cognitive defusion strategies.     Plan:   Continue therapy, currently meeting approximately once a month. We have been fading towards discharge with planned quarterly boosters to support maintenance of symptom remission.      Next therapy appointment has been scheduled for 12/27/23 at 2 pm to continue work on treatment goals.    Treatment Plan review due: As we are moving towards discharge in the next 1-2 appointments we have not yet renewed Marisabel's treatment plan at this time, but will renew it if discharge plans are significantly adjusted         Joann Moore, " PhD, LP  Clinical Psychology Postdoctoral Fellow       I did not see this patient directly, but have discussed the session with the above trainee and approve this documentation.      Hailey Young Psy.D., LP                                                                                        Clinical Supervisor

## 2023-12-20 ASSESSMENT — ASTHMA QUESTIONNAIRES: ACT_TOTALSCORE: 25

## 2023-12-21 ENCOUNTER — OFFICE VISIT (OUTPATIENT)
Dept: ALLERGY | Facility: CLINIC | Age: 38
End: 2023-12-21
Payer: COMMERCIAL

## 2023-12-21 VITALS
BODY MASS INDEX: 29.52 KG/M2 | OXYGEN SATURATION: 99 % | WEIGHT: 188.5 LBS | SYSTOLIC BLOOD PRESSURE: 118 MMHG | DIASTOLIC BLOOD PRESSURE: 86 MMHG | HEART RATE: 81 BPM

## 2023-12-21 DIAGNOSIS — Z91.018 FOOD ALLERGY: ICD-10-CM

## 2023-12-21 DIAGNOSIS — R21 RASH: Primary | ICD-10-CM

## 2023-12-21 DIAGNOSIS — T50.905A ADVERSE EFFECT OF DRUG, INITIAL ENCOUNTER: ICD-10-CM

## 2023-12-21 PROCEDURE — 99202 OFFICE O/P NEW SF 15 MIN: CPT | Performed by: INTERNAL MEDICINE

## 2023-12-21 ASSESSMENT — ENCOUNTER SYMPTOMS
CHILLS: 0
NAUSEA: 0
WHEEZING: 0
MYALGIAS: 0
DIARRHEA: 0
VOMITING: 0
HEADACHES: 0
ADENOPATHY: 0
ARTHRALGIAS: 0
FACIAL SWELLING: 0
EYE DISCHARGE: 0
EYE REDNESS: 0
ACTIVITY CHANGE: 0
CHEST TIGHTNESS: 0
FEVER: 0
RHINORRHEA: 0
EYE ITCHING: 0
JOINT SWELLING: 0
COUGH: 0
SHORTNESS OF BREATH: 0
SINUS PRESSURE: 0

## 2023-12-21 NOTE — PROGRESS NOTES
Griselda Bell was seen in the Allergy Clinic at [facility].    Griselda Bell is a 38 year old female being seen today at the request of Renetta Long MD/[facility] in consultation for a rash which is potentially related to penicillin or shellfish.    In October she was treated with Augmentin for a sinus infection which she tolerated well.  In December she had a sinus infection and was treated with Augmentin again and completed Augmentin on December 8.  On December 10 she developed a rash which covered most of her body.  She describes it as very itchy and it looks like hives.  On 12/10 she ate an [place] dish which was vegetarian and the rash developed 1 to 2 hours after that meal.  She has previously had problems with scallops causing lip swelling 6 to 7 years ago. Blood testing 7 years ago was positive to shrimp.  This was recently repeated and her shrimp IgE level was 0.18 with negative results to other shellfish.  She does tolerate oysters and lobster and mussels and crab.  One consideration is if the Macanese dish may have had cross-contamination with shrimp and contributed to the rash.    It took 4 to 5 days for the rash to resolve.  She is still on the prednisone.  Zyrtec she took last was 3 days ago.  She has not had any rash for at least a week.    She did not have any blistering of the rash or mouth sores.    History reviewed. No pertinent past medical history.  Family History   Problem Relation Age of Onset    Diabetes Father     Arrhythmia Maternal Grandfather         pacemaker    Colon Polyps Brother         age 35    Colon Cancer No family hx of     Breast Cancer No family hx of      Past Surgical History:   Procedure Laterality Date    ARTHROSCOPY SHOULDER RT/LT  2006    labral repair    BIOPSY CERVICAL, LOCAL EXCISION, SINGLE/MULTIPLE      DILATION AND EVACUATION N/A 08/05/2020    Procedure: DILATION AND EVACUATION, UTERUS-;  Surgeon: Mary  Travis DODGE MD;  [facility]: [facility]    DILATION AND EVACUATION N/A 07/28/2021    Procedure: pelvic exam under anesthesia, DILATION AND EVACUATION, UTERUS;  Surgeon: Johnna Felix MD;  [facility] OR    EXAM UNDER ANESTHESIA RECTUM N/A 11/14/2019    Procedure: EXAM UNDER ANESTHESIA;  Surgeon: Diana Steward MD;  Location:  OR    LAPAROSCOPIC APPENDECTOMY N/A 04/19/2021    Procedure: APPENDECTOMY, LAPAROSCOPIC;  Surgeon: Catalina Coronado MD;  Location:  OR    LEEP TX, CERVICAL  2015?       ENVIRONMENTAL HISTORY:   Pets inside the house include 1 dog(s).  Do you smoke cigarettes or other recreational drugs? No There is/are 0 smokers living in the house. The house does not have a damp basement.     SOCIAL HISTORY:   Griselda is employed as . She lives with her  and 2 kids.      Review of Systems   Constitutional:  Negative for activity change, chills and fever.   HENT:  Negative for congestion, dental problem, ear pain, facial swelling, nosebleeds, postnasal drip, rhinorrhea, sinus pressure and sneezing.    Eyes:  Negative for discharge, redness and itching.   Respiratory:  Negative for cough, chest tightness, shortness of breath and wheezing.    Cardiovascular:  Negative for chest pain.   Gastrointestinal:  Negative for diarrhea, nausea and vomiting.   Musculoskeletal:  Negative for arthralgias, joint swelling and myalgias.   Skin:  Negative for rash.   Neurological:  Negative for headaches.   Hematological:  Negative for adenopathy.   Psychiatric/Behavioral:  Negative for behavioral problems and self-injury.    All other systems reviewed and are negative.        Current Outpatient Medications:     cetirizine (ZYRTEC) 5 MG tablet, Take 5 mg by mouth daily, Disp: , Rfl:     EPINEPHrine (ANY BX GENERIC EQUIV) 0.3 MG/0.3ML injection 2-pack, Inject 0.3 mLs (0.3 mg) into the muscle as needed for anaphylaxis May repeat one time in 5-15 minutes if response to initial dose is inadequate.,  Disp: 2 each, Rfl: 0    fluconazole (DIFLUCAN) 150 MG tablet, , Disp: , Rfl:     fluticasone (FLONASE) 50 MCG/ACT nasal spray, Spray 1 spray into both nostrils daily, Disp: 11.1 mL, Rfl: 1    gabapentin (NEURONTIN) 100 MG capsule, Take 1 capsule (100 mg) by mouth daily as needed for other (anxiety/panic attacks) Can take up to 200 mg daily if 100 mg isn't adequate for anxiety/panic symptoms., Disp: 30 capsule, Rfl: 0    montelukast (SINGULAIR) 10 MG tablet, Take 1 tablet (10 mg) by mouth at bedtime, Disp: 30 tablet, Rfl: 3    predniSONE (DELTASONE) 20 MG tablet, Take 3 tabs by mouth daily x 3 days, then 2 tabs daily x 3 days, then 1 tab daily x 3 days, then 1/2 tab daily x 3 days., Disp: 20 tablet, Rfl: 0    sertraline (ZOLOFT) 50 MG tablet, Take 1.5 tablets (75 mg) by mouth daily, Disp: , Rfl:     Vitamin D, Cholecalciferol, 25 MCG (1000 UT) TABS, Take 1,000 Units by mouth daily, Disp: , Rfl:   Allergies   Allergen Reactions    Shellfish-Derived Products Swelling         EXAM:   /86   Pulse 81   Wt 85.5 kg (188 lb 8 oz)   LMP 11/25/2023 (Exact Date)   SpO2 99%   BMI 29.52 kg/m      Physical Exam    Constitutional:       General: She is not in acute distress.     Appearance: Normal appearance. She is not ill-appearing.   HENT:      Head: Normocephalic and atraumatic.     Eyes:      General:         Right eye: No discharge.         Left eye: No discharge.   Neurological:      General: No focal deficit present.      Mental Status: She is alert. Mental status is at baseline.   Psychiatric:         Mood and Affect: Mood normal.         Behavior: Behavior normal.       Latest Reference Range & Units 12/12/23 11:23   Allergen Crab <0.10 KU(A)/L <0.10   Allergen Fish(Cod) <0.10 KU(A)/L <0.10   Allergen Hany IgE <0.10 KU(A)/L <0.10   Allergen Halibut IgE <0.10 KU(A)/L <0.10   Allergen Lobster <0.10 KU(A)/L <0.10   Allergen Scallop <0.10 KU(A)/L <0.10   Allergen Shrimp <0.10 KU(A)/L 0.18 (H)   Allergen Tuna  <0.10 KU(A)/L <0.10   Allergen Clam <0.10 KU(A)/L <0.10   Allergen Flounder <0.10 KU(A)/L <0.10   Allergen Oyster <0.10 KU(A)/L <0.10   Allergen, [place] <0.10 KU(A)/L <0.10   Allergen, Trout <0.10 KU(A)/L <0.10   (H): Data is abnormally high    ASSESSMENT/PLAN:  Griselda Bell is a 38 year old female seen today for a rash.  2 potential contributing factors was a recent course of Augmentin or the cross-contamination of an  dish that she ate the day the rash started.  The antibiotic did finish 2 days prior to the rash thus making it less likely as a cause, however there is delayed rashes that can occur with antibiotics.    Will plan to do skin testing to penicillin at a future appointment as well as to shellfish.  The blood test showed a a low-level IgE level to shrimp only.  However her symptoms in the past were to scallop.    Follow-up in the near future for testing.  She is aware she needs to be off antihistamines for 1 week for testing.      Thank you for allowing me to participate in the care of Griselda Bell.      I spent 20 minutes on the date of the encounter doing chart review, history and exam, documentation and further coordination as noted above exclusive of separately reported interpretations    Paul Fitzpatrick MD  Allergy/Immunology  [facility]

## 2023-12-21 NOTE — PATIENT INSTRUCTIONS
Allergy Staff Appt Hours Shot Hours Location       Physician   Paul Fitzpatrick MD      Support Staff   SYLVIE Baker RN Carlos Q., MA Emily J., MA      Mondays Tuesdays Thursdays and Fridays:      Lucretia 7-5      Wednesdays         Close                Mondays, Tuesdays and Fridays:  7:20 - 3:40              [facility]  [address]  [place] [address]  Allergy appointment  line: [phone]    Pulmonary Function Scheduling:  [place]: [phone]           Questions about cost of your care  For questions about your cost of your visit, procedure, lab or imaging contact: [employer] Line [phone] or visit:  [email or web address]    Prescription Assistance  If you need assistance with your prescriptions (cost, coverage, etc) please contact: [employer] Prescription Assistance Program [phone]    Important Scheduling Information  All visits for food challenges, medication/drug allergy testing, and drug challenges MUST be scheduled through the allergy clinic nurse. Please contact them via [employer] or by calling the clinic at [phone] and asking to speak with an allergy nurse. They will provide additional information and instructions for the appointment. Discontinue oral antihistamines 7 days prior to the appointment. Discontinue nasal and ocular antihistamines 1 day prior to the appointment.    Appointments for skin testing: Appointment will last approximately 45 minutes.  Please call the appointment line for your clinic to schedule.  Discontinue oral antihistamines 7 days prior to the appointment.  Discontinue nasal and ocular antihistamines 1 days prior to appointment.    Thank you for trusting us with your care. Please feel free to contact us with any questions or concerns you may have.

## 2023-12-21 NOTE — LETTER
12/21/2023         RE: Griselda Bell  [address]  [place] [address]        Dear Colleague,    Thank you for referring your patient, Griselda Bell, to the [facility]. Please see a copy of my visit note below.    Griselda Bell was seen in the Allergy Clinic at [facility].    Griselda Bell is a 38 year old female being seen today at the request of Renetta Long MD/[facility] in consultation for a rash which is potentially related to penicillin or shellfish.    In October she was treated with Augmentin for a sinus infection which she tolerated well.  In December she had a sinus infection and was treated with Augmentin again and completed Augmentin on December 8.  On December 10 she developed a rash which covered most of her body.  She describes it as very itchy and it looks like hives.  On 12/10 she ate an  dish which was vegetarian and the rash developed 1 to 2 hours after that meal.  She has previously had problems with scallops causing lip swelling 6 to 7 years ago. Blood testing 7 years ago was positive to shrimp.  This was recently repeated and her shrimp IgE level was 0.18 with negative results to other shellfish.  She does tolerate oysters and lobster and mussels and crab.  One consideration is if the  dish may have had cross-contamination with shrimp and contributed to the rash.    It took 4 to 5 days for the rash to resolve.  She is still on the prednisone.  Zyrtec she took last was 3 days ago.  She has not had any rash for at least a week.    She did not have any blistering of the rash or mouth sores.    History reviewed. No pertinent past medical history.  Family History   Problem Relation Age of Onset     Diabetes Father      Arrhythmia Maternal Grandfather         pacemaker     Colon Polyps Brother         age 35     Colon Cancer No family hx of      Breast Cancer No family hx of      Past  Surgical History:   Procedure Laterality Date     ARTHROSCOPY SHOULDER RT/LT  2006    labral repair     BIOPSY CERVICAL, LOCAL EXCISION, SINGLE/MULTIPLE       DILATION AND EVACUATION N/A 08/05/2020    Procedure: DILATION AND EVACUATION, UTERUS-;  Surgeon: Travis Hernandez MD;  [facility]     DILATION AND EVACUATION N/A 07/28/2021    Procedure: pelvic exam under anesthesia, DILATION AND EVACUATION, UTERUS;  Surgeon: Johnna Felix MD;  [facility]     EXAM UNDER ANESTHESIA RECTUM N/A 11/14/2019    Procedure: EXAM UNDER ANESTHESIA;  Surgeon: Diana Steward MD;  Location: [facility] OR     LAPAROSCOPIC APPENDECTOMY N/A 04/19/2021    Procedure: APPENDECTOMY, LAPAROSCOPIC;  Surgeon: Catalina Coronado MD;  Location:  OR     LEEP TX, CERVICAL  2015?       ENVIRONMENTAL HISTORY:   Pets inside the house include 1 dog(s).  Do you smoke cigarettes or other recreational drugs? No There is/are 0 smokers living in the house. The house does not have a damp basement.     SOCIAL HISTORY:   Griselda is employed as . She lives with her  and 2 kids.      Review of Systems   Constitutional:  Negative for activity change, chills and fever.   HENT:  Negative for congestion, dental problem, ear pain, facial swelling, nosebleeds, postnasal drip, rhinorrhea, sinus pressure and sneezing.    Eyes:  Negative for discharge, redness and itching.   Respiratory:  Negative for cough, chest tightness, shortness of breath and wheezing.    Cardiovascular:  Negative for chest pain.   Gastrointestinal:  Negative for diarrhea, nausea and vomiting.   Musculoskeletal:  Negative for arthralgias, joint swelling and myalgias.   Skin:  Negative for rash.   Neurological:  Negative for headaches.   Hematological:  Negative for adenopathy.   Psychiatric/Behavioral:  Negative for behavioral problems and self-injury.    All other systems reviewed and are negative.        Current Outpatient Medications:      cetirizine (ZYRTEC)  5 MG tablet, Take 5 mg by mouth daily, Disp: , Rfl:      EPINEPHrine (ANY BX GENERIC EQUIV) 0.3 MG/0.3ML injection 2-pack, Inject 0.3 mLs (0.3 mg) into the muscle as needed for anaphylaxis May repeat one time in 5-15 minutes if response to initial dose is inadequate., Disp: 2 each, Rfl: 0     fluconazole (DIFLUCAN) 150 MG tablet, , Disp: , Rfl:      fluticasone (FLONASE) 50 MCG/ACT nasal spray, Spray 1 spray into both nostrils daily, Disp: 11.1 mL, Rfl: 1     gabapentin (NEURONTIN) 100 MG capsule, Take 1 capsule (100 mg) by mouth daily as needed for other (anxiety/panic attacks) Can take up to 200 mg daily if 100 mg isn't adequate for anxiety/panic symptoms., Disp: 30 capsule, Rfl: 0     montelukast (SINGULAIR) 10 MG tablet, Take 1 tablet (10 mg) by mouth at bedtime, Disp: 30 tablet, Rfl: 3     predniSONE (DELTASONE) 20 MG tablet, Take 3 tabs by mouth daily x 3 days, then 2 tabs daily x 3 days, then 1 tab daily x 3 days, then 1/2 tab daily x 3 days., Disp: 20 tablet, Rfl: 0     sertraline (ZOLOFT) 50 MG tablet, Take 1.5 tablets (75 mg) by mouth daily, Disp: , Rfl:      Vitamin D, Cholecalciferol, 25 MCG (1000 UT) TABS, Take 1,000 Units by mouth daily, Disp: , Rfl:   Allergies   Allergen Reactions     Shellfish-Derived Products Swelling         EXAM:   /86   Pulse 81   Wt 85.5 kg (188 lb 8 oz)   LMP 11/25/2023 (Exact Date)   SpO2 99%   BMI 29.52 kg/m      Physical Exam    Constitutional:       General: She is not in acute distress.     Appearance: Normal appearance. She is not ill-appearing.   HENT:      Head: Normocephalic and atraumatic.     Eyes:      General:         Right eye: No discharge.         Left eye: No discharge.   Neurological:      General: No focal deficit present.      Mental Status: She is alert. Mental status is at baseline.   Psychiatric:         Mood and Affect: Mood normal.         Behavior: Behavior normal.       Latest Reference Range & Units 12/12/23 11:23   Allergen Crab <0.10  KU(A)/L <0.10   Allergen Fish(Cod) <0.10 KU(A)/L <0.10   Allergen Hany IgE <0.10 KU(A)/L <0.10   Allergen Halibut IgE <0.10 KU(A)/L <0.10   Allergen Lobster <0.10 KU(A)/L <0.10   Allergen Scallop <0.10 KU(A)/L <0.10   Allergen Shrimp <0.10 KU(A)/L 0.18 (H)   Allergen Tuna <0.10 KU(A)/L <0.10   Allergen Clam <0.10 KU(A)/L <0.10   Allergen Flounder <0.10 KU(A)/L <0.10   Allergen Oyster <0.10 KU(A)/L <0.10   Allergen, [place] <0.10 KU(A)/L <0.10   Allergen, Trout <0.10 KU(A)/L <0.10   (H): Data is abnormally high    ASSESSMENT/PLAN:  Griselda Bell is a 38 year old female seen today for a rash.  2 potential contributing factors was a recent course of Augmentin or the cross-contamination of an [place] dish that she ate the day the rash started.  The antibiotic did finish 2 days prior to the rash thus making it less likely as a cause, however there is delayed rashes that can occur with antibiotics.    Will plan to do skin testing to penicillin at a future appointment as well as to shellfish.  The blood test showed a a low-level IgE level to shrimp only.  However her symptoms in the past were to scallop.    Follow-up in the near future for testing.  She is aware she needs to be off antihistamines for 1 week for testing.      Thank you for allowing me to participate in the care of Griselda Bell.      I spent 20 minutes on the date of the encounter doing chart review, history and exam, documentation and further coordination as noted above exclusive of separately reported interpretations    Paul Fitzpatrick MD  Allergy/Immunology  [facility]      Again, thank you for allowing me to participate in the care of your patient.        Sincerely,        Paul Fitzpatrick MD

## 2023-12-26 ENCOUNTER — VIRTUAL VISIT (OUTPATIENT)
Dept: URGENT CARE | Facility: CLINIC | Age: 38
End: 2023-12-26
Payer: COMMERCIAL

## 2023-12-26 DIAGNOSIS — R05.1 ACUTE COUGH: Primary | ICD-10-CM

## 2023-12-26 PROCEDURE — 99213 OFFICE O/P EST LOW 20 MIN: CPT | Mod: VID | Performed by: EMERGENCY MEDICINE

## 2023-12-26 RX ORDER — AZITHROMYCIN 250 MG/1
TABLET, FILM COATED ORAL
Qty: 6 TABLET | Refills: 0 | Status: SHIPPED | OUTPATIENT
Start: 2023-12-26 | End: 2023-12-31

## 2023-12-26 NOTE — PROGRESS NOTES
Video visit:  Start time: 10:30 AM  Stop time: 10:37 AM  Duration: 7 minutes   Patient location: In car  Provider location: [facility] virtual provider (remote).  Platform used for video visit: [employer]      CHIEF COMPLAINT: Persistent cough and congestion.      HPI: Patient is a 38-year-old female whose had productive cough and discolored rhinorrhea for 2 weeks.  She has no chronic respiratory disease but she has had recurrent upper respiratory type infections for the last few weeks.  No difficulty breathing.      ROS: See HPI otherwise normal.    Allergies   Allergen Reactions     Shellfish-Derived Products Swelling      Current Outpatient Medications   Medication Sig Dispense Refill     azithromycin (ZITHROMAX) 250 MG tablet Take 2 tablets (500 mg) by mouth daily for 1 day, THEN 1 tablet (250 mg) daily for 4 days. 6 tablet 0     cetirizine (ZYRTEC) 5 MG tablet Take 5 mg by mouth daily       EPINEPHrine (ANY BX GENERIC EQUIV) 0.3 MG/0.3ML injection 2-pack Inject 0.3 mLs (0.3 mg) into the muscle as needed for anaphylaxis May repeat one time in 5-15 minutes if response to initial dose is inadequate. 2 each 0     fluconazole (DIFLUCAN) 150 MG tablet        fluticasone (FLONASE) 50 MCG/ACT nasal spray Spray 1 spray into both nostrils daily 11.1 mL 1     gabapentin (NEURONTIN) 100 MG capsule Take 1 capsule (100 mg) by mouth daily as needed for other (anxiety/panic attacks) Can take up to 200 mg daily if 100 mg isn't adequate for anxiety/panic symptoms. 30 capsule 0     montelukast (SINGULAIR) 10 MG tablet Take 1 tablet (10 mg) by mouth at bedtime 30 tablet 3     predniSONE (DELTASONE) 20 MG tablet Take 3 tabs by mouth daily x 3 days, then 2 tabs daily x 3 days, then 1 tab daily x 3 days, then 1/2 tab daily x 3 days. 20 tablet 0     sertraline (ZOLOFT) 50 MG tablet Take 1.5 tablets (75 mg) by mouth daily       Vitamin D, Cholecalciferol, 25 MCG (1000 UT) TABS Take 1,000 Units by mouth daily           PE: No acute  distress on video visit although her voice is somewhat hoarse.  She is alert and oriented.  No dysphonia per se.  She is nondyspneic appearing speaking in full sentences.        TREATMENT: None.      ASSESSMENT: Protracted upper respiratory symptoms including sinuses and chest.  Will treat for bacterial etiology with follow-up with her PCP if no improvement.      DIAGNOSIS: Cough, sinusitis.      PLAN: Z-Rick as instructed.  Plenty of fluids.  Follow-up with PCP in 1 week if symptoms persist, sooner if worse

## 2024-01-03 ENCOUNTER — VIRTUAL VISIT (OUTPATIENT)
Dept: PSYCHIATRY | Facility: CLINIC | Age: 39
End: 2024-01-03
Attending: PSYCHOLOGIST
Payer: COMMERCIAL

## 2024-01-03 DIAGNOSIS — F43.22 ADJUSTMENT DISORDER WITH ANXIOUS MOOD: Primary | ICD-10-CM

## 2024-01-03 PROCEDURE — 90837 PSYTX W PT 60 MINUTES: CPT | Mod: 95

## 2024-01-03 NOTE — PROGRESS NOTES
"[facility]  OUTPATIENT PSYCHOTHERAPY PROGRESS NOTE    Client Name: Griselda Bell   YOB: 1985 (38 year old)   Date of Service:  Dirk 3, 2024  Time of Service: 8:00 am to 8:53 am (53 minutes)  Service Type(s): 76600 psychotherapy (53-60 min. with patient and/or family)  Type of service: Telemedicine Psychotherapy  Reason for Telemedicine Visit: patient preference, health precautions  Mode of transmission: Secure real time interactive audio and visual telecommunication system ([employer])  Location of originating and distant sites:    Originating site (patient location): patient home    Distant site (provider site): HIPAA compliant location within provider home/remote setting  Telemedicine Visit: The patient's condition can be safely assessed and treated via synchronous audio and visual telemedicine encounter. Patient/family has agreed to receive services via telemedicine technology.    Diagnoses:     Adjustment disorder with anxious mood (F43.22)    Panic attack - full remission    Individuals Present:   Patient and provider     Treatment goal(s) being addressed:   1. Maintain gains towards treatment goals and plan for discharge/transfer to \"maintenance\" with quarterly booster sessions  2. Continue to support use of grounding and relaxation strategies to maintain remission of panic and anxiety symptoms as needed  3. Continue to support wellbeing and adjustment through postpartum year     Subjective:  Marisabel is approximately 9.5 months postpartum. Today she reported ongoing remission of panic and anxiety symptoms. She described her mood as \"pretty good,\" and noted fatigue in context of family's ongoing illnesses over the holiday period. She reported ongoing effective use of coping strategies to manage intermittent worries.      Marisabel denied any changes in medication.     Treatment:   Individual psychotherapy with cognitive behavioral therapy (CBT) framework leveraging  " "lens. Today, continued to enhance Marisabel's ongoing use of cognitive and behavioral strategies to support management of stress and worries and remission of anxiety and panic symptoms. Together we continued to recognize and celebrate Marisabel's progress, gains, and achievements in therapy. Discussed strategies to promote \"relapse prevention\", and normalized waxing and waning course of symptoms in times of stress (e.g., if anxiety symptoms worsen it does not represent failure or deficit on Marisabel's part). Explored Marisabel's goals to proactive support her wellbeing in context of progressive grief related to witnessing father's decline in health and functioning. Continued to support Marisabel's work to prioritize rest, sleep, and self-care to support wellbeing. Provided reflective listening, validation, psychoeducation, coaching, and positive reinforcement throughout.     Assessment and Progress:   Appearance:  Appropriately-dressed and groomed   Behavior/relationship to examiner/demeanor:  Open, honest, relaxed    Motor activity/EPS: Within normal limits  Speech rate:  Within normal limits  Speech volume:  Within normal limits  Speech articulation:  Within normal limits  Speech coherence:  Within normal limits  Speech spontaneity:  Within normal limit  Mood (subjective report): \"pretty good\"  Affect (objective appearance): Within normal limits  Thought Process (Associations):  Goal directed  Thought process (Rate):  Within normal limits  Thought content: None  Abnormal Perception: None  Insight:  Within normal limits  Judgment:  Within normal limit     Marisabel has significantly benefited from psychotherapy and medication (Zoloft 75 mg, use of gabapentin 100 mg as needed while traveling to prevent nocturnal panic attacks). She has made strong, steady progress in adjusting positively to the postpartum period and 5th trimester/return to work. She achieved full remission of panic symptoms in February 2023 (see noted dated 2/23/23) and " "full remission of generalized anxiety symptoms in March 2023 (see note dated 3/23/23). Given Marisabel's progress in treatment, her diagnosis was updated in March 2023 to Adjustment Disorder with anxious mood. Marisabel has maintained remission of symptoms since that time, and she is currently coming up on marking a full year of remission of symptoms. Today we continued to plan for upcoming discharge from regular therapy and transition to quarterly \"booster\" appointments to help maintain remission of anxiety and panic symptoms.      Marisabel is an incredibly resilient individual with many areas of personal strengths and sources of support. Marisabel continues to report benefit from Zoloft and she plans to maintain this dose for the foreseeable future/potentially indefinitely. She has a history of postpartum anxiety and intrusive thoughts/images with onset in previous postpartum period. No history of postpartum mood concerns. Marisabel experienced infrequent intrusive images during her most recent pregnancy, which responded well to use of cognitive defusion strategies.     Plan:   We have a planned discharge appointment for 2/9 at 8 am; at that time will transition to planned quarterly boosters to support maintenance of symptom remission, with first booster appointment anticipated for June 2024.     Next therapy appointment has been scheduled for 2/9 at 8 am to continue work on treatment goals.    Treatment Plan review due: As we are moving towards discharge in the next 1-2 appointments we have not yet renewed Marisabel's treatment plan at this time, but will renew it if discharge plans are significantly adjusted     Joann Moore, PhD, LP  Licensed Clinical Psychologist   , Dept of Psychiatry        "

## 2024-01-19 ENCOUNTER — TELEPHONE (OUTPATIENT)
Dept: FAMILY MEDICINE | Facility: CLINIC | Age: 39
End: 2024-01-19

## 2024-01-19 ENCOUNTER — OFFICE VISIT (OUTPATIENT)
Dept: ALLERGY | Facility: CLINIC | Age: 39
End: 2024-01-19
Payer: COMMERCIAL

## 2024-01-19 VITALS
HEART RATE: 77 BPM | OXYGEN SATURATION: 98 % | BODY MASS INDEX: 29.52 KG/M2 | WEIGHT: 188.49 LBS | SYSTOLIC BLOOD PRESSURE: 119 MMHG | DIASTOLIC BLOOD PRESSURE: 83 MMHG

## 2024-01-19 DIAGNOSIS — T78.1XXD ADVERSE FOOD REACTION, SUBSEQUENT ENCOUNTER: ICD-10-CM

## 2024-01-19 DIAGNOSIS — Z88.0 HISTORY OF PENICILLIN ALLERGY: Primary | ICD-10-CM

## 2024-01-19 DIAGNOSIS — J31.0 CHRONIC RHINITIS: ICD-10-CM

## 2024-01-19 DIAGNOSIS — Z91.018 FOOD ALLERGY: ICD-10-CM

## 2024-01-19 PROCEDURE — 95018 ALL TSTG PERQ&IQ DRUGS/BIOL: CPT | Performed by: INTERNAL MEDICINE

## 2024-01-19 PROCEDURE — 99213 OFFICE O/P EST LOW 20 MIN: CPT | Mod: 25 | Performed by: INTERNAL MEDICINE

## 2024-01-19 PROCEDURE — 95004 PERQ TESTS W/ALRGNC XTRCS: CPT | Performed by: INTERNAL MEDICINE

## 2024-01-19 PROCEDURE — 95076 INGEST CHALLENGE INI 120 MIN: CPT | Performed by: INTERNAL MEDICINE

## 2024-01-19 RX ORDER — FLUTICASONE PROPIONATE 50 MCG
1 SPRAY, SUSPENSION (ML) NASAL DAILY
Qty: 11.1 ML | Refills: 1 | Status: CANCELLED | OUTPATIENT
Start: 2024-01-19

## 2024-01-19 NOTE — PROGRESS NOTES
Griselda Bell was seen in the Allergy Clinic at [facility].    Griselda Bell is a 38 year old female being seen today for a PCN challenge.  Also has a history of shellfish reactions.    Since the last visit the patient has been feeling good.     She is here for penicillin skin testing as well as oral challenge as well as shellfish testing.     Latest Reference Range & Units 12/12/23 11:23   Allergen Crab <0.10 KU(A)/L <0.10   Allergen Fish(Cod) <0.10 KU(A)/L <0.10   Allergen Hany IgE <0.10 KU(A)/L <0.10   Allergen Halibut IgE <0.10 KU(A)/L <0.10   Allergen Lobster <0.10 KU(A)/L <0.10   Allergen Scallop <0.10 KU(A)/L <0.10   Allergen Shrimp <0.10 KU(A)/L 0.18 (H)   Allergen Tuna <0.10 KU(A)/L <0.10   Allergen Clam <0.10 KU(A)/L <0.10   Allergen Flounder <0.10 KU(A)/L <0.10   Allergen Oyster <0.10 KU(A)/L <0.10   Allergen, Norris <0.10 KU(A)/L <0.10   Allergen, Trout <0.10 KU(A)/L <0.10   (H): Data is abnormally high    PAST ALLERGY HISTORY:    In October she was treated with Augmentin for a sinus infection which she tolerated well.  In December she had a sinus infection and was treated with Augmentin again and completed Augmentin on December 8.  On December 10 she developed a rash which covered most of her body.  She describes it as very itchy and it looks like hives.  On 12/10 she ate an  dish which was vegetarian and the rash developed 1 to 2 hours after that meal.  She has previously had problems with scallops causing lip swelling 6 to 7 years ago. Blood testing 7 years ago was positive to shrimp.  This was recently repeated and her shrimp IgE level was 0.18 with negative results to other shellfish.  She does tolerate oysters and lobster and mussels and crab.      It took 4 to 5 days for the rash to resolve.      History reviewed. No pertinent past medical history.  Family History   Problem Relation Age of Onset    Diabetes Father     Arrhythmia Maternal Grandfather          pacemaker    Colon Polyps Brother         age 35    Colon Cancer No family hx of     Breast Cancer No family hx of      Past Surgical History:   Procedure Laterality Date    ARTHROSCOPY SHOULDER RT/LT  2006    labral repair    BIOPSY CERVICAL, LOCAL EXCISION, SINGLE/MULTIPLE      DILATION AND EVACUATION N/A 08/05/2020    Procedure: DILATION AND EVACUATION, UTERUS-;  Surgeon: Travis Hernandez MD;  Location: [facility]    DILATION AND EVACUATION N/A 07/28/2021    Procedure: pelvic exam under anesthesia, DILATION AND EVACUATION, UTERUS;  Surgeon: Johnna Felix MD;  Location: [facility] OR    EXAM UNDER ANESTHESIA RECTUM N/A 11/14/2019    Procedure: EXAM UNDER ANESTHESIA;  Surgeon: Diana Steward MD;  Location: [facility] OR    LAPAROSCOPIC APPENDECTOMY N/A 04/19/2021    Procedure: APPENDECTOMY, LAPAROSCOPIC;  Surgeon: Catalina Coronado MD;  Location: [facility] OR    LEEP TX, CERVICAL  2015?         Current Outpatient Medications:     cetirizine (ZYRTEC) 5 MG tablet, Take 5 mg by mouth daily, Disp: , Rfl:     EPINEPHrine (ANY BX GENERIC EQUIV) 0.3 MG/0.3ML injection 2-pack, Inject 0.3 mLs (0.3 mg) into the muscle as needed for anaphylaxis May repeat one time in 5-15 minutes if response to initial dose is inadequate., Disp: 2 each, Rfl: 0    fluconazole (DIFLUCAN) 150 MG tablet, , Disp: , Rfl:     fluticasone (FLONASE) 50 MCG/ACT nasal spray, Spray 1 spray into both nostrils daily, Disp: 11.1 mL, Rfl: 1    gabapentin (NEURONTIN) 100 MG capsule, Take 1 capsule (100 mg) by mouth daily as needed for other (anxiety/panic attacks) Can take up to 200 mg daily if 100 mg isn't adequate for anxiety/panic symptoms., Disp: 30 capsule, Rfl: 0    montelukast (SINGULAIR) 10 MG tablet, Take 1 tablet (10 mg) by mouth at bedtime, Disp: 30 tablet, Rfl: 3    predniSONE (DELTASONE) 20 MG tablet, Take 3 tabs by mouth daily x 3 days, then 2 tabs daily x 3 days, then 1 tab daily x 3 days, then 1/2 tab daily x 3 days., Disp: 20 tablet, Rfl: 0     sertraline (ZOLOFT) 50 MG tablet, Take 1.5 tablets (75 mg) by mouth daily, Disp: , Rfl:     Vitamin D, Cholecalciferol, 25 MCG (1000 UT) TABS, Take 1,000 Units by mouth daily, Disp: , Rfl:   Allergies   Allergen Reactions    Shellfish-Derived Products Swelling         EXAM:   /83   Pulse 77   Wt 85.5 kg (188 lb 7.9 oz)   LMP 11/25/2023 (Exact Date)   SpO2 98%   BMI 29.52 kg/m      Constitutional:       General: She is not in acute distress.     Appearance: Normal appearance. She is not ill-appearing.   HENT:      Head: Normocephalic and atraumatic.      Nose: Nose normal. No congestion or rhinorrhea.      Mouth/Throat:      Mouth: Mucous membranes are moist.      Pharynx: Oropharynx is clear. No posterior oropharyngeal erythema.   Eyes:      General:         Right eye: No discharge.         Left eye: No discharge.   Cardiovascular:      Rate and Rhythm: Normal rate and regular rhythm.      Heart sounds: Normal heart sounds.   Pulmonary:      Effort: Pulmonary effort is normal.      Breath sounds: Normal breath sounds. No wheezing or rhonchi.   Skin:     General: Skin is warm.      Findings: No erythema or rash.   Neurological:      General: No focal deficit present.      Mental Status: She is alert. Mental status is at baseline.   Psychiatric:         Mood and Affect: Mood normal.         Behavior: Behavior normal.      WORKUP: Skin testing was negative to the penicillin products.  Drug Challenge was negative to amoxicillin.    After discussing the risks and benefits of the procedure, including the risks of the oral medication challenge, written and verbal consent was obtained and the procedure was initiated.     Skin Prick/Intradermal Testing    Product Lot #/Exp. Date Time Placed Skin Prick  W (mm) F (mm) Time Placed Intradermal #1  W (mm) F (mm) Intradermal #2  W (mm) F (mm)  Results  +/-       Time Read      Pre-Pen  0.02 ml of 6X10-5 molar [id number]  04/2025 7:15 0 0 7:35 2 3 2 3 -        7:50 0 0 0 0     Penicillin G  10,000 units/ ml [id number]  08/2024 7:15 0 0 7:35 2 3 2 3 -        7:50 0 0 0 0    Diluent/Control [id number]  10/2024 7:15 0 0 7:35 2 3 * -        7:50 0 0     Histamine [id number]  01/2025 7:15 12 15  +         Oral Challenge    Amoxicillin  250mg/5mL oral suspension  or   250mg chewable tablet Lot #/Exp. Dose Time of Ingestion Time of Vitals BP Pulse       pOx Signs/Symptoms Result  +/-     Dose (125mg)  Wait 30 minutes [id number]  04/2025 1/4 tab   7:55   7:25 121/81   71   100   -   -   1 Dose (500 mg)  Wait 60 minutes [id number]  04/2025 500 mg   8:25 8:55 135/85 76 99 none -            -      NUTS/SHELLFISH ALLERGEN PERCUTANEOUS SKIN TESTING      1/19/2024    10:00 AM   Jack nuts & shellfish   Consent Y   Ordering Physician Dr. Fitzpatrick   Interpreting Physician Dr. Fitzpatrick   Testing Technician Edouard WAITE RN   Location Arm   Positive Control: Histatrol*ALK 1 mg/ml 5/8   Negative Control: 50% Glycerin** Tobias Hayde 0   Selection: Shellfish   Shrimp 1:20 (W/F in millimeters) 0   Lobster 1:20 (W/F in millimeters) 0   Crab 1:20 (W/F in millimeters) 0   Clam 1:20 (W/F in millimeters) 0   Oyster 1:20 (W/F in millimeters) 0   Scallops 1:20 (W/F in millimeters) 0       ASSESSMENT/PLAN:  Griselda Bell is a 38 year old female seen today for evaluation of shellfish allergy as well as penicillin allergy.  He had negative skin testing and a negative oral challenge.  She is no longer considered allergic to amoxicillin.    No longer considered allergic to amoxicillin  With her shellfish reaction, she had negative blood testing except for mild elevation to Shrimp at 0.18 with no previous history of reaction to shrimp.  Skin testing was a negative today.  We talked about the option of an oral challenge in the office versus trying a small amount at home.  She will consider.  We will leave the shellfish derived products as an allergy for now but that could be removed in the future when she tries scallops or  shrimp    Follow-up as needed      Thank you for allowing me to participate in the care of Griselda Bell.      I spent 20 minutes on the date of the encounter doing chart review, history and exam, documentation and further coordination as noted above exclusive of separately reported interpretations    Paul Fitzpatrick MD  Allergy/Immunology  [facility]

## 2024-01-19 NOTE — PROGRESS NOTES
Patient evaluated by provider prior to start of penicillin challenge.  RN administered skin prick testing and intradermals per physician directed guidelines.  Patient was monitored for 15 minutes after each administered dose.  Both oral doses of Penicillin were given without reaction. Vital signs were recorded. Patient tolerated the testing well. All questions and concerns were addressed in clinic during challenge.     Verbal consent was obtained prior to procedure by the provider. Per provider verbal order, placed Shellfish Panel scratch test(s). Once antigens were placed, patient was monitored for 15 minutes in clinic. RN read test after 15 minutes and provider was notified of results Patient tolerated procedure well. All questions and concerns were addressed at office visit by the provider.     TING Nix, RN

## 2024-01-19 NOTE — LETTER
1/19/2024         RE: Griselda Bell  [address]  [facility] [address]        Dear Colleague,    Thank you for referring your patient, Griselda Bell, to the [facility] [place]. Please see a copy of my visit note below.    Griselda Bell was seen in the Allergy Clinic at [facility].    Griselda Bell is a 38 year old female being seen today for a PCN challenge.  Also has a history of shellfish reactions.    Since the last visit the patient has been feeling good.     She is here for penicillin skin testing as well as oral challenge as well as shellfish testing.     Latest Reference Range & Units 12/12/23 11:23   Allergen Crab <0.10 KU(A)/L <0.10   Allergen Fish(Cod) <0.10 KU(A)/L <0.10   Allergen Hany IgE <0.10 KU(A)/L <0.10   Allergen Halibut IgE <0.10 KU(A)/L <0.10   Allergen Lobster <0.10 KU(A)/L <0.10   Allergen Scallop <0.10 KU(A)/L <0.10   Allergen Shrimp <0.10 KU(A)/L 0.18 (H)   Allergen Tuna <0.10 KU(A)/L <0.10   Allergen Clam <0.10 KU(A)/L <0.10   Allergen Flounder <0.10 KU(A)/L <0.10   Allergen Oyster <0.10 KU(A)/L <0.10   Allergen, [place] <0.10 KU(A)/L <0.10   Allergen, Trout <0.10 KU(A)/L <0.10   (H): Data is abnormally high    PAST ALLERGY HISTORY:    In October she was treated with Augmentin for a sinus infection which she tolerated well.  In December she had a sinus infection and was treated with Augmentin again and completed Augmentin on December 8.  On December 10 she developed a rash which covered most of her body.  She describes it as very itchy and it looks like hives.  On 12/10 she ate an [place] dish which was vegetarian and the rash developed 1 to 2 hours after that meal.  She has previously had problems with scallops causing lip swelling 6 to 7 years ago. Blood testing 7 years ago was positive to shrimp.  This was recently repeated and her shrimp IgE level was 0.18 with negative results to other shellfish.  She does tolerate oysters  and lobster and mussels and crab.      It took 4 to 5 days for the rash to resolve.      History reviewed. No pertinent past medical history.  Family History   Problem Relation Age of Onset     Diabetes Father      Arrhythmia Maternal Grandfather         pacemaker     Colon Polyps Brother         age 35     Colon Cancer No family hx of      Breast Cancer No family hx of      Past Surgical History:   Procedure Laterality Date     ARTHROSCOPY SHOULDER RT/LT  2006    labral repair     BIOPSY CERVICAL, LOCAL EXCISION, SINGLE/MULTIPLE       DILATION AND EVACUATION N/A 08/05/2020    Procedure: DILATION AND EVACUATION, UTERUS-;  Surgeon: Travis Hernandez MD;  Location: [facility] OR     DILATION AND EVACUATION N/A 07/28/2021    Procedure: pelvic exam under anesthesia, DILATION AND EVACUATION, UTERUS;  Surgeon: Johnna Felix MD;  Location: [facility] OR     EXAM UNDER ANESTHESIA RECTUM N/A 11/14/2019    Procedure: EXAM UNDER ANESTHESIA;  Surgeon: Diana Steward MD;  Location: [facility] OR     LAPAROSCOPIC APPENDECTOMY N/A 04/19/2021    Procedure: APPENDECTOMY, LAPAROSCOPIC;  Surgeon: Catalina Coronado MD;  Location: [facility] OR     LEEP TX, CERVICAL  2015?         Current Outpatient Medications:      cetirizine (ZYRTEC) 5 MG tablet, Take 5 mg by mouth daily, Disp: , Rfl:      EPINEPHrine (ANY BX GENERIC EQUIV) 0.3 MG/0.3ML injection 2-pack, Inject 0.3 mLs (0.3 mg) into the muscle as needed for anaphylaxis May repeat one time in 5-15 minutes if response to initial dose is inadequate., Disp: 2 each, Rfl: 0     fluconazole (DIFLUCAN) 150 MG tablet, , Disp: , Rfl:      fluticasone (FLONASE) 50 MCG/ACT nasal spray, Spray 1 spray into both nostrils daily, Disp: 11.1 mL, Rfl: 1     gabapentin (NEURONTIN) 100 MG capsule, Take 1 capsule (100 mg) by mouth daily as needed for other (anxiety/panic attacks) Can take up to 200 mg daily if 100 mg isn't adequate for anxiety/panic symptoms., Disp: 30 capsule, Rfl: 0     montelukast (SINGULAIR) 10 MG  tablet, Take 1 tablet (10 mg) by mouth at bedtime, Disp: 30 tablet, Rfl: 3     predniSONE (DELTASONE) 20 MG tablet, Take 3 tabs by mouth daily x 3 days, then 2 tabs daily x 3 days, then 1 tab daily x 3 days, then 1/2 tab daily x 3 days., Disp: 20 tablet, Rfl: 0     sertraline (ZOLOFT) 50 MG tablet, Take 1.5 tablets (75 mg) by mouth daily, Disp: , Rfl:      Vitamin D, Cholecalciferol, 25 MCG (1000 UT) TABS, Take 1,000 Units by mouth daily, Disp: , Rfl:   Allergies   Allergen Reactions     Shellfish-Derived Products Swelling         EXAM:   /83   Pulse 77   Wt 85.5 kg (188 lb 7.9 oz)   LMP 11/25/2023 (Exact Date)   SpO2 98%   BMI 29.52 kg/m      Constitutional:       General: She is not in acute distress.     Appearance: Normal appearance. She is not ill-appearing.   HENT:      Head: Normocephalic and atraumatic.      Nose: Nose normal. No congestion or rhinorrhea.      Mouth/Throat:      Mouth: Mucous membranes are moist.      Pharynx: Oropharynx is clear. No posterior oropharyngeal erythema.   Eyes:      General:         Right eye: No discharge.         Left eye: No discharge.   Cardiovascular:      Rate and Rhythm: Normal rate and regular rhythm.      Heart sounds: Normal heart sounds.   Pulmonary:      Effort: Pulmonary effort is normal.      Breath sounds: Normal breath sounds. No wheezing or rhonchi.   Skin:     General: Skin is warm.      Findings: No erythema or rash.   Neurological:      General: No focal deficit present.      Mental Status: She is alert. Mental status is at baseline.   Psychiatric:         Mood and Affect: Mood normal.         Behavior: Behavior normal.      WORKUP: Skin testing was negative to the penicillin products.  Drug Challenge was negative to amoxicillin.    After discussing the risks and benefits of the procedure, including the risks of the oral medication challenge, written and verbal consent was obtained and the procedure was initiated.     Skin Prick/Intradermal  Testing    Product Lot #/Exp. Date Time Placed Skin Prick  W (mm) F (mm) Time Placed Intradermal #1  W (mm) F (mm) Intradermal #2  W (mm) F (mm)  Results  +/-       Time Read      Pre-Pen  0.02 ml of 6X10-5 molar [id number]  04/2025 7:15 0 0 7:35 2 3 2 3 -        7:50 0 0 0 0    Penicillin G  10,000 units/ ml [id number]  08/2024 7:15 0 0 7:35 2 3 2 3 -        7:50 0 0 0 0    Diluent/Control [id number]  10/2024 7:15 0 0 7:35 2 3 * -        7:50 0 0     Histamine [id number]  01/2025 7:15 12 15  +         Oral Challenge    Amoxicillin  250mg/5mL oral suspension  or   250mg chewable tablet Lot #/Exp. Dose Time of Ingestion Time of Vitals BP Pulse       pOx Signs/Symptoms Result  +/-     Dose (125mg)  Wait 30 minutes [id number]  04/2025 1/4 tab   7:55   7:25 121/81   71   100   -   -   1 Dose (500 mg)  Wait 60 minutes [id number]  04/2025 500 mg   8:25 8:55 135/85 76 99 none -            -      NUTS/SHELLFISH ALLERGEN PERCUTANEOUS SKIN TESTING      1/19/2024    10:00 AM   [place] nuts & shellfish   Consent Y   Ordering Physician Dr. Fitzpatrick   Interpreting Physician Dr. Fitzpatrick   Testing Technician Edouard WAITE RN   Location Arm   Positive Control: Histatrol*ALK 1 mg/ml 5/8   Negative Control: 50% Glycerin** Tobias Hayde 0   Selection: Shellfish   Shrimp 1:20 (W/F in millimeters) 0   Lobster 1:20 (W/F in millimeters) 0   Crab 1:20 (W/F in millimeters) 0   Clam 1:20 (W/F in millimeters) 0   Oyster 1:20 (W/F in millimeters) 0   Scallops 1:20 (W/F in millimeters) 0       ASSESSMENT/PLAN:  Griselda Bell is a 38 year old female seen today for evaluation of shellfish allergy as well as penicillin allergy.  He had negative skin testing and a negative oral challenge.  She is no longer considered allergic to amoxicillin.    No longer considered allergic to amoxicillin  With her shellfish reaction, she had negative blood testing except for mild elevation to Shrimp at 0.18 with no previous history of reaction to shrimp.  Skin testing was a  negative today.  We talked about the option of an oral challenge in the office versus trying a small amount at home.  She will consider.  We will leave the shellfish derived products as an allergy for now but that could be removed in the future when she tries scallops or shrimp    Follow-up as needed      Thank you for allowing me to participate in the care of Griselda Bell.      I spent 20 minutes on the date of the encounter doing chart review, history and exam, documentation and further coordination as noted above exclusive of separately reported interpretations    Paul Fitzpatrick MD  Allergy/Immunology  [facility]    Patient evaluated by provider prior to start of penicillin challenge.  RN administered skin prick testing and intradermals per physician directed guidelines.  Patient was monitored for 15 minutes after each administered dose.  Both oral doses of Penicillin were given without reaction. Vital signs were recorded. Patient tolerated the testing well. All questions and concerns were addressed in clinic during challenge.     Verbal consent was obtained prior to procedure by the provider. Per provider verbal order, placed Shellfish Panel scratch test(s). Once antigens were placed, patient was monitored for 15 minutes in clinic. RN read test after 15 minutes and provider was notified of results Patient tolerated procedure well. All questions and concerns were addressed at office visit by the provider.     TING Nix, RN      Again, thank you for allowing me to participate in the care of your patient.        Sincerely,        Paul Fitzpatrick MD

## 2024-01-19 NOTE — PATIENT INSTRUCTIONS
Allergy Staff Appt Hours Shot Hours Location       Physician   Paul Fitzpatrick MD      Support Staff   SYLVIE Baker RN Carlos Q., MA Emily J., MA      Mondays Tuesdays Thursdays and Fridays:      Lucretia 7-5      Wednesdays         Close                Mondays, Tuesdays and Fridays:  7:20 - 3:40              [facility]  [address]  [place] [address]  Allergy appointment  line: [phone]    Pulmonary Function Scheduling:  [place]: [phone]           Questions about cost of your care  For questions about your cost of your visit, procedure, lab or imaging contact: [place] [phone] or visit:  [email or web address]    Prescription Assistance  If you need assistance with your prescriptions (cost, coverage, etc) please contact: [employer] Prescription Assistance Program [phone]    Important Scheduling Information  All visits for food challenges, medication/drug allergy testing, and drug challenges MUST be scheduled through the allergy clinic nurse. Please contact them via [employer] or by calling the clinic at [phone] and asking to speak with an allergy nurse. They will provide additional information and instructions for the appointment. Discontinue oral antihistamines 7 days prior to the appointment. Discontinue nasal and ocular antihistamines 1 day prior to the appointment.    Appointments for skin testing: Appointment will last approximately 45 minutes.  Please call the appointment line for your clinic to schedule.  Discontinue oral antihistamines 7 days prior to the appointment.  Discontinue nasal and ocular antihistamines 1 days prior to appointment.    Thank you for trusting us with your care. Please feel free to contact us with any questions or concerns you may have.

## 2024-02-09 ENCOUNTER — VIRTUAL VISIT (OUTPATIENT)
Dept: PSYCHIATRY | Facility: CLINIC | Age: 39
End: 2024-02-09
Payer: COMMERCIAL

## 2024-02-09 DIAGNOSIS — F43.22 ADJUSTMENT DISORDER WITH ANXIOUS MOOD: Primary | ICD-10-CM

## 2024-02-09 PROCEDURE — 90834 PSYTX W PT 45 MINUTES: CPT | Mod: 95

## 2024-02-23 NOTE — PROGRESS NOTES
"[facility]OMEN[facility]LL[facility] PROGRAM  OUTPATIENT PSYCHOTHERAPY PROGRESS NOTE     Client Name: Griselda Bell          YOB: 1985 (38 year old)            Date of Service:  Feb 9, 2024  Time of Service: 8:13 am to 9:00 am (47 minutes)  Service Type(s): 69929 psychotherapy (38-52 min. with patient and/or family)  Type of service: Telemedicine Psychotherapy  Reason for Telemedicine Visit: patient preference, health precautions  Mode of transmission: Secure real time interactive audio and visual telecommunication system ([employer])  Location of originating and distant sites:  ? Originating site (patient location): patient home  ? Distant site (provider site): HIPAA compliant location within provider home/remote setting  Telemedicine Visit: The patient's condition can be safely assessed and treated via synchronous audio and visual telemedicine encounter. Patient/family has agreed to receive services via telemedicine technology.     Diagnoses:     Adjustment disorder with anxious mood (F43.22)    Panic attack - full remission     Individuals Present:   Patient and provider      Treatment goal(s) being addressed:   1. Maintain gains towards treatment goals and plan for discharge/transfer to \"maintenance\" with quarterly booster sessions  2. Continue to support use of grounding and relaxation strategies to maintain remission of panic and anxiety symptoms as needed  3. Continue to support wellbeing and adjustment through postpartum year      Subjective:  Marisabel is approximately 11 months postpartum. Today she continued to report remission of panic and anxiety symptoms. She described her mood as \"feeling really good.\" She reported ongoing effective use of coping strategies to manage intermittent worries, and noted, \"I am realizing I feel like I really have control over my anxiety.\"     Marisabel denied any changes in medication.      Treatment:   Individual psychotherapy with cognitive behavioral therapy (CBT) framework " "leveraging  lens. Today, we continued to enhance Marisabel's use of cognitive and behavioral strategies to support management of stress and worries and remission of anxiety and panic symptoms. We spent time continuing to recognize and celebrate Marisabel's progress, gains, and achievements in therapy, emphasizing development of strong sense of mastery in use of coping strategies, and what Marisabel identified as a \"new baseline\" of stability after achieving a year of symptom remission. With Marisabel, explored and discussed needs/goals for transition to booster sessions. We planned for initial session to be scheduled for 2024. Explored strategies and resources to support Teddys mood and wellbeing in context of to experiencing progressive, anticipatory grief related to witnessing father's decline in health and functioning. Focus on discussion of mindfulness strategies to observe and describe thoughts and feelings without judgement, journaling to support organization of thoughts and feelings and memories of experiences with father, mindfulness strategies to help savor, enjoy, and be present in moments with father, balancing leveraging of instrumental and emotional support from family members and practicing healthy emotional boundaries. Continued to support Marisabel's work to prioritize rest, sleep, and self-care to support wellbeing. Provided reflective listening, validation, psychoeducation, coaching, and positive reinforcement throughout.      Assessment and Progress:   Appearance:  Appropriately-dressed and groomed   Behavior/relationship to examiner/demeanor:  Open, honest, relaxed    Motor activity/EPS: Within normal limits  Speech rate:  Within normal limits  Speech volume:  Within normal limits  Speech articulation:  Within normal limits  Speech coherence:  Within normal limits  Speech spontaneity:  Within normal limit  Mood (subjective report): \"feeling really good\"  Affect (objective appearance): Within normal " "limits  Thought Process (Associations):  Goal directed  Thought process (Rate):  Within normal limits  Thought content: None  Abnormal Perception: None  Insight:  Within normal limits  Judgment:  Within normal limit     Marisabel has significantly benefited from psychotherapy and medication (Zoloft 75 mg, use of gabapentin 100 mg as needed while traveling to prevent nocturnal panic attacks). She has made strong, steady progress in adjusting positively to the postpartum period and 5th trimester/return to work. She achieved full remission of panic symptoms in February 2023 (see noted dated 2/23/23) and full remission of generalized anxiety symptoms in March 2023 (see note dated 3/23/23). Given Marisabel's progress in treatment, her diagnosis was updated in March 2023 to Adjustment Disorder with anxious mood.     Today we celebrated Marisabel's achievement of marking a full year of remission of symptoms, and completed a planned discharge appointment from regular therapy. We will now transition to quarterly \"booster\" appointments to help maintain remission of anxiety and panic symptoms.      Marisabel is an incredibly resilient individual with many areas of personal strengths and sources of support. Marisabel continues to report benefit from Zoloft and she plans to maintain this dose for the foreseeable future/potentially indefinitely. She has a history of postpartum anxiety and intrusive thoughts/images with onset in previous postpartum period. No history of postpartum mood concerns. Mairsabel experienced infrequent intrusive images during her most recent pregnancy, which responded well to use of cognitive defusion strategies.      Plan:   Today was our planned discharge appointment. We will now transition to planned quarterly boosters to support maintenance of symptom remission. This writer will reach out to Marisabel in June 2024 to assess needs and offer first booster appointment.        Joann Moore, PhD, LP  Licensed Clinical Psychologist "   , Dept of Psychiatry

## 2024-03-12 ENCOUNTER — MYC MEDICAL ADVICE (OUTPATIENT)
Dept: FAMILY MEDICINE | Facility: CLINIC | Age: 39
End: 2024-03-12
Payer: COMMERCIAL

## 2024-03-12 DIAGNOSIS — S76.919S STRAIN OF HIP AND THIGH, UNSPECIFIED LATERALITY, SEQUELA: Primary | ICD-10-CM

## 2024-03-12 DIAGNOSIS — S76.019S STRAIN OF HIP AND THIGH, UNSPECIFIED LATERALITY, SEQUELA: Primary | ICD-10-CM

## 2024-03-21 DIAGNOSIS — J31.0 CHRONIC RHINITIS: ICD-10-CM

## 2024-03-21 RX ORDER — MONTELUKAST SODIUM 10 MG/1
1 TABLET ORAL AT BEDTIME
Qty: 90 TABLET | Refills: 1 | Status: SHIPPED | OUTPATIENT
Start: 2024-03-21 | End: 2024-07-25

## 2024-03-25 ENCOUNTER — THERAPY VISIT (OUTPATIENT)
Dept: PHYSICAL THERAPY | Facility: CLINIC | Age: 39
End: 2024-03-25
Attending: FAMILY MEDICINE
Payer: COMMERCIAL

## 2024-03-25 DIAGNOSIS — S76.912A: Primary | ICD-10-CM

## 2024-03-25 DIAGNOSIS — S76.012A: Primary | ICD-10-CM

## 2024-03-25 DIAGNOSIS — S76.019S STRAIN OF HIP AND THIGH, UNSPECIFIED LATERALITY, SEQUELA: ICD-10-CM

## 2024-03-25 DIAGNOSIS — S76.919S STRAIN OF HIP AND THIGH, UNSPECIFIED LATERALITY, SEQUELA: ICD-10-CM

## 2024-03-25 PROCEDURE — 97161 PT EVAL LOW COMPLEX 20 MIN: CPT | Mod: GP | Performed by: PHYSICAL THERAPIST

## 2024-03-25 PROCEDURE — 97110 THERAPEUTIC EXERCISES: CPT | Mod: GP | Performed by: PHYSICAL THERAPIST

## 2024-03-25 ASSESSMENT — ACTIVITIES OF DAILY LIVING (ADL)
WALKING_UP_STEEP_HILLS: NO DIFFICULTY AT ALL
WALKING_APPROXIMATELY_10_MINUTES: NO DIFFICULTY AT ALL
GOING_DOWN_1_FLIGHT_OF_STAIRS: NO DIFFICULTY AT ALL
WALKING_DOWN_STEEP_HILLS: NO DIFFICULTY AT ALL
RECREATIONAL_ACTIVITIES: SLIGHT DIFFICULTY
TWISTING/PIVOTING_ON_INVOLVED_LEG: NO DIFFICULTY AT ALL
DEEP_SQUATTING: NO DIFFICULTY AT ALL
HEAVY_WORK: SLIGHT DIFFICULTY
HOW_WOULD_YOU_RATE_YOUR_CURRENT_LEVEL_OF_FUNCTION_DURING_YOUR_USUAL_ACTIVITIES_OF_DAILY_LIVING_FROM_0_TO_100_WITH_100_BEING_YOUR_LEVEL_OF_FUNCTION_PRIOR_TO_YOUR_HIP_PROBLEM_AND_0_BEING_THE_INABILITY_TO_PERFORM_ANY_OF_YOUR_USUAL_DAILY_ACTIVITIES?: 90
STEPPING_UP_AND_DOWN_CURBS: NO DIFFICULTY AT ALL
SITTING_FOR_15_MINUTES: NO DIFFICULTY AT ALL
WALKING_INITIALLY: NO DIFFICULTY AT ALL
WALKING_15_MINUTES_OR_GREATER: NO DIFFICULTY AT ALL
HOS_ADL_SCORE(%): 96.88
HOS_ADL_COUNT: 16
PUTTING_ON_SOCKS_AND_SHOES: SLIGHT DIFFICULTY
STANDING_FOR_15_MINUTES: NO DIFFICULTY AT ALL
ROLLING_OVER_IN_BED: NO DIFFICULTY AT ALL
GETTING_INTO_AND_OUT_OF_A_BATHTUB: NO DIFFICULTY AT ALL
LIGHT_TO_MODERATE_WORK: NO DIFFICULTY AT ALL
GOING_UP_1_FLIGHT_OF_STAIRS: NO DIFFICULTY AT ALL
HOS_ADL_HIGHEST_POTENTIAL_SCORE: 64
HOS_ADL_ITEM_SCORE_TOTAL: 62

## 2024-03-25 NOTE — PROGRESS NOTES
PHYSICAL THERAPY EVALUATION  Type of Visit: Evaluation    See electronic medical record for Abuse and Falls Screening details.    Subjective       Presenting condition or subjective complaint:  In the fall was training for a 10 mile run and had R hip flexor region pain 2023 then went away on its own.  2024 while began training for [employer] patient noted significant L hip flexor region pain.  The first mile of training is always a little sore but then loosens up.  Has had some times where could hardly walk due to pain.  Pain has improved on it's own over the past 2 weeks.  Denies low back pain.  Is an active person but thinks  almost a year ago potentially caused weakness and when starts training for running then gets pains.  Runs on treadmill and outside.  Date of onset: 24    Relevant medical history:   anxiety  Dates & types of surgery:  C section 3/2023, L shoulder     Prior diagnostic imaging/testing results:     R hip xray  unremarkable, none for L hip  Prior therapy history for the same diagnosis, illness or injury:    no    Prior Level of Function  Transfers: Independent  Ambulation: Independent  ADL: Independent      Living Environment  Social support: With a significant other or spouse (and kids)   Type of home: House; 2-story   Stairs to enter the home: No       Ramp: No   Stairs inside the home: Yes 15 Is there a railing: Yes   Help at home:    Equipment owned:       Employment:    has stand/sit desk at work, [employer]   Hobbies/Interests: running, exercise, gardening    Patient goals for therapy:  To run without pain, prevent damage to hip    Pain assessment: See objective evaluation for additional pain details     Objective         Objective   HIP EVALUATION  PAIN:   Pain Level at worst: 9/10 previously at worst, now 2-3/10 at worst.  Pain Location: L anterior hip (hip flexor region), occasional L hamstring tightness (crampy) end of  run  Pain Quality: crampy, aching, sharp  Pain Frequency: intermittent  Pain is Worst: stiff in morning but pain just during day with running  Pain is Exacerbated By: when pain was really bad could hardly walk, could not stand on one foot, running 1st mile always painful and feels gait is off and doesn't want to injure self.  Was walking with a limp for awhile. 30# 1 year old is difficult to carry.  Lunges  Relieved By: stretching and warming up before running the past two weeks is really helping.  Pain Progression: better  Tingling/numbness no    POSTURE: fair seated posture, patient sits with hips slid forward in chair with slumped low back/spine posture  GAIT:   Weightbearing Status: WBAT  Assistive Device(s): none  Gait Deviations: WNL  WEIGHTBEARING ALIGNMENT:   NON-WEIGHTBEARING ALIGNMENT: Prone neutral pelvic and sacral alignment, SP neutral alignment  Supine appears neutral pelvic alignment but tenderness to anterior hip flexor/L PSIS  ROM:   (Degrees) Left AROM Left PROM  Right AROM Right PROM   Hip Flexion WNL mild anterior hip strain  WNL    Hip Extension WNL  WNL    Hip Abduction WNL  WNL but provokes strain to L anterior hip    Hip Adduction       Hip Internal Rotation WNL  WNL    Hip External Rotation WNL  WNL    Knee Flexion WNL  WNL    Knee Extension WNL  WNL    Lumbar Side glide No loss No loss   Lumbar Flexion No loss   Lumbar Extension Min loss mild LBP during, NW after   Pain:   End feel:       Lumbar EIS x 10 NE/NE    STRENGTH:   Pain: - none + mild ++ moderate +++ severe  Strength Scale: 0-5/5 Left Right   Hip Flexion 5 with mild strain upon release 5   Hip Extension 5 5   Hip Abduction 4 5-   Hip Adduction     Hip Internal Rotation     Hip External Rotation     Knee Flexion 5 5   Knee Extension 5 5     LE FLEXIBILITY:   SPECIAL TESTS:    Left Right   TANJA negative negative   FADIR/Labrum/JAMAAL negative negative   Hip scour Mild positive for pinch in IR Negative   Aggie's negative negative    Piriformis Mild pinch stretching > 90 negative   Quadrant Testing     SLR negative negative   Slump negative negative   Stork with Extension     Jomar negative negative          FUNCTIONAL TESTS:    Double Leg Squat: Good technique/no significant findings  Single Leg Squat: Mild L>R Knee valgus and Hip internal rotation, Mild pain on L   SLS: 60 sec EO good control  PALPATION: tenderness L ASIS  JOINT MOBILITY: good    Assessment & Plan   CLINICAL IMPRESSIONS  Medical Diagnosis: Strain of L hip/thigh    Treatment Diagnosis: gluteal weakness   Impression/Assessment: Patient is a 39 year old female with Left hip and thigh complaints.  The following significant findings have been identified: Pain, Decreased ROM/flexibility, Decreased strength, Decreased activity tolerance, and Impaired posture. These impairments interfere with their ability to perform self care tasks, recreational activities, household mobility, and community mobility as compared to previous level of function.     Clinical Decision Making (Complexity):  Clinical Presentation: Stable/Uncomplicated  Clinical Presentation Rationale: based on medical and personal factors listed in PT evaluation  Clinical Decision Making (Complexity): Low complexity    PLAN OF CARE  Treatment Interventions:  Interventions: Manual Therapy, Neuromuscular Re-education, Therapeutic Activity, Therapeutic Exercise    Long Term Goals            Frequency of Treatment: 1x/week  Duration of Treatment:      Education Assessment:        Risks and benefits of evaluation/treatment have been explained.   Patient/Family/caregiver agrees with Plan of Care.     Evaluation Time:     PT Eval, Low Complexity Minutes (90228): 20       Signing Clinician: Jacquelin Cavazos PT

## 2024-05-28 ENCOUNTER — VIRTUAL VISIT (OUTPATIENT)
Dept: URGENT CARE | Facility: CLINIC | Age: 39
End: 2024-05-28
Payer: COMMERCIAL

## 2024-05-28 DIAGNOSIS — R05.2 SUBACUTE COUGH: Primary | ICD-10-CM

## 2024-05-28 PROCEDURE — 99213 OFFICE O/P EST LOW 20 MIN: CPT | Mod: 95 | Performed by: EMERGENCY MEDICINE

## 2024-05-28 RX ORDER — ALBUTEROL SULFATE 90 UG/1
2 AEROSOL, METERED RESPIRATORY (INHALATION) EVERY 4 HOURS PRN
Qty: 18 G | Refills: 0 | Status: SHIPPED | OUTPATIENT
Start: 2024-05-28 | End: 2024-07-25

## 2024-05-28 RX ORDER — BENZONATATE 200 MG/1
200 CAPSULE ORAL 3 TIMES DAILY PRN
Qty: 30 CAPSULE | Refills: 0 | Status: SHIPPED | OUTPATIENT
Start: 2024-05-28 | End: 2024-07-25

## 2024-05-28 RX ORDER — AZITHROMYCIN 250 MG/1
TABLET, FILM COATED ORAL
Qty: 6 TABLET | Refills: 0 | Status: SHIPPED | OUTPATIENT
Start: 2024-05-28 | End: 2024-06-02

## 2024-05-28 NOTE — PROGRESS NOTES
Video visit:  Start time: 12:03 PM  Stop time: 12:09 PM  Duration: 6 minutes  Patient location: At home  Provider location: [facility] virtual provider (remote).  Platform used for video visit: Velasquez      CHIEF COMPLAINT: Persistent cough and congestion.      HPI: Patient is a 39-year-old female whose been ill for 7 or 8 days.  She became ill with URI and flulike symptoms last Wednesday and Thursday which included nausea and vomiting.  She now has persistent coughing and a sense of tickle in her throat.  She had an old inhaler and feels like it may have helped.  She has discolored sinus congestion with green in color as well as some greenish colored phlegm.      ROS: See HPI otherwise normal.    Allergies   Allergen Reactions    Shellfish-Derived Products Swelling      Current Outpatient Medications   Medication Sig Dispense Refill    albuterol (PROAIR HFA/PROVENTIL HFA/VENTOLIN HFA) 108 (90 Base) MCG/ACT inhaler Inhale 2 puffs into the lungs every 4 hours as needed 18 g 0    azithromycin (ZITHROMAX) 250 MG tablet Take 2 tablets (500 mg) by mouth daily for 1 day, THEN 1 tablet (250 mg) daily for 4 days. 6 tablet 0    benzonatate (TESSALON) 200 MG capsule Take 1 capsule (200 mg) by mouth 3 times daily as needed 30 capsule 0    cetirizine (ZYRTEC) 5 MG tablet Take 5 mg by mouth daily      EPINEPHrine (ANY BX GENERIC EQUIV) 0.3 MG/0.3ML injection 2-pack Inject 0.3 mLs (0.3 mg) into the muscle as needed for anaphylaxis May repeat one time in 5-15 minutes if response to initial dose is inadequate. 2 each 0    fluconazole (DIFLUCAN) 150 MG tablet       fluticasone (FLONASE) 50 MCG/ACT nasal spray Spray 1 spray into both nostrils daily 11.1 mL 1    gabapentin (NEURONTIN) 100 MG capsule Take 1 capsule (100 mg) by mouth daily as needed for other (anxiety/panic attacks) Can take up to 200 mg daily if 100 mg isn't adequate for anxiety/panic symptoms. 30 capsule 0    montelukast (SINGULAIR) 10 MG tablet TAKE 1 TABLET BY  MOUTH EVERYDAY AT BEDTIME 90 tablet 1    predniSONE (DELTASONE) 20 MG tablet Take 3 tabs by mouth daily x 3 days, then 2 tabs daily x 3 days, then 1 tab daily x 3 days, then 1/2 tab daily x 3 days. 20 tablet 0    sertraline (ZOLOFT) 50 MG tablet Take 1.5 tablets (75 mg) by mouth daily      Vitamin D, Cholecalciferol, 25 MCG (1000 UT) TABS Take 1,000 Units by mouth daily           PE: No acute distress on video visit although she has a frequent bronchospastic sounding cough.  She is alert and oriented.  She is nondyspneic appearing speaking in full sentences.        TREATMENT: None.      ASSESSMENT: Protracted respiratory illness with some component of sinusitis and reactive airway symptoms.      DIAGNOSIS: Cough.  Sinusitis.      PLAN: Z-Rick, albuterol, Tessalon perles as instructed.  Follow-up 1 week if still ill.  Be seen sooner if more ill.

## 2024-06-26 ENCOUNTER — VIRTUAL VISIT (OUTPATIENT)
Dept: PSYCHIATRY | Facility: CLINIC | Age: 39
End: 2024-06-26
Payer: COMMERCIAL

## 2024-06-26 DIAGNOSIS — F43.22 ADJUSTMENT DISORDER WITH ANXIOUS MOOD: Primary | ICD-10-CM

## 2024-06-26 PROCEDURE — 90837 PSYTX W PT 60 MINUTES: CPT | Mod: 95

## 2024-06-26 NOTE — PROGRESS NOTES
"[facility]  OUTPATIENT PSYCHOTHERAPY PROGRESS NOTE     Client Name: Griselda Bell          YOB: 1985 (39 year old)            Date of Service:  2024  Time of Service: 8:06 am to 8:59 am (53 minutes)  Service Type(s): 40955 psychotherapy (38-52 min. with patient and/or family)  Type of service: Telemedicine Psychotherapy  Reason for Telemedicine Visit: patient preference, health precautions  Mode of transmission: Secure real time interactive audio and visual telecommunication system ([employer])  Location of originating and distant sites:  Originating site (patient location): patient home  Distant site (provider site): [facility]   Telemedicine Visit: The patient's condition can be safely assessed and treated via synchronous audio and visual telemedicine encounter. Patient/family has agreed to receive services via telemedicine technology.     Diagnoses:   Adjustment disorder with anxious mood (F43.22)  Panic attack - full remission     Individuals Present:   Patient and provider      Treatment goal(s) being addressed:   1. Maintain gains towards treatment goals and plan for discharge/transfer to \"maintenance\" with quarterly booster sessions  2. Continue to support use of grounding and relaxation strategies to maintain remission of panic and anxiety symptoms as needed  3. Continue to support wellbeing and adjustment through postpartum year      Subjective:  Marisabel is approximately 15 months postpartum. Today she continued to report remission of panic and anxiety symptoms. She described her mood as \"really good.\" She reported ongoing effective use of coping strategies to manage intermittent worries and stress.      Marisabel denied any changes in medication.      Treatment:   Individual psychotherapy with cognitive behavioral therapy (CBT) framework leveraging  lens. Today, re-established " "rapport following Marisabel's therapy vacation during this writer's parental leave. Assessed for recent and current symptoms/concerns. Continued to reinforce Marisabel's use of cognitive and behavioral strategies to manage stress and worries, and maintain remission of anxiety and panic symptoms. Continued to explore strategies and resources to support Teddys mood and wellbeing in context of psychosocial stressors including work stress and experiencing progressive, anticipatory grief related to witnessing father's decline in health and functioning. Focus on use of interpersonal effectiveness skills and mindfulness strategies to observe and describe thoughts and feelings without judgement, journaling to support organization of thoughts and feelings and memories of experiences with father, mindfulness strategies to help savor, enjoy, and be present in moments with father, balancing leveraging of instrumental and emotional support from family members and practicing healthy emotional boundaries. Discussed needs/goals for transition to booster sessions. Planned for quarterly sessions. Provided reflective listening, validation, psychoeducation, coaching, and positive reinforcement throughout.      Assessment and Progress:   Appearance:  Appropriately-dressed and groomed   Behavior/relationship to examiner/demeanor:  Open, honest, relaxed    Motor activity/EPS: Within normal limits  Speech rate:  Within normal limits  Speech volume:  Within normal limits  Speech articulation:  Within normal limits  Speech coherence:  Within normal limits  Speech spontaneity:  Within normal limit  Mood (subjective report): \"really good\"  Affect (objective appearance): Within normal limits  Thought Process (Associations):  Goal directed  Thought process (Rate):  Within normal limits  Thought content: None  Abnormal Perception: None  Insight:  Within normal limits  Judgment:  Within normal limit    Answers submitted by the patient for this " "visit:  Patient Health Questionnaire (Submitted on 6/26/2024)  If you checked off any problems, how difficult have these problems made it for you to do your work, take care of things at home, or get along with other people?: Not difficult at all  PHQ9 TOTAL SCORE: 0, indicating no depressive symptoms    Marisabel has significantly benefited from psychotherapy and medication (Zoloft 75 mg, use of gabapentin 100 mg as needed while traveling to prevent nocturnal panic attacks). She has made strong, steady progress in adjusting positively to the postpartum period and role of parenting two young children. She achieved full remission of panic symptoms in February 2023 (see noted dated 2/23/23) and full remission of generalized anxiety symptoms in March 2023 (see note dated 3/23/23). She has continued to maintain remission of symptoms since that time. Given Marisabel's progress in treatment, her diagnosis was updated in March 2023 to Adjustment Disorder with anxious mood. We have transitioned to quarterly \"booster\" appointments to help maintain remission of anxiety and panic symptoms. Focus of treatment now emphasizes management of stressors and maintenance of symptom remission.      Marisabel is an incredibly resilient individual with many areas of personal strengths and sources of support. Marisabel continues to report benefit from Zoloft and she plans to maintain this dose for the foreseeable future/potentially indefinitely. She has a history of postpartum anxiety and intrusive thoughts/images with onset in previous postpartum period. No history of postpartum mood concerns. Marisabel experienced infrequent intrusive images during her most recent pregnancy, which responded well to use of cognitive defusion strategies.      Plan:   Continue quarterly booster appointments with next appointment scheduled for 10/2 at 8 am.       Joann Moore, PhD, LP  Licensed Clinical Psychologist   , Dept of Psychiatry  "

## 2024-06-28 ENCOUNTER — VIRTUAL VISIT (OUTPATIENT)
Dept: URGENT CARE | Facility: CLINIC | Age: 39
End: 2024-06-28
Payer: COMMERCIAL

## 2024-06-28 DIAGNOSIS — N30.00 ACUTE CYSTITIS WITHOUT HEMATURIA: Primary | ICD-10-CM

## 2024-06-28 PROCEDURE — 99213 OFFICE O/P EST LOW 20 MIN: CPT | Mod: 95 | Performed by: EMERGENCY MEDICINE

## 2024-06-28 RX ORDER — NITROFURANTOIN 25; 75 MG/1; MG/1
100 CAPSULE ORAL 2 TIMES DAILY
Qty: 10 CAPSULE | Refills: 0 | Status: SHIPPED | OUTPATIENT
Start: 2024-06-28 | End: 2024-07-03

## 2024-06-28 NOTE — PROGRESS NOTES
Video visit:  Start time: 9:32 AM  Stop time: 9:37 AM  Duration: 5 minutes  Patient location: At home  Provider location:  [facility] virtual provider (remote).  Platform used for video visit: Velasquez      CHIEF COMPLAINT: Possible UTI.      HPI: Patient is a 39-year-old female who for the last several days has been having dysuria.  She did run in a marathon on the weekend and wondered whether tight fitting shorts may have contributed to her symptoms.  She has no vaginal discharge or gregorio vaginal symptoms.  She does not have marked frequency at this time.  No fever or chills voiced.      ROS: See HPI otherwise normal.    Allergies   Allergen Reactions    Shellfish-Derived Products Swelling      Current Outpatient Medications   Medication Sig Dispense Refill    nitroFURantoin macrocrystal-monohydrate (MACROBID) 100 MG capsule Take 1 capsule (100 mg) by mouth 2 times daily for 5 days 10 capsule 0    albuterol (PROAIR HFA/PROVENTIL HFA/VENTOLIN HFA) 108 (90 Base) MCG/ACT inhaler Inhale 2 puffs into the lungs every 4 hours as needed 18 g 0    benzonatate (TESSALON) 200 MG capsule Take 1 capsule (200 mg) by mouth 3 times daily as needed 30 capsule 0    cetirizine (ZYRTEC) 5 MG tablet Take 5 mg by mouth daily      EPINEPHrine (ANY BX GENERIC EQUIV) 0.3 MG/0.3ML injection 2-pack Inject 0.3 mLs (0.3 mg) into the muscle as needed for anaphylaxis May repeat one time in 5-15 minutes if response to initial dose is inadequate. 2 each 0    fluconazole (DIFLUCAN) 150 MG tablet       fluticasone (FLONASE) 50 MCG/ACT nasal spray Spray 1 spray into both nostrils daily 11.1 mL 1    gabapentin (NEURONTIN) 100 MG capsule Take 1 capsule (100 mg) by mouth daily as needed for other (anxiety/panic attacks) Can take up to 200 mg daily if 100 mg isn't adequate for anxiety/panic symptoms. 30 capsule 0    montelukast (SINGULAIR) 10 MG tablet TAKE 1 TABLET BY MOUTH EVERYDAY AT BEDTIME 90 tablet 1    predniSONE (DELTASONE) 20 MG tablet Take  3 tabs by mouth daily x 3 days, then 2 tabs daily x 3 days, then 1 tab daily x 3 days, then 1/2 tab daily x 3 days. 20 tablet 0    sertraline (ZOLOFT) 50 MG tablet Take 1.5 tablets (75 mg) by mouth daily      Vitamin D, Cholecalciferol, 25 MCG (1000 UT) TABS Take 1,000 Units by mouth daily           PE: No acute distress on video visit.  Patient alert and oriented.  She is nondyspneic appearing speaking in full sentences.        TREATMENT: None.      ASSESSMENT: Dysuria and a 39-year-old female with no other chronic health issues.  Feel comfortable with empiric treatment with short-term follow-up if not better.  It was discussed that patient be seen in 72 hours if symptoms persist with more formal testing.      DIAGNOSIS: UTI      PLAN: Macrobid.  Follow-up 2 to 3 days if symptoms persist.

## 2024-07-24 ENCOUNTER — TRANSFERRED RECORDS (OUTPATIENT)
Dept: HEALTH INFORMATION MANAGEMENT | Facility: CLINIC | Age: 39
End: 2024-07-24
Payer: COMMERCIAL

## 2024-07-24 LAB — TSH SERPL-ACNC: 1.4 UIU/ML (ref 0.45–4.5)

## 2024-07-25 ENCOUNTER — OFFICE VISIT (OUTPATIENT)
Dept: FAMILY MEDICINE | Facility: CLINIC | Age: 39
End: 2024-07-25
Payer: COMMERCIAL

## 2024-07-25 VITALS
WEIGHT: 193.2 LBS | SYSTOLIC BLOOD PRESSURE: 114 MMHG | RESPIRATION RATE: 16 BRPM | TEMPERATURE: 97.3 F | HEART RATE: 74 BPM | DIASTOLIC BLOOD PRESSURE: 80 MMHG | HEIGHT: 67 IN | BODY MASS INDEX: 30.32 KG/M2 | OXYGEN SATURATION: 99 %

## 2024-07-25 DIAGNOSIS — Z13.1 SCREENING FOR DIABETES MELLITUS: ICD-10-CM

## 2024-07-25 DIAGNOSIS — Z13.29 SCREENING FOR THYROID DISORDER: ICD-10-CM

## 2024-07-25 DIAGNOSIS — Z00.00 ROUTINE GENERAL MEDICAL EXAMINATION AT A HEALTH CARE FACILITY: Primary | ICD-10-CM

## 2024-07-25 DIAGNOSIS — F41.9 ANXIETY: ICD-10-CM

## 2024-07-25 DIAGNOSIS — E66.3 OVERWEIGHT (BMI 25.0-29.9): ICD-10-CM

## 2024-07-25 DIAGNOSIS — Z13.6 CARDIOVASCULAR SCREENING; LDL GOAL LESS THAN 160: ICD-10-CM

## 2024-07-25 DIAGNOSIS — Z13.0 SCREENING, ANEMIA, DEFICIENCY, IRON: ICD-10-CM

## 2024-07-25 LAB
ALBUMIN SERPL BCG-MCNC: 4.4 G/DL (ref 3.5–5.2)
ALP SERPL-CCNC: 58 U/L (ref 40–150)
ALT SERPL W P-5'-P-CCNC: 11 U/L (ref 0–50)
ANION GAP SERPL CALCULATED.3IONS-SCNC: 8 MMOL/L (ref 7–15)
AST SERPL W P-5'-P-CCNC: 18 U/L (ref 0–45)
BASOPHILS # BLD AUTO: 0.1 10E3/UL (ref 0–0.2)
BASOPHILS NFR BLD AUTO: 1 %
BILIRUB SERPL-MCNC: 0.3 MG/DL
BUN SERPL-MCNC: 12.6 MG/DL (ref 6–20)
CALCIUM SERPL-MCNC: 9.3 MG/DL (ref 8.8–10.4)
CHLORIDE SERPL-SCNC: 108 MMOL/L (ref 98–107)
CHOLEST SERPL-MCNC: 144 MG/DL
CREAT SERPL-MCNC: 0.61 MG/DL (ref 0.51–0.95)
EGFRCR SERPLBLD CKD-EPI 2021: >90 ML/MIN/1.73M2
EOSINOPHIL # BLD AUTO: 0.2 10E3/UL (ref 0–0.7)
EOSINOPHIL NFR BLD AUTO: 2 %
ERYTHROCYTE [DISTWIDTH] IN BLOOD BY AUTOMATED COUNT: 11.7 % (ref 10–15)
FASTING STATUS PATIENT QL REPORTED: YES
FASTING STATUS PATIENT QL REPORTED: YES
FERRITIN SERPL-MCNC: 22 NG/ML (ref 6–175)
GLUCOSE SERPL-MCNC: 89 MG/DL (ref 70–99)
HBA1C MFR BLD: 4.9 % (ref 0–5.6)
HCO3 SERPL-SCNC: 24 MMOL/L (ref 22–29)
HCT VFR BLD AUTO: 36.9 % (ref 35–47)
HDLC SERPL-MCNC: 54 MG/DL
HGB BLD-MCNC: 12.8 G/DL (ref 11.7–15.7)
IMM GRANULOCYTES # BLD: 0 10E3/UL
IMM GRANULOCYTES NFR BLD: 0 %
IRON BINDING CAPACITY (ROCHE): 280 UG/DL (ref 240–430)
IRON SATN MFR SERPL: 25 % (ref 15–46)
IRON SERPL-MCNC: 70 UG/DL (ref 37–145)
LDLC SERPL CALC-MCNC: 82 MG/DL
LYMPHOCYTES # BLD AUTO: 2.1 10E3/UL (ref 0.8–5.3)
LYMPHOCYTES NFR BLD AUTO: 27 %
MCH RBC QN AUTO: 31 PG (ref 26.5–33)
MCHC RBC AUTO-ENTMCNC: 34.7 G/DL (ref 31.5–36.5)
MCV RBC AUTO: 89 FL (ref 78–100)
MONOCYTES # BLD AUTO: 0.5 10E3/UL (ref 0–1.3)
MONOCYTES NFR BLD AUTO: 7 %
NEUTROPHILS # BLD AUTO: 5 10E3/UL (ref 1.6–8.3)
NEUTROPHILS NFR BLD AUTO: 64 %
NONHDLC SERPL-MCNC: 90 MG/DL
PLATELET # BLD AUTO: 245 10E3/UL (ref 150–450)
POTASSIUM SERPL-SCNC: 4.5 MMOL/L (ref 3.4–5.3)
PROT SERPL-MCNC: 7.2 G/DL (ref 6.4–8.3)
RBC # BLD AUTO: 4.13 10E6/UL (ref 3.8–5.2)
SODIUM SERPL-SCNC: 140 MMOL/L (ref 135–145)
TRIGL SERPL-MCNC: 42 MG/DL
TSH SERPL DL<=0.005 MIU/L-ACNC: 1.77 UIU/ML (ref 0.3–4.2)
WBC # BLD AUTO: 7.8 10E3/UL (ref 4–11)

## 2024-07-25 PROCEDURE — 82728 ASSAY OF FERRITIN: CPT | Performed by: PHYSICIAN ASSISTANT

## 2024-07-25 PROCEDURE — 83550 IRON BINDING TEST: CPT | Performed by: PHYSICIAN ASSISTANT

## 2024-07-25 PROCEDURE — 83036 HEMOGLOBIN GLYCOSYLATED A1C: CPT | Performed by: PHYSICIAN ASSISTANT

## 2024-07-25 PROCEDURE — 80061 LIPID PANEL: CPT | Performed by: PHYSICIAN ASSISTANT

## 2024-07-25 PROCEDURE — 80053 COMPREHEN METABOLIC PANEL: CPT | Performed by: PHYSICIAN ASSISTANT

## 2024-07-25 PROCEDURE — 36415 COLL VENOUS BLD VENIPUNCTURE: CPT | Performed by: PHYSICIAN ASSISTANT

## 2024-07-25 PROCEDURE — 83540 ASSAY OF IRON: CPT | Performed by: PHYSICIAN ASSISTANT

## 2024-07-25 PROCEDURE — 84443 ASSAY THYROID STIM HORMONE: CPT | Performed by: PHYSICIAN ASSISTANT

## 2024-07-25 PROCEDURE — 99395 PREV VISIT EST AGE 18-39: CPT | Performed by: PHYSICIAN ASSISTANT

## 2024-07-25 PROCEDURE — 85025 COMPLETE CBC W/AUTO DIFF WBC: CPT | Performed by: PHYSICIAN ASSISTANT

## 2024-07-25 RX ORDER — BUPROPION HYDROCHLORIDE 150 MG/1
150 TABLET ORAL EVERY MORNING
Qty: 90 TABLET | Refills: 1 | Status: SHIPPED | OUTPATIENT
Start: 2024-07-25

## 2024-07-25 SDOH — HEALTH STABILITY: PHYSICAL HEALTH: ON AVERAGE, HOW MANY DAYS PER WEEK DO YOU ENGAGE IN MODERATE TO STRENUOUS EXERCISE (LIKE A BRISK WALK)?: 5 DAYS

## 2024-07-25 ASSESSMENT — ANXIETY QUESTIONNAIRES
1. FEELING NERVOUS, ANXIOUS, OR ON EDGE: NOT AT ALL
6. BECOMING EASILY ANNOYED OR IRRITABLE: NOT AT ALL
IF YOU CHECKED OFF ANY PROBLEMS ON THIS QUESTIONNAIRE, HOW DIFFICULT HAVE THESE PROBLEMS MADE IT FOR YOU TO DO YOUR WORK, TAKE CARE OF THINGS AT HOME, OR GET ALONG WITH OTHER PEOPLE: NOT DIFFICULT AT ALL
GAD7 TOTAL SCORE: 0
8. IF YOU CHECKED OFF ANY PROBLEMS, HOW DIFFICULT HAVE THESE MADE IT FOR YOU TO DO YOUR WORK, TAKE CARE OF THINGS AT HOME, OR GET ALONG WITH OTHER PEOPLE?: NOT DIFFICULT AT ALL
2. NOT BEING ABLE TO STOP OR CONTROL WORRYING: NOT AT ALL
7. FEELING AFRAID AS IF SOMETHING AWFUL MIGHT HAPPEN: NOT AT ALL
5. BEING SO RESTLESS THAT IT IS HARD TO SIT STILL: NOT AT ALL
3. WORRYING TOO MUCH ABOUT DIFFERENT THINGS: NOT AT ALL
4. TROUBLE RELAXING: NOT AT ALL
7. FEELING AFRAID AS IF SOMETHING AWFUL MIGHT HAPPEN: NOT AT ALL
GAD7 TOTAL SCORE: 0

## 2024-07-25 ASSESSMENT — SOCIAL DETERMINANTS OF HEALTH (SDOH): HOW OFTEN DO YOU GET TOGETHER WITH FRIENDS OR RELATIVES?: TWICE A WEEK

## 2024-07-25 ASSESSMENT — PAIN SCALES - GENERAL: PAINLEVEL: NO PAIN (0)

## 2024-07-25 NOTE — PROGRESS NOTES
"Preventive Care Visit  [facility]  Jul 25, 2024      Assessment & Plan   Problem List Items Addressed This Visit       Anxiety    Relevant Medications    sertraline (ZOLOFT) 50 MG tablet    buPROPion (WELLBUTRIN XL) 150 MG 24 hr tablet     Other Visit Diagnoses       Routine general medical examination at a health care facility    -  Primary    Screening for diabetes mellitus        Relevant Orders    Comprehensive metabolic panel    Hemoglobin A1c    Overweight (BMI 25.0-29.9)        Relevant Orders    Adult Comprehensive Weight Management  Referral    Screening, anemia, deficiency, iron        Relevant Orders    Ferritin    Iron & Iron Binding Capacity    CBC with Platelets & Differential (Completed)    Screening for thyroid disorder        Relevant Orders    TSH with free T4 reflex    CARDIOVASCULAR SCREENING; LDL GOAL LESS THAN 160        Relevant Orders    Lipid panel reflex to direct LDL Fasting             Patient has been advised of split billing requirements and indicates understanding: Yes       BMI  Estimated body mass index is 30.15 kg/m  as calculated from the following:    Height as of this encounter: 1.705 m (5' 7.13\").    Weight as of this encounter: 87.6 kg (193 lb 3.2 oz).   Weight management plan: Patient referred to endocrine and/or weight management specialty    Counseling  Appropriate preventive services were addressed with this patient via screening, questionnaire, or discussion as appropriate for fall prevention, nutrition, physical activity, Tobacco-use cessation, weight loss and cognition.  Checklist reviewing preventive services available has been given to the patient.  Reviewed patient's diet, addressing concerns and/or questions.     See Patient Instructions      Martina Petit is a 39 year old, presenting for the following:  Physical (Pt is fasting)        7/25/2024     8:37 AM   Additional Questions   Roomed by " Joana MADISON MA apprentice   Accompanied by n/a        Health Care Directive  Patient does not have a Health Care Directive or Living Will: Discussed advance care planning with patient; information given to patient to review.    HPI  Answers submitted by the patient for this visit:  COLLINS-7 (Submitted on 7/25/2024)  COLLINS 7 TOTAL SCORE: 0          7/25/2024   General Health   How would you rate your overall physical health? Good   Feel stress (tense, anxious, or unable to sleep) Not at all            7/25/2024   Nutrition   Three or more servings of calcium each day? Yes   Diet: Regular (no restrictions)   How many servings of fruit and vegetables per day? (!) 2-3   How many sweetened beverages each day? 0-1            7/25/2024   Exercise   Days per week of moderate/strenous exercise 5 days            7/25/2024   Social Factors   Frequency of gathering with friends or relatives Twice a week   Worry food won't last until get money to buy more No   Food not last or not have enough money for food? No   Do you have housing? (Housing is defined as stable permanent housing and does not include staying ouside in a car, in a tent, in an abandoned building, in an overnight shelter, or couch-surfing.) Yes   Are you worried about losing your housing? No   Lack of transportation? No   Unable to get utilities (heat,electricity)? No            7/25/2024   Dental   Dentist two times every year? Yes            7/25/2024   TB Screening   Were you born outside of the [place]? No              Today's PHQ-2 Score:       1/19/2024     6:56 AM   PHQ-2 ( 1999 [employer])   Q1: Little interest or pleasure in doing things 0   Q2: Feeling down, depressed or hopeless 0   PHQ-2 Score 0         7/25/2024   Substance Use   Alcohol more than 3/day or more than 7/wk No   Do you use any other substances recreationally? (!) CANNABIS PRODUCTS        Social History     Tobacco Use    Smoking status: Never    Smokeless tobacco: Never   Vaping Use    Vaping status:  Never Used   Substance Use Topics    Alcohol use: Yes     Comment: 1 drink when does have etoh    Drug use: Never          Mammogram Screening - Patient under 40 years of age: Routine Mammogram Screening not recommended.         7/25/2024   STI Screening   New sexual partner(s) since last STI/HIV test? No        History of abnormal Pap smear: No - age 30- 64 PAP with HPV every 5 years recommended             7/25/2024   Contraception/Family Planning   Questions about contraception or family planning No           Reviewed and updated as needed this visit by Provider   Tobacco  Allergies  Meds  Problems  Med Hx  Surg Hx  Fam Hx            History reviewed. No pertinent past medical history.  Past Surgical History:   Procedure Laterality Date    ARTHROSCOPY SHOULDER RT/LT  2006    labral repair    BIOPSY CERVICAL, LOCAL EXCISION, SINGLE/MULTIPLE      DILATION AND EVACUATION N/A 08/05/2020    Procedure: DILATION AND EVACUATION, UTERUS-;  Surgeon: Travis Hernandez MD;  Location: [facility] OR    DILATION AND EVACUATION N/A 07/28/2021    Procedure: pelvic exam under anesthesia, DILATION AND EVACUATION, UTERUS;  Surgeon: Johnna Felix MD;  Location: [facility] OR    EXAM UNDER ANESTHESIA RECTUM N/A 11/14/2019    Procedure: EXAM UNDER ANESTHESIA;  Surgeon: Diana Steward MD;  Location:  OR    LAPAROSCOPIC APPENDECTOMY N/A 04/19/2021    Procedure: APPENDECTOMY, LAPAROSCOPIC;  Surgeon: Catalina Coronado MD;  Location:  OR    LEEP TX, CERVICAL  2015?     Labs reviewed in EPIC  BP Readings from Last 3 Encounters:   07/25/24 114/80   01/19/24 119/83   12/21/23 118/86    Wt Readings from Last 3 Encounters:   07/25/24 87.6 kg (193 lb 3.2 oz)   01/19/24 85.5 kg (188 lb 7.9 oz)   12/21/23 85.5 kg (188 lb 8 oz)                  Patient Active Problem List   Diagnosis    S/P LEEP    Anxiety    Pain of right hip    Adverse effect of drug, initial encounter    Urticaria    Food allergy    Strain of left hip and thigh      Past Surgical History:   Procedure Laterality Date    ARTHROSCOPY SHOULDER RT/LT  2006    labral repair    BIOPSY CERVICAL, LOCAL EXCISION, SINGLE/MULTIPLE      DILATION AND EVACUATION N/A 08/05/2020    Procedure: DILATION AND EVACUATION, UTERUS-;  Surgeon: Travis Hernandez MD;  [facility]: [facility]    DILATION AND EVACUATION N/A 07/28/2021    Procedure: pelvic exam under anesthesia, DILATION AND EVACUATION, UTERUS;  Surgeon: Johnna Felix MD;  Location: [facility] OR    EXAM UNDER ANESTHESIA RECTUM N/A 11/14/2019    Procedure: EXAM UNDER ANESTHESIA;  Surgeon: Diana Steward MD;  Location: [facility] OR    LAPAROSCOPIC APPENDECTOMY N/A 04/19/2021    Procedure: APPENDECTOMY, LAPAROSCOPIC;  Surgeon: Catalina Coronado MD;  Location: [facility] OR    LEEP TX, CERVICAL  2015?       Social History     Tobacco Use    Smoking status: Never    Smokeless tobacco: Never   Substance Use Topics    Alcohol use: Yes     Comment: 1 drink when does have etoh     Family History   Problem Relation Age of Onset    Diabetes Father     Arrhythmia Maternal Grandfather         pacemaker    Colon Polyps Brother         age 35    Colon Cancer No family hx of     Breast Cancer No family hx of          Current Outpatient Medications   Medication Sig Dispense Refill    buPROPion (WELLBUTRIN XL) 150 MG 24 hr tablet Take 1 tablet (150 mg) by mouth every morning 90 tablet 1    gabapentin (NEURONTIN) 100 MG capsule Take 1 capsule (100 mg) by mouth daily as needed for other (anxiety/panic attacks) Can take up to 200 mg daily if 100 mg isn't adequate for anxiety/panic symptoms. 30 capsule 0    sertraline (ZOLOFT) 50 MG tablet Take 1 tablet (50 mg) by mouth daily 90 tablet 1     No Known Allergies  Recent Labs   Lab Test 08/24/23  0816 06/23/22  0946 04/19/21  0922 05/09/18  0000 09/09/16  0000   A1C  --  4.9  --   --   --    *  --   --   --  79   HDL 68  --   --   --  86   TRIG 43  --   --   --  76   ALT  --   --  21 13  --    CR  --  0.72 0.60 0.57  "0.60   GFRESTIMATED  --  >90 >90 122 122   GFRESTBLACK  --   --  >90 141 140   POTASSIUM  --  4.4 3.6 3.9 3.8   TSH  --   --   --  1.480 3.040          Review of Systems  Constitutional, neuro, ENT, endocrine, pulmonary, cardiac, gastrointestinal, genitourinary, musculoskeletal, integument and psychiatric systems are negative, except as otherwise noted.     Objective    Exam  /80 (BP Location: Left arm, Patient Position: Sitting, Cuff Size: Adult Regular)   Pulse 74   Temp 97.3  F (36.3  C) (Temporal)   Resp 16   Ht 1.705 m (5' 7.13\")   Wt 87.6 kg (193 lb 3.2 oz)   LMP 07/05/2024 (Exact Date)   SpO2 99%   BMI 30.15 kg/m     Estimated body mass index is 30.15 kg/m  as calculated from the following:    Height as of this encounter: 1.705 m (5' 7.13\").    Weight as of this encounter: 87.6 kg (193 lb 3.2 oz).    Physical Exam  GENERAL: alert and no distress  EYES: Eyes grossly normal to inspection, PERRL and conjunctivae and sclerae normal  HENT: ear canals and TM's normal, nose and mouth without ulcers or lesions  NECK: no adenopathy, no asymmetry, masses, or scars  RESP: lungs clear to auscultation - no rales, rhonchi or wheezes  CV: regular rate and rhythm, normal S1 S2, no S3 or S4, no murmur, click or rub, no peripheral edema  MS: no gross musculoskeletal defects noted, no edema  SKIN: no suspicious lesions or rashes  NEURO: Normal strength and tone, mentation intact and speech normal  PSYCH: mentation appears normal, affect normal/bright        Signed Electronically by: Israel Jin PA-C    "

## 2024-07-25 NOTE — PATIENT INSTRUCTIONS
Patient Education   Preventive Care Advice   This is general advice given by our system to help you stay healthy. However, your care team may have specific advice just for you. Please talk to your care team about your preventive care needs.  Nutrition  Eat 5 or more servings of fruits and vegetables each day.  Try wheat bread, brown rice and whole grain pasta (instead of white bread, rice, and pasta).  Get enough calcium and vitamin D. Check the label on foods and aim for 100% of the RDA (recommended daily allowance).  Lifestyle  Exercise at least 150 minutes each week  (30 minutes a day, 5 days a week).  Do muscle strengthening activities 2 days a week. These help control your weight and prevent disease.  No smoking.  Wear sunscreen to prevent skin cancer.  Have a dental exam and cleaning every 6 months.  Yearly exams  See your health care team every year to talk about:  Any changes in your health.  Any medicines your care team has prescribed.  Preventive care, family planning, and ways to prevent chronic diseases.  Shots (vaccines)   HPV shots (up to age 26), if you've never had them before.  Hepatitis B shots (up to age 59), if you've never had them before.  COVID-19 shot: Get this shot when it's due.  Flu shot: Get a flu shot every year.  Tetanus shot: Get a tetanus shot every 10 years.  Pneumococcal, hepatitis A, and RSV shots: Ask your care team if you need these based on your risk.  Shingles shot (for age 50 and up)  General health tests  Diabetes screening:  Starting at age 35, Get screened for diabetes at least every 3 years.  If you are younger than age 35, ask your care team if you should be screened for diabetes.  Cholesterol test: At age 39, start having a cholesterol test every 5 years, or more often if advised.  Bone density scan (DEXA): At age 50, ask your care team if you should have this scan for osteoporosis (brittle bones).  Hepatitis C: Get tested at least once in your life.  STIs (sexually  transmitted infections)  Before age 24: Ask your care team if you should be screened for STIs.  After age 24: Get screened for STIs if you're at risk. You are at risk for STIs (including HIV) if:  You are sexually active with more than one person.  You don't use condoms every time.  You or a partner was diagnosed with a sexually transmitted infection.  If you are at risk for HIV, ask about PrEP medicine to prevent HIV.  Get tested for HIV at least once in your life, whether you are at risk for HIV or not.  Cancer screening tests  Cervical cancer screening: If you have a cervix, begin getting regular cervical cancer screening tests starting at age 21.  Breast cancer scan (mammogram): If you've ever had breasts, begin having regular mammograms starting at age 40. This is a scan to check for breast cancer.  Colon cancer screening: It is important to start screening for colon cancer at age 45.  Have a colonoscopy test every 10 years (or more often if you're at risk) Or, ask your provider about stool tests like a FIT test every year or Cologuard test every 3 years.  To learn more about your testing options, visit:   .  For help making a decision, visit:   https://bit.ly/yv67877.  Prostate cancer screening test: If you have a prostate, ask your care team if a prostate cancer screening test (PSA) at age 55 is right for you.  Lung cancer screening: If you are a current or former smoker ages 50 to 80, ask your care team if ongoing lung cancer screenings are right for you.  For informational purposes only. Not to replace the advice of your health care provider. Copyright   2023 [facility]. All rights reserved. Clinically reviewed by the [facility]. [employer] 634547 - REV 01/24.  Substance Use Disorder: Care Instructions  Overview     You can improve your life and health by stopping your use of alcohol or drugs. When you don't drink or use drugs, you may feel and sleep better. You may  get along better with your family, friends, and coworkers. There are medicines and programs that can help with substance use disorder.  How can you care for yourself at home?  Here are some ways to help you stay sober and prevent relapse.  If you have been given medicine to help keep you sober or reduce your cravings, be sure to take it exactly as prescribed.  Talk to your doctor about programs that can help you stop using drugs or drinking alcohol.  Do not keep alcohol or drugs in your home.  Plan ahead. Think about what you'll say if other people ask you to drink or use drugs. Try not to spend time with people who drink or use drugs.  Use the time and money spent on drinking or drugs to do something that's important to you.  Preventing a relapse  Have a plan to deal with relapse. Learn to recognize changes in your thinking that lead you to drink or use drugs. Get help before you start to drink or use drugs again.  Try to stay away from situations, friends, or places that may lead you to drink or use drugs.  If you feel the need to drink alcohol or use drugs again, seek help right away. Call a trusted friend or family member. Some people get support from organizations such as Narcotics Anonymous or [employer] or from treatment facilities.  If you relapse, get help as soon as you can. Some people make a plan with another person that outlines what they want that person to do for them if they relapse. The plan usually includes how to handle the relapse and who to notify in case of relapse.  Don't give up. Remember that a relapse doesn't mean that you have failed. Use the experience to learn the triggers that lead you to drink or use drugs. Then quit again. Recovery is a lifelong process. Many people have several relapses before they are able to quit for good.  Follow-up care is a key part of your treatment and safety. Be sure to make and go to all appointments, and call your doctor if you are having problems. It's  "also a good idea to know your test results and keep a list of the medicines you take.  When should you call for help?   Call 911  anytime you think you may need emergency care. For example, call if you or someone else:    Has overdosed or has withdrawal signs. Be sure to tell the emergency workers that you are or someone else is using or trying to quit using drugs. Overdose or withdrawal signs may include:  Losing consciousness.  Seizure.  Seeing or hearing things that aren't there (hallucinations).     Is thinking or talking about suicide or harming others.   Where to get help 24 hours a day, 7 days a week   If you or someone you know talks about suicide, self-harm, a mental health crisis, a substance use crisis, or any other kind of emotional distress, get help right away. You can:    Call the Suicide and Crisis Lifeline at 988.     Call 6-726-356-TALK (1-379.634.3807).     Text HOME to 425966 to access the Crisis Text Line.   Consider saving these numbers in your phone.  Go to [employer] for more information or to chat online.  Call your doctor now or seek immediate medical care if:    You are having withdrawal symptoms. These may include nausea or vomiting, sweating, shakiness, and anxiety.   Watch closely for changes in your health, and be sure to contact your doctor if:    You have a relapse.     You need more help or support to stop.   Where can you learn more?  Go to https://www.[employer].net/patiented  Enter H573 in the search box to learn more about \"Substance Use Disorder: Care Instructions.\"  Current as of: November 15, 2023               Content Version: 14.0    5640-9143 [employer].   Care instructions adapted under license by your healthcare professional. If you have questions about a medical condition or this instruction, always ask your healthcare professional. [employer] disclaims any warranty or liability for your use of this information.         "

## 2024-07-26 NOTE — RESULT ENCOUNTER NOTE
"Yury Villalobos  Your attached labs are normal. Keep up the good work!  Please contact the office with any questions or concerns.    Griselda Alvarez \"[place]\" KEM Jin    "

## 2024-08-06 ENCOUNTER — MYC MEDICAL ADVICE (OUTPATIENT)
Dept: FAMILY MEDICINE | Facility: CLINIC | Age: 39
End: 2024-08-06
Payer: COMMERCIAL

## 2024-08-06 DIAGNOSIS — R76.8 ANTI-TPO ANTIBODIES PRESENT: Primary | ICD-10-CM

## 2024-08-07 NOTE — TELEPHONE ENCOUNTER
MP,  Please see below [employer] message and advise.  Printed orders to be scanned into chart  Thanks,  Elisa ALMEIDA RN

## 2024-08-08 NOTE — TELEPHONE ENCOUNTER
"Can refer to endocrinology, referral placed, but they are often booked out for a bit.  And abnormal TPO level though just tends to suggest that you MAY have an issue with your thyroid at some point, it is not typically used to confirm need for treatment when other associated labs are within normal limits.  We will for sure keep checking thyroid levels as often as preferred, but nothing different (except referral) at this time.    Thanks  Griselda \"[place]\" KEM Jin     "

## 2024-08-13 ENCOUNTER — TRANSFERRED RECORDS (OUTPATIENT)
Dept: HEALTH INFORMATION MANAGEMENT | Facility: CLINIC | Age: 39
End: 2024-08-13
Payer: COMMERCIAL

## 2024-09-06 ENCOUNTER — TRANSFERRED RECORDS (OUTPATIENT)
Dept: HEALTH INFORMATION MANAGEMENT | Facility: CLINIC | Age: 39
End: 2024-09-06
Payer: COMMERCIAL

## 2024-09-11 ENCOUNTER — VIRTUAL VISIT (OUTPATIENT)
Dept: URGENT CARE | Facility: CLINIC | Age: 39
End: 2024-09-11
Payer: COMMERCIAL

## 2024-09-11 DIAGNOSIS — J01.90 ACUTE NON-RECURRENT SINUSITIS, UNSPECIFIED LOCATION: Primary | ICD-10-CM

## 2024-09-11 PROCEDURE — 99213 OFFICE O/P EST LOW 20 MIN: CPT | Mod: 95

## 2024-09-11 NOTE — PROGRESS NOTES
rGiselda is a 39 year old female who presents for a billable video visit.    ASSESSMENT/PLAN:  Diagnoses and all orders for this visit:    Acute non-recurrent sinusitis, unspecified location  -     amoxicillin-clavulanate (AUGMENTIN) 875-125 MG tablet; Take 1 tablet by mouth 2 times daily for 7 days.    Pt presents today with sinus complaints. Pt reports sinus congestion, facial and dental pain and headaches x 10. No improvement with over the counter medications. Ddx include viral sinus sinusitis, URI, bacterial sinusitis, AOM, tension headache among others. Based on symptoms feel this is most likely a bacterial sinusitis will start patient on Augmentin here today. Follow up in person if no improvement in symptoms or worsening symptom.     SUBJECTIVE:  Pt presents with facial pressure, congestion, and dental pain x 10 days. [facility] and negative for covid and strep. Has been doing nasal rinses without any improvement. Denies fevers.       ROS: Pertinent ROS neg other than the symptoms noted above in the HPI.     OBJECTIVE:    Vitals not done due to this being a virtual visit    GENERAL: healthy, alert and no distress  EYES: Eyes grossly normal to inspection,conjunctivae and sclerae normal  RESP: Able to speak in complete sentences, no audible wheeze or cough  SKIN: no suspicious lesions or rashes  NEURO: mentation intact and speech normal  PSYCH: mentation appears normal, affect normal/bright    Video-Visit Details    Type of service:  Video Visit  Video Start Time: 0830  Video End Time: 0835    Originating Location: Home    Distant Location:  [facility]     Platform used for Video Visit: JASWINDER Camargo CNP

## 2024-10-02 ENCOUNTER — VIRTUAL VISIT (OUTPATIENT)
Dept: PSYCHIATRY | Facility: CLINIC | Age: 39
End: 2024-10-02
Payer: COMMERCIAL

## 2024-10-02 DIAGNOSIS — F41.0 PANIC ATTACK: ICD-10-CM

## 2024-10-02 DIAGNOSIS — F43.22 ADJUSTMENT DISORDER WITH ANXIOUS MOOD: Primary | ICD-10-CM

## 2024-10-02 PROCEDURE — 90837 PSYTX W PT 60 MINUTES: CPT | Mod: 95

## 2024-10-27 NOTE — PROGRESS NOTES
"[facility]  OUTPATIENT PSYCHOTHERAPY PROGRESS NOTE     Client Name: Griselda Bell          YOB: 1985 (39 year old)            Date of Service:  Oct 2, 2024  Time of Service: 8:07 am to 9:16 am (69 minutes)  Service Type(s): 36156 psychotherapy (53-60 min. with patient and/or family)  Type of service: Telemedicine Psychotherapy  Reason for Telemedicine Visit: patient preference, health precautions  Mode of transmission: Secure real time interactive audio and visual telecommunication system ([employer])  Location of originating and distant sites:  Originating site (patient location): patient's office  Distant site (provider site): HIPAA Compliant location within provider's home/remote setting   Telemedicine Visit: The patient's condition can be safely assessed and treated via synchronous audio and visual telemedicine encounter. Patient/family has agreed to receive services via telemedicine technology.     Diagnoses:   Adjustment disorder with anxious mood (F43.22)  Panic attack - full remission     Individuals Present:   Patient and provider      Treatment goal(s) being addressed:   1. Maintain treatment gains following recent discharge/transfer to \"maintenance mode\" with quarterly booster sessions  2. Continue to support use of grounding and relaxation strategies to maintain remission of panic and anxiety symptoms as needed     Subjective:  Today Marisabel reports ongoing remission of panic and anxiety symptoms. She described her mood as \"great.\" She reported ongoing effective use of coping strategies to manage intermittent worries and stress. Marisabel's father has received a diagnosis of Mild cognitive decline, and Marisabel reports using coping strategies and social support from brother to manage thoughts and feelings of grief and loss related to witnessing father's decline in health and functioning, noting, \"I'm mourning the dad that he was - is no longer.\"        Marisabel received a provisional " diagnosis of Hashimoto's after completing medical workup due to concerns related to persisting fatigue and difficulty losing weight despite adjustments to exercise and diet. Marisabel received a recommendation from her endocrinologist to undergo a 6 month course of Ozempic to support weight loss goals, which she started 3 weeks ago. She reports progress towards weight loss goals.      Marisabel reported with consultation with her PCP she transitioned from sertraline to Wellbutrin (150mg) over the summer. She reports benefiting from Wellbutrin and denies any perceived side effects.      Treatment:   Individual psychotherapy with cognitive behavioral therapy (CBT) framework leveraging  lens. Today, continued to re-establish and enhance longstanding rapport as Marisabel is now engaging in therapy for quarterly booster sessions. Assessed for recent and current symptoms/concerns. With Marisabel, worked to recognize and reinforce sense of mastery and effectiveness in her use of cognitive and behavioral strategies to manage stress and worries, and maintain remission of anxiety and panic symptoms. Supported Marisabel in processing difficult thoughts and feelings of grief and loss related to witnessing father's decline in health and functioning. Continued to support use of mindfulness strategies to observe and describe thoughts and feelings without judgement, and savor, enjoy, and be present in moments with father, journaling to support organization of thoughts and feelings and memories of experiences with father, and efforts to practice setting and reinforcing healthy emotional boundaries and leverage instrumental and emotional support. Continued to check in on needs/wishes for support and plan for quarterly booster sessions. Provided reflective listening, validation, psychoeducation, coaching, and positive reinforcement throughout.      Assessment and Progress:   Appearance:  Appropriately-dressed and groomed   Behavior/relationship  "to examiner/demeanor:  Open, honest, relaxed    Motor activity/EPS: Within normal limits  Speech rate:  Within normal limits  Speech volume:  Within normal limits  Speech articulation:  Within normal limits  Speech coherence:  Within normal limits  Speech spontaneity:  Within normal limit  Mood (subjective report): \"great\"  Affect (objective appearance): Within normal limits, tearful during moments of reflection on father's decline in health/functioning   Thought Process (Associations):  Goal directed  Thought process (Rate):  Within normal limits  Thought content: None  Abnormal Perception: None  Insight:  Within normal limits  Judgment:  Within normal limit     Marisabel has significantly benefited from psychotherapy and medication (previously Zoloft 75 mg; now Wellbutrin 150 mg; gabapentin 100 mg is prescribed for use as needed but has not been used in the past 6 months). She has adjusted well to the first postpartum year and role of parenting two young children. Marisabel achieved full remission of panic symptoms in February 2023 (see noted dated 2/23/23) and full remission of generalized anxiety symptoms in March 2023 (see note dated 3/23/23). She has continued to maintain remission of symptoms since that time. Given Marisabel's progress in treatment, her diagnosis was updated in March 2023 to Adjustment Disorder with anxious mood. We have transitioned to quarterly \"booster\" appointments to support Marisabel in maintaining remission of anxiety and panic symptoms. Current focus of treatment emphasizes management of stressors and maintenance of symptom remission.      Marisabel is an incredibly resilient individual with many areas of personal strengths and sources of support. Marisabel continues to report benefiting from Wellbutrin and she plans to maintain psychiatric medication for the foreseeable future/potentially indefinitely. She has a history of postpartum anxiety and intrusive thoughts/images with onset in previous postpartum " period. No history of postpartum mood concerns. Marisabel experienced infrequent intrusive images during her most recent pregnancy, which responded well to use of cognitive defusion strategies.      Plan:   Continue quarterly booster appointments with next appointment scheduled for 1/8/25.       Joann Moore, PhD, LP  Licensed Clinical Psychologist   , Dept of Psychiatry

## 2024-11-30 ENCOUNTER — VIRTUAL VISIT (OUTPATIENT)
Dept: URGENT CARE | Facility: CLINIC | Age: 39
End: 2024-11-30
Payer: COMMERCIAL

## 2024-11-30 DIAGNOSIS — R53.83 FATIGUE, UNSPECIFIED TYPE: ICD-10-CM

## 2024-11-30 DIAGNOSIS — R05.1 ACUTE COUGH: Primary | ICD-10-CM

## 2024-11-30 PROCEDURE — 99213 OFFICE O/P EST LOW 20 MIN: CPT | Mod: 95

## 2024-11-30 RX ORDER — AZITHROMYCIN 250 MG/1
TABLET, FILM COATED ORAL
Qty: 6 TABLET | Refills: 0 | Status: SHIPPED | OUTPATIENT
Start: 2024-11-30 | End: 2024-12-05

## 2024-11-30 NOTE — PROGRESS NOTES
Marisabel is a 39 year old who is being evaluated via a billable video visit.          Assessment & Plan     Acute cough    - azithromycin (ZITHROMAX) 250 MG tablet; Take 2 tablets (500 mg) by mouth daily for 1 day, THEN 1 tablet (250 mg) daily for 4 days.    Fatigue, unspecified type    - azithromycin (ZITHROMAX) 250 MG tablet; Take 2 tablets (500 mg) by mouth daily for 1 day, THEN 1 tablet (250 mg) daily for 4 days.    Discussed the possibility of pneumonia, and will treat with a Z-Rick with the caveat being if she does not feel she is turning the corner on day 3 she will need to be seen in person for evaluation.  Continue with Mucinex, steam fluids and rest.        No follow-ups on file.    Subjective   Marisabel is a 39 year old, presenting for the following health issues:  No chief complaint on file.    HPI     Patient presents with persistent cough and fatigue, low appetite and slight headache for the past 7 to 8 days.  Thought she has had a cold but symptoms do not seem to be improving.  Her kids at home have been sick with upper respiratory illnesses.  Denies any shortness of breath or wheezing.  No history of asthma.  Chest feels tight and cough has been productive of thick green and yellow sputum        Review of Systems  Constitutional, HEENT, cardiovascular, pulmonary, gi and gu systems are negative, except as otherwise noted.      Objective           Vitals:  No vitals were obtained today due to virtual visit.    Physical Exam   GENERAL: alert and no distress  RESP: No audible wheeze, or visible cyanosis. Dry cough noted  PSYCH: Appropriate affect, tone, and pace of words          Video-Visit Details    Type of service:  Video Visit   Originating Location (pt. Location): Home    Distant Location (provider location):  Off-site  Platform used for Video Visit: Lucian  Signed Electronically by: [facility]

## 2024-12-27 ENCOUNTER — LAB REQUISITION (OUTPATIENT)
Dept: LAB | Facility: CLINIC | Age: 39
End: 2024-12-27
Payer: COMMERCIAL

## 2024-12-27 PROCEDURE — 88305 TISSUE EXAM BY PATHOLOGIST: CPT | Mod: 26 | Performed by: PATHOLOGY

## 2024-12-27 PROCEDURE — 88305 TISSUE EXAM BY PATHOLOGIST: CPT | Mod: TC,ORL | Performed by: OBSTETRICS & GYNECOLOGY

## 2024-12-30 LAB
PATH REPORT.COMMENTS IMP SPEC: NORMAL
PATH REPORT.COMMENTS IMP SPEC: NORMAL
PATH REPORT.FINAL DX SPEC: NORMAL
PATH REPORT.GROSS SPEC: NORMAL
PATH REPORT.MICROSCOPIC SPEC OTHER STN: NORMAL
PATH REPORT.RELEVANT HX SPEC: NORMAL
PHOTO IMAGE: NORMAL

## 2025-01-02 ENCOUNTER — TRANSFERRED RECORDS (OUTPATIENT)
Dept: HEALTH INFORMATION MANAGEMENT | Facility: CLINIC | Age: 40
End: 2025-01-02
Payer: COMMERCIAL

## 2025-01-08 ENCOUNTER — APPOINTMENT (OUTPATIENT)
Dept: PSYCHIATRY | Facility: CLINIC | Age: 40
End: 2025-01-08
Payer: COMMERCIAL

## 2025-01-18 DIAGNOSIS — F41.9 ANXIETY: ICD-10-CM

## 2025-01-20 PROBLEM — S76.912A STRAIN OF LEFT HIP AND THIGH: Status: RESOLVED | Noted: 2024-03-25 | Resolved: 2025-01-20

## 2025-01-20 PROBLEM — S76.012A STRAIN OF LEFT HIP AND THIGH: Status: RESOLVED | Noted: 2024-03-25 | Resolved: 2025-01-20

## 2025-01-20 RX ORDER — BUPROPION HYDROCHLORIDE 150 MG/1
150 TABLET ORAL EVERY MORNING
Qty: 90 TABLET | Refills: 0 | Status: SHIPPED | OUTPATIENT
Start: 2025-01-20

## 2025-01-29 ENCOUNTER — TRANSFERRED RECORDS (OUTPATIENT)
Dept: HEALTH INFORMATION MANAGEMENT | Facility: CLINIC | Age: 40
End: 2025-01-29
Payer: COMMERCIAL

## 2025-02-23 DIAGNOSIS — F41.9 ANXIETY: ICD-10-CM

## 2025-03-10 ENCOUNTER — E-VISIT (OUTPATIENT)
Dept: URGENT CARE | Facility: CLINIC | Age: 40
End: 2025-03-10
Payer: COMMERCIAL

## 2025-03-10 DIAGNOSIS — J01.90 ACUTE SINUSITIS, RECURRENCE NOT SPECIFIED, UNSPECIFIED LOCATION: Primary | ICD-10-CM

## 2025-03-10 NOTE — PATIENT INSTRUCTIONS
Acute Sinusitis: Care Instructions  Overview     Acute sinusitis is an inflammation of the mucous membranes inside the nose and sinuses. Sinuses are the hollow spaces in your skull around the eyes and nose. Acute sinusitis often follows a cold. Acute sinusitis causes thick, discolored mucus that drains from the nose or down the back of the throat. It also can cause pain and pressure in your head and face along with a stuffy or blocked nose.  In most cases, sinusitis gets better on its own in 1 to 2 weeks. But some mild symptoms may last for several weeks. Sometimes antibiotics are needed if there is a bacterial infection.  Follow-up care is a key part of your treatment and safety. Be sure to make and go to all appointments, and call your doctor if you are having problems. It's also a good idea to know your test results and keep a list of the medicines you take.  How can you care for yourself at home?  Use saline (saltwater) nasal washes. This can help keep your nasal passages open and wash out mucus and allergens.  You can buy saline nose washes at a grocery store or drugstore. Follow the instructions on the package.  You can make your own at home. Add 1 teaspoon of non-iodized salt and 1 teaspoon of baking soda to 2 cups of distilled or boiled and cooled water. Fill a squeeze bottle or a nasal cleansing pot (such as a neti pot) with the nasal wash. Then put the tip into your nostril, and lean over the sink. With your mouth open, gently squirt the liquid. Repeat on the other side.  Try a decongestant nasal spray like oxymetazoline (Afrin). Do not use it for more than 3 days in a row. Using it for more than 3 days can make your congestion worse.  If needed, take an over-the-counter pain medicine, such as acetaminophen (Tylenol), ibuprofen (Advil, Motrin), or naproxen (Aleve). Read and follow all instructions on the label.  If the doctor prescribed antibiotics, take them as directed. Do not stop taking them just  "because you feel better. You need to take the full course of antibiotics.  Be careful when taking over-the-counter cold or flu medicines and Tylenol at the same time. Many of these medicines have acetaminophen, which is Tylenol. Read the labels to make sure that you are not taking more than the recommended dose. Too much acetaminophen (Tylenol) can be harmful.  Try a steroid nasal spray. It may help with your symptoms.  Breathe warm, moist air. You can use a steamy shower, a hot bath, or a sink filled with hot water. Avoid cold, dry air. Using a humidifier in your home may help. Follow the directions for cleaning the machine.  When should you call for help?   Call your doctor now or seek immediate medical care if:    You have new or worse swelling, redness, or pain in your face or around one or both of your eyes.     You have double vision or a change in your vision.     You have a high fever.     You have a severe headache and a stiff neck.     You have mental changes, such as feeling confused or much less alert.   Watch closely for changes in your health, and be sure to contact your doctor if:    You are not getting better as expected.   Where can you learn more?  Go to https://www.PAK.net/patiented  Enter I933 in the search box to learn more about \"Acute Sinusitis: Care Instructions.\"  Current as of: September 27, 2023  Content Version: 14.3    2024 [employer].   Care instructions adapted under license by your healthcare professional. If you have questions about a medical condition or this instruction, always ask your healthcare professional. [employer] disclaims any warranty or liability for your use of this information.    You may want to try a nasal lavage (also known as nasal irrigation). You can find over-the-counter products, such as Neti-Pot, at retail locations or make your own at home. Instructions for homemade nasal lavage and more information on the process are available " online at http://www.aafp.org/afp/2009/1115/p1121.html.    Dear Griselda Bell    After reviewing your responses, I've been able to diagnose you with Acute sinusitis, recurrence not specified, unspecified location.      Based on your responses and diagnosis, I have prescribed   Orders Placed This Encounter   Medications     amoxicillin-clavulanate (AUGMENTIN) 875-125 MG tablet     Sig: Take 1 tablet by mouth 2 times daily for 7 days.     Dispense:  14 tablet     Refill:  0      to treat your symptoms. I have sent this to your pharmacy.?     It is also important to stay well hydrated, get lots of rest and take over-the-counter decongestants,?tylenol?or ibuprofen if you?are able to?take those medications per your primary care provider to help relieve discomfort.?     It is important that you take?all of?your prescribed medication even if your symptoms are improving after a few doses.? Taking?all of?your medicine helps prevent the symptoms from returning.?     If your symptoms worsen, you develop severe headache, vomiting, high fever (>102), or are not improving in 7 days, please contact your primary care provider for an appointment or visit any of our convenient Walk-in Care or Urgent Care Centers to be seen which can be found on our website?here.?     Thanks again for choosing?us?as your health care partner,?   ?  Rigo Toscano MD, MD?   Thank you for choosing us for your care. I have placed an order for a prescription so that you can start treatment:  Orders Placed This Encounter   Medications     amoxicillin-clavulanate (AUGMENTIN) 875-125 MG tablet     Sig: Take 1 tablet by mouth 2 times daily for 7 days.     Dispense:  14 tablet     Refill:  0          View your full visit summary for details by clicking on the link below. Your pharmacist will able to address any questions you may have about the medication.     If you're not feeling better within 5-7 days, please schedule an appointment.   You can schedule an appointment right here in [facility], or call [phone]  If the visit is for the same symptoms as your eVisit, we'll refund the cost of your eVisit if seen within seven days.

## 2025-03-15 ENCOUNTER — HEALTH MAINTENANCE LETTER (OUTPATIENT)
Age: 40
End: 2025-03-15

## 2025-04-09 ENCOUNTER — VIRTUAL VISIT (OUTPATIENT)
Dept: PSYCHIATRY | Facility: CLINIC | Age: 40
End: 2025-04-09
Payer: COMMERCIAL

## 2025-04-09 DIAGNOSIS — F43.22 ADJUSTMENT DISORDER WITH ANXIOUS MOOD: Primary | ICD-10-CM

## 2025-04-09 NOTE — PROGRESS NOTES
"[facility]  OUTPATIENT PSYCHOTHERAPY PROGRESS NOTE     Client Name: Griselda Bell          YOB: 1985 (39 year old)            Date of Service:  April 9, 2025  Time of Service: 8:00 am to 8:59 am (59 minutes)  Service Type(s): 77790 psychotherapy (53-60 min. with patient and/or family)  Type of service: Telemedicine Psychotherapy  Reason for Telemedicine Visit: patient preference, health precautions  Mode of transmission: Secure real time interactive audio and visual telecommunication system ([employer])  Location of originating and distant sites:  Originating site (patient location): patient's office  Distant site (provider site): HIPAA Compliant location within Dept of Psychiatry Clinic, [facility]    Telemedicine Visit: The patient's condition can be safely assessed and treated via synchronous audio and visual telemedicine encounter. Patient/family has agreed to receive services via telemedicine technology.     Diagnoses:   Adjustment disorder with anxious mood (F43.22)     Individuals Present:   Patient and provider      Treatment goal(s) being addressed:   1. Maintain treatment gains following recent discharge/transfer to \"maintenance mode\" with quarterly booster sessions  2. Continue to support Marisabel's processing of grief and loss using reproductive trauma lens to honor ruptures to parenting narrative and work towards integration and finding meaning   3. Continue to support use of grounding and relaxation strategies to maintain remission of panic and anxiety symptoms as needed     Subjective:  Today, Marisabel report ongoing remission of panic and anxiety symptoms. About a week ago a close friend (Radha) lost her baby at 16 weeks, triggering feelings of \"overwhelming, profound grief\" and flashbacks/memories of her losses (losses July/August; due dates December/January; flashbacks primarily of experiences of [place] of [place] during [place] and [place] for [place] and " "being asked to describe why she was there as part of obtaining consent). Marisabel's daughter Yady noticed her crying and they talked together about their family's history of loss. Yady has had ongoing questions and interest in talking more (\"I'm supposed to have a sister\"). Marisabel has wondered about returning to a memory box with items she placed there after her losses but has never looked at, such as her baby's footprints. Marisabel has been using exercise to manage grief. She is training for [employer] half marathon and has found it helpful to have \"time to think in my head\" while she is walking or running.     Marisabel described continuing to use coping strategies to support her management of stress and intermittent worries, and her adjustment to her father's diagnosis of Mild cognitive decline. She has been practicing radical acceptance to cope with events that are out of her control, such as her father's zan concerns and financial uncertainty of the economy. Marisabel's father was recently hospitalized and her mother fractured her spine while trying to assist father. Marisabel reported work has been \"insanely busy\" with a new exhibition just opening. She has been working to find the right balance of connection to news and managing digital diet to support her own mental wellbeing. Marisabel reported she has lost 30 lbs and is currently weaning off Ozempic. Marisabel reported significant benefit from reaching her weight loss goals (decreased joint pain, strength, confidence). She has established significant lifestyle changes, including adding strength training and taking out alcohol from her diet. Marisabel's son, Gordon, turned 2-years-old at the end of March. Yady loves  and is thriving at school. No significant parenting stress.      Marisabel denied any medication changes since our last appointment. In consultation with her PCP she transitioned from sertraline to Wellbutrin (150mg) over the summer. She continued to report " benefiting from Wellbutrin and denies any perceived side effects.      Treatment:   Individual psychotherapy with cognitive behavioral therapy (CBT) framework leveraging reproductive trauma lens. Today, continued to maintain/enhance longstanding rapport as Marisabel is continuing to engage in therapy for quarterly booster sessions. Supported Marisabel in processing thoughts and feelings of grief, loss, and uncertainty. Offered reflective listening, psychoeducation on disenfranchised loss, coaching, and positive reinforcement to support Marisabel's use of coping strategies to manage recent feelings of grief and loss, maintain symptom remission, and manage intermittent stress and worries. Offered resources Marisabel can leverage to support her daughter's understanding and processing of grief and loss. Marisabel continued to affirm quarterly booster sessions are meeting her needs/wishes for support and planned for next booster session. Offered reflective listening, validation, psychoeducation, coaching, and positive reinforcement throughout.      Assessment and Progress:   Appearance:  Beautifully dressed and groomed   Behavior/relationship to examiner/demeanor:  Open, honest, relaxed    Motor activity/EPS: Within normal limits  Speech rate:  Within normal limits  Speech volume:  Within normal limits  Speech articulation:  Within normal limits  Speech coherence:  Within normal limits  Speech spontaneity:  Within normal limit  Mood (subjective report): endorsed recent feelings of grief and loss   Affect (objective appearance): Within normal limits  Thought Process (Associations):  Goal directed  Thought process (Rate):  Within normal limits  Thought content: None  Abnormal Perception: None  Insight:  Within normal limits  Judgment:  Within normal limit     Marisabel has significantly benefited from psychotherapy and medication (previously Zoloft 75 mg; now Wellbutrin 150 mg; gabapentin 100 mg is prescribed for use as needed but has not been  "used in the past 12+ months). She achieved full remission of panic symptoms in February 2023 (see noted dated 2/23/23) and full remission of generalized anxiety symptoms in March 2023 (see note dated 3/23/23). She has continued to maintain remission of symptoms since that time. Given Marisabel's progress in treatment, her diagnosis was updated in March 2023 to Adjustment Disorder with anxious mood. Marisabel's father received a diagnosis of mild cognitive decline in the fall of 2024 and a diagnosis of Adjustment Disorder continues to best capture Marisabel's current concerns and desire for support, which is primarily related to managing feelings of grief and loss related to his physical and mental decline. At today's visit we have dropped \"Panic - full remission\" from her diagnosis list to reflect her maintenance of symptom remission for a full year. We continue to meet for quarterly \"booster\" appointments to support Marisabel in maintaining remission of anxiety and panic symptoms. Current focus of treatment emphasizes management of stressors, feelings of grief and loss, and maintenance of symptom remission.      Marisabel is an incredibly resilient individual with many areas of personal strengths and sources of support. She continues to report benefiting from Wellbutrin and she plans to maintain psychiatric medication for the foreseeable future/potentially indefinitely. She has a history of postpartum anxiety and intrusive thoughts/images with onset in previous postpartum period. No history of postpartum mood concerns. Marisabel experienced infrequent intrusive images during her most recent pregnancy, which responded well to use of cognitive defusion strategies.      Plan:   Continue quarterly booster appointments with next appointment scheduled for 6/18.      Today we discussed resources to support Marisabel's daughter's understanding of grief and loss, including the book We Were Gonna have a Baby, but We had an Cuate Instead by Tiff " Kristi.     Joann Moore, PhD, LP  , Dept of Psychiatry

## 2025-04-23 ENCOUNTER — E-VISIT (OUTPATIENT)
Dept: URGENT CARE | Facility: CLINIC | Age: 40
End: 2025-04-23
Payer: COMMERCIAL

## 2025-04-23 DIAGNOSIS — J01.90 ACUTE SINUSITIS WITH SYMPTOMS > 10 DAYS: Primary | ICD-10-CM

## 2025-04-23 NOTE — PATIENT INSTRUCTIONS
Acute Sinusitis: Care Instructions  Overview     Acute sinusitis is an inflammation of the mucous membranes inside the nose and sinuses. Sinuses are the hollow spaces in your skull around the eyes and nose. Acute sinusitis often follows a cold. Acute sinusitis causes thick, discolored mucus that drains from the nose or down the back of the throat. It also can cause pain and pressure in your head and face along with a stuffy or blocked nose.  In most cases, sinusitis gets better on its own in 1 to 2 weeks. But some mild symptoms may last for several weeks. Sometimes antibiotics are needed if there is a bacterial infection.  Follow-up care is a key part of your treatment and safety. Be sure to make and go to all appointments, and call your doctor if you are having problems. It's also a good idea to know your test results and keep a list of the medicines you take.  How can you care for yourself at home?  Use saline (saltwater) nasal washes. This can help keep your nasal passages open and wash out mucus and allergens.  You can buy saline nose washes at a grocery store or drugstore. Follow the instructions on the package.  You can make your own at home. Add 1 teaspoon of non-iodized salt and 1 teaspoon of baking soda to 2 cups of distilled or boiled and cooled water. Fill a squeeze bottle or a nasal cleansing pot (such as a neti pot) with the nasal wash. Then put the tip into your nostril, and lean over the sink. With your mouth open, gently squirt the liquid. Repeat on the other side.  Try a decongestant nasal spray like oxymetazoline (Afrin). Do not use it for more than 3 days in a row. Using it for more than 3 days can make your congestion worse.  If needed, take an over-the-counter pain medicine, such as acetaminophen (Tylenol), ibuprofen (Advil, Motrin), or naproxen (Aleve). Read and follow all instructions on the label.  If the doctor prescribed antibiotics, take them as directed. Do not stop taking them just  "because you feel better. You need to take the full course of antibiotics.  Be careful when taking over-the-counter cold or flu medicines and Tylenol at the same time. Many of these medicines have acetaminophen, which is Tylenol. Read the labels to make sure that you are not taking more than the recommended dose. Too much acetaminophen (Tylenol) can be harmful.  Try a steroid nasal spray. It may help with your symptoms.  Breathe warm, moist air. You can use a steamy shower, a hot bath, or a sink filled with hot water. Avoid cold, dry air. Using a humidifier in your home may help. Follow the directions for cleaning the machine.  When should you call for help?   Call your doctor now or seek immediate medical care if:    You have new or worse swelling, redness, or pain in your face or around one or both of your eyes.     You have double vision or a change in your vision.     You have a high fever.     You have a severe headache and a stiff neck.     You have mental changes, such as feeling confused or much less alert.   Watch closely for changes in your health, and be sure to contact your doctor if:    You are not getting better as expected.   Where can you learn more?  Go to https://www.[employer].net/patiented  Enter I933 in the search box to learn more about \"Acute Sinusitis: Care Instructions.\"  Current as of: October 27, 2024  Content Version: 14.4    9292-3588 [employer].   Care instructions adapted under license by your healthcare professional. If you have questions about a medical condition or this instruction, always ask your healthcare professional. [employer] disclaims any warranty or liability for your use of this information.    Thank you for choosing us for your care. I have placed an order for a prescription so that you can start treatment:  Orders Placed This Encounter   Medications     amoxicillin-clavulanate (AUGMENTIN) 875-125 MG tablet     Sig: Take 1 tablet by mouth 2 times " daily for 7 days.     Dispense:  14 tablet     Refill:  0          View your full visit summary for details by clicking on the link below. Your pharmacist will able to address any questions you may have about the medication.     If you're not feeling better within 5-7 days, please schedule an appointment.  You can schedule an appointment right here in [facility], or call [phone]  If the visit is for the same symptoms as your eVisit, we'll refund the cost of your eVisit if seen within seven days.

## 2025-04-24 DIAGNOSIS — F41.9 ANXIETY: ICD-10-CM

## 2025-04-24 RX ORDER — BUPROPION HYDROCHLORIDE 150 MG/1
150 TABLET ORAL EVERY MORNING
Qty: 90 TABLET | Refills: 0 | Status: SHIPPED | OUTPATIENT
Start: 2025-04-24

## 2025-05-09 ENCOUNTER — HOSPITAL ENCOUNTER (OUTPATIENT)
Dept: MAMMOGRAPHY | Facility: CLINIC | Age: 40
Discharge: HOME | End: 2025-05-09
Attending: FAMILY MEDICINE | Admitting: FAMILY MEDICINE
Payer: COMMERCIAL

## 2025-05-09 DIAGNOSIS — Z12.31 VISIT FOR SCREENING MAMMOGRAM: ICD-10-CM

## 2025-05-09 PROCEDURE — 77063 BREAST TOMOSYNTHESIS BI: CPT

## 2025-05-12 ENCOUNTER — HOSPITAL ENCOUNTER (OUTPATIENT)
Dept: MAMMOGRAPHY | Facility: CLINIC | Age: 40
Discharge: HOME | End: 2025-05-12
Attending: OBSTETRICS & GYNECOLOGY | Admitting: OBSTETRICS & GYNECOLOGY
Payer: COMMERCIAL

## 2025-05-12 DIAGNOSIS — R92.8 ABNORMAL MAMMOGRAM: ICD-10-CM

## 2025-05-12 PROCEDURE — 76642 ULTRASOUND BREAST LIMITED: CPT | Mod: LT

## 2025-05-16 ENCOUNTER — HOSPITAL ENCOUNTER (OUTPATIENT)
Dept: MAMMOGRAPHY | Facility: CLINIC | Age: 40
Discharge: HOME | End: 2025-05-16
Attending: OBSTETRICS & GYNECOLOGY
Payer: COMMERCIAL

## 2025-05-16 DIAGNOSIS — R92.8 ABNORMAL MAMMOGRAM: ICD-10-CM

## 2025-05-16 PROCEDURE — 250N000009 HC RX 250: Performed by: OBSTETRICS & GYNECOLOGY

## 2025-05-16 PROCEDURE — 999N000065 MA POST PROCEDURE LEFT

## 2025-05-16 PROCEDURE — 88305 TISSUE EXAM BY PATHOLOGIST: CPT | Mod: TC | Performed by: OBSTETRICS & GYNECOLOGY

## 2025-05-16 PROCEDURE — 88305 TISSUE EXAM BY PATHOLOGIST: CPT | Mod: 26 | Performed by: PATHOLOGY

## 2025-05-16 PROCEDURE — 272N000615 US BREAST BIOPSY CORE NEEDLE LEFT

## 2025-05-16 RX ADMIN — LIDOCAINE HYDROCHLORIDE 5 ML: 10 INJECTION, SOLUTION INFILTRATION; PERINEURAL at 14:05

## 2025-05-16 NOTE — DISCHARGE INSTRUCTIONS
After Your Breast Biopsy  Bleeding, bruising, and pain  Breast tenderness and some bruising is normal and may last several days. You may wear your bra overnight to support the breast.  You may use an ice pack for pain. Place it over the area for 15 to 20 minutes, several times a day.  You may take over-the-counter pain medicine:  On the day of the biopsy, we recommend Tylenol (acetaminophen) because it does not raise your risk of bleeding.  The next day, you may take an anti-inflammatory medicine (aspirin, ibuprofen, Motrin, Aleve, Advil), unless your doctor tells you not to.  Bandages and showering  Keep your bandage in place until tomorrow morning. Don't get it wet.  If you have small pieces of tape on the skin, leave them in place. They will fall off on their own, or you can remove them after 5 days.  You may shower the next morning after your biopsy.  Activity  No heavy activity (no running, no gym workouts, no lifting, no vacuuming, etc.) on the day of your biopsy.  You may go back to normal activity the next day. But limit what you do if you still have pain or discomfort.  Infection  Infection is rare. Signs of infection include:  Fever (including sweats and chills)  Redness  Pain that gets worse  Fluid draining from the biopsy site  Biopsy results  Results may take up to 5 business days.  A nurse or doctor from the Breast Center will call with your results. We will also send the results to the doctor that ordered your biopsy.  If you have not gotten your results in 5 days, please call the Breast Center.  Call the Breast Center with questions or if:   You have bleeding that lasts more than 20 minutes.  You have pain that you can't control.  You have signs of infection (fever, sweats, chills, redness, increasing pain, or drainage).  After hours, please call the doctor who ordered your biopsy.  For informational purposes only. Not to replace the advice of your health care provider. Copyright   2010 [facility]. All rights reserved. Clinically reviewed by Connie Mcnamara, Director, [facility]. [employer] [id number] - REV 08/23.

## 2025-05-19 ENCOUNTER — TELEPHONE (OUTPATIENT)
Dept: MAMMOGRAPHY | Facility: CLINIC | Age: 40
End: 2025-05-19
Payer: COMMERCIAL

## 2025-05-19 LAB
PATH REPORT.COMMENTS IMP SPEC: NORMAL
PATH REPORT.FINAL DX SPEC: NORMAL
PATH REPORT.GROSS SPEC: NORMAL
PATH REPORT.MICROSCOPIC SPEC OTHER STN: NORMAL
PHOTO IMAGE: NORMAL

## 2025-05-19 NOTE — TELEPHONE ENCOUNTER
Call placed to Marisabel.   verified.     Marisabel was notified that pathology results from her 2025 LEFT BREAST BIOPSY revealed:     [facility]  Griselda Bell [id number]  F, 1985  Surgical Pathology Report (Final result) [id number]  Authorizing Provider: Homa Gonzales MD Ordering Provider: Homa Gonzales MD  Ordering Location: [facility]  Collected: 2025 02:09 PM  Pathologist: Steve Hooper MD Received: 2025 02:34 PM  .  Specimens  A Breast, Left, Breast Biospy Needle  .  .  Final Diagnosis  BREAST, LEFT, 2:00, 4 CM FROM NIPPLE, MASS, ULTRASOUND-GUIDED NEEDLE CORE BIOPSY:  - FIBROADENOMA WITH USUAL DUCTAL HYPERPLASIA  Electronically signed by Steve Hooper MD on 2025 at 1224 CDT     Per [facility] Radiologist, Dr. Olman Cedeño , results are concordant with imaging findings.     Recommendation: Annual Screening Mammograms     Marisabel denies any concerns with the biopsy site. Ordering provider was informed of the results and follow up plan.  I encouraged her to contact her doctor with any further breast concerns.  Patient verbalized understanding and agrees with the plan of care.        Poppy Sarmiento RN BSN  Procedure Nurse  [facility]  [phone]

## 2025-06-06 ENCOUNTER — VIRTUAL VISIT (OUTPATIENT)
Dept: PSYCHIATRY | Facility: CLINIC | Age: 40
End: 2025-06-06
Payer: COMMERCIAL

## 2025-06-06 DIAGNOSIS — F43.22 ADJUSTMENT DISORDER WITH ANXIOUS MOOD: Primary | ICD-10-CM

## 2025-06-06 DIAGNOSIS — F41.8 ANXIETY WITH SOMATIC FEATURES: ICD-10-CM

## 2025-06-06 PROCEDURE — 90837 PSYTX W PT 60 MINUTES: CPT | Mod: 95
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
## 2025-06-10 ENCOUNTER — MYC MEDICAL ADVICE (OUTPATIENT)
Dept: FAMILY MEDICINE | Facility: CLINIC | Age: 40
End: 2025-06-10
Payer: COMMERCIAL

## 2025-06-10 ENCOUNTER — VIRTUAL VISIT (OUTPATIENT)
Dept: PSYCHIATRY | Facility: CLINIC | Age: 40
End: 2025-06-10
Payer: COMMERCIAL

## 2025-06-10 DIAGNOSIS — F43.22 ADJUSTMENT DISORDER WITH ANXIOUS MOOD: Primary | ICD-10-CM

## 2025-06-10 NOTE — TELEPHONE ENCOUNTER
MP,  Please see below MyChart message and advise.  Could advise VV to discuss?  Last visit with you was 07/25/24  Thanks,  Maggie FIELDS RN
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
## 2025-06-11 NOTE — TELEPHONE ENCOUNTER
"Lets go with VV but longer answer is yes, wellbutrin can sometimes make anxiety worse so doing any of the options she mentioned would probably be a good idea.    Thanks  Griselda \"Israel\" KEM Jni   "

## 2025-06-16 ENCOUNTER — VIRTUAL VISIT (OUTPATIENT)
Dept: FAMILY MEDICINE | Facility: CLINIC | Age: 40
End: 2025-06-16
Payer: COMMERCIAL

## 2025-06-16 DIAGNOSIS — F41.1 GAD (GENERALIZED ANXIETY DISORDER): ICD-10-CM

## 2025-06-16 DIAGNOSIS — F41.0 ANXIETY ATTACK: ICD-10-CM

## 2025-06-16 DIAGNOSIS — F43.22 ADJUSTMENT DISORDER WITH ANXIOUS MOOD: ICD-10-CM

## 2025-06-16 PROCEDURE — 98005 SYNCH AUDIO-VIDEO EST LOW 20: CPT | Performed by: PHYSICIAN ASSISTANT

## 2025-06-16 RX ORDER — GABAPENTIN 100 MG/1
100 CAPSULE ORAL DAILY PRN
Qty: 30 CAPSULE | Refills: 3 | Status: SHIPPED | OUTPATIENT
Start: 2025-06-16

## 2025-06-16 RX ORDER — FLUOXETINE 10 MG/1
10 CAPSULE ORAL DAILY
Qty: 90 CAPSULE | Refills: 1 | Status: SHIPPED | OUTPATIENT
Start: 2025-06-16

## 2025-06-16 RX ORDER — HYDROXYZINE HYDROCHLORIDE 25 MG/1
25 TABLET, FILM COATED ORAL
Qty: 30 TABLET | Refills: 3 | Status: SHIPPED | OUTPATIENT
Start: 2025-06-16

## 2025-06-16 RX ORDER — BUPROPION HYDROCHLORIDE 150 MG/1
150 TABLET ORAL EVERY MORNING
Qty: 90 TABLET | Refills: 1 | Status: SHIPPED | OUTPATIENT
Start: 2025-06-16

## 2025-06-16 ASSESSMENT — PATIENT HEALTH QUESTIONNAIRE - PHQ9: SUM OF ALL RESPONSES TO PHQ QUESTIONS 1-9: 1

## 2025-06-16 ASSESSMENT — ANXIETY QUESTIONNAIRES
IF YOU CHECKED OFF ANY PROBLEMS ON THIS QUESTIONNAIRE, HOW DIFFICULT HAVE THESE PROBLEMS MADE IT FOR YOU TO DO YOUR WORK, TAKE CARE OF THINGS AT HOME, OR GET ALONG WITH OTHER PEOPLE: NOT DIFFICULT AT ALL
6. BECOMING EASILY ANNOYED OR IRRITABLE: NOT AT ALL
5. BEING SO RESTLESS THAT IT IS HARD TO SIT STILL: NOT AT ALL
GAD7 TOTAL SCORE: 2
7. FEELING AFRAID AS IF SOMETHING AWFUL MIGHT HAPPEN: NOT AT ALL
8. IF YOU CHECKED OFF ANY PROBLEMS, HOW DIFFICULT HAVE THESE MADE IT FOR YOU TO DO YOUR WORK, TAKE CARE OF THINGS AT HOME, OR GET ALONG WITH OTHER PEOPLE?: NOT DIFFICULT AT ALL
1. FEELING NERVOUS, ANXIOUS, OR ON EDGE: SEVERAL DAYS
7. FEELING AFRAID AS IF SOMETHING AWFUL MIGHT HAPPEN: NOT AT ALL
2. NOT BEING ABLE TO STOP OR CONTROL WORRYING: NOT AT ALL
3. WORRYING TOO MUCH ABOUT DIFFERENT THINGS: NOT AT ALL
4. TROUBLE RELAXING: SEVERAL DAYS

## 2025-06-16 NOTE — PROGRESS NOTES
"Marisabel is a 40 year old who is being evaluated via a billable video visit.    How would you like to obtain your AVS? MyChart  If the video visit is dropped, the invitation should be resent by: Text to cell phone: [phone]  Will anyone else be joining your video visit? No      Assessment & Plan     COLLINS (generalized anxiety disorder)  Adjustment disorder with anxious mood  Anxiety attack  Long standing, chronic, controlled overall but worsening anxiety latley - will continue wellbutrin  mg daily but add low dose prozac at 10 mg daily as well; continue work with therapist and lab only appointment to be done to recheck labs at patient's convenience. Return to clinic with any worsening or changes in symptoms and follow up with PCP for routine care.   - buPROPion (WELLBUTRIN XL) 150 MG 24 hr tablet; Take 1 tablet (150 mg) by mouth every morning.  - Comprehensive metabolic panel; Future  - CBC with Platelets & Differential; Future  - TSH with free T4 reflex; Future  - Iron & Iron Binding Capacity; Future  - Ferritin; Future  - Vitamin B12; Future  - Vitamin D Deficiency; Future  - FLUoxetine (PROZAC) 10 MG capsule; Take 1 capsule (10 mg) by mouth daily.  - gabapentin (NEURONTIN) 100 MG capsule; Take 1 capsule (100 mg) by mouth daily as needed for other (anxiety/panic attacks). Can take up to 200 mg daily if 100 mg isn't adequate for anxiety/panic symptoms.  - hydrOXYzine HCl (ATARAX) 25 MG tablet; Take 1 tablet (25 mg) by mouth nightly as needed for anxiety.      BMI  Estimated body mass index is 30.15 kg/m  as calculated from the following:    Height as of 7/25/24: 1.705 m (5' 7.13\").    Weight as of 7/25/24: 87.6 kg (193 lb 3.2 oz).   Weight management plan: Discussed healthy diet and exercise guidelines      Follow-up  Return in about 3 months (around 9/16/2025) for updated PHQ-9/COLLINS, or sooner with worsening symptoms.    Subjective   Marisabel is a 40 year old, presenting for the following health " "issues:  Depression and Anxiety        6/16/2025    10:06 AM   Additional Questions   Roomed by kirk goodman   Accompanied by self     History of Present Illness       Mental Health Follow-up:  Patient presents to follow-up on Depression & Anxiety (discuss dosage changes, guidance of OB, had ween off sertraline, talks to therapist about phyisiological).Patient's depression since last visit has been:  No change  The patient is not having other symptoms associated with depression.  Patient's anxiety since last visit has been:  Worse  The patient is having other symptoms associated with anxiety.  Any significant life events: health concerns  Patient is feeling anxious or having panic attacks.  Patient has no concerns about alcohol or drug use.       Patient working with therapist regularly.  Previously on zoloft but weaned off abour 6 months ago and has continued with just Wellbutrin  Patient notes worsening anxiety though ~ 1.5 weeks ago  - history of nocturnal panic attacks with heart racing waking her up in the middle of the night; patient has gabapentin (and hydroxyzine) for as needed use with overall good results at night especially  -no recent panic attacks but more anxious overall    Patient seen by endocrinology in the past year as well - Hashimoto's ruled out (for now) but was willing to suggest/prescribe semaglutide for the past 6 months - patient has been off for the past 6 weeks though  - patient has lost 35#  - patient did find similar anecdotal evidence of worsening anxiety when tapering down semaglutide      Review of Systems  Constitutional, HEENT, cardiovascular, pulmonary, gi and gu systems are negative, except as otherwise noted.      Objective    Vitals - Patient Reported  Weight (Patient Reported): 72.6 kg (160 lb)  Height (Patient Reported): 170.2 cm (5' 7\")  BMI (Based on Pt Reported Ht/Wt): 25.06      Vitals:  No vitals were obtained today due to virtual visit.    Physical Exam   GENERAL: alert and no " distress  EYES: Eyes grossly normal to inspection.  No discharge or erythema, or obvious scleral/conjunctival abnormalities.  RESP: No audible wheeze, cough, or visible cyanosis.    SKIN: Visible skin clear. No significant rash, abnormal pigmentation or lesions.  NEURO: Cranial nerves grossly intact.  Mentation and speech appropriate for age.  PSYCH: Appropriate affect, tone, and pace of words          Video-Visit Details    Type of service:  Video Visit   Originating Location (pt. Location): Home    Distant Location (provider location):  Off-site  Platform used for Video Visit: Lucian  Signed Electronically by: Israel Jin PA-C

## 2025-06-17 ENCOUNTER — VIRTUAL VISIT (OUTPATIENT)
Dept: PSYCHIATRY | Facility: CLINIC | Age: 40
End: 2025-06-17
Payer: COMMERCIAL

## 2025-06-17 DIAGNOSIS — F41.8 ANXIETY WITH SOMATIC FEATURES: ICD-10-CM

## 2025-06-17 DIAGNOSIS — F43.22 ADJUSTMENT DISORDER WITH ANXIOUS MOOD: Primary | ICD-10-CM

## 2025-06-17 PROCEDURE — 90832 PSYTX W PT 30 MINUTES: CPT | Mod: 95
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
## 2025-06-25 ENCOUNTER — LAB (OUTPATIENT)
Dept: LAB | Facility: CLINIC | Age: 40
End: 2025-06-25
Payer: COMMERCIAL

## 2025-06-25 DIAGNOSIS — F41.1 GAD (GENERALIZED ANXIETY DISORDER): ICD-10-CM

## 2025-06-25 LAB
ALBUMIN SERPL BCG-MCNC: 4.2 G/DL (ref 3.5–5.2)
ALP SERPL-CCNC: 47 U/L (ref 40–150)
ALT SERPL W P-5'-P-CCNC: 15 U/L (ref 0–50)
ANION GAP SERPL CALCULATED.3IONS-SCNC: 9 MMOL/L (ref 7–15)
AST SERPL W P-5'-P-CCNC: 18 U/L (ref 0–45)
BASOPHILS # BLD AUTO: 0.1 10E3/UL (ref 0–0.2)
BASOPHILS NFR BLD AUTO: 1 %
BILIRUB SERPL-MCNC: 0.4 MG/DL
BUN SERPL-MCNC: 15.4 MG/DL (ref 6–20)
CALCIUM SERPL-MCNC: 9 MG/DL (ref 8.8–10.4)
CHLORIDE SERPL-SCNC: 106 MMOL/L (ref 98–107)
CREAT SERPL-MCNC: 0.76 MG/DL (ref 0.51–0.95)
EGFRCR SERPLBLD CKD-EPI 2021: >90 ML/MIN/1.73M2
EOSINOPHIL # BLD AUTO: 0.2 10E3/UL (ref 0–0.7)
EOSINOPHIL NFR BLD AUTO: 3 %
ERYTHROCYTE [DISTWIDTH] IN BLOOD BY AUTOMATED COUNT: 11.2 % (ref 10–15)
FERRITIN SERPL-MCNC: 28 NG/ML (ref 6–175)
GLUCOSE SERPL-MCNC: 93 MG/DL (ref 70–99)
HCO3 SERPL-SCNC: 25 MMOL/L (ref 22–29)
HCT VFR BLD AUTO: 36.6 % (ref 35–47)
HGB BLD-MCNC: 12.6 G/DL (ref 11.7–15.7)
IMM GRANULOCYTES # BLD: 0 10E3/UL
IMM GRANULOCYTES NFR BLD: 0 %
IRON BINDING CAPACITY (ROCHE): 274 UG/DL (ref 240–430)
IRON SATN MFR SERPL: 37 % (ref 15–46)
IRON SERPL-MCNC: 101 UG/DL (ref 37–145)
LYMPHOCYTES # BLD AUTO: 1.6 10E3/UL (ref 0.8–5.3)
LYMPHOCYTES NFR BLD AUTO: 28 %
MCH RBC QN AUTO: 31.7 PG (ref 26.5–33)
MCHC RBC AUTO-ENTMCNC: 34.4 G/DL (ref 31.5–36.5)
MCV RBC AUTO: 92 FL (ref 78–100)
MONOCYTES # BLD AUTO: 0.4 10E3/UL (ref 0–1.3)
MONOCYTES NFR BLD AUTO: 7 %
NEUTROPHILS # BLD AUTO: 3.5 10E3/UL (ref 1.6–8.3)
NEUTROPHILS NFR BLD AUTO: 62 %
PLATELET # BLD AUTO: 215 10E3/UL (ref 150–450)
POTASSIUM SERPL-SCNC: 4.3 MMOL/L (ref 3.4–5.3)
PROT SERPL-MCNC: 6.4 G/DL (ref 6.4–8.3)
RBC # BLD AUTO: 3.98 10E6/UL (ref 3.8–5.2)
SODIUM SERPL-SCNC: 140 MMOL/L (ref 135–145)
TSH SERPL DL<=0.005 MIU/L-ACNC: 1.77 UIU/ML (ref 0.3–4.2)
VIT B12 SERPL-MCNC: 452 PG/ML (ref 232–1245)
VIT D+METAB SERPL-MCNC: 28 NG/ML (ref 20–50)
WBC # BLD AUTO: 5.6 10E3/UL (ref 4–11)

## 2025-06-25 PROCEDURE — 84443 ASSAY THYROID STIM HORMONE: CPT

## 2025-06-25 PROCEDURE — 36415 COLL VENOUS BLD VENIPUNCTURE: CPT

## 2025-06-25 PROCEDURE — 83550 IRON BINDING TEST: CPT

## 2025-06-25 PROCEDURE — 82728 ASSAY OF FERRITIN: CPT

## 2025-06-25 PROCEDURE — 83540 ASSAY OF IRON: CPT

## 2025-06-25 PROCEDURE — 80053 COMPREHEN METABOLIC PANEL: CPT

## 2025-06-25 PROCEDURE — 85025 COMPLETE CBC W/AUTO DIFF WBC: CPT

## 2025-06-25 PROCEDURE — 82306 VITAMIN D 25 HYDROXY: CPT

## 2025-06-25 PROCEDURE — 82607 VITAMIN B-12: CPT

## 2025-06-26 ENCOUNTER — RESULTS FOLLOW-UP (OUTPATIENT)
Dept: FAMILY MEDICINE | Facility: CLINIC | Age: 40
End: 2025-06-26

## 2025-06-26 ENCOUNTER — VIRTUAL VISIT (OUTPATIENT)
Dept: PSYCHIATRY | Facility: CLINIC | Age: 40
End: 2025-06-26
Payer: COMMERCIAL

## 2025-06-26 DIAGNOSIS — F43.22 ADJUSTMENT DISORDER WITH ANXIOUS MOOD: Primary | ICD-10-CM

## 2025-06-26 DIAGNOSIS — F41.8 ANXIETY WITH SOMATIC FEATURES: ICD-10-CM
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
## 2025-07-08 ENCOUNTER — VIRTUAL VISIT (OUTPATIENT)
Dept: PSYCHIATRY | Facility: CLINIC | Age: 40
End: 2025-07-08
Payer: COMMERCIAL

## 2025-07-08 DIAGNOSIS — F43.22 ADJUSTMENT DISORDER WITH ANXIOUS MOOD: Primary | ICD-10-CM

## 2025-07-08 DIAGNOSIS — F41.8 ANXIETY WITH SOMATIC FEATURES: ICD-10-CM

## 2025-07-08 PROCEDURE — 90837 PSYTX W PT 60 MINUTES: CPT | Mod: 95
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
## 2025-07-29 ENCOUNTER — TELEPHONE (OUTPATIENT)
Dept: PSYCHIATRY | Facility: CLINIC | Age: 40
End: 2025-07-29
Payer: COMMERCIAL

## 2025-07-29 NOTE — TELEPHONE ENCOUNTER
Writer called Patient and LVM reminding of questionnaires to complete prior to tomorrow's appointment with Joann Moore.     Sherin Santana, EMT

## 2025-07-30 ENCOUNTER — VIRTUAL VISIT (OUTPATIENT)
Dept: PSYCHIATRY | Facility: CLINIC | Age: 40
End: 2025-07-30
Payer: COMMERCIAL

## 2025-07-30 DIAGNOSIS — Z63.8 PARENTING STRESS: ICD-10-CM

## 2025-07-30 DIAGNOSIS — F43.22 ADJUSTMENT DISORDER WITH ANXIOUS MOOD: Primary | ICD-10-CM

## 2025-07-30 DIAGNOSIS — F41.0 PANIC DISORDER WITHOUT AGORAPHOBIA WITH PANIC ATTACKS IN PARTIAL REMISSION: ICD-10-CM

## 2025-07-30 PROCEDURE — 90834 PSYTX W PT 45 MINUTES: CPT | Mod: 95

## 2025-07-30 SDOH — SOCIAL STABILITY - SOCIAL INSECURITY: OTHER SPECIFIED PROBLEMS RELATED TO PRIMARY SUPPORT GROUP: Z63.8

## 2025-07-30 NOTE — PROGRESS NOTES
"[facility]  OUTPATIENT PSYCHOTHERAPY PROGRESS NOTE     Client Name: Griselda Bell          YOB: 1985 (40 year old)            Date of Service:  July 30, 2025  Time of Service: 8:04 am - 8:54 am (50 minutes)  Service Type(s): 38058 psychotherapy (38-52 min. with patient and/or family)  Type of service: Telemedicine Psychotherapy  Reason for Telemedicine Visit: patient preference, health precautions  Mode of transmission: Secure real time interactive audio and visual telecommunication system ([employer])  Location of originating and distant sites:  Originating site (patient location): patient's car then patient's work  Distant site (provider site): HIPAA Compliant location within provider home/remote setting    Telemedicine Visit: The patient's condition can be safely assessed and treated via synchronous audio and visual telemedicine encounter. Patient/family has agreed to receive services via telemedicine technology.     Diagnoses:   Adjustment disorder with anxious mood (F43.22)  Panic disorder without agoraphobia with panic attacks in partial remission (F41.0)  Parenting stress (Z63.8)     Individuals Present:   Patient presented alone     Treatment goal(s) being addressed:   1. Continue to support use of coping strategies to maintain recent improvement in panic and anxiety symptoms   2. Support Marisabel in managing parenting stress   3. Support Marisabel in managing feelings of grief and loss related to father's recent diagnosis of mild cognitive decline in fall 2024      Subjective:  Today Marisabel continued to report ongoing improvements in physiological anxiety and irritability:   Anxiety:  She was in [place] last week with college friends, and with the altitude change she experienced shortness of breath, racing heart, and difficulty sleeping. She was proud to report, \"there were some moments where I would have gone straight into a panic attack, and I was able to use my skills!\" She " "also leveraged support from friends, which helped. She did not experience any nocturnal panic attacks. Marisabel reported, \"it ended up being one of those fill your cup weekend.\" No recent intrusive thoughts/images.   Irritability: Marisabel reported improvement in irritability, particularly in the context of managing parenting stress, that she perceives as a benefit of starting Prozac. Marisabel feels she has been able to manage daughter's challenging behaviors with effective use cognitive strategies (\"she's just 6\") and parenting strategies (strengthening daily routines, prioritizing sleep, engaging in special time). She reports, \"things have been really good with Yady.\" The family is beginning to prep for the start of school.     Marisabel has a third interview with a consulting firm on Friday. She's feeling \"really good\" about the company and more clarity that \"I think I'm going to make a change.\" She feels this change will allow her to be more aligned with values and priorities, and offer more flexibility for her to enjoy family and care for herself, her kids, and her parents as her father continues to show signs of physical and mental decline.      No medication change in medications since our last appointment. Marisabel recently worked with her PCP to continue gabapentin, begin hydroxyzine, and add Prozac (10 mg) to complement Wellbutrin. She is looking forward to upcoming consultation with Dr. Santiago on 8/13, with primary consultation questions being:  Why did onset of physiological anxiety happen? Was it side effect of tapering GLP-1 medication as suspected by her prescribing physician? She has no concrete plans to restart this medication at this time but wonders if she may wish to in the future and wonders if consulting with a psychiatrist on taper plan would be helpful in that scenario.   She wants to consider with Dr. Santiago if/how to taper use of hydroxyzine and/or gabapentin. She feels no need to discontinue them at " this time but given symptom improvement she wants to understand and develop a plan to decrease use so she could be empowered to do this sometime down the road.       Treatment:   Individual psychotherapy with cognitive behavioral therapy (CBT) framework leveraging Developmental Parenting approach. Today, continued to support and reinforce Marisabel's use of coping strategies to maintain reduction in anxiety, irritability, and intrusive thoughts/images. Developmental Parenting approach used to support and reinforce Marisabel's use of parental reflective functioning and coping and parenting strategies to manage parenting stress and challenging child behaviors, and support positive parent-child interactions. Offered reflective listening with values-based approach to support Marisabel in continuing to explore and organize insights related to aligning her behaviors and decisions with her values and priorities, with focus today on her goals for coping with anticipatory grief related to father's diagnosis of Mild Cognitive Impairment and physical health decline related to diabetes. Reviewed Marisabel's goals of upcoming medication consultation to support care coordination. Assessed Marisabel's need for support and scheduled next appointment.       Assessment and Progress:   Appearance:  Professionally dressed and groomed, with polish and style   Behavior/relationship to examiner/demeanor:  Open, candid; appeared significantly more relaxed and upbeat, and less irritable and restless, relative to recent appointments  Motor activity/EPS: Within normal limits  Speech rate:  Within normal limits  Speech volume:  Within normal limits  Speech articulation:  Within normal limits  Speech coherence:  Within normal limits  Speech spontaneity:  Within normal limit  Mood (subjective report): endorsed ongoing improvement in anxiety and irritability    Affect (objective appearance): within normal limits  Thought Process (Associations):  Goal  "directed  Thought process (Rate):  Within normal limits  Thought content: None  Abnormal Perception: None  Insight:  Within normal limits  Judgment:  Within normal limit     Marisabel appears to continue to benefit from a recent return to more regular psychotherapy appointments and adjustments to her medication treatment plan to target and manage abrupt onset of recent anxiety symptoms. Symptoms onset in context of tapering off of GLP-1 medication and experiencing a major stressor (daughter nearly drowning at pool party, requiring Marisabel to jump into pool).     Historially, Marisabel achieved full remission of panic symptoms in February 2023 (see noted dated 2/23/23) and full remission of generalized anxiety symptoms in March 2023 (see note dated 3/23/23). Given Marisabel's progress in treatment, her diagnosis was updated in March 2023 to Adjustment Disorder with anxious mood. She maintained remission of symptoms from March 2023 - June 2025 prior to onset of recent anxiety concerns.     Given improvement in Marisabel's symptoms, we updated her diagnoses today to include Adjustment Disorder with anxious mood, Panic disorder in partial remission,and parenting stress. These diagnoses best capture Marisabel's current concerns and desire for support, which is primarily related to maintaining improvement in anxiety and panic symptoms, managing feelings of grief and loss related to her father's physical and mental decline, and managing parenting stress. Prior to recent onset of somatic anxiety we had been meeting for quarterly \"booster\" appointments to support Marisabel in maintaining remission of anxiety and panic symptoms. Today we discussed meeting in a month, and will re-evaluate Marisabel's needs/desire for support at that time.      Marisabel is an incredibly resilient individual with many areas of personal strengths and sources of support. She has a history of postpartum anxiety and intrusive thoughts/images with onset in previous postpartum " period. No history of postpartum mood concerns. Marisabel experienced infrequent intrusive images during her most recent pregnancy, which responded well to use of cognitive defusion strategies.      Plan:   Next appointment scheduled for 8/25 at 8 am.      To review the DBT PLEASE skill Marisabel can use this QR code:                  Joann Moore, PhD, LP  , Dept of Psychiatry

## 2025-07-30 NOTE — Clinical Note
Hi [place]! Thanks for seeing Marisabel for upcoming return consult on 8/13! I can give you more context/review hx if helpful in team this week! She identified some primary consult questions I documented in note here.   Hope you're having a good week!

## 2025-08-13 ENCOUNTER — VIRTUAL VISIT (OUTPATIENT)
Dept: PSYCHIATRY | Facility: CLINIC | Age: 40
End: 2025-08-13
Attending: STUDENT IN AN ORGANIZED HEALTH CARE EDUCATION/TRAINING PROGRAM
Payer: COMMERCIAL

## 2025-08-13 DIAGNOSIS — F43.22 ADJUSTMENT DISORDER WITH ANXIOUS MOOD: Primary | ICD-10-CM

## 2025-08-13 ASSESSMENT — PAIN SCALES - GENERAL: PAINLEVEL_OUTOF10: NO PAIN (0)

## 2025-08-13 ASSESSMENT — PATIENT HEALTH QUESTIONNAIRE - PHQ9
SUM OF ALL RESPONSES TO PHQ QUESTIONS 1-9: 0
10. IF YOU CHECKED OFF ANY PROBLEMS, HOW DIFFICULT HAVE THESE PROBLEMS MADE IT FOR YOU TO DO YOUR WORK, TAKE CARE OF THINGS AT HOME, OR GET ALONG WITH OTHER PEOPLE: NOT DIFFICULT AT ALL
SUM OF ALL RESPONSES TO PHQ QUESTIONS 1-9: 0

## (undated) DEVICE — Device

## (undated) DEVICE — GLOVE PROTEXIS W/NEU-THERA 7.5  2D73TE75

## (undated) DEVICE — TUBING VACUUM COLLECTION SET LG 1/2"X6' BKT-506

## (undated) DEVICE — PACK MINOR SBA15MIFSE

## (undated) DEVICE — PACK LAP CHOLE SLC15LCFSD

## (undated) DEVICE — DEVICE SUTURE GRASPER TROCAR CLOSURE 14GA PMITCSG

## (undated) DEVICE — DRAPE POUCH IRR 1016

## (undated) DEVICE — SUCTION CANNULA UTERINE 16MM CVD 21594

## (undated) DEVICE — DRAPE UNDER BUTTOCK 8483

## (undated) DEVICE — GLOVE PROTEXIS BLUE W/NEU-THERA 6.5  2D73EB65

## (undated) DEVICE — ESU LIGASURE MARYLAND LAPAROSCOPIC SLR/DVDR 5MMX37CM LF1937

## (undated) DEVICE — ENDO TROCAR FIRST ENTRY KII FIOS Z-THRD 05X100MM CTF03

## (undated) DEVICE — NDL 19GA 1.5"

## (undated) DEVICE — SOL WATER IRRIG 1000ML BOTTLE 2F7114

## (undated) DEVICE — LINEN TOWEL PACK X5 5464

## (undated) DEVICE — GLOVE PROTEXIS W/NEU-THERA 6.0  2D73TE60

## (undated) DEVICE — STRAP KNEE/BODY 31143004

## (undated) DEVICE — DRAPE ISOLATION BAG 1003

## (undated) DEVICE — SUCTION VACUUM CANISTER STANDARD W/LID&CAPS 003987-901

## (undated) DEVICE — SOL NACL 0.9% IRRIG 1000ML BOTTLE 2F7124

## (undated) DEVICE — SPONGE BALL KERLIX ROUND XL W/O STRING LATEX 4935

## (undated) DEVICE — LINEN GOWN X4 5410

## (undated) DEVICE — SU VICRYL 2-0 SH 27" J317H

## (undated) DEVICE — SOL NACL 0.9% INJ 1000ML BAG 2B1324X

## (undated) DEVICE — SPONGE RAY-TEC 4X8" 7318

## (undated) DEVICE — SYR 01ML 27GA 0.5" NDL TBC 309623

## (undated) DEVICE — GLOVE PROTEXIS MICRO 6.5  2D73PM65

## (undated) DEVICE — SUCTION TIP YANKAUER W/O VENT K86

## (undated) DEVICE — ESU HOLDER LAP INST DISP PURPLE LONG 330MM H-PRO-330

## (undated) DEVICE — DECANTER TRANSFER DEVICE 2008S

## (undated) DEVICE — SYSTEM CLEARIFY VISUALIZATION 21-345

## (undated) DEVICE — PEN MARKING SKIN W/LABELS 31145918

## (undated) DEVICE — GLOVE PROTEXIS MICRO 6.0  2D73PM60

## (undated) DEVICE — ENDO TROCAR FIRST ENTRY KII FIOS Z-THRD 12X100MM CTF73

## (undated) DEVICE — SU VICRYL 3-0 SH 27" J316H

## (undated) DEVICE — SU MONOCRYL 4-0 PS-2 18" UND Y496G

## (undated) DEVICE — PAD CHUX UNDERPAD 30X36" P3036C

## (undated) DEVICE — STPL RELOAD REG TISSUE ECHELON 45 X 3.6MM BLUE GST45B

## (undated) DEVICE — SUCTION IRR STRYKERFLOW II W/TIP 250-070-520

## (undated) DEVICE — SPECIMEN TRAP VACUUM SUCTION SAFETOUCH 003853-902

## (undated) DEVICE — CATH INTERMITTENT CLEAN-CATH FEMALE 14FR 6" VINYL LF 420614

## (undated) DEVICE — PREP CHLORAPREP 26ML TINTED ORANGE  260815

## (undated) DEVICE — SU VICRYL 0 UR-6 27" J603H

## (undated) DEVICE — SUCTION CANISTER MEDIVAC LINER 3000ML W/LID 65651-530

## (undated) DEVICE — SYR 10ML SLIP TIP W/O NDL

## (undated) DEVICE — ESU GROUND PAD UNIVERSAL W/O CORD

## (undated) DEVICE — SUCTION CANNULA UTERINE 12MM CVD 022112-10

## (undated) DEVICE — TUBING SUCTION VACUUM COLLECTION 6FT 610

## (undated) DEVICE — DRAPE MINOR PROCEDURE LAP 29496

## (undated) DEVICE — LINEN LEG DRAPE 5457

## (undated) DEVICE — ENDO TROCAR SLEEVE KII Z-THREADED 05X100MM CTS02

## (undated) DEVICE — SWAB PROCTO 16" 2/PK 32-046

## (undated) DEVICE — ENDO SCOPE WARMER LF TM500

## (undated) DEVICE — TUBING VACUUM COLLECTION 6FT 23116

## (undated) DEVICE — GLOVE PROTEXIS BLUE W/NEU-THERA 7.5  2D73EB75

## (undated) RX ORDER — DEXAMETHASONE SODIUM PHOSPHATE 4 MG/ML
INJECTION, SOLUTION INTRA-ARTICULAR; INTRALESIONAL; INTRAMUSCULAR; INTRAVENOUS; SOFT TISSUE
Status: DISPENSED
Start: 2019-11-14

## (undated) RX ORDER — ONDANSETRON 2 MG/ML
INJECTION INTRAMUSCULAR; INTRAVENOUS
Status: DISPENSED
Start: 2019-11-14

## (undated) RX ORDER — KETOROLAC TROMETHAMINE 30 MG/ML
INJECTION, SOLUTION INTRAMUSCULAR; INTRAVENOUS
Status: DISPENSED
Start: 2021-07-28

## (undated) RX ORDER — LIDOCAINE HYDROCHLORIDE 10 MG/ML
INJECTION, SOLUTION EPIDURAL; INFILTRATION; INTRACAUDAL; PERINEURAL
Status: DISPENSED
Start: 2021-04-19

## (undated) RX ORDER — GLYCOPYRROLATE 0.2 MG/ML
INJECTION, SOLUTION INTRAMUSCULAR; INTRAVENOUS
Status: DISPENSED
Start: 2021-04-19

## (undated) RX ORDER — BUPIVACAINE HYDROCHLORIDE AND EPINEPHRINE 2.5; 5 MG/ML; UG/ML
INJECTION, SOLUTION EPIDURAL; INFILTRATION; INTRACAUDAL; PERINEURAL
Status: DISPENSED
Start: 2019-11-14

## (undated) RX ORDER — LIDOCAINE HYDROCHLORIDE 20 MG/ML
INJECTION, SOLUTION EPIDURAL; INFILTRATION; INTRACAUDAL; PERINEURAL
Status: DISPENSED
Start: 2021-04-19

## (undated) RX ORDER — LIDOCAINE HYDROCHLORIDE 20 MG/ML
INJECTION, SOLUTION EPIDURAL; INFILTRATION; INTRACAUDAL; PERINEURAL
Status: DISPENSED
Start: 2021-07-28

## (undated) RX ORDER — DOXYCYCLINE 100 MG/1
CAPSULE ORAL
Status: DISPENSED
Start: 2021-07-28

## (undated) RX ORDER — HYDROCODONE BITARTRATE AND ACETAMINOPHEN 5; 325 MG/1; MG/1
TABLET ORAL
Status: DISPENSED
Start: 2021-04-19

## (undated) RX ORDER — FENTANYL CITRATE 50 UG/ML
INJECTION, SOLUTION INTRAMUSCULAR; INTRAVENOUS
Status: DISPENSED
Start: 2021-04-19

## (undated) RX ORDER — OXYCODONE HYDROCHLORIDE 5 MG/1
TABLET ORAL
Status: DISPENSED
Start: 2021-07-28

## (undated) RX ORDER — FENTANYL CITRATE 50 UG/ML
INJECTION, SOLUTION INTRAMUSCULAR; INTRAVENOUS
Status: DISPENSED
Start: 2019-11-14

## (undated) RX ORDER — LIDOCAINE HYDROCHLORIDE 10 MG/ML
INJECTION, SOLUTION EPIDURAL; INFILTRATION; INTRACAUDAL; PERINEURAL
Status: DISPENSED
Start: 2019-11-14

## (undated) RX ORDER — PROPOFOL 10 MG/ML
INJECTION, EMULSION INTRAVENOUS
Status: DISPENSED
Start: 2021-07-28

## (undated) RX ORDER — PROPOFOL 10 MG/ML
INJECTION, EMULSION INTRAVENOUS
Status: DISPENSED
Start: 2019-11-14

## (undated) RX ORDER — LIDOCAINE HYDROCHLORIDE 10 MG/ML
INJECTION, SOLUTION EPIDURAL; INFILTRATION; INTRACAUDAL; PERINEURAL
Status: DISPENSED
Start: 2020-08-05

## (undated) RX ORDER — HYDROMORPHONE HYDROCHLORIDE 1 MG/ML
INJECTION, SOLUTION INTRAMUSCULAR; INTRAVENOUS; SUBCUTANEOUS
Status: DISPENSED
Start: 2021-04-19

## (undated) RX ORDER — FENTANYL CITRATE 50 UG/ML
INJECTION, SOLUTION INTRAMUSCULAR; INTRAVENOUS
Status: DISPENSED
Start: 2020-08-05

## (undated) RX ORDER — DOXYCYCLINE 100 MG/10ML
INJECTION, POWDER, LYOPHILIZED, FOR SOLUTION INTRAVENOUS
Status: DISPENSED
Start: 2020-08-05

## (undated) RX ORDER — PROPOFOL 10 MG/ML
INJECTION, EMULSION INTRAVENOUS
Status: DISPENSED
Start: 2021-04-19

## (undated) RX ORDER — ONDANSETRON 2 MG/ML
INJECTION INTRAMUSCULAR; INTRAVENOUS
Status: DISPENSED
Start: 2020-08-05

## (undated) RX ORDER — LIDOCAINE HYDROCHLORIDE 20 MG/ML
INJECTION, SOLUTION EPIDURAL; INFILTRATION; INTRACAUDAL; PERINEURAL
Status: DISPENSED
Start: 2020-08-05

## (undated) RX ORDER — ONDANSETRON 2 MG/ML
INJECTION INTRAMUSCULAR; INTRAVENOUS
Status: DISPENSED
Start: 2021-04-19

## (undated) RX ORDER — ACETAMINOPHEN 325 MG/1
TABLET ORAL
Status: DISPENSED
Start: 2021-07-28

## (undated) RX ORDER — DEXAMETHASONE SODIUM PHOSPHATE 4 MG/ML
INJECTION, SOLUTION INTRA-ARTICULAR; INTRALESIONAL; INTRAMUSCULAR; INTRAVENOUS; SOFT TISSUE
Status: DISPENSED
Start: 2021-04-19

## (undated) RX ORDER — ONDANSETRON 2 MG/ML
INJECTION INTRAMUSCULAR; INTRAVENOUS
Status: DISPENSED
Start: 2021-07-28

## (undated) RX ORDER — METHYLERGONOVINE MALEATE 0.2 MG/ML
INJECTION INTRAVENOUS
Status: DISPENSED
Start: 2020-08-05

## (undated) RX ORDER — HYDROMORPHONE HYDROCHLORIDE 1 MG/ML
INJECTION, SOLUTION INTRAMUSCULAR; INTRAVENOUS; SUBCUTANEOUS
Status: DISPENSED
Start: 2020-08-05

## (undated) RX ORDER — NEOSTIGMINE METHYLSULFATE 1 MG/ML
VIAL (ML) INJECTION
Status: DISPENSED
Start: 2021-04-19

## (undated) RX ORDER — LIDOCAINE HYDROCHLORIDE 20 MG/ML
INJECTION, SOLUTION EPIDURAL; INFILTRATION; INTRACAUDAL; PERINEURAL
Status: DISPENSED
Start: 2019-11-14

## (undated) RX ORDER — GLYCOPYRROLATE 0.2 MG/ML
INJECTION, SOLUTION INTRAMUSCULAR; INTRAVENOUS
Status: DISPENSED
Start: 2019-11-14

## (undated) RX ORDER — KETOROLAC TROMETHAMINE 30 MG/ML
INJECTION, SOLUTION INTRAMUSCULAR; INTRAVENOUS
Status: DISPENSED
Start: 2021-04-19

## (undated) RX ORDER — ACETAMINOPHEN 325 MG/1
TABLET ORAL
Status: DISPENSED
Start: 2019-11-14

## (undated) RX ORDER — BUPIVACAINE HYDROCHLORIDE AND EPINEPHRINE 5; 5 MG/ML; UG/ML
INJECTION, SOLUTION EPIDURAL; INTRACAUDAL; PERINEURAL
Status: DISPENSED
Start: 2021-04-19

## (undated) RX ORDER — LIDOCAINE HYDROCHLORIDE 10 MG/ML
INJECTION, SOLUTION EPIDURAL; INFILTRATION; INTRACAUDAL; PERINEURAL
Status: DISPENSED
Start: 2021-07-28

## (undated) RX ORDER — FENTANYL CITRATE 50 UG/ML
INJECTION, SOLUTION INTRAMUSCULAR; INTRAVENOUS
Status: DISPENSED
Start: 2021-07-28

## (undated) RX ORDER — METHYLERGONOVINE MALEATE 0.2 MG/ML
INJECTION INTRAVENOUS
Status: DISPENSED
Start: 2021-07-28

## (undated) RX ORDER — VASOPRESSIN 20 U/ML
INJECTION PARENTERAL
Status: DISPENSED
Start: 2021-07-28

## (undated) RX ORDER — MISOPROSTOL 200 UG/1
TABLET ORAL
Status: DISPENSED
Start: 2021-07-28